# Patient Record
Sex: FEMALE | Race: WHITE | Employment: OTHER | URBAN - METROPOLITAN AREA
[De-identification: names, ages, dates, MRNs, and addresses within clinical notes are randomized per-mention and may not be internally consistent; named-entity substitution may affect disease eponyms.]

---

## 2017-01-13 ENCOUNTER — ANTI-COAG VISIT (OUTPATIENT)
Dept: PHARMACY | Facility: CLINIC | Age: 77
End: 2017-01-13

## 2017-01-13 DIAGNOSIS — I48.91 ATRIAL FIBRILLATION WITH RVR (HCC): ICD-10-CM

## 2017-01-13 LAB — INR BLD: 2.6

## 2017-01-16 ENCOUNTER — TELEPHONE (OUTPATIENT)
Dept: ORTHOPEDIC SURGERY | Age: 77
End: 2017-01-16

## 2017-01-17 ENCOUNTER — OFFICE VISIT (OUTPATIENT)
Dept: INTERNAL MEDICINE | Age: 77
End: 2017-01-17

## 2017-01-17 ENCOUNTER — OFFICE VISIT (OUTPATIENT)
Dept: CARDIOLOGY CLINIC | Age: 77
End: 2017-01-17

## 2017-01-17 VITALS
WEIGHT: 272 LBS | HEIGHT: 67 IN | BODY MASS INDEX: 42.69 KG/M2 | DIASTOLIC BLOOD PRESSURE: 80 MMHG | HEART RATE: 78 BPM | RESPIRATION RATE: 18 BRPM | TEMPERATURE: 98.4 F | SYSTOLIC BLOOD PRESSURE: 124 MMHG

## 2017-01-17 VITALS — DIASTOLIC BLOOD PRESSURE: 78 MMHG | HEART RATE: 80 BPM | SYSTOLIC BLOOD PRESSURE: 118 MMHG

## 2017-01-17 DIAGNOSIS — E79.0 HYPERURICEMIA: ICD-10-CM

## 2017-01-17 DIAGNOSIS — M17.11 PRIMARY OSTEOARTHRITIS OF RIGHT KNEE: ICD-10-CM

## 2017-01-17 DIAGNOSIS — Z86.59 HISTORY OF DEPRESSION: ICD-10-CM

## 2017-01-17 DIAGNOSIS — M1A.9XX0 CHRONIC GOUT WITHOUT TOPHUS, UNSPECIFIED CAUSE, UNSPECIFIED SITE: ICD-10-CM

## 2017-01-17 DIAGNOSIS — R92.8 ABNORMAL MAMMOGRAM: ICD-10-CM

## 2017-01-17 DIAGNOSIS — R06.09 DOE (DYSPNEA ON EXERTION): ICD-10-CM

## 2017-01-17 DIAGNOSIS — I48.91 ATRIAL FIBRILLATION WITH RVR (HCC): Primary | ICD-10-CM

## 2017-01-17 DIAGNOSIS — E79.0 HYPERURICEMIA: Primary | ICD-10-CM

## 2017-01-17 DIAGNOSIS — I10 ESSENTIAL HYPERTENSION: ICD-10-CM

## 2017-01-17 DIAGNOSIS — N18.2 CHRONIC KIDNEY DISEASE (CKD), STAGE II (MILD): ICD-10-CM

## 2017-01-17 DIAGNOSIS — I48.91 ATRIAL FIBRILLATION WITH RVR (HCC): ICD-10-CM

## 2017-01-17 DIAGNOSIS — Z01.818 PREOP EXAM FOR INTERNAL MEDICINE: Primary | ICD-10-CM

## 2017-01-17 DIAGNOSIS — E78.00 PURE HYPERCHOLESTEROLEMIA: ICD-10-CM

## 2017-01-17 LAB
A/G RATIO: 1.5 (ref 1.1–2.2)
ALBUMIN SERPL-MCNC: 4.1 G/DL (ref 3.4–5)
ALP BLD-CCNC: 71 U/L (ref 40–129)
ALT SERPL-CCNC: 12 U/L (ref 10–40)
ANION GAP SERPL CALCULATED.3IONS-SCNC: 16 MMOL/L (ref 3–16)
APTT: 43 SEC (ref 25.2–36.4)
AST SERPL-CCNC: 16 U/L (ref 15–37)
BASOPHILS ABSOLUTE: 0 K/UL (ref 0–0.2)
BASOPHILS RELATIVE PERCENT: 0.5 %
BILIRUB SERPL-MCNC: 0.4 MG/DL (ref 0–1)
BUN BLDV-MCNC: 27 MG/DL (ref 7–20)
CALCIUM SERPL-MCNC: 9.1 MG/DL (ref 8.3–10.6)
CHLORIDE BLD-SCNC: 103 MMOL/L (ref 99–110)
CO2: 23 MMOL/L (ref 21–32)
CREAT SERPL-MCNC: 1.6 MG/DL (ref 0.6–1.2)
EOSINOPHILS ABSOLUTE: 0.1 K/UL (ref 0–0.6)
EOSINOPHILS RELATIVE PERCENT: 1.8 %
GFR AFRICAN AMERICAN: 38
GFR NON-AFRICAN AMERICAN: 31
GLOBULIN: 2.8 G/DL
GLUCOSE BLD-MCNC: 98 MG/DL (ref 70–99)
HCT VFR BLD CALC: 43.1 % (ref 36–48)
HEMOGLOBIN: 13.8 G/DL (ref 12–16)
INR BLD: 2.45 (ref 0.85–1.16)
LYMPHOCYTES ABSOLUTE: 2.2 K/UL (ref 1–5.1)
LYMPHOCYTES RELATIVE PERCENT: 27.3 %
MCH RBC QN AUTO: 29.6 PG (ref 26–34)
MCHC RBC AUTO-ENTMCNC: 32.1 G/DL (ref 31–36)
MCV RBC AUTO: 92 FL (ref 80–100)
MONOCYTES ABSOLUTE: 0.7 K/UL (ref 0–1.3)
MONOCYTES RELATIVE PERCENT: 8.8 %
NEUTROPHILS ABSOLUTE: 4.9 K/UL (ref 1.7–7.7)
NEUTROPHILS RELATIVE PERCENT: 61.6 %
PDW BLD-RTO: 18 % (ref 12.4–15.4)
PLATELET # BLD: 250 K/UL (ref 135–450)
PMV BLD AUTO: 8.6 FL (ref 5–10.5)
POTASSIUM SERPL-SCNC: 4.9 MMOL/L (ref 3.5–5.1)
PROTHROMBIN TIME: 28.5 SEC (ref 9.8–13)
RBC # BLD: 4.68 M/UL (ref 4–5.2)
SODIUM BLD-SCNC: 142 MMOL/L (ref 136–145)
TOTAL PROTEIN: 6.9 G/DL (ref 6.4–8.2)
URIC ACID, SERUM: 4 MG/DL (ref 2.6–6)
WBC # BLD: 7.9 K/UL (ref 4–11)

## 2017-01-17 PROCEDURE — 1090F PRES/ABSN URINE INCON ASSESS: CPT | Performed by: INTERNAL MEDICINE

## 2017-01-17 PROCEDURE — 1036F TOBACCO NON-USER: CPT | Performed by: INTERNAL MEDICINE

## 2017-01-17 PROCEDURE — G8428 CUR MEDS NOT DOCUMENT: HCPCS | Performed by: INTERNAL MEDICINE

## 2017-01-17 PROCEDURE — G8419 CALC BMI OUT NRM PARAM NOF/U: HCPCS | Performed by: INTERNAL MEDICINE

## 2017-01-17 PROCEDURE — 4040F PNEUMOC VAC/ADMIN/RCVD: CPT | Performed by: INTERNAL MEDICINE

## 2017-01-17 PROCEDURE — 99213 OFFICE O/P EST LOW 20 MIN: CPT | Performed by: INTERNAL MEDICINE

## 2017-01-17 PROCEDURE — G8399 PT W/DXA RESULTS DOCUMENT: HCPCS | Performed by: INTERNAL MEDICINE

## 2017-01-17 PROCEDURE — 99215 OFFICE O/P EST HI 40 MIN: CPT | Performed by: INTERNAL MEDICINE

## 2017-01-17 PROCEDURE — G8427 DOCREV CUR MEDS BY ELIG CLIN: HCPCS | Performed by: INTERNAL MEDICINE

## 2017-01-17 PROCEDURE — G8484 FLU IMMUNIZE NO ADMIN: HCPCS | Performed by: INTERNAL MEDICINE

## 2017-01-17 PROCEDURE — 1123F ACP DISCUSS/DSCN MKR DOCD: CPT | Performed by: INTERNAL MEDICINE

## 2017-01-17 ASSESSMENT — ENCOUNTER SYMPTOMS
SHORTNESS OF BREATH: 1
WHEEZING: 0
COUGH: 0
CHOKING: 0
CHEST TIGHTNESS: 0
ABDOMINAL PAIN: 0
BACK PAIN: 0
COLOR CHANGE: 0

## 2017-01-18 ENCOUNTER — ANTI-COAG VISIT (OUTPATIENT)
Dept: INTERNAL MEDICINE | Age: 77
End: 2017-01-18

## 2017-01-18 DIAGNOSIS — I48.91 ATRIAL FIBRILLATION WITH RVR (HCC): ICD-10-CM

## 2017-01-18 LAB
ESTIMATED AVERAGE GLUCOSE: 108.3 MG/DL
HBA1C MFR BLD: 5.4 %

## 2017-01-19 ENCOUNTER — TELEPHONE (OUTPATIENT)
Dept: CARDIOLOGY CLINIC | Age: 77
End: 2017-01-19

## 2017-01-19 ENCOUNTER — HOSPITAL ENCOUNTER (OUTPATIENT)
Dept: OTHER | Age: 77
Discharge: HOME | End: 2017-01-19
Attending: ORTHOPAEDIC SURGERY | Admitting: ORTHOPAEDIC SURGERY

## 2017-01-19 LAB
BACTERIA: ABNORMAL /HPF
BILIRUBIN URINE: NEGATIVE
BLOOD, URINE: ABNORMAL
CLARITY: CLEAR
COLOR: YELLOW
EPITHELIAL CELLS, UA: ABNORMAL /HPF
GLUCOSE URINE: NEGATIVE MG/DL
KETONES, URINE: NEGATIVE MG/DL
LEUKOCYTE ESTERASE, URINE: NEGATIVE
MICROSCOPIC EXAMINATION: YES
NITRITE, URINE: NEGATIVE
PH UA: 6
PROTEIN UA: NEGATIVE MG/DL
RBC UA: ABNORMAL /HPF (ref 0–2)
RENAL EPITHELIAL, UA: ABNORMAL /HPF
SPECIFIC GRAVITY UA: 1.02
URINE TYPE: ABNORMAL
UROBILINOGEN, URINE: 0.2 E.U./DL
WBC UA: ABNORMAL /HPF (ref 0–5)

## 2017-01-21 LAB — URINE CULTURE, ROUTINE: NORMAL

## 2017-01-26 PROBLEM — Z96.651 STATUS POST TOTAL RIGHT KNEE REPLACEMENT: Status: ACTIVE | Noted: 2017-01-26

## 2017-01-30 ENCOUNTER — CARE COORDINATION (OUTPATIENT)
Dept: CASE MANAGEMENT | Age: 77
End: 2017-01-30

## 2017-01-31 ENCOUNTER — ANTI-COAG VISIT (OUTPATIENT)
Dept: PHARMACY | Facility: CLINIC | Age: 77
End: 2017-01-31

## 2017-01-31 DIAGNOSIS — I48.20 CHRONIC ATRIAL FIBRILLATION (HCC): ICD-10-CM

## 2017-01-31 LAB — INR BLD: 1.7

## 2017-02-01 ENCOUNTER — ANTI-COAG VISIT (OUTPATIENT)
Dept: PHARMACY | Facility: CLINIC | Age: 77
End: 2017-02-01

## 2017-02-01 LAB — INR BLD: 1.8

## 2017-02-02 ENCOUNTER — CARE COORDINATION (OUTPATIENT)
Dept: CASE MANAGEMENT | Age: 77
End: 2017-02-02

## 2017-02-03 ENCOUNTER — ANTI-COAG VISIT (OUTPATIENT)
Dept: PHARMACY | Facility: CLINIC | Age: 77
End: 2017-02-03

## 2017-02-03 LAB — INR BLD: 1.9

## 2017-02-06 ENCOUNTER — ANTI-COAG VISIT (OUTPATIENT)
Dept: PHARMACY | Facility: CLINIC | Age: 77
End: 2017-02-06

## 2017-02-06 ENCOUNTER — CARE COORDINATION (OUTPATIENT)
Dept: CASE MANAGEMENT | Age: 77
End: 2017-02-06

## 2017-02-06 DIAGNOSIS — I48.20 CHRONIC ATRIAL FIBRILLATION (HCC): ICD-10-CM

## 2017-02-06 LAB — INR BLD: 2.1

## 2017-02-09 ENCOUNTER — CARE COORDINATION (OUTPATIENT)
Dept: CASE MANAGEMENT | Age: 77
End: 2017-02-09

## 2017-02-10 ENCOUNTER — ANTI-COAG VISIT (OUTPATIENT)
Dept: PHARMACY | Facility: CLINIC | Age: 77
End: 2017-02-10

## 2017-02-10 ENCOUNTER — OFFICE VISIT (OUTPATIENT)
Dept: ORTHOPEDIC SURGERY | Age: 77
End: 2017-02-10

## 2017-02-10 VITALS — BODY MASS INDEX: 42.7 KG/M2 | HEIGHT: 67 IN | WEIGHT: 272.05 LBS

## 2017-02-10 DIAGNOSIS — M25.561 RIGHT KNEE PAIN, UNSPECIFIED CHRONICITY: Primary | ICD-10-CM

## 2017-02-10 DIAGNOSIS — I48.20 CHRONIC ATRIAL FIBRILLATION (HCC): ICD-10-CM

## 2017-02-10 DIAGNOSIS — Z96.651 STATUS POST TOTAL RIGHT KNEE REPLACEMENT: ICD-10-CM

## 2017-02-10 LAB — INR BLD: 2.2

## 2017-02-10 PROCEDURE — 73562 X-RAY EXAM OF KNEE 3: CPT | Performed by: PHYSICIAN ASSISTANT

## 2017-02-10 PROCEDURE — 99024 POSTOP FOLLOW-UP VISIT: CPT | Performed by: PHYSICIAN ASSISTANT

## 2017-02-13 ENCOUNTER — CARE COORDINATION (OUTPATIENT)
Dept: CASE MANAGEMENT | Age: 77
End: 2017-02-13

## 2017-02-15 ENCOUNTER — HOSPITAL ENCOUNTER (OUTPATIENT)
Dept: PHYSICAL THERAPY | Age: 77
Discharge: HOME | End: 2017-02-28
Admitting: ORTHOPAEDIC SURGERY

## 2017-02-17 ENCOUNTER — ANTI-COAG VISIT (OUTPATIENT)
Dept: PHARMACY | Facility: CLINIC | Age: 77
End: 2017-02-17

## 2017-02-17 DIAGNOSIS — I48.20 CHRONIC ATRIAL FIBRILLATION (HCC): ICD-10-CM

## 2017-02-17 LAB — INR BLD: 3.2

## 2017-02-20 ENCOUNTER — HOSPITAL ENCOUNTER (OUTPATIENT)
Dept: PHYSICAL THERAPY | Age: 77
Discharge: HOME | End: 2017-02-20
Admitting: ORTHOPAEDIC SURGERY

## 2017-02-22 ENCOUNTER — CARE COORDINATION (OUTPATIENT)
Dept: CASE MANAGEMENT | Age: 77
End: 2017-02-22

## 2017-02-22 ENCOUNTER — HOSPITAL ENCOUNTER (OUTPATIENT)
Dept: PHYSICAL THERAPY | Age: 77
Discharge: HOME | End: 2017-02-22
Admitting: ORTHOPAEDIC SURGERY

## 2017-02-24 ENCOUNTER — ANTI-COAG VISIT (OUTPATIENT)
Dept: PHARMACY | Facility: CLINIC | Age: 77
End: 2017-02-24

## 2017-02-24 DIAGNOSIS — I48.20 CHRONIC ATRIAL FIBRILLATION (HCC): ICD-10-CM

## 2017-02-24 LAB — INR BLD: 2.8

## 2017-02-24 RX ORDER — ALLOPURINOL 100 MG/1
TABLET ORAL
Qty: 90 TABLET | Refills: 0 | Status: SHIPPED | OUTPATIENT
Start: 2017-02-24 | End: 2017-05-26 | Stop reason: SDUPTHER

## 2017-02-28 ENCOUNTER — CARE COORDINATION (OUTPATIENT)
Dept: CASE MANAGEMENT | Age: 77
End: 2017-02-28

## 2017-02-28 ENCOUNTER — HOSPITAL ENCOUNTER (OUTPATIENT)
Dept: PHYSICAL THERAPY | Age: 77
Discharge: HOME | End: 2017-02-28
Admitting: ORTHOPAEDIC SURGERY

## 2017-03-06 ENCOUNTER — HOSPITAL ENCOUNTER (OUTPATIENT)
Dept: PHYSICAL THERAPY | Age: 77
Discharge: HOME | End: 2017-03-06
Admitting: ORTHOPAEDIC SURGERY

## 2017-03-07 ENCOUNTER — CARE COORDINATION (OUTPATIENT)
Dept: CASE MANAGEMENT | Age: 77
End: 2017-03-07

## 2017-03-10 ENCOUNTER — OFFICE VISIT (OUTPATIENT)
Dept: ORTHOPEDIC SURGERY | Age: 77
End: 2017-03-10

## 2017-03-10 DIAGNOSIS — Z96.651 STATUS POST TOTAL RIGHT KNEE REPLACEMENT: Primary | ICD-10-CM

## 2017-03-10 PROCEDURE — 99024 POSTOP FOLLOW-UP VISIT: CPT | Performed by: ORTHOPAEDIC SURGERY

## 2017-03-13 ENCOUNTER — TELEPHONE (OUTPATIENT)
Dept: OTHER | Facility: CLINIC | Age: 77
End: 2017-03-13

## 2017-03-14 ENCOUNTER — CARE COORDINATION (OUTPATIENT)
Dept: CASE MANAGEMENT | Age: 77
End: 2017-03-14

## 2017-03-20 ENCOUNTER — CARE COORDINATION (OUTPATIENT)
Dept: CASE MANAGEMENT | Age: 77
End: 2017-03-20

## 2017-03-24 ENCOUNTER — ANTI-COAG VISIT (OUTPATIENT)
Dept: PHARMACY | Facility: CLINIC | Age: 77
End: 2017-03-24

## 2017-03-24 DIAGNOSIS — I48.20 CHRONIC ATRIAL FIBRILLATION (HCC): ICD-10-CM

## 2017-03-24 LAB — INR BLD: 2.6

## 2017-04-03 ENCOUNTER — CARE COORDINATION (OUTPATIENT)
Dept: CASE MANAGEMENT | Age: 77
End: 2017-04-03

## 2017-04-05 RX ORDER — SIMVASTATIN 10 MG
TABLET ORAL
Qty: 90 TABLET | Refills: 2 | Status: ON HOLD | OUTPATIENT
Start: 2017-04-05 | End: 2017-12-30 | Stop reason: HOSPADM

## 2017-04-05 RX ORDER — DILTIAZEM HYDROCHLORIDE 300 MG/1
CAPSULE, EXTENDED RELEASE ORAL
Qty: 90 CAPSULE | Refills: 2 | Status: ON HOLD | OUTPATIENT
Start: 2017-04-05 | End: 2017-12-30 | Stop reason: HOSPADM

## 2017-04-07 ENCOUNTER — OFFICE VISIT (OUTPATIENT)
Dept: ORTHOPEDIC SURGERY | Age: 77
End: 2017-04-07

## 2017-04-07 VITALS — WEIGHT: 272.05 LBS | BODY MASS INDEX: 42.7 KG/M2 | HEIGHT: 67 IN

## 2017-04-07 DIAGNOSIS — Z96.651 STATUS POST TOTAL RIGHT KNEE REPLACEMENT: Primary | ICD-10-CM

## 2017-04-07 PROCEDURE — 99024 POSTOP FOLLOW-UP VISIT: CPT | Performed by: ORTHOPAEDIC SURGERY

## 2017-04-07 RX ORDER — CLINDAMYCIN HYDROCHLORIDE 150 MG/1
CAPSULE ORAL
Qty: 12 CAPSULE | Refills: 1 | Status: SHIPPED | OUTPATIENT
Start: 2017-04-07 | End: 2017-04-11

## 2017-04-11 ENCOUNTER — OFFICE VISIT (OUTPATIENT)
Dept: INTERNAL MEDICINE | Age: 77
End: 2017-04-11

## 2017-04-11 VITALS
DIASTOLIC BLOOD PRESSURE: 72 MMHG | BODY MASS INDEX: 43.07 KG/M2 | WEIGHT: 268 LBS | HEIGHT: 66 IN | SYSTOLIC BLOOD PRESSURE: 124 MMHG

## 2017-04-11 DIAGNOSIS — G47.30 SLEEP APNEA, UNSPECIFIED TYPE: ICD-10-CM

## 2017-04-11 DIAGNOSIS — I48.20 CHRONIC ATRIAL FIBRILLATION (HCC): ICD-10-CM

## 2017-04-11 DIAGNOSIS — I10 ESSENTIAL HYPERTENSION: ICD-10-CM

## 2017-04-11 DIAGNOSIS — E78.00 PURE HYPERCHOLESTEROLEMIA: ICD-10-CM

## 2017-04-11 DIAGNOSIS — E79.0 HYPERURICEMIA: ICD-10-CM

## 2017-04-11 DIAGNOSIS — N18.2 CHRONIC KIDNEY DISEASE (CKD), STAGE II (MILD): ICD-10-CM

## 2017-04-11 DIAGNOSIS — E03.9 ACQUIRED HYPOTHYROIDISM: ICD-10-CM

## 2017-04-11 DIAGNOSIS — Z00.00 WELL ADULT EXAM: Primary | ICD-10-CM

## 2017-04-11 DIAGNOSIS — M17.11 PRIMARY OSTEOARTHRITIS OF RIGHT KNEE: ICD-10-CM

## 2017-04-11 PROCEDURE — G8399 PT W/DXA RESULTS DOCUMENT: HCPCS | Performed by: INTERNAL MEDICINE

## 2017-04-11 PROCEDURE — G0439 PPPS, SUBSEQ VISIT: HCPCS | Performed by: INTERNAL MEDICINE

## 2017-04-11 PROCEDURE — G8427 DOCREV CUR MEDS BY ELIG CLIN: HCPCS | Performed by: INTERNAL MEDICINE

## 2017-04-11 PROCEDURE — 4040F PNEUMOC VAC/ADMIN/RCVD: CPT | Performed by: INTERNAL MEDICINE

## 2017-04-11 PROCEDURE — 1123F ACP DISCUSS/DSCN MKR DOCD: CPT | Performed by: INTERNAL MEDICINE

## 2017-04-11 PROCEDURE — 99214 OFFICE O/P EST MOD 30 MIN: CPT | Performed by: INTERNAL MEDICINE

## 2017-04-11 PROCEDURE — G8417 CALC BMI ABV UP PARAM F/U: HCPCS | Performed by: INTERNAL MEDICINE

## 2017-04-11 PROCEDURE — 1090F PRES/ABSN URINE INCON ASSESS: CPT | Performed by: INTERNAL MEDICINE

## 2017-04-11 PROCEDURE — 1036F TOBACCO NON-USER: CPT | Performed by: INTERNAL MEDICINE

## 2017-04-11 ASSESSMENT — LIFESTYLE VARIABLES: HOW OFTEN DO YOU HAVE A DRINK CONTAINING ALCOHOL: 0

## 2017-04-11 ASSESSMENT — ANXIETY QUESTIONNAIRES: GAD7 TOTAL SCORE: 0

## 2017-04-16 PROBLEM — R92.8 ABNORMAL MAMMOGRAM: Status: RESOLVED | Noted: 2017-01-17 | Resolved: 2017-04-16

## 2017-04-16 PROBLEM — Z96.651 STATUS POST TOTAL RIGHT KNEE REPLACEMENT: Status: RESOLVED | Noted: 2017-01-26 | Resolved: 2017-04-16

## 2017-04-16 ASSESSMENT — ENCOUNTER SYMPTOMS
ABDOMINAL PAIN: 0
NAUSEA: 0
CHEST TIGHTNESS: 0
COUGH: 0
BACK PAIN: 0
COLOR CHANGE: 0
CONSTIPATION: 0
WHEEZING: 0

## 2017-04-18 ENCOUNTER — CARE COORDINATION (OUTPATIENT)
Dept: CASE MANAGEMENT | Age: 77
End: 2017-04-18

## 2017-04-21 ENCOUNTER — ANTI-COAG VISIT (OUTPATIENT)
Dept: PHARMACY | Facility: CLINIC | Age: 77
End: 2017-04-21

## 2017-04-21 LAB — INR BLD: 2.2

## 2017-04-25 RX ORDER — WARFARIN SODIUM 1 MG/1
TABLET ORAL
Qty: 90 TABLET | Refills: 0 | Status: SHIPPED | OUTPATIENT
Start: 2017-04-25 | End: 2017-07-24 | Stop reason: SDUPTHER

## 2017-05-19 ENCOUNTER — ANTI-COAG VISIT (OUTPATIENT)
Dept: PHARMACY | Facility: CLINIC | Age: 77
End: 2017-05-19

## 2017-05-19 LAB — INR BLD: 2.5

## 2017-05-25 ENCOUNTER — OFFICE VISIT (OUTPATIENT)
Dept: CARDIOLOGY CLINIC | Age: 77
End: 2017-05-25

## 2017-05-25 VITALS
BODY MASS INDEX: 43.84 KG/M2 | WEIGHT: 271.6 LBS | DIASTOLIC BLOOD PRESSURE: 80 MMHG | HEART RATE: 80 BPM | SYSTOLIC BLOOD PRESSURE: 120 MMHG

## 2017-05-25 DIAGNOSIS — I48.20 CHRONIC ATRIAL FIBRILLATION (HCC): Primary | ICD-10-CM

## 2017-05-25 PROCEDURE — G8417 CALC BMI ABV UP PARAM F/U: HCPCS | Performed by: INTERNAL MEDICINE

## 2017-05-25 PROCEDURE — 99213 OFFICE O/P EST LOW 20 MIN: CPT | Performed by: INTERNAL MEDICINE

## 2017-05-25 PROCEDURE — G8399 PT W/DXA RESULTS DOCUMENT: HCPCS | Performed by: INTERNAL MEDICINE

## 2017-05-25 PROCEDURE — 1123F ACP DISCUSS/DSCN MKR DOCD: CPT | Performed by: INTERNAL MEDICINE

## 2017-05-25 PROCEDURE — 1036F TOBACCO NON-USER: CPT | Performed by: INTERNAL MEDICINE

## 2017-05-25 PROCEDURE — G8427 DOCREV CUR MEDS BY ELIG CLIN: HCPCS | Performed by: INTERNAL MEDICINE

## 2017-05-25 PROCEDURE — 4040F PNEUMOC VAC/ADMIN/RCVD: CPT | Performed by: INTERNAL MEDICINE

## 2017-05-25 PROCEDURE — 1090F PRES/ABSN URINE INCON ASSESS: CPT | Performed by: INTERNAL MEDICINE

## 2017-05-26 RX ORDER — ALLOPURINOL 100 MG/1
TABLET ORAL
Qty: 90 TABLET | Refills: 0 | Status: SHIPPED | OUTPATIENT
Start: 2017-05-26 | End: 2017-08-30 | Stop reason: SDUPTHER

## 2017-06-07 ENCOUNTER — OFFICE VISIT (OUTPATIENT)
Dept: ORTHOPEDIC SURGERY | Age: 77
End: 2017-06-07

## 2017-06-07 VITALS — HEIGHT: 66 IN | WEIGHT: 272 LBS | BODY MASS INDEX: 43.71 KG/M2

## 2017-06-07 DIAGNOSIS — M25.511 RIGHT SHOULDER PAIN, UNSPECIFIED CHRONICITY: Primary | ICD-10-CM

## 2017-06-07 PROCEDURE — 4040F PNEUMOC VAC/ADMIN/RCVD: CPT | Performed by: ORTHOPAEDIC SURGERY

## 2017-06-07 PROCEDURE — 1090F PRES/ABSN URINE INCON ASSESS: CPT | Performed by: ORTHOPAEDIC SURGERY

## 2017-06-07 PROCEDURE — G8417 CALC BMI ABV UP PARAM F/U: HCPCS | Performed by: ORTHOPAEDIC SURGERY

## 2017-06-07 PROCEDURE — 1036F TOBACCO NON-USER: CPT | Performed by: ORTHOPAEDIC SURGERY

## 2017-06-07 PROCEDURE — G8399 PT W/DXA RESULTS DOCUMENT: HCPCS | Performed by: ORTHOPAEDIC SURGERY

## 2017-06-07 PROCEDURE — 20610 DRAIN/INJ JOINT/BURSA W/O US: CPT | Performed by: ORTHOPAEDIC SURGERY

## 2017-06-07 PROCEDURE — 73030 X-RAY EXAM OF SHOULDER: CPT | Performed by: ORTHOPAEDIC SURGERY

## 2017-06-07 PROCEDURE — 1123F ACP DISCUSS/DSCN MKR DOCD: CPT | Performed by: ORTHOPAEDIC SURGERY

## 2017-06-07 PROCEDURE — G8427 DOCREV CUR MEDS BY ELIG CLIN: HCPCS | Performed by: ORTHOPAEDIC SURGERY

## 2017-06-07 PROCEDURE — 99214 OFFICE O/P EST MOD 30 MIN: CPT | Performed by: ORTHOPAEDIC SURGERY

## 2017-06-08 ENCOUNTER — OFFICE VISIT (OUTPATIENT)
Dept: INTERNAL MEDICINE | Age: 77
End: 2017-06-08

## 2017-06-08 DIAGNOSIS — H60.321 ACUTE HEMORRHAGIC OTITIS EXTERNA OF RIGHT EAR: Primary | ICD-10-CM

## 2017-06-08 DIAGNOSIS — H61.23 BILATERAL IMPACTED CERUMEN: ICD-10-CM

## 2017-06-08 PROCEDURE — G8399 PT W/DXA RESULTS DOCUMENT: HCPCS | Performed by: NURSE PRACTITIONER

## 2017-06-08 PROCEDURE — G8417 CALC BMI ABV UP PARAM F/U: HCPCS | Performed by: NURSE PRACTITIONER

## 2017-06-08 PROCEDURE — 1036F TOBACCO NON-USER: CPT | Performed by: NURSE PRACTITIONER

## 2017-06-08 PROCEDURE — 99213 OFFICE O/P EST LOW 20 MIN: CPT | Performed by: NURSE PRACTITIONER

## 2017-06-08 PROCEDURE — 69209 REMOVE IMPACTED EAR WAX UNI: CPT | Performed by: NURSE PRACTITIONER

## 2017-06-08 PROCEDURE — 4130F TOPICAL PREP RX AOE: CPT | Performed by: NURSE PRACTITIONER

## 2017-06-08 PROCEDURE — G8427 DOCREV CUR MEDS BY ELIG CLIN: HCPCS | Performed by: NURSE PRACTITIONER

## 2017-06-08 PROCEDURE — 1090F PRES/ABSN URINE INCON ASSESS: CPT | Performed by: NURSE PRACTITIONER

## 2017-06-08 PROCEDURE — 4040F PNEUMOC VAC/ADMIN/RCVD: CPT | Performed by: NURSE PRACTITIONER

## 2017-06-08 PROCEDURE — 1123F ACP DISCUSS/DSCN MKR DOCD: CPT | Performed by: NURSE PRACTITIONER

## 2017-06-08 RX ORDER — CIPROFLOXACIN AND DEXAMETHASONE 3; 1 MG/ML; MG/ML
4 SUSPENSION/ DROPS AURICULAR (OTIC) 2 TIMES DAILY
Qty: 7.5 ML | Refills: 0 | Status: SHIPPED | OUTPATIENT
Start: 2017-06-08 | End: 2017-06-15

## 2017-06-08 ASSESSMENT — ENCOUNTER SYMPTOMS
SHORTNESS OF BREATH: 0
WHEEZING: 0
SINUS PRESSURE: 0
COUGH: 0

## 2017-06-23 ENCOUNTER — ANTI-COAG VISIT (OUTPATIENT)
Dept: PHARMACY | Facility: CLINIC | Age: 77
End: 2017-06-23

## 2017-06-23 DIAGNOSIS — I48.20 CHRONIC ATRIAL FIBRILLATION (HCC): ICD-10-CM

## 2017-06-23 LAB — INR BLD: 3.1

## 2017-07-05 ENCOUNTER — OFFICE VISIT (OUTPATIENT)
Dept: ORTHOPEDIC SURGERY | Age: 77
End: 2017-07-05

## 2017-07-05 VITALS
DIASTOLIC BLOOD PRESSURE: 78 MMHG | BODY MASS INDEX: 43.71 KG/M2 | HEART RATE: 65 BPM | WEIGHT: 272 LBS | SYSTOLIC BLOOD PRESSURE: 131 MMHG | HEIGHT: 66 IN

## 2017-07-05 DIAGNOSIS — M75.101 TEAR OF RIGHT ROTATOR CUFF, UNSPECIFIED TEAR EXTENT: Primary | ICD-10-CM

## 2017-07-05 PROCEDURE — G8417 CALC BMI ABV UP PARAM F/U: HCPCS | Performed by: ORTHOPAEDIC SURGERY

## 2017-07-05 PROCEDURE — G8399 PT W/DXA RESULTS DOCUMENT: HCPCS | Performed by: ORTHOPAEDIC SURGERY

## 2017-07-05 PROCEDURE — 1090F PRES/ABSN URINE INCON ASSESS: CPT | Performed by: ORTHOPAEDIC SURGERY

## 2017-07-05 PROCEDURE — G8427 DOCREV CUR MEDS BY ELIG CLIN: HCPCS | Performed by: ORTHOPAEDIC SURGERY

## 2017-07-05 PROCEDURE — 1036F TOBACCO NON-USER: CPT | Performed by: ORTHOPAEDIC SURGERY

## 2017-07-05 PROCEDURE — 99213 OFFICE O/P EST LOW 20 MIN: CPT | Performed by: ORTHOPAEDIC SURGERY

## 2017-07-05 PROCEDURE — 4040F PNEUMOC VAC/ADMIN/RCVD: CPT | Performed by: ORTHOPAEDIC SURGERY

## 2017-07-05 PROCEDURE — 1123F ACP DISCUSS/DSCN MKR DOCD: CPT | Performed by: ORTHOPAEDIC SURGERY

## 2017-07-06 ENCOUNTER — HOSPITAL ENCOUNTER (OUTPATIENT)
Dept: MRI IMAGING | Age: 77
Discharge: HOME | End: 2017-07-06

## 2017-07-06 DIAGNOSIS — M75.101 TEAR OF RIGHT ROTATOR CUFF, UNSPECIFIED TEAR EXTENT: ICD-10-CM

## 2017-07-21 ENCOUNTER — ANTI-COAG VISIT (OUTPATIENT)
Dept: PHARMACY | Facility: CLINIC | Age: 77
End: 2017-07-21

## 2017-07-21 DIAGNOSIS — I48.20 CHRONIC ATRIAL FIBRILLATION (HCC): ICD-10-CM

## 2017-07-21 LAB — INR BLD: 3

## 2017-07-26 ENCOUNTER — OFFICE VISIT (OUTPATIENT)
Dept: ORTHOPEDIC SURGERY | Age: 77
End: 2017-07-26

## 2017-07-26 VITALS — BODY MASS INDEX: 43.71 KG/M2 | WEIGHT: 272 LBS | HEIGHT: 66 IN

## 2017-07-26 DIAGNOSIS — M25.511 RIGHT SHOULDER PAIN, UNSPECIFIED CHRONICITY: Primary | ICD-10-CM

## 2017-07-26 PROCEDURE — G8399 PT W/DXA RESULTS DOCUMENT: HCPCS | Performed by: ORTHOPAEDIC SURGERY

## 2017-07-26 PROCEDURE — 4040F PNEUMOC VAC/ADMIN/RCVD: CPT | Performed by: ORTHOPAEDIC SURGERY

## 2017-07-26 PROCEDURE — 1036F TOBACCO NON-USER: CPT | Performed by: ORTHOPAEDIC SURGERY

## 2017-07-26 PROCEDURE — 1090F PRES/ABSN URINE INCON ASSESS: CPT | Performed by: ORTHOPAEDIC SURGERY

## 2017-07-26 PROCEDURE — 1123F ACP DISCUSS/DSCN MKR DOCD: CPT | Performed by: ORTHOPAEDIC SURGERY

## 2017-07-26 PROCEDURE — G8417 CALC BMI ABV UP PARAM F/U: HCPCS | Performed by: ORTHOPAEDIC SURGERY

## 2017-07-26 PROCEDURE — G8428 CUR MEDS NOT DOCUMENT: HCPCS | Performed by: ORTHOPAEDIC SURGERY

## 2017-07-26 PROCEDURE — 99213 OFFICE O/P EST LOW 20 MIN: CPT | Performed by: ORTHOPAEDIC SURGERY

## 2017-07-26 RX ORDER — WARFARIN SODIUM 1 MG/1
1 TABLET ORAL DAILY
Qty: 90 TABLET | Refills: 3 | Status: SHIPPED | OUTPATIENT
Start: 2017-07-26 | End: 2018-02-06

## 2017-08-18 ENCOUNTER — ANTI-COAG VISIT (OUTPATIENT)
Dept: PHARMACY | Facility: CLINIC | Age: 77
End: 2017-08-18

## 2017-08-18 DIAGNOSIS — I48.20 CHRONIC ATRIAL FIBRILLATION (HCC): ICD-10-CM

## 2017-08-18 LAB — INR BLD: 2.8

## 2017-09-15 ENCOUNTER — ANTI-COAG VISIT (OUTPATIENT)
Dept: PHARMACY | Facility: CLINIC | Age: 77
End: 2017-09-15

## 2017-09-15 DIAGNOSIS — I48.20 CHRONIC ATRIAL FIBRILLATION (HCC): ICD-10-CM

## 2017-09-15 LAB — INR BLD: 3.4

## 2017-09-26 DIAGNOSIS — Z86.59 HISTORY OF DEPRESSION: ICD-10-CM

## 2017-09-26 DIAGNOSIS — I15.9 SECONDARY HYPERTENSION: ICD-10-CM

## 2017-09-26 DIAGNOSIS — E78.5 HYPERLIPIDEMIA: ICD-10-CM

## 2017-09-26 DIAGNOSIS — E03.9 HYPOTHYROIDISM: ICD-10-CM

## 2017-09-26 DIAGNOSIS — R07.9 CHEST PAIN: ICD-10-CM

## 2017-09-26 DIAGNOSIS — Z00.00 WELL ADULT EXAM: ICD-10-CM

## 2017-09-26 DIAGNOSIS — E79.0 HYPERURICEMIA: ICD-10-CM

## 2017-09-26 DIAGNOSIS — M10.9 GOUT, UNSPECIFIED CAUSE, UNSPECIFIED CHRONICITY, UNSPECIFIED SITE: ICD-10-CM

## 2017-09-26 RX ORDER — ALLOPURINOL 300 MG/1
TABLET ORAL
Qty: 90 TABLET | Refills: 1 | Status: SHIPPED | OUTPATIENT
Start: 2017-09-26 | End: 2018-04-02 | Stop reason: SDUPTHER

## 2017-10-06 ENCOUNTER — ANTI-COAG VISIT (OUTPATIENT)
Dept: PHARMACY | Facility: CLINIC | Age: 77
End: 2017-10-06

## 2017-10-06 LAB — INR BLD: 4.2

## 2017-10-06 NOTE — PROGRESS NOTES
Ms. Zev Pineda is here for management of anticoagulation for Afib. PMH also significant for HTN, Gout, CKD II/III, Hyperlipidemia, and depression. She presents today w/out complaint. Pt verifies dosing regimen as listed above. Pt denies s/s bleeding/swelling/SOB. No BRBPR. No melena. No missed doses   No changes in RX/OTCs/Herbal medications. Reviewed dietary concerns. Pt does not drink EtOH or smoke. Unsure why INR is high, patient denies any changes. INR high two consecutive visits. Will hold 1 dose and decrease weekly dosage. INR 4.2 is above the therapeutic range of 2-3. Recommend to hold dose x 1 and then decrease warfarin regimen to 1 mg daily, except 0.5 mg on Sun/Fri. Patient has 1 mg tablets. Will continue to monitor and check INR in 2 weeks. Dosing reminder card given with phone number, appointment date and time.    Return to clinic: 10/20 @ 11:00 am    Merline Homestead, PharmD 10/6/2017 11:31 AM

## 2017-10-17 ENCOUNTER — OFFICE VISIT (OUTPATIENT)
Dept: INTERNAL MEDICINE | Age: 77
End: 2017-10-17

## 2017-10-17 VITALS
HEIGHT: 67 IN | SYSTOLIC BLOOD PRESSURE: 144 MMHG | DIASTOLIC BLOOD PRESSURE: 80 MMHG | BODY MASS INDEX: 42.85 KG/M2 | WEIGHT: 273 LBS

## 2017-10-17 DIAGNOSIS — N18.2 CHRONIC KIDNEY DISEASE (CKD), STAGE II (MILD): ICD-10-CM

## 2017-10-17 DIAGNOSIS — M25.562 CHRONIC PAIN OF BOTH KNEES: ICD-10-CM

## 2017-10-17 DIAGNOSIS — E79.0 HYPERURICEMIA: ICD-10-CM

## 2017-10-17 DIAGNOSIS — Z23 NEED FOR INFLUENZA VACCINATION: ICD-10-CM

## 2017-10-17 DIAGNOSIS — M25.561 CHRONIC PAIN OF BOTH KNEES: ICD-10-CM

## 2017-10-17 DIAGNOSIS — I10 ESSENTIAL HYPERTENSION: Primary | ICD-10-CM

## 2017-10-17 DIAGNOSIS — E78.00 PURE HYPERCHOLESTEROLEMIA: ICD-10-CM

## 2017-10-17 DIAGNOSIS — E03.9 ACQUIRED HYPOTHYROIDISM: ICD-10-CM

## 2017-10-17 DIAGNOSIS — E66.01 MORBID OBESITY WITH BMI OF 40.0-44.9, ADULT (HCC): ICD-10-CM

## 2017-10-17 DIAGNOSIS — I48.20 CHRONIC ATRIAL FIBRILLATION (HCC): ICD-10-CM

## 2017-10-17 DIAGNOSIS — M1A.9XX0 CHRONIC GOUT WITHOUT TOPHUS, UNSPECIFIED CAUSE, UNSPECIFIED SITE: ICD-10-CM

## 2017-10-17 DIAGNOSIS — G89.29 CHRONIC PAIN OF BOTH KNEES: ICD-10-CM

## 2017-10-17 PROCEDURE — G8427 DOCREV CUR MEDS BY ELIG CLIN: HCPCS | Performed by: INTERNAL MEDICINE

## 2017-10-17 PROCEDURE — 99214 OFFICE O/P EST MOD 30 MIN: CPT | Performed by: INTERNAL MEDICINE

## 2017-10-17 PROCEDURE — 1036F TOBACCO NON-USER: CPT | Performed by: INTERNAL MEDICINE

## 2017-10-17 PROCEDURE — G8417 CALC BMI ABV UP PARAM F/U: HCPCS | Performed by: INTERNAL MEDICINE

## 2017-10-17 PROCEDURE — 1123F ACP DISCUSS/DSCN MKR DOCD: CPT | Performed by: INTERNAL MEDICINE

## 2017-10-17 PROCEDURE — 90662 IIV NO PRSV INCREASED AG IM: CPT | Performed by: INTERNAL MEDICINE

## 2017-10-17 PROCEDURE — 4040F PNEUMOC VAC/ADMIN/RCVD: CPT | Performed by: INTERNAL MEDICINE

## 2017-10-17 PROCEDURE — G8484 FLU IMMUNIZE NO ADMIN: HCPCS | Performed by: INTERNAL MEDICINE

## 2017-10-17 PROCEDURE — G0008 ADMIN INFLUENZA VIRUS VAC: HCPCS | Performed by: INTERNAL MEDICINE

## 2017-10-17 PROCEDURE — 1090F PRES/ABSN URINE INCON ASSESS: CPT | Performed by: INTERNAL MEDICINE

## 2017-10-17 PROCEDURE — G8399 PT W/DXA RESULTS DOCUMENT: HCPCS | Performed by: INTERNAL MEDICINE

## 2017-10-17 ASSESSMENT — PATIENT HEALTH QUESTIONNAIRE - PHQ9
2. FEELING DOWN, DEPRESSED OR HOPELESS: 0
SUM OF ALL RESPONSES TO PHQ9 QUESTIONS 1 & 2: 0
1. LITTLE INTEREST OR PLEASURE IN DOING THINGS: 0
SUM OF ALL RESPONSES TO PHQ QUESTIONS 1-9: 0

## 2017-10-17 ASSESSMENT — ENCOUNTER SYMPTOMS
CHEST TIGHTNESS: 0
COLOR CHANGE: 0
BACK PAIN: 0
ABDOMINAL PAIN: 0
WHEEZING: 0

## 2017-10-17 NOTE — PROGRESS NOTES
Subjective:      Patient ID: Sheila Naylor is a 65 y.o. female. In for check up- knees bothering her a lot still- no progress w wt loss-no recent gouty flares- bp's have been well controlled -utd w cardiology and rate controlled       Review of Systems   Constitutional: Negative for activity change, appetite change and fatigue. HENT: Negative for congestion. Eyes: Negative for visual disturbance. Respiratory: Negative for chest tightness and wheezing. Cardiovascular: Negative for chest pain and palpitations. Gastrointestinal: Negative for abdominal pain. Musculoskeletal: Positive for arthralgias and gait problem. Negative for back pain. Skin: Negative for color change and rash. Neurological: Negative for weakness, light-headedness and headaches. Psychiatric/Behavioral: Negative for dysphoric mood and sleep disturbance. The patient is not nervous/anxious. Objective:   Physical Exam   Constitutional: She is oriented to person, place, and time. She appears well-developed and well-nourished. HENT:   Head: Normocephalic and atraumatic. Eyes: Conjunctivae and EOM are normal. Pupils are equal, round, and reactive to light. Neck: Normal range of motion. Neck supple. Cardiovascular: Normal rate, regular rhythm and normal heart sounds. No carotid bruit auscultated bilaterally   Pulmonary/Chest: Effort normal and breath sounds normal. No respiratory distress. She has no wheezes. Abdominal: She exhibits no distension. There is no tenderness. Musculoskeletal: She exhibits tenderness. sridevi knee pain   Lymphadenopathy:     She has no cervical adenopathy. Neurological: She is alert and oriented to person, place, and time. Skin: Skin is warm and dry. Psychiatric: She has a normal mood and affect.  Her behavior is normal. Thought content normal.         Assessment and Plan:      Hypothyroid  Recheck labs in jan before physical     Essential hypertension  Controlled w current regimen- continue and monitor closely      Hyperlipidemia  Dominick labs now and try to watch diet     Morbid obesity with BMI of 40.0-44.9, adult [facility])  Discussed with patient at length health risks of obesity and need for diet and exercise      Gout  No recent flares-dominick ua level    Bilateral knee pain  Cont to work on wt loss    A-fib [facility])  utd w cardiology- doing well        Encounter Diagnoses   Name Primary?     Need for influenza vaccination Yes    Acquired hypothyroidism     Pure hypercholesterolemia     Chronic kidney disease (CKD), stage II (mild)     Morbid obesity with BMI of 40.0-44.9, adult (HCC)     Essential hypertension     Chronic atrial fibrillation (HCC)     Hyperuricemia     Chronic gout without tophus, unspecified cause, unspecified site     Chronic pain of both knees        Orders Placed This Encounter   Procedures    INFLUENZA, HIGH DOSE, 65 YRS +, IM, PF, PREFILL SYR, 0.5ML (FLUZONE HD)    T4, Free     Standing Status:   Future     Standing Expiration Date:   10/17/2018    TSH without Reflex     Standing Status:   Future     Standing Expiration Date:   10/17/2018    Comprehensive Metabolic Panel     Standing Status:   Future     Standing Expiration Date:   10/17/2018    CBC Auto Differential     Standing Status:   Future     Standing Expiration Date:   10/17/2018    Hemoglobin A1C     Standing Status:   Future     Standing Expiration Date:   10/17/2018    Microalbumin / Creatinine Urine Ratio     Standing Status:   Future     Standing Expiration Date:   10/17/2018    Lipid Panel     Standing Status:   Future     Standing Expiration Date:   10/17/2018     Order Specific Question:   Is Patient Fasting?/# of Hours     Answer:   yes/10    Uric Acid     Standing Status:   Future     Standing Expiration Date:   10/17/2018

## 2017-10-20 ENCOUNTER — ANTI-COAG VISIT (OUTPATIENT)
Dept: PHARMACY | Facility: CLINIC | Age: 77
End: 2017-10-20

## 2017-10-20 DIAGNOSIS — I48.20 CHRONIC ATRIAL FIBRILLATION (HCC): ICD-10-CM

## 2017-10-20 LAB — INR BLD: 2.9

## 2017-10-20 NOTE — PROGRESS NOTES
Ms. Pablito Gavin is here for management of anticoagulation for Afib. PMH also significant for HTN, Gout, CKD II/III, Hyperlipidemia, and depression. She presents today w/out complaint. Pt verifies dosing regimen as listed above. Pt denies s/s bleeding/swelling/SOB. No BRBPR. No melena. No missed doses   No changes in RX/OTCs/Herbal medications. Reviewed dietary concerns. Pt does not drink EtOH or smoke. INR 2.9 is within the therapeutic range of 2-3. Recommend to continue dose of 1 mg daily, except 0.5 mg on Sun/Fri. Patient has 1 mg tablets. Will continue to monitor and check INR in 2 weeks. Dosing reminder card given with phone number, appointment date and time.    Return to clinic: 11/17/17 @ 11:00 am    Lena Khan PharmD 11:01 AM 10/20/17

## 2017-11-01 ENCOUNTER — HOSPITAL ENCOUNTER (OUTPATIENT)
Dept: OTHER | Age: 77
Discharge: HOME | End: 2017-11-30
Attending: INTERNAL MEDICINE | Admitting: INTERNAL MEDICINE

## 2017-11-15 ENCOUNTER — OFFICE VISIT (OUTPATIENT)
Dept: ORTHOPEDIC SURGERY | Age: 77
End: 2017-11-15

## 2017-11-15 VITALS — BODY MASS INDEX: 43.71 KG/M2 | WEIGHT: 272 LBS | HEIGHT: 66 IN

## 2017-11-15 DIAGNOSIS — M75.81 RIGHT ROTATOR CUFF TENDINITIS: Primary | ICD-10-CM

## 2017-11-15 PROCEDURE — 1123F ACP DISCUSS/DSCN MKR DOCD: CPT | Performed by: ORTHOPAEDIC SURGERY

## 2017-11-15 PROCEDURE — G8427 DOCREV CUR MEDS BY ELIG CLIN: HCPCS | Performed by: ORTHOPAEDIC SURGERY

## 2017-11-15 PROCEDURE — 20610 DRAIN/INJ JOINT/BURSA W/O US: CPT | Performed by: ORTHOPAEDIC SURGERY

## 2017-11-15 PROCEDURE — G8484 FLU IMMUNIZE NO ADMIN: HCPCS | Performed by: ORTHOPAEDIC SURGERY

## 2017-11-15 PROCEDURE — G8399 PT W/DXA RESULTS DOCUMENT: HCPCS | Performed by: ORTHOPAEDIC SURGERY

## 2017-11-15 PROCEDURE — G8417 CALC BMI ABV UP PARAM F/U: HCPCS | Performed by: ORTHOPAEDIC SURGERY

## 2017-11-15 PROCEDURE — 1036F TOBACCO NON-USER: CPT | Performed by: ORTHOPAEDIC SURGERY

## 2017-11-15 PROCEDURE — 99213 OFFICE O/P EST LOW 20 MIN: CPT | Performed by: ORTHOPAEDIC SURGERY

## 2017-11-15 PROCEDURE — 4040F PNEUMOC VAC/ADMIN/RCVD: CPT | Performed by: ORTHOPAEDIC SURGERY

## 2017-11-15 PROCEDURE — 1090F PRES/ABSN URINE INCON ASSESS: CPT | Performed by: ORTHOPAEDIC SURGERY

## 2017-11-15 NOTE — PROGRESS NOTES
Chief Complaint:  Shoulder Pain (RIGHT         PT WOULD LIKE AN INJECTION)      History of Present Illness:  Pio Tolliver is a 65 y.o. RHD female here regarding right shoulder pain. She did have an MRI of her right shoulder on 7/6/2017, which showed mild supraspinatus and subscapularis tendinosis. She's had a previous injection of the shoulder that was done 6/7/2017, which helped for approx 3months. At her last visit we showed her some home exercises to work on her strengthening however they were causing her increased pain after she stopped taking them. Today she is interested in trying another cortisone injection. She has been taking Tylenol on a p.r.n. basis which helps only minimally as well. Medical History:  Patient's medications, allergies, past medical, surgical, social and family histories were reviewed and updated as appropriate. Review of Systems:  Pertinent items are noted in HPI  Review of systems reviewed from Patient History Form dated on 6/7/17 and available in the patient's chart under the Media tab. Vital Signs: There were no vitals filed for this visit. Pain Assessment:            General Exam:   Constitutional: Patient is adequately groomed with no evidence of malnutrition  DTRs: Deep tendon reflexes are intact  Mental Status: The patient is oriented to time, place and person. The patient's mood and affect are appropriate. Vascular: Examination reveals no swelling or calf tenderness. Peripheral pulses are palpable and 2+. Neurological: The patient has good coordination. There is no weakness or sensory deficit. RightShoulder Examination:  Inspection:  No gross deformities noted. No atrophy, erythema or ecchymosis. Skin is intact. Palpation:  She is no areas of significant tenderness. She has no tenderness today and no palpable swelling. There is no effusion of her shoulder. Range of Motion:  Range of motion is slightly limited due to pain.   Forward flexion to 140, abduction to 1:30. Strength:  She has no weakness with supraspinatus testing. Normal strength. Special Tests:  No instability stable    Skin: There are no rashes, ulcerations or lesions. Additional Comments: Sensation is intact to light touch throughout the median, ulnar and radial nerve distribution. Able to wiggle fingers, give thumbs up, A-OK and cross index and middle fingers. Additional Examinations:  Left Upper Extremity: Examination of the left upper extremity does not show any tenderness, deformity or injury. Range of motion is unremarkable. There is no gross instability. There are no rashes, ulcerations or lesions. Strength and tone are normal.    Diagnostics:  No new x-rays taken today    MRI of the right shoulder from July 2017 shows mild supraspinatus and subscapularis tendinosis. Assessment: Right shoulder rotator cuff tendinitis    Impression:  Encounter Diagnosis   Name Primary?  Right rotator cuff tendinitis Yes       Office Procedures:  No orders of the defined types were placed in this encounter. Treatment Plan: We will plan on injecting the patient's right shoulder with a cortisone injection. She'll follow-up with us p.r.n. Basis. The risks and benefits of an injection were discussed with the patient. The patient had full opportunity to ask questions and all were answered. The patient then provided verbal informed consent. The skin was then prepped with betadine solution and alcohol. Under aseptic conditions, the  right shoulder, subacromial space, was injected with 2cc of 1% xylocaine, then a mixture of 3cc of 0.25% marcaine and 2cc (80 mg) of Depomedrol. There were no immediate complications following the injection. The patient was advised of the possibility of injection site reaction and instructed to apply ice to the area.     Kaylin Luu PA-C, scribing for Dr. Michelle Mayo        This dictation was performed with a verbal recognition program [facility]) and it was checked for errors. It is possible that there are still dictated errors within this office note. If so, please bring any errors to my attention for an addendum. All efforts were made to ensure that this office note is accurate.

## 2017-11-17 ENCOUNTER — ANTI-COAG VISIT (OUTPATIENT)
Dept: PHARMACY | Facility: CLINIC | Age: 77
End: 2017-11-17

## 2017-11-17 DIAGNOSIS — I48.20 CHRONIC ATRIAL FIBRILLATION (HCC): ICD-10-CM

## 2017-11-17 LAB — INR BLD: 3.4

## 2017-11-17 NOTE — PROGRESS NOTES
Ms. Alexandr Keita is here for management of anticoagulation for Afib. PMH also significant for HTN, Gout, CKD II/III, Hyperlipidemia, and depression. She presents today w/out complaint. Pt verifies dosing regimen as listed above. Pt denies s/s bleeding/swelling/SOB. No BRBPR. No melena. No missed doses   No changes in RX/OTCs/Herbal medications. Reviewed dietary concerns. Pt does not drink EtOH or smoke. INR 3.4 is above the therapeutic range of 2-3. Recommend to reduce dose to 1 mg daily, except 0.5 mg on MWF. Patient has 1 mg tablets. Will continue to monitor and check INR in 3 weeks. Dosing reminder card given with phone number, appointment date and time.    Return to clinic: 12/8/17 @ 11:00 am    Med Barry PharmD 10:57 AM 11/17/17

## 2017-11-22 RX ORDER — ALLOPURINOL 100 MG/1
TABLET ORAL
Qty: 90 TABLET | Refills: 0 | Status: SHIPPED | OUTPATIENT
Start: 2017-11-22 | End: 2018-03-09 | Stop reason: SDUPTHER

## 2017-11-30 ENCOUNTER — OFFICE VISIT (OUTPATIENT)
Dept: CARDIOLOGY CLINIC | Age: 77
End: 2017-11-30

## 2017-11-30 VITALS
BODY MASS INDEX: 44 KG/M2 | SYSTOLIC BLOOD PRESSURE: 114 MMHG | HEART RATE: 63 BPM | DIASTOLIC BLOOD PRESSURE: 70 MMHG | WEIGHT: 272.6 LBS

## 2017-11-30 DIAGNOSIS — I48.20 CHRONIC ATRIAL FIBRILLATION (HCC): Primary | ICD-10-CM

## 2017-11-30 PROCEDURE — 1036F TOBACCO NON-USER: CPT | Performed by: INTERNAL MEDICINE

## 2017-11-30 PROCEDURE — 99213 OFFICE O/P EST LOW 20 MIN: CPT | Performed by: INTERNAL MEDICINE

## 2017-11-30 PROCEDURE — G8427 DOCREV CUR MEDS BY ELIG CLIN: HCPCS | Performed by: INTERNAL MEDICINE

## 2017-11-30 PROCEDURE — 1123F ACP DISCUSS/DSCN MKR DOCD: CPT | Performed by: INTERNAL MEDICINE

## 2017-11-30 PROCEDURE — 4040F PNEUMOC VAC/ADMIN/RCVD: CPT | Performed by: INTERNAL MEDICINE

## 2017-11-30 PROCEDURE — G8399 PT W/DXA RESULTS DOCUMENT: HCPCS | Performed by: INTERNAL MEDICINE

## 2017-11-30 PROCEDURE — 1090F PRES/ABSN URINE INCON ASSESS: CPT | Performed by: INTERNAL MEDICINE

## 2017-11-30 PROCEDURE — G8484 FLU IMMUNIZE NO ADMIN: HCPCS | Performed by: INTERNAL MEDICINE

## 2017-11-30 PROCEDURE — G8417 CALC BMI ABV UP PARAM F/U: HCPCS | Performed by: INTERNAL MEDICINE

## 2017-11-30 NOTE — PROGRESS NOTES
[address]     Outpatient Follow Up Note    Subjective:   CHIEF COMPLAINT / HPI:  Follow Up secondary to hypertension, obesity and atrial fibrillation   Kilo Davidson is 65 y.o. female who presents for follow up. In general seems to be doing ok. No chest pain. Mild dyspnea at times when climbing 14 steps up to her apt, but recovers quickly. No rapid heart beats or palpitations.       Past Medical History:    Past Medical History:   Diagnosis Date    Anemia     NOS    Arthritis     knee     Atrial fibrillation (HCC)     on coumadin    Chronic kidney disease (CKD), stage II (mild)     Depression     Diverticulosis     Foot pain     Gout     Hemorrhoids     Hyperlipidemia     Hypertension     Hyperuricemia     Iron deficiency anemia 1/8/2014    Iron deficiency anemia-s/p EGD and colonoscopy 2/11/2014 Esophageal tear likely source     Menopausal syndrome     Obesity     Pelvic relaxation     PONV (postoperative nausea and vomiting)     Stress 8/2006    NL stress    Syncope     Unspecified sleep apnea 03/2002    uvulectomy -hx of    Vitamin D deficiency     20ng/ml 6/2009    Wears dentures     Wears glasses      Social History:    History   Smoking Status    Never Smoker   Smokeless Tobacco    Never Used     Current Medications:  Current Outpatient Prescriptions on File Prior to Visit   Medication Sig Dispense Refill    allopurinol (ZYLOPRIM) 100 MG tablet TAKE ONE TABLET BY MOUTH DAILY 90 tablet 0    allopurinol (ZYLOPRIM) 300 MG tablet TAKE ONE TABLET BY MOUTH DAILY 90 tablet 1    warfarin (COUMADIN) 1 MG tablet Take 1 tablet by mouth daily 90 tablet 3    metoprolol tartrate (LOPRESSOR) 25 MG tablet TAKE ONE TABLET BY MOUTH TWICE A  tablet 2    CARTIA  MG extended release capsule TAKE ONE CAPSULE BY MOUTH DAILY 90 capsule 2    simvastatin (ZOCOR) 10 MG tablet TAKE ONE TABLET BY MOUTH ONCE NIGHTLY 90 tablet 2    sertraline (ZOLOFT) 50 MG tablet TAKE ONE

## 2017-12-01 ENCOUNTER — HOSPITAL ENCOUNTER (OUTPATIENT)
Dept: OTHER | Age: 77
Discharge: HOME | End: 2017-12-31
Attending: INTERNAL MEDICINE | Admitting: INTERNAL MEDICINE

## 2017-12-08 ENCOUNTER — ANTI-COAG VISIT (OUTPATIENT)
Dept: PHARMACY | Facility: CLINIC | Age: 77
End: 2017-12-08

## 2017-12-08 ENCOUNTER — HOSPITAL ENCOUNTER (OUTPATIENT)
Dept: GENERAL RADIOLOGY | Age: 77
Discharge: HOME | End: 2017-12-08
Attending: INTERNAL MEDICINE | Admitting: INTERNAL MEDICINE

## 2017-12-08 DIAGNOSIS — I48.20 CHRONIC ATRIAL FIBRILLATION (HCC): ICD-10-CM

## 2017-12-08 DIAGNOSIS — E03.9 ACQUIRED HYPOTHYROIDISM: ICD-10-CM

## 2017-12-08 DIAGNOSIS — E66.01 MORBID OBESITY WITH BMI OF 40.0-44.9, ADULT (HCC): ICD-10-CM

## 2017-12-08 DIAGNOSIS — E78.00 PURE HYPERCHOLESTEROLEMIA: ICD-10-CM

## 2017-12-08 DIAGNOSIS — I10 ESSENTIAL HYPERTENSION: ICD-10-CM

## 2017-12-08 DIAGNOSIS — E79.0 HYPERURICEMIA: ICD-10-CM

## 2017-12-08 DIAGNOSIS — N18.2 CHRONIC KIDNEY DISEASE (CKD), STAGE II (MILD): ICD-10-CM

## 2017-12-08 LAB
A/G RATIO: 1 (ref 1.1–2.2)
ALBUMIN SERPL-MCNC: 3.9 G/DL (ref 3.4–5)
ALP BLD-CCNC: 82 U/L (ref 40–129)
ALT SERPL-CCNC: 11 U/L (ref 10–40)
ANION GAP SERPL CALCULATED.3IONS-SCNC: 12 MMOL/L (ref 3–16)
AST SERPL-CCNC: 14 U/L (ref 15–37)
BASOPHILS ABSOLUTE: 0 K/UL (ref 0–0.2)
BASOPHILS RELATIVE PERCENT: 0.4 %
BILIRUB SERPL-MCNC: 0.5 MG/DL (ref 0–1)
BUN BLDV-MCNC: 23 MG/DL (ref 7–20)
CALCIUM SERPL-MCNC: 8.9 MG/DL (ref 8.3–10.6)
CHLORIDE BLD-SCNC: 104 MMOL/L (ref 99–110)
CHOLESTEROL, TOTAL: 146 MG/DL (ref 0–199)
CO2: 26 MMOL/L (ref 21–32)
CREAT SERPL-MCNC: 1.3 MG/DL (ref 0.6–1.2)
CREATININE URINE: 114 MG/DL (ref 28–259)
EOSINOPHILS ABSOLUTE: 0.2 K/UL (ref 0–0.6)
EOSINOPHILS RELATIVE PERCENT: 2.2 %
GFR AFRICAN AMERICAN: 48
GFR NON-AFRICAN AMERICAN: 40
GLOBULIN: 3.8 G/DL
GLUCOSE BLD-MCNC: 79 MG/DL (ref 70–99)
HCT VFR BLD CALC: 39.4 % (ref 36–48)
HDLC SERPL-MCNC: 63 MG/DL (ref 40–60)
HEMOGLOBIN: 12.4 G/DL (ref 12–16)
INR BLD: 3.9
LDL CHOLESTEROL CALCULATED: 66 MG/DL
LYMPHOCYTES ABSOLUTE: 1.7 K/UL (ref 1–5.1)
LYMPHOCYTES RELATIVE PERCENT: 18 %
MCH RBC QN AUTO: 27.9 PG (ref 26–34)
MCHC RBC AUTO-ENTMCNC: 31.4 G/DL (ref 31–36)
MCV RBC AUTO: 88.9 FL (ref 80–100)
MICROALBUMIN UR-MCNC: 2.6 MG/DL
MICROALBUMIN/CREAT UR-RTO: 22.8 MG/G (ref 0–30)
MONOCYTES ABSOLUTE: 0.6 K/UL (ref 0–1.3)
MONOCYTES RELATIVE PERCENT: 6 %
NEUTROPHILS ABSOLUTE: 7 K/UL (ref 1.7–7.7)
NEUTROPHILS RELATIVE PERCENT: 73.4 %
PDW BLD-RTO: 20.9 % (ref 12.4–15.4)
PLATELET # BLD: 197 K/UL (ref 135–450)
PMV BLD AUTO: 8.8 FL (ref 5–10.5)
POTASSIUM SERPL-SCNC: 4.2 MMOL/L (ref 3.5–5.1)
RBC # BLD: 4.43 M/UL (ref 4–5.2)
SODIUM BLD-SCNC: 142 MMOL/L (ref 136–145)
T4 FREE: 1 NG/DL (ref 0.9–1.8)
TOTAL PROTEIN: 7.7 G/DL (ref 6.4–8.2)
TRIGL SERPL-MCNC: 87 MG/DL (ref 0–150)
TSH SERPL DL<=0.05 MIU/L-ACNC: 3.13 UIU/ML (ref 0.27–4.2)
URIC ACID, SERUM: 4.2 MG/DL (ref 2.6–6)
VLDLC SERPL CALC-MCNC: 17 MG/DL
WBC # BLD: 9.6 K/UL (ref 4–11)

## 2017-12-09 LAB
ESTIMATED AVERAGE GLUCOSE: 111.2 MG/DL
HBA1C MFR BLD: 5.5 %

## 2017-12-14 ENCOUNTER — ANTI-COAG VISIT (OUTPATIENT)
Dept: PHARMACY | Facility: CLINIC | Age: 77
End: 2017-12-14

## 2017-12-14 DIAGNOSIS — I48.20 CHRONIC ATRIAL FIBRILLATION (HCC): ICD-10-CM

## 2017-12-14 LAB — INR BLD: 2.8

## 2017-12-14 NOTE — PROGRESS NOTES
Ms. Wheat Dean is here for management of anticoagulation for Afib. PMH also significant for HTN, Gout, CKD II/III, Hyperlipidemia, and depression. She presents today w/out complaint. Pt verifies dosing regimen as listed above. Pt denies s/s bleeding/swelling/SOB. No BRBPR. No melena. No missed doses   No changes in RX/OTCs/Herbal medications. Reviewed dietary concerns. Pt does not drink EtOH or smoke. INR 2.8 is within the therapeutic range of 2-3. Recommend to continue the normal 1 mg daily, except 0.5 mg on MWF. Went over calendar with pt and will check in one week to get good control of what pt is taking. Patient has 1 mg tablets. Will continue to monitor and check INR in 4 weeks. Dosing reminder card given with phone number, appointment date and time.    Return to clinic: 12/15/17 @ 11:00 am

## 2017-12-26 PROBLEM — I50.32 CHRONIC DIASTOLIC HEART FAILURE (HCC): Status: ACTIVE | Noted: 2017-12-26

## 2017-12-26 PROBLEM — R09.02 HYPOXEMIA: Status: ACTIVE | Noted: 2017-12-26

## 2017-12-26 PROBLEM — R06.02 SOB (SHORTNESS OF BREATH): Status: RESOLVED | Noted: 2017-12-26 | Resolved: 2017-12-26

## 2017-12-26 PROBLEM — N18.30 STAGE 3 CHRONIC KIDNEY DISEASE (HCC): Status: ACTIVE | Noted: 2017-12-26

## 2017-12-26 PROBLEM — R06.02 SOB (SHORTNESS OF BREATH): Status: ACTIVE | Noted: 2017-12-26

## 2017-12-26 PROBLEM — J18.9 COMMUNITY ACQUIRED PNEUMONIA OF LEFT LOWER LOBE OF LUNG: Status: ACTIVE | Noted: 2017-12-26

## 2017-12-26 PROBLEM — E83.42 HYPOMAGNESEMIA: Status: ACTIVE | Noted: 2017-12-26

## 2017-12-30 PROBLEM — J10.1 INFLUENZA A: Status: ACTIVE | Noted: 2017-12-30

## 2017-12-30 PROBLEM — E83.42 HYPOMAGNESEMIA: Status: RESOLVED | Noted: 2017-12-26 | Resolved: 2017-12-30

## 2017-12-30 PROBLEM — R09.02 HYPOXEMIA: Status: RESOLVED | Noted: 2017-12-26 | Resolved: 2017-12-30

## 2017-12-31 ENCOUNTER — CARE COORDINATION (OUTPATIENT)
Dept: CASE MANAGEMENT | Age: 77
End: 2017-12-31

## 2017-12-31 NOTE — CARE COORDINATION
[facility] Transitions Initial Follow Up Call    Call within 2 business days of discharge: Yes    Patient: Subhash Ceja Patient : 1940   MRN: [id number]  Reason for Admission: There are no discharge diagnoses documented for the most recent discharge. Discharge Date: 17 RARS: [facility] Risk Score: 19.5     Spoke with: Dina 428: Taoism    Non-face-to-face services provided:  Obtained and reviewed discharge summary and/or continuity of care documents    Care Transitions 24 Hour Call    Do you have a copy of your discharge instructions?:  Yes  Do you have all of your prescriptions and are they filled?:  Yes  Have you been contacted by a [facility]?:  No  Were you discharged with any Home Care or Post Acute Services:  Yes  Do you have support at home?:  Roommate/Housemate  Are you an active caregiver in your home?:  No  Care Transitions Interventions         Follow Up : spoke briefly with patients friend and also patient. Patient was in the restroom when I first called. Patient came to phone and said she is doing much better today. Pt states she has all her new prescriptions and has no questions about any of the new medicines or the changes that were made. Pt said \" I am on target with all the medicines\". Pt had a friend visiting and did not want to stay on the phone long. Pt did say she has everything she needs and had no concerns today.    Future Appointments  Date Time Provider Juan Jose Ramirez   2018 11:00 AM [address] [facility] None       Tiffanie Oglesby RN

## 2018-01-01 ENCOUNTER — HOSPITAL ENCOUNTER (OUTPATIENT)
Dept: OTHER | Age: 78
Discharge: HOME | End: 2018-01-31
Attending: INTERNAL MEDICINE | Admitting: INTERNAL MEDICINE

## 2018-01-04 ENCOUNTER — CARE COORDINATION (OUTPATIENT)
Dept: CASE MANAGEMENT | Age: 78
End: 2018-01-04

## 2018-01-10 ENCOUNTER — OFFICE VISIT (OUTPATIENT)
Dept: INTERNAL MEDICINE | Age: 78
End: 2018-01-10

## 2018-01-10 VITALS
BODY MASS INDEX: 43 KG/M2 | SYSTOLIC BLOOD PRESSURE: 132 MMHG | DIASTOLIC BLOOD PRESSURE: 84 MMHG | HEIGHT: 67 IN | WEIGHT: 274 LBS

## 2018-01-10 DIAGNOSIS — E66.01 MORBID OBESITY WITH BMI OF 40.0-44.9, ADULT (HCC): ICD-10-CM

## 2018-01-10 DIAGNOSIS — I48.20 CHRONIC ATRIAL FIBRILLATION (HCC): ICD-10-CM

## 2018-01-10 DIAGNOSIS — I50.32 HEART FAILURE, DIASTOLIC, CHRONIC (HCC): ICD-10-CM

## 2018-01-10 DIAGNOSIS — N18.30 STAGE 3 CHRONIC KIDNEY DISEASE (HCC): ICD-10-CM

## 2018-01-10 DIAGNOSIS — I10 ESSENTIAL HYPERTENSION: ICD-10-CM

## 2018-01-10 DIAGNOSIS — J10.1 INFLUENZA A: ICD-10-CM

## 2018-01-10 PROCEDURE — 99495 TRANSJ CARE MGMT MOD F2F 14D: CPT | Performed by: INTERNAL MEDICINE

## 2018-01-10 RX ORDER — ATORVASTATIN CALCIUM 20 MG/1
20 TABLET, FILM COATED ORAL NIGHTLY
Qty: 90 TABLET | Refills: 3 | Status: SHIPPED | OUTPATIENT
Start: 2018-01-10 | End: 2019-02-03 | Stop reason: SDUPTHER

## 2018-01-10 NOTE — PROGRESS NOTES
mg by mouth daily. Vitals:    01/10/18 1511   BP: 132/84   Weight: 274 lb (124.3 kg)   Height: 5' 6.5\" (1.689 m)     Body mass index is 43.56 kg/m². Wt Readings from Last 3 Encounters:   01/10/18 274 lb (124.3 kg)   12/26/17 269 lb 10 oz (122.3 kg)   11/30/17 272 lb 9.6 oz (123.7 kg)     BP Readings from Last 3 Encounters:   01/10/18 132/84   12/30/17 128/81   11/30/17 114/70        Patient was admitted to [place] [address] from 12/26 to 12/30 for presumptive pneumonia . Inpatient course: Discharge summary reviewed- see chart. Current status: stable    Review of Systems:  A comprehensive review of systems was negative except for what was noted in the HPI.     Physical Exam:  General Appearance: alert and oriented to person, place and time, well developed and well- nourished, in no acute distress  Skin: warm and dry, no rash or erythema  Head: normocephalic and atraumatic  Eyes: pupils equal, round, and reactive to light, extraocular eye movements intact, conjunctivae normal  ENT: tympanic membrane, external ear and ear canal normal bilaterally, nose without deformity, nasal mucosa and turbinates normal without polyps  Neck: supple and non-tender without mass, no thyromegaly or thyroid nodules, no cervical lymphadenopathy  Pulmonary/Chest: clear to auscultation bilaterally- no wheezes, rales or rhonchi, normal air movement, no respiratory distress  Cardiovascular: normal rate, regular rhythm, normal S1 and S2, no murmurs, rubs, clicks, or gallops, distal pulses intact, no carotid bruits  Abdomen: soft, non-tender, non-distended, normal bowel sounds, no masses or organomegaly  Extremities: no cyanosis, clubbing or edema  Musculoskeletal: normal range of motion, no joint swelling, deformity or tenderness  Neurologic: reflexes normal and symmetric, no cranial nerve deficit, gait, coordination and speech normal    Initial post-discharge communication occurred between nurse care

## 2018-01-12 ENCOUNTER — ANTI-COAG VISIT (OUTPATIENT)
Dept: PHARMACY | Facility: CLINIC | Age: 78
End: 2018-01-12

## 2018-01-12 LAB — INR BLD: 3.2

## 2018-01-12 NOTE — PROGRESS NOTES
Ms. Sabrina Justice is here for management of anticoagulation for Afib. PMH also significant for HTN, Gout, CKD II/III, Hyperlipidemia, and depression. She presents today w/out complaint. Pt verifies dosing regimen as listed above. Pt denies s/s bleeding/swelling/SOB. No BRBPR. No melena. No missed doses   No changes in RX/OTCs/Herbal medications. Reviewed dietary concerns. Pt does not drink EtOH or smoke. Patient was hospitalized 12/26-12/30, pneumonia, finished abx course (Levaquin) 1/2. Patient started on metoprolol and atorvastatin as well. INR 3.2 is above the therapeutic range of 2-3. Recommend to continue the normal 1 mg daily, except 0.5 mg on MWF. Patient has 1 mg tablets. Will continue to monitor and check INR in 4 weeks. Dosing reminder card given with phone number, appointment date and time.    Return to clinic: 2/9 at 1100

## 2018-01-15 ENCOUNTER — CARE COORDINATION (OUTPATIENT)
Dept: CASE MANAGEMENT | Age: 78
End: 2018-01-15

## 2018-01-22 ENCOUNTER — TELEPHONE (OUTPATIENT)
Dept: INTERNAL MEDICINE | Age: 78
End: 2018-01-22

## 2018-01-22 RX ORDER — METOPROLOL SUCCINATE 25 MG/1
12.5 TABLET, EXTENDED RELEASE ORAL 2 TIMES DAILY
Qty: 90 TABLET | Refills: 3 | Status: ON HOLD | OUTPATIENT
Start: 2018-01-22 | End: 2018-02-08 | Stop reason: HOSPADM

## 2018-01-22 NOTE — TELEPHONE ENCOUNTER
Pt called needs RX for. ..metoprolol succinate (TOPROL XL) 25 MG extended release tablet -90 day supply Sent to . .. Pharm on file . .. Advised Pt to check with Pharm in 24-48 hours . ..  Pls send  Pt is requesting a call back today at [phone]

## 2018-01-31 ENCOUNTER — OFFICE VISIT (OUTPATIENT)
Dept: ORTHOPEDIC SURGERY | Age: 78
End: 2018-01-31

## 2018-01-31 VITALS — WEIGHT: 274.03 LBS | BODY MASS INDEX: 44.04 KG/M2 | HEIGHT: 66 IN

## 2018-01-31 DIAGNOSIS — Z96.651 STATUS POST TOTAL RIGHT KNEE REPLACEMENT: Primary | ICD-10-CM

## 2018-01-31 PROCEDURE — G8417 CALC BMI ABV UP PARAM F/U: HCPCS | Performed by: ORTHOPAEDIC SURGERY

## 2018-01-31 PROCEDURE — 1090F PRES/ABSN URINE INCON ASSESS: CPT | Performed by: ORTHOPAEDIC SURGERY

## 2018-01-31 PROCEDURE — 99213 OFFICE O/P EST LOW 20 MIN: CPT | Performed by: ORTHOPAEDIC SURGERY

## 2018-01-31 PROCEDURE — G8427 DOCREV CUR MEDS BY ELIG CLIN: HCPCS | Performed by: ORTHOPAEDIC SURGERY

## 2018-01-31 PROCEDURE — 1123F ACP DISCUSS/DSCN MKR DOCD: CPT | Performed by: ORTHOPAEDIC SURGERY

## 2018-01-31 PROCEDURE — G8399 PT W/DXA RESULTS DOCUMENT: HCPCS | Performed by: ORTHOPAEDIC SURGERY

## 2018-01-31 PROCEDURE — 4040F PNEUMOC VAC/ADMIN/RCVD: CPT | Performed by: ORTHOPAEDIC SURGERY

## 2018-01-31 PROCEDURE — 1036F TOBACCO NON-USER: CPT | Performed by: ORTHOPAEDIC SURGERY

## 2018-01-31 PROCEDURE — G8484 FLU IMMUNIZE NO ADMIN: HCPCS | Performed by: ORTHOPAEDIC SURGERY

## 2018-02-01 ENCOUNTER — HOSPITAL ENCOUNTER (OUTPATIENT)
Dept: OTHER | Age: 78
Discharge: HOME | End: 2018-02-28
Attending: INTERNAL MEDICINE | Admitting: INTERNAL MEDICINE

## 2018-02-05 ENCOUNTER — TELEPHONE (OUTPATIENT)
Dept: INTERNAL MEDICINE | Age: 78
End: 2018-02-05

## 2018-02-05 NOTE — TELEPHONE ENCOUNTER
Pt stated breath hard for few minutes, feels weak. Pt stated feels better, and then it repeats itself. Pt needs to be advised on what to do. Pt is aware that Dr. Sherin Philippe is not in office.   Please call

## 2018-02-09 ENCOUNTER — CARE COORDINATION (OUTPATIENT)
Dept: CASE MANAGEMENT | Age: 78
End: 2018-02-09

## 2018-02-09 ENCOUNTER — OFFICE VISIT (OUTPATIENT)
Dept: CARDIOLOGY CLINIC | Age: 78
End: 2018-02-09

## 2018-02-09 VITALS
BODY MASS INDEX: 44.1 KG/M2 | DIASTOLIC BLOOD PRESSURE: 70 MMHG | SYSTOLIC BLOOD PRESSURE: 144 MMHG | HEART RATE: 71 BPM | WEIGHT: 273.2 LBS

## 2018-02-09 DIAGNOSIS — I48.20 CHRONIC ATRIAL FIBRILLATION (HCC): Primary | ICD-10-CM

## 2018-02-09 DIAGNOSIS — E78.00 PURE HYPERCHOLESTEROLEMIA: ICD-10-CM

## 2018-02-09 DIAGNOSIS — N18.2 CHRONIC KIDNEY DISEASE (CKD), STAGE II (MILD): ICD-10-CM

## 2018-02-09 DIAGNOSIS — I48.21 PERMANENT ATRIAL FIBRILLATION (HCC): Primary | ICD-10-CM

## 2018-02-09 DIAGNOSIS — I50.32 CHRONIC DIASTOLIC HEART FAILURE (HCC): ICD-10-CM

## 2018-02-09 PROCEDURE — G8417 CALC BMI ABV UP PARAM F/U: HCPCS | Performed by: INTERNAL MEDICINE

## 2018-02-09 PROCEDURE — 4040F PNEUMOC VAC/ADMIN/RCVD: CPT | Performed by: INTERNAL MEDICINE

## 2018-02-09 PROCEDURE — 1036F TOBACCO NON-USER: CPT | Performed by: INTERNAL MEDICINE

## 2018-02-09 PROCEDURE — 1123F ACP DISCUSS/DSCN MKR DOCD: CPT | Performed by: INTERNAL MEDICINE

## 2018-02-09 PROCEDURE — 1111F DSCHRG MED/CURRENT MED MERGE: CPT | Performed by: INTERNAL MEDICINE

## 2018-02-09 PROCEDURE — G8427 DOCREV CUR MEDS BY ELIG CLIN: HCPCS | Performed by: INTERNAL MEDICINE

## 2018-02-09 PROCEDURE — G8399 PT W/DXA RESULTS DOCUMENT: HCPCS | Performed by: INTERNAL MEDICINE

## 2018-02-09 PROCEDURE — G8484 FLU IMMUNIZE NO ADMIN: HCPCS | Performed by: INTERNAL MEDICINE

## 2018-02-09 PROCEDURE — 1090F PRES/ABSN URINE INCON ASSESS: CPT | Performed by: INTERNAL MEDICINE

## 2018-02-09 PROCEDURE — 99214 OFFICE O/P EST MOD 30 MIN: CPT | Performed by: INTERNAL MEDICINE

## 2018-02-09 NOTE — PROGRESS NOTES
65 y.o.  woman with a fib. Recent admission to [facility] with af with RVR. Previously saw Dr. Peyton Lawson. Has had it since 2016. Never had a stroke. It was found on a routine physical with Dr. Miguelangel Lepe. No symptoms at all. Has had sob of ? Cause but had a cardioversion that lasted just a few days. Never had an MI. Never had a stroke. No n/v/LH/dizziness. No syncope. Does get sob frequently. Gets it with exertion. Recent hospitalization for sob. Had worsening sx of sob, and bec walking 10 feet made her feel so uncomfortable, she went to the hospital.  In ER was found to be in rapid a fib and she was admitted.     Past Medical History:   Diagnosis Date    Anemia     NOS    Arthritis     knee     Atrial fibrillation (HCC)     on coumadin    Chronic kidney disease (CKD), stage II (mild)     Depression     Diverticulosis     Foot pain     Gout     Hemorrhoids     Hyperlipidemia     Hypertension     Hyperuricemia     Iron deficiency anemia 1/8/2014    Iron deficiency anemia-s/p EGD and colonoscopy 2/11/2014 Esophageal tear likely source     Menopausal syndrome     Obesity     Pelvic relaxation     PONV (postoperative nausea and vomiting)     Stress 8/2006    NL stress    Syncope     Unspecified sleep apnea 03/2002    uvulectomy -hx of    Vitamin D deficiency     20ng/ml 6/2009    Wears dentures     Wears glasses      Past Surgical History:   Procedure Laterality Date    BLADDER SUSPENSION  1997    CHOLECYSTECTOMY  1974    COLONOSCOPY      2013    COLONOSCOPY  11/2014    10yr    EYE SURGERY      macular tear and cataract right    HYSTERECTOMY  1977    partial    KNEE ARTHROSCOPY  2005    knee surgery    KNEE ARTHROSCOPY  10/25/2011    left knee    UVULOPALATOPHARYGOPLASTY  3/2002     Social History   Substance Use Topics    Smoking status: Never Smoker    Smokeless tobacco: Never Used    Alcohol use No     Allergies   Allergen Reactions    Diclofenac Hives     Severe itching    Failed DCCV in the past. Has some sob; can't say whether it's unusual or not. 1. Chronic atrial fibrillation ([facility] [address]    2. Pure hypercholesterolemia    3. Chronic diastolic heart failure (HCC)    4. Class 3 obesity due to excess calories without serious comorbidity with body mass index (BMI) of 40.0 to 44.9 in adult ([facility] [address]    5. Chronic kidney disease (CKD), stage II (mild)      Recs:  -Offered stress testing given her MONACO with 10+ feet. Reluctant to do at this time  -Next visit will stress wt loss  -No med changes  -F/U 6 mo    She is a/c with warfarin.

## 2018-02-12 ENCOUNTER — CARE COORDINATION (OUTPATIENT)
Dept: CASE MANAGEMENT | Age: 78
End: 2018-02-12

## 2018-02-12 NOTE — CARE COORDINATION
[address] Transitions Follow Up Call    2018    Patient: Courtney Youssef  Patient : 1940   MRN: [id number]  Reason for Admission: At Fib with RVR. Discharge Date: 18 RARS: Risk Score: 20.75       Spoke with: Courtney Youssef  Pt states she feels \"fine\". Denies SOB,palpitations or weakness. Pt has had f/u appt with Dr Yang Li. Care Transitions Subsequent and Final Call    Subsequent and Final Calls  Do you have any ongoing symptoms?:  No  Have your medications changed?:  No  Do you have any questions related to your medications?:  No  Do you currently have any active services?:  No  Do you have any needs or concerns that I can assist you with?:  No  Identified Barriers:  None  Care Transitions Interventions  Other Interventions: Follow Up  Future Appointments  Date Time Provider Juan Jose Ramirez   2018 3:30 PM MD [facility]   3/9/2018 10:45 AM A ANTICOAGULATION CLINIC A ANTICOAG None   3/14/2018 11:30 AM [place] Guinevere Chard, MD [facility]   2018 1:00 PM MD [facility]   2018 1:00 PM Myah Loyd MD [address] Transition Coordinator  [phone]  [email or web address]

## 2018-02-14 ENCOUNTER — OFFICE VISIT (OUTPATIENT)
Dept: INTERNAL MEDICINE | Age: 78
End: 2018-02-14

## 2018-02-14 VITALS
HEIGHT: 66 IN | SYSTOLIC BLOOD PRESSURE: 132 MMHG | WEIGHT: 272 LBS | DIASTOLIC BLOOD PRESSURE: 82 MMHG | BODY MASS INDEX: 43.71 KG/M2

## 2018-02-14 DIAGNOSIS — N18.30 STAGE 3 CHRONIC KIDNEY DISEASE (HCC): ICD-10-CM

## 2018-02-14 DIAGNOSIS — E66.01 MORBID OBESITY WITH BMI OF 40.0-44.9, ADULT (HCC): ICD-10-CM

## 2018-02-14 DIAGNOSIS — E78.00 PURE HYPERCHOLESTEROLEMIA: ICD-10-CM

## 2018-02-14 DIAGNOSIS — E79.0 HYPERURICEMIA: ICD-10-CM

## 2018-02-14 DIAGNOSIS — Z09 HOSPITAL DISCHARGE FOLLOW-UP: Primary | ICD-10-CM

## 2018-02-14 DIAGNOSIS — I10 ESSENTIAL HYPERTENSION: ICD-10-CM

## 2018-02-14 DIAGNOSIS — I48.91 ATRIAL FIBRILLATION WITH RAPID VENTRICULAR RESPONSE (HCC): ICD-10-CM

## 2018-02-14 PROCEDURE — 1111F DSCHRG MED/CURRENT MED MERGE: CPT | Performed by: INTERNAL MEDICINE

## 2018-02-14 PROCEDURE — 99495 TRANSJ CARE MGMT MOD F2F 14D: CPT | Performed by: INTERNAL MEDICINE

## 2018-02-14 NOTE — PROGRESS NOTES
Post-Discharge Transitional Care Management Services      You Davis   YOB: 1940    Date of Visit:  2/14/2018  Patient in for post discharge visits after admission to [facility] for tachycardia due to atrial fib with rapid ventricular response. Patient states that she has been feeling very poorly with weakness and wasn't really sure what was going on but when she checked her pulse she noted it was very high and went immediately to the emergency room. When the diltiazem drip was initiated she states she immediately felt better and over the next couple of days as she was transferred to by mouth dosing she continued to improve. Since discharge she continues to feel well and has been checking her pulse and it has been well-controlled although is obviously still in atrial fib. She's had no progress with working on her diet or weight loss. She continues to have some aches and pains due to her arthritis. Otherwise she is feeling fairly well. She is up-to-date with getting her pro time checked.   Allergies   Allergen Reactions    Diclofenac Hives     Severe itching    Adhesive Tape Rash    Penicillins Nausea And Vomiting     \"years ago\"     Outpatient Prescriptions Marked as Taking for the 2/14/18 encounter (Office Visit) with Lorie Hogue MD   Medication Sig Dispense Refill    diltiazem (CARTIA XT) 300 MG extended release capsule Take 1 capsule by mouth daily 30 capsule 3    warfarin (COUMADIN) 1 MG tablet Take 0.5 mg by mouth See Admin Instructions MWF      atorvastatin (LIPITOR) 20 MG tablet Take 1 tablet by mouth nightly 90 tablet 3    albuterol sulfate HFA (PROAIR HFA) 108 (90 Base) MCG/ACT inhaler Inhale 2 puffs into the lungs every 6 hours as needed for Wheezing 1 Inhaler 1    warfarin (COUMADIN) 1 MG tablet Take 1 tablet by mouth four times a week Take on Sun, Tues, Thurs, Sat 30 tablet 3    sertraline (ZOLOFT) 50 MG tablet TAKE ONE TABLET BY MOUTH DAILY 90 tablet 3   

## 2018-02-15 ENCOUNTER — CARE COORDINATION (OUTPATIENT)
Dept: CASE MANAGEMENT | Age: 78
End: 2018-02-15

## 2018-02-15 NOTE — CARE COORDINATION
[facility] Transitions Follow Up Call    2/15/2018    Patient: Brittney Glaser  Patient : 1940   MRN: [id number]  Reason for Admission: At [facility] with RVR  Discharge Date: 18 RARS: Risk Score: 20.75       Spoke with: [address] Transitions Subsequent and Final Call    Subsequent and Final Calls  Do you have any ongoing symptoms?:  No  Have your medications changed?:  No  Do you have any questions related to your medications?:  No  Do you currently have any active services?:  No  Do you have any needs or concerns that I can assist you with?:  No  Identified Barriers: Other  Care Transitions Interventions  Other Interventions:        Care Transition f/u call to pt. She said she is doing fine. Denies any chest pain, palpitations, lightheadedness or SOB. Noted she saw PCP yesterday. No changes in meds. Care transitions continues to follow.     Follow Up  Future Appointments  Date Time Provider Juan Jose Ramirez   2018 9:45 AM Fabienne Nieto [facility]   3/9/2018 10:45 AM A ANTICOAGULATION CLINIC [facility] ANTICOAG None   2018 1:00 PM MD GARY Jiménez [address] IM [facility]   2018 1:00 PM MD Vinay Kiran [facility]       Radha Callahan, RN

## 2018-02-23 ENCOUNTER — OFFICE VISIT (OUTPATIENT)
Dept: ORTHOPEDIC SURGERY | Age: 78
End: 2018-02-23

## 2018-02-23 VITALS
BODY MASS INDEX: 44.03 KG/M2 | HEART RATE: 96 BPM | DIASTOLIC BLOOD PRESSURE: 79 MMHG | WEIGHT: 274 LBS | SYSTOLIC BLOOD PRESSURE: 124 MMHG | HEIGHT: 66 IN

## 2018-02-23 DIAGNOSIS — M75.81 RIGHT ROTATOR CUFF TENDINITIS: Primary | ICD-10-CM

## 2018-02-23 PROCEDURE — 20610 DRAIN/INJ JOINT/BURSA W/O US: CPT | Performed by: PHYSICIAN ASSISTANT

## 2018-02-27 ENCOUNTER — CARE COORDINATION (OUTPATIENT)
Dept: CASE MANAGEMENT | Age: 78
End: 2018-02-27

## 2018-03-09 ENCOUNTER — ANTI-COAG VISIT (OUTPATIENT)
Dept: PHARMACY | Facility: CLINIC | Age: 78
End: 2018-03-09

## 2018-03-09 ENCOUNTER — HOSPITAL ENCOUNTER (OUTPATIENT)
Dept: OTHER | Age: 78
Discharge: HOME | End: 2018-03-31
Attending: INTERNAL MEDICINE | Admitting: INTERNAL MEDICINE

## 2018-03-09 LAB — INR BLD: 2.2

## 2018-03-09 RX ORDER — ALLOPURINOL 100 MG/1
TABLET ORAL
Qty: 90 TABLET | Refills: 0 | Status: SHIPPED | OUTPATIENT
Start: 2018-03-09 | End: 2018-10-31 | Stop reason: RX

## 2018-03-29 ENCOUNTER — ANTI-COAG VISIT (OUTPATIENT)
Dept: PHARMACY | Facility: CLINIC | Age: 78
End: 2018-03-29

## 2018-03-29 LAB — INR BLD: 2.3

## 2018-04-01 ENCOUNTER — HOSPITAL ENCOUNTER (OUTPATIENT)
Dept: OTHER | Age: 78
Discharge: HOME | End: 2018-04-30
Attending: INTERNAL MEDICINE | Admitting: INTERNAL MEDICINE

## 2018-04-02 ENCOUNTER — TELEPHONE (OUTPATIENT)
Dept: INTERNAL MEDICINE | Age: 78
End: 2018-04-02

## 2018-04-02 DIAGNOSIS — R07.9 CHEST PAIN, UNSPECIFIED TYPE: ICD-10-CM

## 2018-04-02 DIAGNOSIS — M10.9 GOUT, UNSPECIFIED CAUSE, UNSPECIFIED CHRONICITY, UNSPECIFIED SITE: ICD-10-CM

## 2018-04-02 DIAGNOSIS — Z00.00 WELL ADULT EXAM: ICD-10-CM

## 2018-04-02 DIAGNOSIS — I15.9 SECONDARY HYPERTENSION: ICD-10-CM

## 2018-04-02 DIAGNOSIS — E03.9 HYPOTHYROIDISM, UNSPECIFIED TYPE: ICD-10-CM

## 2018-04-02 DIAGNOSIS — E78.5 HYPERLIPIDEMIA, UNSPECIFIED HYPERLIPIDEMIA TYPE: ICD-10-CM

## 2018-04-02 DIAGNOSIS — E79.0 HYPERURICEMIA: ICD-10-CM

## 2018-04-02 DIAGNOSIS — Z86.59 HISTORY OF DEPRESSION: ICD-10-CM

## 2018-04-02 RX ORDER — ALLOPURINOL 300 MG/1
TABLET ORAL
Qty: 90 TABLET | Refills: 3 | Status: SHIPPED | OUTPATIENT
Start: 2018-04-02 | End: 2019-03-20 | Stop reason: SDUPTHER

## 2018-04-11 PROBLEM — J10.1 INFLUENZA A: Status: RESOLVED | Noted: 2017-12-30 | Resolved: 2018-04-11

## 2018-04-11 RX ORDER — OMEPRAZOLE 20 MG/1
20 CAPSULE, DELAYED RELEASE ORAL DAILY
Qty: 90 CAPSULE | Refills: 3 | Status: SHIPPED | OUTPATIENT
Start: 2018-04-11 | End: 2019-02-13 | Stop reason: SDUPTHER

## 2018-04-18 ENCOUNTER — OFFICE VISIT (OUTPATIENT)
Dept: INTERNAL MEDICINE | Age: 78
End: 2018-04-18

## 2018-04-18 VITALS
DIASTOLIC BLOOD PRESSURE: 74 MMHG | BODY MASS INDEX: 43.87 KG/M2 | HEIGHT: 66 IN | WEIGHT: 273 LBS | SYSTOLIC BLOOD PRESSURE: 132 MMHG

## 2018-04-18 DIAGNOSIS — M25.561 CHRONIC PAIN OF BOTH KNEES: ICD-10-CM

## 2018-04-18 DIAGNOSIS — R73.9 HYPERGLYCEMIA: ICD-10-CM

## 2018-04-18 DIAGNOSIS — N18.30 STAGE 3 CHRONIC KIDNEY DISEASE (HCC): ICD-10-CM

## 2018-04-18 DIAGNOSIS — I48.91 ATRIAL FIBRILLATION WITH RAPID VENTRICULAR RESPONSE (HCC): ICD-10-CM

## 2018-04-18 DIAGNOSIS — E66.01 MORBID OBESITY WITH BMI OF 40.0-44.9, ADULT (HCC): ICD-10-CM

## 2018-04-18 DIAGNOSIS — J18.9 COMMUNITY ACQUIRED PNEUMONIA OF LEFT LOWER LOBE OF LUNG: ICD-10-CM

## 2018-04-18 DIAGNOSIS — K29.71 GASTROINTESTINAL HEMORRHAGE ASSOCIATED WITH GASTRITIS, UNSPECIFIED GASTRITIS TYPE: ICD-10-CM

## 2018-04-18 DIAGNOSIS — K21.9 GASTROESOPHAGEAL REFLUX DISEASE WITHOUT ESOPHAGITIS: ICD-10-CM

## 2018-04-18 DIAGNOSIS — M1A.9XX0 CHRONIC GOUT WITHOUT TOPHUS, UNSPECIFIED CAUSE, UNSPECIFIED SITE: ICD-10-CM

## 2018-04-18 DIAGNOSIS — I10 ESSENTIAL HYPERTENSION: ICD-10-CM

## 2018-04-18 DIAGNOSIS — Z00.00 ROUTINE GENERAL MEDICAL EXAMINATION AT A HEALTH CARE FACILITY: ICD-10-CM

## 2018-04-18 DIAGNOSIS — I50.32 CHRONIC DIASTOLIC HEART FAILURE (HCC): ICD-10-CM

## 2018-04-18 DIAGNOSIS — M25.562 CHRONIC PAIN OF BOTH KNEES: ICD-10-CM

## 2018-04-18 DIAGNOSIS — Z00.00 WELL ADULT EXAM: Primary | ICD-10-CM

## 2018-04-18 DIAGNOSIS — G89.29 CHRONIC PAIN OF BOTH KNEES: ICD-10-CM

## 2018-04-18 PROBLEM — K92.2 GI BLEED: Status: ACTIVE | Noted: 2018-04-18

## 2018-04-18 PROCEDURE — 1123F ACP DISCUSS/DSCN MKR DOCD: CPT | Performed by: INTERNAL MEDICINE

## 2018-04-18 PROCEDURE — G8427 DOCREV CUR MEDS BY ELIG CLIN: HCPCS | Performed by: INTERNAL MEDICINE

## 2018-04-18 PROCEDURE — 1090F PRES/ABSN URINE INCON ASSESS: CPT | Performed by: INTERNAL MEDICINE

## 2018-04-18 PROCEDURE — G0439 PPPS, SUBSEQ VISIT: HCPCS | Performed by: INTERNAL MEDICINE

## 2018-04-18 PROCEDURE — 1036F TOBACCO NON-USER: CPT | Performed by: INTERNAL MEDICINE

## 2018-04-18 PROCEDURE — 99214 OFFICE O/P EST MOD 30 MIN: CPT | Performed by: INTERNAL MEDICINE

## 2018-04-18 PROCEDURE — G8399 PT W/DXA RESULTS DOCUMENT: HCPCS | Performed by: INTERNAL MEDICINE

## 2018-04-18 PROCEDURE — G8417 CALC BMI ABV UP PARAM F/U: HCPCS | Performed by: INTERNAL MEDICINE

## 2018-04-18 PROCEDURE — 4040F PNEUMOC VAC/ADMIN/RCVD: CPT | Performed by: INTERNAL MEDICINE

## 2018-04-27 ENCOUNTER — ANTI-COAG VISIT (OUTPATIENT)
Dept: PHARMACY | Facility: CLINIC | Age: 78
End: 2018-04-27

## 2018-04-27 ENCOUNTER — HOSPITAL ENCOUNTER (OUTPATIENT)
Dept: GENERAL RADIOLOGY | Age: 78
Discharge: HOME | End: 2018-04-27
Attending: INTERNAL MEDICINE | Admitting: INTERNAL MEDICINE

## 2018-04-27 DIAGNOSIS — G89.29 CHRONIC PAIN OF BOTH KNEES: ICD-10-CM

## 2018-04-27 DIAGNOSIS — R73.9 HYPERGLYCEMIA: ICD-10-CM

## 2018-04-27 DIAGNOSIS — M1A.9XX0 CHRONIC GOUT WITHOUT TOPHUS, UNSPECIFIED CAUSE, UNSPECIFIED SITE: ICD-10-CM

## 2018-04-27 DIAGNOSIS — M25.561 CHRONIC PAIN OF BOTH KNEES: ICD-10-CM

## 2018-04-27 DIAGNOSIS — K29.71 GASTROINTESTINAL HEMORRHAGE ASSOCIATED WITH GASTRITIS, UNSPECIFIED GASTRITIS TYPE: ICD-10-CM

## 2018-04-27 DIAGNOSIS — I50.32 CHRONIC DIASTOLIC HEART FAILURE (HCC): ICD-10-CM

## 2018-04-27 DIAGNOSIS — N18.30 STAGE 3 CHRONIC KIDNEY DISEASE (HCC): ICD-10-CM

## 2018-04-27 DIAGNOSIS — Z00.00 WELL ADULT EXAM: ICD-10-CM

## 2018-04-27 DIAGNOSIS — M25.562 CHRONIC PAIN OF BOTH KNEES: ICD-10-CM

## 2018-04-27 DIAGNOSIS — I48.91 ATRIAL FIBRILLATION WITH RAPID VENTRICULAR RESPONSE (HCC): ICD-10-CM

## 2018-04-27 LAB
BASOPHILS ABSOLUTE: 0.1 K/UL (ref 0–0.2)
BASOPHILS RELATIVE PERCENT: 1 %
EOSINOPHILS ABSOLUTE: 0.1 K/UL (ref 0–0.6)
EOSINOPHILS RELATIVE PERCENT: 1.2 %
FOLATE: 13.26 NG/ML (ref 4.78–24.2)
HCT VFR BLD CALC: 37.4 % (ref 36–48)
HEMOGLOBIN: 12 G/DL (ref 12–16)
INR BLD: 2
IRON SATURATION: 7 % (ref 15–50)
IRON: 30 UG/DL (ref 37–145)
LYMPHOCYTES ABSOLUTE: 1.8 K/UL (ref 1–5.1)
LYMPHOCYTES RELATIVE PERCENT: 17.9 %
MCH RBC QN AUTO: 26.4 PG (ref 26–34)
MCHC RBC AUTO-ENTMCNC: 32.1 G/DL (ref 31–36)
MCV RBC AUTO: 82.2 FL (ref 80–100)
MONOCYTES ABSOLUTE: 0.7 K/UL (ref 0–1.3)
MONOCYTES RELATIVE PERCENT: 7.5 %
NEUTROPHILS ABSOLUTE: 7.1 K/UL (ref 1.7–7.7)
NEUTROPHILS RELATIVE PERCENT: 72.4 %
PDW BLD-RTO: 20.6 % (ref 12.4–15.4)
PLATELET # BLD: 244 K/UL (ref 135–450)
PMV BLD AUTO: 8.6 FL (ref 5–10.5)
RBC # BLD: 4.55 M/UL (ref 4–5.2)
TOTAL IRON BINDING CAPACITY: 406 UG/DL (ref 260–445)
URIC ACID, SERUM: 4 MG/DL (ref 2.6–6)
VITAMIN B-12: 387 PG/ML (ref 211–911)
WBC # BLD: 9.8 K/UL (ref 4–11)

## 2018-04-28 LAB
ESTIMATED AVERAGE GLUCOSE: 111.2 MG/DL
HBA1C MFR BLD: 5.5 %

## 2018-04-28 ASSESSMENT — ENCOUNTER SYMPTOMS
BACK PAIN: 0
ABDOMINAL PAIN: 0
CHEST TIGHTNESS: 0
COLOR CHANGE: 0
SHORTNESS OF BREATH: 1
WHEEZING: 0

## 2018-05-01 ENCOUNTER — HOSPITAL ENCOUNTER (OUTPATIENT)
Dept: OTHER | Age: 78
Discharge: HOME | End: 2018-05-31
Attending: INTERNAL MEDICINE | Admitting: INTERNAL MEDICINE

## 2018-05-25 ENCOUNTER — ANTI-COAG VISIT (OUTPATIENT)
Dept: PHARMACY | Facility: CLINIC | Age: 78
End: 2018-05-25

## 2018-05-25 LAB — INR BLD: 2.2

## 2018-06-01 ENCOUNTER — HOSPITAL ENCOUNTER (OUTPATIENT)
Dept: OTHER | Age: 78
Discharge: HOME | End: 2018-06-30
Attending: INTERNAL MEDICINE | Admitting: INTERNAL MEDICINE

## 2018-06-06 RX ORDER — DILTIAZEM HYDROCHLORIDE 300 MG/1
300 CAPSULE, COATED, EXTENDED RELEASE ORAL DAILY
Qty: 90 CAPSULE | Refills: 3 | Status: SHIPPED | OUTPATIENT
Start: 2018-06-06 | End: 2019-02-21 | Stop reason: SDUPTHER

## 2018-06-12 NOTE — PROGRESS NOTES
Ms. Chey Chakraborty is here for management of anticoagulation for Afib. PMH also significant for HTN, Gout, CKD II/III, Hyperlipidemia, and depression. She presents today w/out complaint. Pt verifies dosing regimen as listed above. Pt denies s/s bleeding/swelling/SOB. No BRBPR. No melena. No missed doses   No changes in RX/OTCs/Herbal medications. Reviewed dietary concerns. Pt does not drink EtOH or smoke. Pt stated at first that she skipped M/W/F cause she read the card wrong. She then said no that maybe she did follow the card correctly? INR 3.9 is above the therapeutic range of 2-3. Recommend to [facility] then take the normal 1 mg daily, except 0.5 mg on MWF. Went over calendar with pt and will check in one week to get good control of what pt is taking. Patient has 1 mg tablets. Will continue to monitor and check INR in 1 week. Dosing reminder card given with phone number, appointment date and time.    Return to clinic: 12/15/17 @ 11:00 am
no radiation

## 2018-06-22 ENCOUNTER — ANTI-COAG VISIT (OUTPATIENT)
Dept: PHARMACY | Facility: CLINIC | Age: 78
End: 2018-06-22

## 2018-06-22 LAB — INR BLD: 1.9

## 2018-07-01 ENCOUNTER — HOSPITAL ENCOUNTER (OUTPATIENT)
Dept: OTHER | Age: 78
Discharge: HOME | End: 2018-07-01
Attending: INTERNAL MEDICINE | Admitting: INTERNAL MEDICINE

## 2018-07-20 ENCOUNTER — ANTI-COAG VISIT (OUTPATIENT)
Dept: PHARMACY | Age: 78
End: 2018-07-20
Payer: MEDICARE

## 2018-07-20 LAB — INR BLD: 3.6

## 2018-07-20 PROCEDURE — 99211 OFF/OP EST MAY X REQ PHY/QHP: CPT

## 2018-07-20 PROCEDURE — 85610 PROTHROMBIN TIME: CPT

## 2018-07-20 NOTE — PROGRESS NOTES
Ms. Clarisse Castro is here for management of anticoagulation for Afib. PMH also significant for HTN, Gout, CKD II/III, Hyperlipidemia, and depression. She presents today w/out complaint. Pt verifies dosing regimen as listed above. Pt denies s/s bleeding/swelling/SOB. No BRBPR. No melena. No missed doses   No changes in RX/OTCs/Herbal medications. Reviewed dietary concerns. Pt does not drink EtOH or smoke. INR 3.6 is above the acceptable therapeutic range of 2-3. Recommend to hold today then continue the normal 1 mg daily, except 0.5 mg on MWF. Pt has been stable on this dose for a while. Patient has 1 mg tablets. Will continue to monitor and check INR in about 2 weeks. Dosing reminder card given with phone number, appointment date and time.    Return to clinic: 8/3 @ 11:15 am

## 2018-08-03 ENCOUNTER — ANTI-COAG VISIT (OUTPATIENT)
Dept: PHARMACY | Age: 78
End: 2018-08-03
Payer: MEDICARE

## 2018-08-03 LAB — INR BLD: 3.4

## 2018-08-03 PROCEDURE — 85610 PROTHROMBIN TIME: CPT | Performed by: PHARMACIST

## 2018-08-03 PROCEDURE — 99211 OFF/OP EST MAY X REQ PHY/QHP: CPT | Performed by: PHARMACIST

## 2018-08-13 ENCOUNTER — TELEPHONE (OUTPATIENT)
Dept: INTERNAL MEDICINE | Age: 78
End: 2018-08-13

## 2018-08-13 NOTE — TELEPHONE ENCOUNTER
So as long as no PNC allergy amoxicillin 500 mg 4 tabs 1/2 hr before procedure- get her #8 w 3 refills

## 2018-08-13 NOTE — TELEPHONE ENCOUNTER
Patient having dental work done 8/22 and requesting antibiotics be called into [address], [address] [place] [address] [phone] - F [phone] [[id number] Preferred]   [phone]. Patient knows Dr. Katie Cheung out of the office 8/13.

## 2018-08-20 RX ORDER — CLINDAMYCIN HYDROCHLORIDE 300 MG/1
CAPSULE ORAL
Qty: 2 CAPSULE | Refills: 3 | Status: SHIPPED | OUTPATIENT
Start: 2018-08-20 | End: 2019-11-13 | Stop reason: SDUPTHER

## 2018-08-20 NOTE — TELEPHONE ENCOUNTER
Pt calling again and would like an antibiotic sent to pharmacy before her dental appt on 8.22.18.     Pharmacy verified

## 2018-08-24 ENCOUNTER — ANTI-COAG VISIT (OUTPATIENT)
Dept: PHARMACY | Age: 78
End: 2018-08-24
Payer: MEDICARE

## 2018-08-24 LAB — INR BLD: 2.9

## 2018-08-24 PROCEDURE — 99211 OFF/OP EST MAY X REQ PHY/QHP: CPT | Performed by: PHARMACIST

## 2018-08-24 PROCEDURE — 85610 PROTHROMBIN TIME: CPT | Performed by: PHARMACIST

## 2018-09-21 ENCOUNTER — ANTI-COAG VISIT (OUTPATIENT)
Dept: PHARMACY | Age: 78
End: 2018-09-21
Payer: MEDICARE

## 2018-09-21 LAB — INR BLD: 2.4

## 2018-09-21 PROCEDURE — 99211 OFF/OP EST MAY X REQ PHY/QHP: CPT | Performed by: INTERNAL MEDICINE

## 2018-09-21 PROCEDURE — 85610 PROTHROMBIN TIME: CPT | Performed by: INTERNAL MEDICINE

## 2018-09-21 NOTE — PROGRESS NOTES
Ms. Brien Trivedi is here for management of anticoagulation for Afib. PMH also significant for HTN, Gout, CKD II/III, Hyperlipidemia, and depression. She presents today w/out complaint. Pt verifies dosing regimen as listed above. Pt denies s/s bleeding/swelling/SOB. No BRBPR. No melena. No missed doses   No changes in RX/OTCs/Herbal medications. No diet changes. Pt does not drink EtOH or smoke. INR 2.4 is within the acceptable therapeutic range of 2-3. Recommend to continue 1 mg Sunday/Tuesday/Thursday and  0.5 mg on all other days. Patient has 1 mg tablets. Will continue to monitor and check INR in about 4 weeks. Dosing reminder card given with phone number, appointment date and time. Return to clinic: 9/21 @ 11:15 am  Referring Provider- Dr. Elliott Fraser, PharmD Candidate 2019    I have seen the patient and reviewed the progress note written by the PharmD Candidate. I agree with this assessment and plan.    Kelsea Vann, Ryan 9/21/2018 1:26 PM

## 2018-09-27 ENCOUNTER — OFFICE VISIT (OUTPATIENT)
Dept: ORTHOPEDIC SURGERY | Age: 78
End: 2018-09-27
Payer: MEDICARE

## 2018-09-27 VITALS — WEIGHT: 260 LBS | HEIGHT: 67 IN | BODY MASS INDEX: 40.81 KG/M2

## 2018-09-27 DIAGNOSIS — M25.511 RIGHT SHOULDER PAIN, UNSPECIFIED CHRONICITY: Primary | ICD-10-CM

## 2018-09-27 DIAGNOSIS — M75.81 RIGHT ROTATOR CUFF TENDINITIS: ICD-10-CM

## 2018-09-27 PROCEDURE — 1101F PT FALLS ASSESS-DOCD LE1/YR: CPT | Performed by: PHYSICIAN ASSISTANT

## 2018-09-27 PROCEDURE — 20610 DRAIN/INJ JOINT/BURSA W/O US: CPT | Performed by: PHYSICIAN ASSISTANT

## 2018-09-27 PROCEDURE — 1036F TOBACCO NON-USER: CPT | Performed by: PHYSICIAN ASSISTANT

## 2018-09-27 PROCEDURE — G8399 PT W/DXA RESULTS DOCUMENT: HCPCS | Performed by: PHYSICIAN ASSISTANT

## 2018-09-27 PROCEDURE — 1090F PRES/ABSN URINE INCON ASSESS: CPT | Performed by: PHYSICIAN ASSISTANT

## 2018-09-27 PROCEDURE — 4040F PNEUMOC VAC/ADMIN/RCVD: CPT | Performed by: PHYSICIAN ASSISTANT

## 2018-09-27 PROCEDURE — 1123F ACP DISCUSS/DSCN MKR DOCD: CPT | Performed by: PHYSICIAN ASSISTANT

## 2018-09-27 PROCEDURE — 99213 OFFICE O/P EST LOW 20 MIN: CPT | Performed by: PHYSICIAN ASSISTANT

## 2018-09-27 PROCEDURE — G8417 CALC BMI ABV UP PARAM F/U: HCPCS | Performed by: PHYSICIAN ASSISTANT

## 2018-09-27 PROCEDURE — G8427 DOCREV CUR MEDS BY ELIG CLIN: HCPCS | Performed by: PHYSICIAN ASSISTANT

## 2018-09-27 NOTE — PROGRESS NOTES
SITE: RIGHT SHOULDER    MARCAINE  NDC# [id number]  LOT NUMBER#  [id number]    DEPO 40MG  NDC# 1060-7053-93  LOT NUMBER#  [id number]    LIDOCAINE    NDC# 7502-9931-95  LOT NUMBER#  [id number]

## 2018-09-27 NOTE — PROGRESS NOTES
Chief Complaint:  Shoulder Pain (RIGHT SHOULDER)      History of Present Illness:  Yusuf Maldonado is a 77 y.o. RHD female here regarding right shoulder pain. She did have an MRI of her right shoulder on 7/6/2017, which showed mild supraspinatus and subscapularis tendinosis. She's had a few previous injections of the shoulder, with the most recent being done in February 2018. This injection just started wearing off approximately 3 weeks ago. She is interested in trying another cortisone injection today. She takes Tylenol arthritis for all of her joints. Medical History:  Patient's medications, allergies, past medical, surgical, social and family histories were reviewed and updated as appropriate. Review of Systems:  Pertinent items are noted in HPI  Review of systems reviewed from Patient History Form dated on 06/07/2017 and available in the patient's chart under the Media tab. Vital Signs: There were no vitals filed for this visit. Pain Assessment:            General Exam:   Constitutional: Patient is adequately groomed with no evidence of malnutrition  DTRs: Deep tendon reflexes are intact  Mental Status: The patient is oriented to time, place and person. The patient's mood and affect are appropriate. Vascular: Examination reveals no swelling or calf tenderness. Peripheral pulses are palpable and 2+. Neurological: The patient has good coordination. There is no weakness or sensory deficit. Body mass index is 40.72 kg/m². Right Shoulder Examination:  Inspection:  No gross deformities noted. No atrophy, erythema or ecchymosis. Skin is intact. Palpation:  She is no areas of significant tenderness. She has no tenderness today and no palpable swelling. There is no effusion of her shoulder. Range of Motion:  Range of motion is slightly limited due to pain. Forward flexion to 140, abduction to 130. Strength:  She has no weakness with supraspinatus testing.  Normal

## 2018-10-19 ENCOUNTER — ANTI-COAG VISIT (OUTPATIENT)
Dept: PHARMACY | Age: 78
End: 2018-10-19
Payer: MEDICARE

## 2018-10-19 DIAGNOSIS — I48.21 PERMANENT ATRIAL FIBRILLATION (HCC): ICD-10-CM

## 2018-10-19 LAB — INR BLD: 3.1

## 2018-10-19 PROCEDURE — 85610 PROTHROMBIN TIME: CPT | Performed by: PHARMACIST

## 2018-10-19 PROCEDURE — 99211 OFF/OP EST MAY X REQ PHY/QHP: CPT | Performed by: PHARMACIST

## 2018-10-19 RX ORDER — WARFARIN SODIUM 1 MG/1
0.5 TABLET ORAL SEE ADMIN INSTRUCTIONS
Qty: 90 TABLET | Refills: 3 | Status: SHIPPED | OUTPATIENT
Start: 2018-10-19 | End: 2019-02-21 | Stop reason: SDUPTHER

## 2018-10-31 ENCOUNTER — OFFICE VISIT (OUTPATIENT)
Dept: INTERNAL MEDICINE CLINIC | Age: 78
End: 2018-10-31
Payer: MEDICARE

## 2018-10-31 VITALS
HEART RATE: 85 BPM | BODY MASS INDEX: 43.87 KG/M2 | HEIGHT: 66 IN | SYSTOLIC BLOOD PRESSURE: 128 MMHG | OXYGEN SATURATION: 97 % | WEIGHT: 273 LBS | DIASTOLIC BLOOD PRESSURE: 82 MMHG

## 2018-10-31 DIAGNOSIS — R73.9 HYPERGLYCEMIA: ICD-10-CM

## 2018-10-31 DIAGNOSIS — E78.00 PURE HYPERCHOLESTEROLEMIA: ICD-10-CM

## 2018-10-31 DIAGNOSIS — Z23 NEED FOR INFLUENZA VACCINATION: ICD-10-CM

## 2018-10-31 DIAGNOSIS — I48.21 PERMANENT ATRIAL FIBRILLATION (HCC): ICD-10-CM

## 2018-10-31 DIAGNOSIS — Z00.00 WELL ADULT EXAM: Primary | ICD-10-CM

## 2018-10-31 DIAGNOSIS — I50.32 CHRONIC DIASTOLIC HEART FAILURE (HCC): ICD-10-CM

## 2018-10-31 DIAGNOSIS — N18.30 STAGE 3 CHRONIC KIDNEY DISEASE (HCC): ICD-10-CM

## 2018-10-31 DIAGNOSIS — I10 ESSENTIAL HYPERTENSION: ICD-10-CM

## 2018-10-31 DIAGNOSIS — I48.91 ATRIAL FIBRILLATION WITH RAPID VENTRICULAR RESPONSE (HCC): ICD-10-CM

## 2018-10-31 DIAGNOSIS — E66.01 MORBID OBESITY WITH BMI OF 40.0-44.9, ADULT (HCC): ICD-10-CM

## 2018-10-31 DIAGNOSIS — Z00.00 ROUTINE GENERAL MEDICAL EXAMINATION AT A HEALTH CARE FACILITY: ICD-10-CM

## 2018-10-31 PROBLEM — J18.9 COMMUNITY ACQUIRED PNEUMONIA OF LEFT LOWER LOBE OF LUNG: Status: RESOLVED | Noted: 2017-12-26 | Resolved: 2018-10-31

## 2018-10-31 PROCEDURE — 4040F PNEUMOC VAC/ADMIN/RCVD: CPT | Performed by: INTERNAL MEDICINE

## 2018-10-31 PROCEDURE — G8399 PT W/DXA RESULTS DOCUMENT: HCPCS | Performed by: INTERNAL MEDICINE

## 2018-10-31 PROCEDURE — 1101F PT FALLS ASSESS-DOCD LE1/YR: CPT | Performed by: INTERNAL MEDICINE

## 2018-10-31 PROCEDURE — G8482 FLU IMMUNIZE ORDER/ADMIN: HCPCS | Performed by: INTERNAL MEDICINE

## 2018-10-31 PROCEDURE — 1090F PRES/ABSN URINE INCON ASSESS: CPT | Performed by: INTERNAL MEDICINE

## 2018-10-31 PROCEDURE — G8417 CALC BMI ABV UP PARAM F/U: HCPCS | Performed by: INTERNAL MEDICINE

## 2018-10-31 PROCEDURE — 90662 IIV NO PRSV INCREASED AG IM: CPT | Performed by: INTERNAL MEDICINE

## 2018-10-31 PROCEDURE — 1123F ACP DISCUSS/DSCN MKR DOCD: CPT | Performed by: INTERNAL MEDICINE

## 2018-10-31 PROCEDURE — G0439 PPPS, SUBSEQ VISIT: HCPCS | Performed by: INTERNAL MEDICINE

## 2018-10-31 PROCEDURE — G8427 DOCREV CUR MEDS BY ELIG CLIN: HCPCS | Performed by: INTERNAL MEDICINE

## 2018-10-31 PROCEDURE — G0008 ADMIN INFLUENZA VIRUS VAC: HCPCS | Performed by: INTERNAL MEDICINE

## 2018-10-31 PROCEDURE — 1036F TOBACCO NON-USER: CPT | Performed by: INTERNAL MEDICINE

## 2018-10-31 ASSESSMENT — ENCOUNTER SYMPTOMS
SHORTNESS OF BREATH: 1
COUGH: 0
ABDOMINAL PAIN: 0
CHEST TIGHTNESS: 0
COLOR CHANGE: 0
BACK PAIN: 0
WHEEZING: 0

## 2018-10-31 ASSESSMENT — PATIENT HEALTH QUESTIONNAIRE - PHQ9
SUM OF ALL RESPONSES TO PHQ QUESTIONS 1-9: 0
SUM OF ALL RESPONSES TO PHQ QUESTIONS 1-9: 0

## 2018-10-31 ASSESSMENT — LIFESTYLE VARIABLES: HOW OFTEN DO YOU HAVE A DRINK CONTAINING ALCOHOL: 0

## 2018-10-31 ASSESSMENT — ANXIETY QUESTIONNAIRES: GAD7 TOTAL SCORE: 0

## 2018-10-31 NOTE — PROGRESS NOTES
Medicare Annual Wellness Visit  Name: Chris Marsh Date: 10/31/2018   MRN: [id number] Sex: Female   Age: 77 y.o. Ethnicity: Non-/Non    : 1940 Race: Kaylan Crawford is here for Medicare AWV (no complaints )    Screenings for behavioral, psychosocial and functional/safety risks, and cognitive dysfunction are all negative except as indicated below. These results, as well as other patient data from the [address] form, are documented in Flowsheets linked to this Encounter. Allergies   Allergen Reactions    Diclofenac Hives     Severe itching    Adhesive Tape Rash    Penicillins Nausea And Vomiting     \"years ago\"     Prior to Visit Medications    Medication Sig Taking?  Authorizing Provider   warfarin (COUMADIN) 1 MG tablet Take 0.5 tablets by mouth See Admin Instructions Take 1/2 or 1 tablets by mouth daily as directed by coumadin clinic Yes Marya Pizarro MD   diltiazem (CARTIA XT) 300 MG extended release capsule Take 1 capsule by mouth daily Yes RADHA Galeana - CNP   omeprazole (PRILOSEC) 20 MG delayed release capsule Take 1 capsule by mouth daily Yes Marya Pizarro MD   allopurinol (ZYLOPRIM) 300 MG tablet TAKE ONE TABLET BY MOUTH DAILY Yes Marya Pizarro MD   atorvastatin (LIPITOR) 20 MG tablet Take 1 tablet by mouth nightly Yes Marya Pizarro MD   albuterol sulfate HFA (PROAIR HFA) 108 (90 Base) MCG/ACT inhaler Inhale 2 puffs into the lungs every 6 hours as needed for Wheezing Yes Estrella Guzman MD   warfarin (COUMADIN) 1 MG tablet Take 1 tablet by mouth four times a week Take on Sun, Tues, Thurs, Sat Yes Estrella Guzman MD   sertraline (ZOLOFT) 50 MG tablet TAKE ONE TABLET BY MOUTH DAILY Yes Marya Pizarro MD   Cholecalciferol (VITAMIN D) 2000 UNITS CAPS capsule Take 2,000 Units by mouth daily Yes Historical Provider, MD   docusate sodium (COLACE) 100 MG capsule Take 100 mg by mouth daily Yes Historical Provider, MD   aspirin 81 MG EC

## 2018-10-31 NOTE — ASSESSMENT & PLAN NOTE
Discussed with patient at length health risks of obesity and need for diet and exercise  Not really interested in loosing weight

## 2018-11-16 ENCOUNTER — ANTI-COAG VISIT (OUTPATIENT)
Dept: PHARMACY | Age: 78
End: 2018-11-16
Payer: MEDICARE

## 2018-11-16 ENCOUNTER — HOSPITAL ENCOUNTER (OUTPATIENT)
Age: 78
Discharge: HOME | End: 2018-11-16
Payer: MEDICARE

## 2018-11-16 DIAGNOSIS — E78.00 PURE HYPERCHOLESTEROLEMIA: ICD-10-CM

## 2018-11-16 DIAGNOSIS — E66.01 MORBID OBESITY WITH BMI OF 40.0-44.9, ADULT (HCC): ICD-10-CM

## 2018-11-16 DIAGNOSIS — I50.32 CHRONIC DIASTOLIC HEART FAILURE (HCC): ICD-10-CM

## 2018-11-16 DIAGNOSIS — I48.21 PERMANENT ATRIAL FIBRILLATION (HCC): ICD-10-CM

## 2018-11-16 DIAGNOSIS — R73.9 HYPERGLYCEMIA: ICD-10-CM

## 2018-11-16 DIAGNOSIS — I10 ESSENTIAL HYPERTENSION: ICD-10-CM

## 2018-11-16 DIAGNOSIS — N18.30 STAGE 3 CHRONIC KIDNEY DISEASE (HCC): ICD-10-CM

## 2018-11-16 DIAGNOSIS — I48.91 ATRIAL FIBRILLATION WITH RAPID VENTRICULAR RESPONSE (HCC): ICD-10-CM

## 2018-11-16 LAB
A/G RATIO: 1.3 (ref 1.1–2.2)
ALBUMIN SERPL-MCNC: 4.2 G/DL (ref 3.4–5)
ALP BLD-CCNC: 88 U/L (ref 40–129)
ALT SERPL-CCNC: 11 U/L (ref 10–40)
ANION GAP SERPL CALCULATED.3IONS-SCNC: 15 MMOL/L (ref 3–16)
AST SERPL-CCNC: 16 U/L (ref 15–37)
BASOPHILS ABSOLUTE: 0.1 K/UL (ref 0–0.2)
BASOPHILS RELATIVE PERCENT: 0.7 %
BILIRUB SERPL-MCNC: 0.7 MG/DL (ref 0–1)
BUN BLDV-MCNC: 22 MG/DL (ref 7–20)
CALCIUM SERPL-MCNC: 9.4 MG/DL (ref 8.3–10.6)
CHLORIDE BLD-SCNC: 106 MMOL/L (ref 99–110)
CHOLESTEROL, TOTAL: 119 MG/DL (ref 0–199)
CO2: 23 MMOL/L (ref 21–32)
CREAT SERPL-MCNC: 1.3 MG/DL (ref 0.6–1.2)
CREATININE URINE: 202.4 MG/DL (ref 28–259)
EOSINOPHILS ABSOLUTE: 0.2 K/UL (ref 0–0.6)
EOSINOPHILS RELATIVE PERCENT: 2.3 %
GFR AFRICAN AMERICAN: 48
GFR NON-AFRICAN AMERICAN: 39
GLOBULIN: 3.3 G/DL
GLUCOSE BLD-MCNC: 87 MG/DL (ref 70–99)
HCT VFR BLD CALC: 43.7 % (ref 36–48)
HDLC SERPL-MCNC: 54 MG/DL (ref 40–60)
HEMOGLOBIN: 14.5 G/DL (ref 12–16)
INR BLD: 2.5
LDL CHOLESTEROL CALCULATED: 49 MG/DL
LYMPHOCYTES ABSOLUTE: 1.8 K/UL (ref 1–5.1)
LYMPHOCYTES RELATIVE PERCENT: 23 %
MCH RBC QN AUTO: 32 PG (ref 26–34)
MCHC RBC AUTO-ENTMCNC: 33.1 G/DL (ref 31–36)
MCV RBC AUTO: 96.8 FL (ref 80–100)
MICROALBUMIN UR-MCNC: 1.4 MG/DL
MICROALBUMIN/CREAT UR-RTO: 6.9 MG/G (ref 0–30)
MONOCYTES ABSOLUTE: 0.6 K/UL (ref 0–1.3)
MONOCYTES RELATIVE PERCENT: 7.7 %
NEUTROPHILS ABSOLUTE: 5.2 K/UL (ref 1.7–7.7)
NEUTROPHILS RELATIVE PERCENT: 66.3 %
PDW BLD-RTO: 16.5 % (ref 12.4–15.4)
PLATELET # BLD: 206 K/UL (ref 135–450)
PMV BLD AUTO: 8.8 FL (ref 5–10.5)
POTASSIUM SERPL-SCNC: 4.3 MMOL/L (ref 3.5–5.1)
RBC # BLD: 4.52 M/UL (ref 4–5.2)
SODIUM BLD-SCNC: 144 MMOL/L (ref 136–145)
TOTAL PROTEIN: 7.5 G/DL (ref 6.4–8.2)
TRIGL SERPL-MCNC: 82 MG/DL (ref 0–150)
TSH SERPL DL<=0.05 MIU/L-ACNC: 3.67 UIU/ML (ref 0.27–4.2)
VLDLC SERPL CALC-MCNC: 16 MG/DL
WBC # BLD: 7.8 K/UL (ref 4–11)

## 2018-11-16 PROCEDURE — 85025 COMPLETE CBC W/AUTO DIFF WBC: CPT

## 2018-11-16 PROCEDURE — 82570 ASSAY OF URINE CREATININE: CPT

## 2018-11-16 PROCEDURE — 80061 LIPID PANEL: CPT

## 2018-11-16 PROCEDURE — 36415 COLL VENOUS BLD VENIPUNCTURE: CPT

## 2018-11-16 PROCEDURE — 82043 UR ALBUMIN QUANTITATIVE: CPT

## 2018-11-16 PROCEDURE — 99211 OFF/OP EST MAY X REQ PHY/QHP: CPT

## 2018-11-16 PROCEDURE — 80053 COMPREHEN METABOLIC PANEL: CPT

## 2018-11-16 PROCEDURE — 84443 ASSAY THYROID STIM HORMONE: CPT

## 2018-11-16 PROCEDURE — 83036 HEMOGLOBIN GLYCOSYLATED A1C: CPT

## 2018-11-16 PROCEDURE — 85610 PROTHROMBIN TIME: CPT

## 2018-11-16 NOTE — PROGRESS NOTES
Ms. Mauricio Oliva is here for management of anticoagulation for Afib. PMH also significant for HTN, Gout, CKD II/III, Hyperlipidemia, and depression. She presents today w/out complaint. Pt verifies dosing regimen as listed above. Pt denies s/s bleeding/swelling/SOB. No BRBPR. No melena. No missed doses. No changes in Rx/OTC/herbal medications. No major changes in diet. Pt does not drink EtOH or smoke. INR 2.5 is within the acceptable therapeutic range of 2 - 3. Recommend to continue 1 mg Sunday/Tuesday/Thursday and 0.5 mg on all other days. Patient has 1 mg tablets. Will continue to monitor and check INR in about 4 weeks. Dosing reminder card given with phone number, appointment date and time.   Return to clinic: 12/13 @ [phone]  Referring Provider: Dr. Joshua Saldana, PharmD Candidate 2019  [facility] Intern  11/16/2018 11:03 AM

## 2018-11-17 LAB
ESTIMATED AVERAGE GLUCOSE: 114 MG/DL
HBA1C MFR BLD: 5.6 %

## 2018-12-13 ENCOUNTER — ANTI-COAG VISIT (OUTPATIENT)
Dept: PHARMACY | Age: 78
End: 2018-12-13
Payer: MEDICARE

## 2018-12-13 DIAGNOSIS — I48.21 PERMANENT ATRIAL FIBRILLATION (HCC): ICD-10-CM

## 2018-12-13 LAB — INR BLD: 2.4

## 2018-12-13 PROCEDURE — 85610 PROTHROMBIN TIME: CPT | Performed by: PHARMACIST

## 2018-12-13 PROCEDURE — 99211 OFF/OP EST MAY X REQ PHY/QHP: CPT | Performed by: PHARMACIST

## 2019-01-03 ENCOUNTER — TELEPHONE (OUTPATIENT)
Dept: CARDIOLOGY CLINIC | Age: 79
End: 2019-01-03

## 2019-01-11 ENCOUNTER — ANTI-COAG VISIT (OUTPATIENT)
Dept: PHARMACY | Age: 79
End: 2019-01-11
Payer: MEDICARE

## 2019-01-11 DIAGNOSIS — I48.21 PERMANENT ATRIAL FIBRILLATION (HCC): ICD-10-CM

## 2019-01-11 LAB — INR BLD: 2.5

## 2019-01-11 PROCEDURE — 99211 OFF/OP EST MAY X REQ PHY/QHP: CPT

## 2019-01-11 PROCEDURE — 85610 PROTHROMBIN TIME: CPT

## 2019-02-04 RX ORDER — ATORVASTATIN CALCIUM 20 MG/1
TABLET, FILM COATED ORAL
Qty: 90 TABLET | Refills: 2 | Status: SHIPPED | OUTPATIENT
Start: 2019-02-04 | End: 2019-11-05 | Stop reason: SDUPTHER

## 2019-02-08 ENCOUNTER — ANTI-COAG VISIT (OUTPATIENT)
Dept: PHARMACY | Age: 79
End: 2019-02-08
Payer: MEDICARE

## 2019-02-08 DIAGNOSIS — I48.21 PERMANENT ATRIAL FIBRILLATION (HCC): ICD-10-CM

## 2019-02-08 LAB — INR BLD: 2.8

## 2019-02-08 PROCEDURE — 99211 OFF/OP EST MAY X REQ PHY/QHP: CPT | Performed by: INTERNAL MEDICINE

## 2019-02-08 PROCEDURE — 85610 PROTHROMBIN TIME: CPT | Performed by: INTERNAL MEDICINE

## 2019-02-13 RX ORDER — OMEPRAZOLE 20 MG/1
CAPSULE, DELAYED RELEASE ORAL
Qty: 90 CAPSULE | Refills: 2 | Status: SHIPPED | OUTPATIENT
Start: 2019-02-13 | End: 2019-11-13 | Stop reason: SDUPTHER

## 2019-02-21 ENCOUNTER — OFFICE VISIT (OUTPATIENT)
Dept: CARDIOLOGY CLINIC | Age: 79
End: 2019-02-21
Payer: MEDICARE

## 2019-02-21 VITALS
WEIGHT: 273 LBS | SYSTOLIC BLOOD PRESSURE: 142 MMHG | HEIGHT: 67 IN | HEART RATE: 76 BPM | BODY MASS INDEX: 42.85 KG/M2 | DIASTOLIC BLOOD PRESSURE: 80 MMHG

## 2019-02-21 DIAGNOSIS — I48.20 CHRONIC ATRIAL FIBRILLATION (HCC): Primary | ICD-10-CM

## 2019-02-21 DIAGNOSIS — I50.32 CHRONIC DIASTOLIC HEART FAILURE (HCC): ICD-10-CM

## 2019-02-21 DIAGNOSIS — E78.00 PURE HYPERCHOLESTEROLEMIA: ICD-10-CM

## 2019-02-21 PROCEDURE — 1123F ACP DISCUSS/DSCN MKR DOCD: CPT | Performed by: INTERNAL MEDICINE

## 2019-02-21 PROCEDURE — G8427 DOCREV CUR MEDS BY ELIG CLIN: HCPCS | Performed by: INTERNAL MEDICINE

## 2019-02-21 PROCEDURE — 99204 OFFICE O/P NEW MOD 45 MIN: CPT | Performed by: INTERNAL MEDICINE

## 2019-02-21 PROCEDURE — 1036F TOBACCO NON-USER: CPT | Performed by: INTERNAL MEDICINE

## 2019-02-21 PROCEDURE — 1101F PT FALLS ASSESS-DOCD LE1/YR: CPT | Performed by: INTERNAL MEDICINE

## 2019-02-21 PROCEDURE — G8399 PT W/DXA RESULTS DOCUMENT: HCPCS | Performed by: INTERNAL MEDICINE

## 2019-02-21 PROCEDURE — G8482 FLU IMMUNIZE ORDER/ADMIN: HCPCS | Performed by: INTERNAL MEDICINE

## 2019-02-21 PROCEDURE — 4040F PNEUMOC VAC/ADMIN/RCVD: CPT | Performed by: INTERNAL MEDICINE

## 2019-02-21 PROCEDURE — G8417 CALC BMI ABV UP PARAM F/U: HCPCS | Performed by: INTERNAL MEDICINE

## 2019-02-21 PROCEDURE — 93000 ELECTROCARDIOGRAM COMPLETE: CPT | Performed by: INTERNAL MEDICINE

## 2019-02-21 PROCEDURE — 1090F PRES/ABSN URINE INCON ASSESS: CPT | Performed by: INTERNAL MEDICINE

## 2019-02-21 RX ORDER — DILTIAZEM HYDROCHLORIDE 300 MG/1
300 CAPSULE, COATED, EXTENDED RELEASE ORAL DAILY
Qty: 90 CAPSULE | Refills: 3 | Status: SHIPPED | OUTPATIENT
Start: 2019-02-21 | End: 2020-02-20 | Stop reason: SDUPTHER

## 2019-02-28 DIAGNOSIS — I48.91 ATRIAL FIBRILLATION WITH RAPID VENTRICULAR RESPONSE (HCC): Primary | ICD-10-CM

## 2019-02-28 PROCEDURE — 93227 XTRNL ECG REC<48 HR R&I: CPT | Performed by: INTERNAL MEDICINE

## 2019-03-08 ENCOUNTER — ANTI-COAG VISIT (OUTPATIENT)
Dept: PHARMACY | Age: 79
End: 2019-03-08
Payer: MEDICARE

## 2019-03-08 DIAGNOSIS — I48.21 PERMANENT ATRIAL FIBRILLATION (HCC): ICD-10-CM

## 2019-03-08 LAB — INR BLD: 2.4

## 2019-03-08 PROCEDURE — 99211 OFF/OP EST MAY X REQ PHY/QHP: CPT

## 2019-03-08 PROCEDURE — 85610 PROTHROMBIN TIME: CPT

## 2019-03-20 ENCOUNTER — TELEPHONE (OUTPATIENT)
Dept: INTERNAL MEDICINE CLINIC | Age: 79
End: 2019-03-20

## 2019-03-20 DIAGNOSIS — Z86.59 HISTORY OF DEPRESSION: ICD-10-CM

## 2019-03-20 DIAGNOSIS — I15.9 SECONDARY HYPERTENSION: ICD-10-CM

## 2019-03-20 DIAGNOSIS — R07.9 CHEST PAIN, UNSPECIFIED TYPE: ICD-10-CM

## 2019-03-20 DIAGNOSIS — Z00.00 WELL ADULT EXAM: ICD-10-CM

## 2019-03-20 DIAGNOSIS — E78.5 HYPERLIPIDEMIA, UNSPECIFIED HYPERLIPIDEMIA TYPE: ICD-10-CM

## 2019-03-20 DIAGNOSIS — E03.9 HYPOTHYROIDISM, UNSPECIFIED TYPE: ICD-10-CM

## 2019-03-20 DIAGNOSIS — M10.9 GOUT, UNSPECIFIED CAUSE, UNSPECIFIED CHRONICITY, UNSPECIFIED SITE: ICD-10-CM

## 2019-03-20 DIAGNOSIS — E79.0 HYPERURICEMIA: ICD-10-CM

## 2019-03-20 RX ORDER — ALLOPURINOL 300 MG/1
TABLET ORAL
Qty: 90 TABLET | Refills: 3 | Status: SHIPPED | OUTPATIENT
Start: 2019-03-20 | End: 2020-03-16

## 2019-04-05 ENCOUNTER — ANTI-COAG VISIT (OUTPATIENT)
Dept: PHARMACY | Age: 79
End: 2019-04-05
Payer: MEDICARE

## 2019-04-05 DIAGNOSIS — I48.21 PERMANENT ATRIAL FIBRILLATION (HCC): ICD-10-CM

## 2019-04-05 LAB — INR BLD: 2.4

## 2019-04-05 PROCEDURE — 85610 PROTHROMBIN TIME: CPT

## 2019-04-05 PROCEDURE — 99211 OFF/OP EST MAY X REQ PHY/QHP: CPT

## 2019-04-05 NOTE — PROGRESS NOTES
Ms. Arron Andino is here for management of anticoagulation for Afib. PMH also significant for HTN, Gout, CKD II/III, Hyperlipidemia, and depression. She presents today w/out complaint. Pt verifies dosing regimen as listed above. Pt denies s/s bleeding/swelling/SOB. No BRBPR. No melena. No missed doses. No changes in Rx/OTC/herbal medications. No major changes in diet. Pt does not drink EtOH or smoke. No longer taking ASA 81mg per cardiologist recommendation. INR 2.4 is within the acceptable therapeutic range of 2-3. Recommend to continue 1 mg Sunday/Tuesday/Thursday and 0.5 mg on all other days. Patient has 1 mg tablets. Will continue to monitor and check INR in 4 weeks. Dosing reminder card given with phone number, appointment date and time.   Return to clinic: 5/3 at 11:15am  Referring Provider: Dr. Blaise Covington

## 2019-05-03 ENCOUNTER — ANTI-COAG VISIT (OUTPATIENT)
Dept: PHARMACY | Age: 79
End: 2019-05-03
Payer: MEDICARE

## 2019-05-03 DIAGNOSIS — I48.21 PERMANENT ATRIAL FIBRILLATION (HCC): ICD-10-CM

## 2019-05-03 LAB — INR BLD: 2.3

## 2019-05-03 PROCEDURE — 85610 PROTHROMBIN TIME: CPT

## 2019-05-03 PROCEDURE — 99211 OFF/OP EST MAY X REQ PHY/QHP: CPT

## 2019-05-03 NOTE — PROGRESS NOTES
Ms. Lennie Zacarias is here for management of anticoagulation for Afib. PMH also significant for HTN, Gout, CKD II/III, Hyperlipidemia, and depression. She presents today w/out complaint. Pt verifies dosing regimen as listed above. Pt denies s/s bleeding/swelling/SOB. No BRBPR. No melena. No missed doses. No changes in Rx/OTC/herbal medications. No major changes in diet. Pt does not drink EtOH or smoke. INR 2.3 is within the acceptable therapeutic range of 2-3. Recommend to continue 1 mg Sunday/Tuesday/Thursday and 0.5 mg on all other days. Patient has 1 mg tablets. Will continue to monitor and check INR in 4 weeks. Dosing reminder card given with phone number, appointment date and time.   Return to clinic: 6/7 at 11:15am  Referring Provider: Dr. Marce Henry

## 2019-05-23 ENCOUNTER — OFFICE VISIT (OUTPATIENT)
Dept: ORTHOPEDIC SURGERY | Age: 79
End: 2019-05-23
Payer: MEDICARE

## 2019-05-23 VITALS — WEIGHT: 260 LBS | HEIGHT: 66 IN | BODY MASS INDEX: 41.78 KG/M2

## 2019-05-23 DIAGNOSIS — M25.532 WRIST PAIN, LEFT: Primary | ICD-10-CM

## 2019-05-23 DIAGNOSIS — S63.592A: ICD-10-CM

## 2019-05-23 PROCEDURE — 1036F TOBACCO NON-USER: CPT | Performed by: PHYSICIAN ASSISTANT

## 2019-05-23 PROCEDURE — 4040F PNEUMOC VAC/ADMIN/RCVD: CPT | Performed by: PHYSICIAN ASSISTANT

## 2019-05-23 PROCEDURE — 99213 OFFICE O/P EST LOW 20 MIN: CPT | Performed by: PHYSICIAN ASSISTANT

## 2019-05-23 PROCEDURE — 1090F PRES/ABSN URINE INCON ASSESS: CPT | Performed by: PHYSICIAN ASSISTANT

## 2019-05-23 PROCEDURE — G8427 DOCREV CUR MEDS BY ELIG CLIN: HCPCS | Performed by: PHYSICIAN ASSISTANT

## 2019-05-23 PROCEDURE — G8399 PT W/DXA RESULTS DOCUMENT: HCPCS | Performed by: PHYSICIAN ASSISTANT

## 2019-05-23 PROCEDURE — L3908 WHO COCK-UP NONMOLDE PRE OTS: HCPCS | Performed by: PHYSICIAN ASSISTANT

## 2019-05-23 PROCEDURE — G8417 CALC BMI ABV UP PARAM F/U: HCPCS | Performed by: PHYSICIAN ASSISTANT

## 2019-05-23 PROCEDURE — 1123F ACP DISCUSS/DSCN MKR DOCD: CPT | Performed by: PHYSICIAN ASSISTANT

## 2019-05-23 NOTE — PROGRESS NOTES
CHIEF COMPLAINT:    Chief Complaint   Patient presents with    Wrist Pain     fell yesterday       HISTORY OF PRESENT ILLNESS:                The patient is a 77 y.o. female   Past Medical History:   Diagnosis Date    Anemia     NOS    Arthritis     knee     Atrial fibrillation (HCC)     on coumadin    Chronic kidney disease (CKD), stage II (mild)     Community acquired pneumonia of left lower lobe of lung ([facility] [address] 12/26/2017    Depression     Diverticulosis     Foot pain     Gout     Hemorrhoids     Hyperlipidemia     Hypertension     Hyperuricemia     Iron deficiency anemia 1/8/2014    Iron deficiency anemia-s/p EGD and colonoscopy 2/11/2014 Esophageal tear likely source     Menopausal syndrome     Obesity     Pelvic relaxation     PONV (postoperative nausea and vomiting)     Stress 8/2006    NL stress    Syncope     Unspecified sleep apnea 03/2002    uvulectomy -hx of    Vitamin D deficiency     20ng/ml 6/2009    Wears dentures     Wears glasses         The patient presents today for left wrist evaluation. The patient is right-hand dominant. She had an injury yesterday when she fell backwards with her right hand behind her head. She is unsure where her left hand was but following the fall she had immediate left wrist discomfort. She has numbness and tingling into the fifth digit of her left hand that was present prior to her fall. She denies any new numbness or tingling into her left hand. She denies any previous problems with her left wrist.  She has treated her left wrist symptoms with ice, Tylenol arthritis, and activity modification.     The pain assessment was noted & is as follows:  Pain Assessment  Location of Pain: Wrist  Location Modifiers: Left  Severity of Pain: 7  Quality of Pain: Dull  Duration of Pain: A few minutes  Frequency of Pain: Intermittent  Date Pain First Started: 05/22/19  Aggravating Factors: Bending, Straightening, Other (Comment)(gripping/lifting)  Limiting Behavior: Yes  Relieving Factors: Rest, Ice  Result of Injury: Yes  Work-Related Injury: No  Are there other pain locations you wish to document?: No]      Past Medical History: Medical history form was reviewed today & can be found in the media tab  Past Medical History:   Diagnosis Date    Anemia     NOS    Arthritis     knee     Atrial fibrillation (HCC)     on coumadin    Chronic kidney disease (CKD), stage II (mild)     Community acquired pneumonia of left lower lobe of lung ([facility] [address] 12/26/2017    Depression     Diverticulosis     Foot pain     Gout     Hemorrhoids     Hyperlipidemia     Hypertension     Hyperuricemia     Iron deficiency anemia 1/8/2014    Iron deficiency anemia-s/p EGD and colonoscopy 2/11/2014 Esophageal tear likely source     Menopausal syndrome     Obesity     Pelvic relaxation     PONV (postoperative nausea and vomiting)     Stress 8/2006    NL stress    Syncope     Unspecified sleep apnea 03/2002    uvulectomy -hx of    Vitamin D deficiency     20ng/ml 6/2009    Wears dentures     Wears glasses       Past Surgical History:     Past Surgical History:   Procedure Laterality Date    BLADDER SUSPENSION  1997    CHOLECYSTECTOMY  1974    COLONOSCOPY      2013    COLONOSCOPY  11/2014    10yr    EYE SURGERY      macular tear and cataract right    HYSTERECTOMY  1977    partial    KNEE ARTHROSCOPY  2005    knee surgery    KNEE ARTHROSCOPY  10/25/2011    left knee    UVULOPALATOPHARYGOPLASTY  3/2002     Current Medications:     Current Outpatient Medications:     allopurinol (ZYLOPRIM) 300 MG tablet, TAKE ONE TABLET BY MOUTH DAILY, Disp: 90 tablet, Rfl: 3    diltiazem (CARTIA XT) 300 MG extended release capsule, Take 1 capsule by mouth daily, Disp: 90 capsule, Rfl: 3    omeprazole (PRILOSEC) 20 MG delayed release capsule, TAKE ONE CAPSULE BY MOUTH DAILY, Disp: 90 capsule, Rfl: 2    atorvastatin (LIPITOR) 20 MG tablet, TAKE ONE TABLET BY MOUTH ONCE NIGHTLY, Disp: 90 tablet, Rfl: 2    sertraline (ZOLOFT) 50 MG tablet, TAKE ONE TABLET BY MOUTH DAILY, Disp: 90 tablet, Rfl: 3    clindamycin (CLEOCIN) 300 MG capsule, Take 2 tabs 1 hr before procedure (Patient taking differently: 300 mg Take 2 tabs 1 hr before procedure), Disp: 2 capsule, Rfl: 3    albuterol sulfate HFA (PROAIR HFA) 108 (90 Base) MCG/ACT inhaler, Inhale 2 puffs into the lungs every 6 hours as needed for Wheezing, Disp: 1 Inhaler, Rfl: 1    warfarin (COUMADIN) 1 MG tablet, Take 1 tablet by mouth four times a week Take on Sun, Tues, Thurs, Sat, Disp: 30 tablet, Rfl: 3    Cholecalciferol (VITAMIN D) 2000 UNITS CAPS capsule, Take 2,000 Units by mouth daily, Disp: , Rfl:     docusate sodium (COLACE) 100 MG capsule, Take 100 mg by mouth daily, Disp: , Rfl:   Allergies:  Diclofenac; Adhesive tape; and Penicillins  Social History:    reports that she has never smoked. She has never used smokeless tobacco. She reports that she does not drink alcohol or use drugs. Family History:   Family History   Problem Relation Age of Onset    Heart Attack Father     Heart Disease Father     Stroke Brother        REVIEW OF SYSTEMS:   Pertinent items are noted in HPI  Review of systems reviewed from Patient History Form dated on May 23, 2019 and available in the patient's chart under the Media tab. CONSTITUTIONAL: Denies unexplained weight loss, fevers, chills or fatigue  NEUROLOGICAL: Denies unsteady gait or progressive weakness  SKIN: Denies skin changes, delayed healing, rash, itching     PHYSICAL EXAMINATION:   Vitals: Height 5' 6\" (1.676 m), weight 260 lb (117.9 kg), not currently breastfeeding. GENERAL EXAM:  ? General Apparence: Patient is adequately groomed with no evidence of malnutrition. ? Orientation: The patient is oriented to time, place and person. ?  Mood & Affect:The patient's mood and affect are appropriate     PHYSICAL EXAMINATION:  Inspection reveals wrist will require stabilization / immobilization from this semi-rigid / rigid orthosis to improve their function. The orthosis will assist in protecting the affected area, provide functional support and facilitate healing. The patient was educated and fit by a healthcare professional with expert knowledge and specialization in brace application while under the direct supervision of the treating physician. Verbal and written instructions for the use of and application of this item were provided. They were instructed to contact the office immediately should the brace result in increased pain, decreased sensation, increased swelling or worsening of the condition.          Ramona Masters ATC, PAAspenC

## 2019-06-07 ENCOUNTER — ANTI-COAG VISIT (OUTPATIENT)
Dept: PHARMACY | Age: 79
End: 2019-06-07
Payer: MEDICARE

## 2019-06-07 DIAGNOSIS — I48.21 PERMANENT ATRIAL FIBRILLATION (HCC): ICD-10-CM

## 2019-06-07 LAB — INR BLD: 2.4

## 2019-06-07 PROCEDURE — 99211 OFF/OP EST MAY X REQ PHY/QHP: CPT

## 2019-06-07 PROCEDURE — 85610 PROTHROMBIN TIME: CPT

## 2019-07-05 ENCOUNTER — ANTI-COAG VISIT (OUTPATIENT)
Dept: PHARMACY | Age: 79
End: 2019-07-05
Payer: MEDICARE

## 2019-07-05 DIAGNOSIS — I48.21 PERMANENT ATRIAL FIBRILLATION (HCC): ICD-10-CM

## 2019-07-05 LAB — INR BLD: 2.4

## 2019-07-05 PROCEDURE — 99211 OFF/OP EST MAY X REQ PHY/QHP: CPT

## 2019-07-05 PROCEDURE — 85610 PROTHROMBIN TIME: CPT

## 2019-07-05 NOTE — PROGRESS NOTES
Ms. Valentina Bello is here for management of anticoagulation for Afib. PMH also significant for HTN, Gout, CKD II/III, Hyperlipidemia, and depression. She presents today w/out complaint. Pt verifies dosing regimen as listed above. Pt denies s/s bleeding/swelling/SOB. No BRBPR. No melena. No missed doses. No changes in Rx/OTC/herbal medications. No major changes in diet. Pt does not drink EtOH or smoke. INR 2.4 is within the acceptable therapeutic range of 2-3. Recommend to continue 1 mg Sunday/Tuesday/Thursday and 0.5 mg on all other days. Patient has 1 mg tablets. Will continue to monitor and check INR in 4 weeks. Dosing reminder card given with phone number, appointment date and time.   Return to clinic: 8/2 at 11:15 am  Referring Provider: Dr. Génesis Paulino

## 2019-07-24 DIAGNOSIS — I48.91 ATRIAL FIBRILLATION WITH RAPID VENTRICULAR RESPONSE (HCC): ICD-10-CM

## 2019-08-02 ENCOUNTER — ANTI-COAG VISIT (OUTPATIENT)
Dept: PHARMACY | Age: 79
End: 2019-08-02
Payer: MEDICARE

## 2019-08-02 DIAGNOSIS — I48.21 PERMANENT ATRIAL FIBRILLATION (HCC): ICD-10-CM

## 2019-08-02 LAB — INR BLD: 2.2

## 2019-08-02 PROCEDURE — 85610 PROTHROMBIN TIME: CPT | Performed by: FAMILY MEDICINE

## 2019-08-02 PROCEDURE — 99211 OFF/OP EST MAY X REQ PHY/QHP: CPT | Performed by: FAMILY MEDICINE

## 2019-08-26 NOTE — PROGRESS NOTES
[facility]   Cardiac Consultation    Referring Provider:  Chelsey Landers MD     CC:  AF  HPI:  Paola Mirza is a 77 y.o. female with PMH of CKD, HLD, HTN, and AF. She was first diagnosed with AF during a routine physical with PCP in 1/2016. She was asymptomatic at that time. ECG from 2013 showed SR. S/p DCCV (2/2016, Dr. Mendoza Neves) with recurrence of AF within a week (?). She denies feeling any different during the brief time that she was in NSR following the DCCV. Review of all ECG's since DCCV have shown persistent AF. She was admitted for AF RVR in 12/2017, presenting with SOB. Treatment has been focused on rate control and anticoagulation. Currently on diltiazem 300 mg daily and warfarin, managed by the anticoagulation clinic. INR's have been consistently stable at > 2.0. She had a normal Myoview (1/2016) and a normal echo (2/6/18) with EF of 55%. Today she presents for follow up for AF. 24 hour monitor worn 2/2019 demonstrated Afib, 46 pauses > 2.0 seconds, longest lasting 2.7 seconds, no symptoms. EKG today demonstrates Afib, HR 87. She is taking her medications as prescribed. She states she takes her medications between 10 am and 12 pm, depending upon when she gets up. She denies feeling any palpitations. She states she is active on her computer, otherwise she cleans and cooks. She states she can walk flat surfaces and can walk through the grocery store without having to stop and rest. States going up 1 flight of stairs does not make her short of breath, but 2 flights would. Her coumadin/INR is monitored by [facility] coumadin clinic.     Past Medical History:   has a past medical history of Anemia, Arthritis, Atrial fibrillation ([address] Chronic kidney disease (CKD), stage II (mild), Community acquired pneumonia of left lower lobe of lung ([address] Depression, Diverticulosis, Foot pain, Gout, Hemorrhoids, Hyperlipidemia, Hypertension, Hyperuricemia, Iron deficiency anemia, Inhaler 1     Facility-Administered Encounter Medications as of 8/28/2019   Medication Dose Route Frequency Provider Last Rate Last Dose    hyaluronate (HYLAN) injection 16 mg  16 mg Intra-articular Once KAREN Rojo           Allergies:  Diclofenac; Adhesive tape; and Penicillins     Review of Systems   Constitutional: Negative. HENT: Negative. Eyes: Negative. Respiratory: Negative. Cardiovascular: Negative. Gastrointestinal: Negative. Genitourinary: Negative. Musculoskeletal: Negative. Skin: Negative. Neurological: Negative. Hematological: Negative. Psychiatric/Behavioral: Negative. /60   Pulse 82   Ht 5' 6\" (1.676 m)   Wt 274 lb 3.2 oz (124.4 kg)   SpO2 95%   BMI 44.26 kg/m²     ECG 8/28/19, personally reviewed. AF, V rate 82    Echo 2/6/18  Summary   Left ventricle size is normal with normal left ventricular wall thickness.   Left ventricular function is borderline with ejection fraction estimated at   50 %.   No regional wall motion abnormalities   Diastolic dysfunction is indeterminate.   Trivial mitral regurgitation .   Moderate tricuspid regurgitation with RVSP estimated at 52 mmHg.   Biatrial enlargement. Myoview 1/15/16  IMPRESSION :       Normal myocardial perfusion scan       Ejection fraction 59 %          Objective:  Physical Exam   Constitutional: She is oriented to person, place, and time. She appears well-developed and well-nourished. HENT:   Head: Normocephalic and atraumatic. Eyes: Pupils are equal, round, and reactive to light. Neck: Normal range of motion. Cardiovascular:  Irregular S1, S1,  normal heart sounds. Pulmonary/Chest: Effort normal and breath sounds normal.   Abdominal: Soft. No tenderness. Musculoskeletal: Normal range of motion. She exhibits no edema. Neurological: She is alert and oriented to person, place, and time. Skin: Skin is warm and dry. Psychiatric: She has a normal mood and affect.

## 2019-08-28 ENCOUNTER — OFFICE VISIT (OUTPATIENT)
Dept: CARDIOLOGY CLINIC | Age: 79
End: 2019-08-28
Payer: MEDICARE

## 2019-08-28 VITALS
HEIGHT: 66 IN | WEIGHT: 274.2 LBS | BODY MASS INDEX: 44.07 KG/M2 | OXYGEN SATURATION: 95 % | DIASTOLIC BLOOD PRESSURE: 60 MMHG | SYSTOLIC BLOOD PRESSURE: 118 MMHG | HEART RATE: 82 BPM

## 2019-08-28 DIAGNOSIS — I48.20 CHRONIC ATRIAL FIBRILLATION (HCC): Primary | ICD-10-CM

## 2019-08-28 PROCEDURE — 1090F PRES/ABSN URINE INCON ASSESS: CPT | Performed by: INTERNAL MEDICINE

## 2019-08-28 PROCEDURE — 93000 ELECTROCARDIOGRAM COMPLETE: CPT | Performed by: INTERNAL MEDICINE

## 2019-08-28 PROCEDURE — 1123F ACP DISCUSS/DSCN MKR DOCD: CPT | Performed by: INTERNAL MEDICINE

## 2019-08-28 PROCEDURE — 99214 OFFICE O/P EST MOD 30 MIN: CPT | Performed by: INTERNAL MEDICINE

## 2019-08-28 PROCEDURE — G8427 DOCREV CUR MEDS BY ELIG CLIN: HCPCS | Performed by: INTERNAL MEDICINE

## 2019-08-28 PROCEDURE — G8417 CALC BMI ABV UP PARAM F/U: HCPCS | Performed by: INTERNAL MEDICINE

## 2019-08-28 PROCEDURE — 1036F TOBACCO NON-USER: CPT | Performed by: INTERNAL MEDICINE

## 2019-08-28 PROCEDURE — 4040F PNEUMOC VAC/ADMIN/RCVD: CPT | Performed by: INTERNAL MEDICINE

## 2019-08-28 PROCEDURE — G8399 PT W/DXA RESULTS DOCUMENT: HCPCS | Performed by: INTERNAL MEDICINE

## 2019-08-28 PROCEDURE — 93227 XTRNL ECG REC<48 HR R&I: CPT | Performed by: INTERNAL MEDICINE

## 2019-09-06 ENCOUNTER — ANTI-COAG VISIT (OUTPATIENT)
Dept: PHARMACY | Age: 79
End: 2019-09-06
Payer: MEDICARE

## 2019-09-06 DIAGNOSIS — I48.21 PERMANENT ATRIAL FIBRILLATION (HCC): ICD-10-CM

## 2019-09-06 DIAGNOSIS — I48.19 PERSISTENT ATRIAL FIBRILLATION (HCC): ICD-10-CM

## 2019-09-06 LAB — INR BLD: 2.7

## 2019-09-06 PROCEDURE — 99211 OFF/OP EST MAY X REQ PHY/QHP: CPT

## 2019-09-06 PROCEDURE — 85610 PROTHROMBIN TIME: CPT

## 2019-09-20 ENCOUNTER — TELEPHONE (OUTPATIENT)
Dept: CARDIOLOGY CLINIC | Age: 79
End: 2019-09-20

## 2019-10-04 ENCOUNTER — ANTI-COAG VISIT (OUTPATIENT)
Dept: PHARMACY | Age: 79
End: 2019-10-04
Payer: MEDICARE

## 2019-10-04 DIAGNOSIS — I48.21 PERMANENT ATRIAL FIBRILLATION (HCC): ICD-10-CM

## 2019-10-04 DIAGNOSIS — I48.19 PERSISTENT ATRIAL FIBRILLATION (HCC): ICD-10-CM

## 2019-10-04 LAB — INTERNATIONAL NORMALIZATION RATIO, POC: 2.3

## 2019-10-04 PROCEDURE — 99211 OFF/OP EST MAY X REQ PHY/QHP: CPT

## 2019-10-04 PROCEDURE — 85610 PROTHROMBIN TIME: CPT

## 2019-11-01 ENCOUNTER — ANTI-COAG VISIT (OUTPATIENT)
Dept: PHARMACY | Age: 79
End: 2019-11-01
Payer: MEDICARE

## 2019-11-01 DIAGNOSIS — I48.19 PERSISTENT ATRIAL FIBRILLATION (HCC): ICD-10-CM

## 2019-11-01 DIAGNOSIS — I48.21 PERMANENT ATRIAL FIBRILLATION (HCC): ICD-10-CM

## 2019-11-01 LAB — INTERNATIONAL NORMALIZATION RATIO, POC: 2.4

## 2019-11-01 PROCEDURE — 85610 PROTHROMBIN TIME: CPT

## 2019-11-01 PROCEDURE — 99211 OFF/OP EST MAY X REQ PHY/QHP: CPT

## 2019-11-13 ENCOUNTER — OFFICE VISIT (OUTPATIENT)
Dept: INTERNAL MEDICINE CLINIC | Age: 79
End: 2019-11-13
Payer: MEDICARE

## 2019-11-13 VITALS — HEIGHT: 68 IN | BODY MASS INDEX: 41.52 KG/M2 | WEIGHT: 274 LBS

## 2019-11-13 DIAGNOSIS — M25.562 CHRONIC PAIN OF BOTH KNEES: ICD-10-CM

## 2019-11-13 DIAGNOSIS — I48.19 PERSISTENT ATRIAL FIBRILLATION (HCC): ICD-10-CM

## 2019-11-13 DIAGNOSIS — N18.30 STAGE 3 CHRONIC KIDNEY DISEASE (HCC): ICD-10-CM

## 2019-11-13 DIAGNOSIS — I10 ESSENTIAL HYPERTENSION: ICD-10-CM

## 2019-11-13 DIAGNOSIS — R73.9 HYPERGLYCEMIA: ICD-10-CM

## 2019-11-13 DIAGNOSIS — E78.00 PURE HYPERCHOLESTEROLEMIA: ICD-10-CM

## 2019-11-13 DIAGNOSIS — K21.9 GASTROESOPHAGEAL REFLUX DISEASE WITHOUT ESOPHAGITIS: ICD-10-CM

## 2019-11-13 DIAGNOSIS — E66.01 MORBID OBESITY WITH BMI OF 40.0-44.9, ADULT (HCC): ICD-10-CM

## 2019-11-13 DIAGNOSIS — M25.561 CHRONIC PAIN OF BOTH KNEES: ICD-10-CM

## 2019-11-13 DIAGNOSIS — N18.30 CHRONIC KIDNEY DISEASE, STAGE 3 (MODERATE): ICD-10-CM

## 2019-11-13 DIAGNOSIS — E79.0 HYPERURICEMIA: ICD-10-CM

## 2019-11-13 DIAGNOSIS — K57.90 DIVERTICULOSIS: ICD-10-CM

## 2019-11-13 DIAGNOSIS — M1A.9XX0 CHRONIC GOUT WITHOUT TOPHUS, UNSPECIFIED CAUSE, UNSPECIFIED SITE: ICD-10-CM

## 2019-11-13 DIAGNOSIS — Z23 NEED FOR INFLUENZA VACCINATION: Primary | ICD-10-CM

## 2019-11-13 DIAGNOSIS — R26.9 GAIT DISTURBANCE: ICD-10-CM

## 2019-11-13 DIAGNOSIS — G89.29 CHRONIC PAIN OF BOTH KNEES: ICD-10-CM

## 2019-11-13 DIAGNOSIS — I50.32 CHRONIC DIASTOLIC HEART FAILURE (HCC): ICD-10-CM

## 2019-11-13 DIAGNOSIS — Z00.00 WELL ADULT EXAM: ICD-10-CM

## 2019-11-13 PROBLEM — K92.2 GI BLEED: Status: RESOLVED | Noted: 2018-04-18 | Resolved: 2019-11-13

## 2019-11-13 PROCEDURE — 99214 OFFICE O/P EST MOD 30 MIN: CPT | Performed by: INTERNAL MEDICINE

## 2019-11-13 PROCEDURE — G8482 FLU IMMUNIZE ORDER/ADMIN: HCPCS | Performed by: INTERNAL MEDICINE

## 2019-11-13 PROCEDURE — 1090F PRES/ABSN URINE INCON ASSESS: CPT | Performed by: INTERNAL MEDICINE

## 2019-11-13 PROCEDURE — 90653 IIV ADJUVANT VACCINE IM: CPT | Performed by: INTERNAL MEDICINE

## 2019-11-13 PROCEDURE — 1123F ACP DISCUSS/DSCN MKR DOCD: CPT | Performed by: INTERNAL MEDICINE

## 2019-11-13 PROCEDURE — G8399 PT W/DXA RESULTS DOCUMENT: HCPCS | Performed by: INTERNAL MEDICINE

## 2019-11-13 PROCEDURE — 4040F PNEUMOC VAC/ADMIN/RCVD: CPT | Performed by: INTERNAL MEDICINE

## 2019-11-13 PROCEDURE — G8417 CALC BMI ABV UP PARAM F/U: HCPCS | Performed by: INTERNAL MEDICINE

## 2019-11-13 PROCEDURE — 1036F TOBACCO NON-USER: CPT | Performed by: INTERNAL MEDICINE

## 2019-11-13 PROCEDURE — G0008 ADMIN INFLUENZA VIRUS VAC: HCPCS | Performed by: INTERNAL MEDICINE

## 2019-11-13 PROCEDURE — G0439 PPPS, SUBSEQ VISIT: HCPCS | Performed by: INTERNAL MEDICINE

## 2019-11-13 PROCEDURE — G8427 DOCREV CUR MEDS BY ELIG CLIN: HCPCS | Performed by: INTERNAL MEDICINE

## 2019-11-13 RX ORDER — CLINDAMYCIN HYDROCHLORIDE 300 MG/1
CAPSULE ORAL
Qty: 2 CAPSULE | Refills: 5 | Status: SHIPPED | OUTPATIENT
Start: 2019-11-13 | End: 2020-02-19

## 2019-11-13 RX ORDER — OMEPRAZOLE 20 MG/1
CAPSULE, DELAYED RELEASE ORAL
Qty: 90 CAPSULE | Refills: 3 | Status: SHIPPED | OUTPATIENT
Start: 2019-11-13 | End: 2020-11-16 | Stop reason: SDUPTHER

## 2019-11-13 ASSESSMENT — PATIENT HEALTH QUESTIONNAIRE - PHQ9
SUM OF ALL RESPONSES TO PHQ QUESTIONS 1-9: 0
SUM OF ALL RESPONSES TO PHQ QUESTIONS 1-9: 0

## 2019-11-13 ASSESSMENT — ENCOUNTER SYMPTOMS
CHEST TIGHTNESS: 0
ABDOMINAL PAIN: 0
BACK PAIN: 0
SHORTNESS OF BREATH: 0
COLOR CHANGE: 0
WHEEZING: 0

## 2019-11-20 ENCOUNTER — OFFICE VISIT (OUTPATIENT)
Dept: DERMATOLOGY | Age: 79
End: 2019-11-20
Payer: MEDICARE

## 2019-11-20 DIAGNOSIS — D48.5 NEOPLASM OF UNCERTAIN BEHAVIOR OF SKIN: Primary | ICD-10-CM

## 2019-11-20 PROCEDURE — 11102 TANGNTL BX SKIN SINGLE LES: CPT | Performed by: DERMATOLOGY

## 2019-11-26 ENCOUNTER — TELEPHONE (OUTPATIENT)
Dept: CARDIOLOGY CLINIC | Age: 79
End: 2019-11-26

## 2019-11-26 ENCOUNTER — TELEPHONE (OUTPATIENT)
Dept: INTERNAL MEDICINE CLINIC | Age: 79
End: 2019-11-26

## 2019-11-26 LAB — DERMATOLOGY PATHOLOGY REPORT: NORMAL

## 2019-12-06 ENCOUNTER — HOSPITAL ENCOUNTER (OUTPATIENT)
Age: 79
Discharge: HOME | End: 2019-12-06
Payer: MEDICARE

## 2019-12-06 ENCOUNTER — ANTI-COAG VISIT (OUTPATIENT)
Dept: PHARMACY | Age: 79
End: 2019-12-06
Payer: MEDICARE

## 2019-12-06 DIAGNOSIS — K57.90 DIVERTICULOSIS: ICD-10-CM

## 2019-12-06 DIAGNOSIS — K21.9 GASTROESOPHAGEAL REFLUX DISEASE WITHOUT ESOPHAGITIS: ICD-10-CM

## 2019-12-06 DIAGNOSIS — Z23 NEED FOR INFLUENZA VACCINATION: ICD-10-CM

## 2019-12-06 DIAGNOSIS — I10 ESSENTIAL HYPERTENSION: ICD-10-CM

## 2019-12-06 DIAGNOSIS — M1A.9XX0 CHRONIC GOUT WITHOUT TOPHUS, UNSPECIFIED CAUSE, UNSPECIFIED SITE: ICD-10-CM

## 2019-12-06 DIAGNOSIS — I48.19 PERSISTENT ATRIAL FIBRILLATION (HCC): ICD-10-CM

## 2019-12-06 DIAGNOSIS — I50.32 CHRONIC DIASTOLIC HEART FAILURE (HCC): ICD-10-CM

## 2019-12-06 DIAGNOSIS — E79.0 HYPERURICEMIA: ICD-10-CM

## 2019-12-06 DIAGNOSIS — R73.9 HYPERGLYCEMIA: ICD-10-CM

## 2019-12-06 DIAGNOSIS — N18.30 STAGE 3 CHRONIC KIDNEY DISEASE (HCC): ICD-10-CM

## 2019-12-06 DIAGNOSIS — E66.01 MORBID OBESITY WITH BMI OF 40.0-44.9, ADULT (HCC): ICD-10-CM

## 2019-12-06 LAB
A/G RATIO: 1.4 (ref 1.1–2.2)
ALBUMIN SERPL-MCNC: 4.3 G/DL (ref 3.4–5)
ALP BLD-CCNC: 88 U/L (ref 40–129)
ALT SERPL-CCNC: 9 U/L (ref 10–40)
ANION GAP SERPL CALCULATED.3IONS-SCNC: 14 MMOL/L (ref 3–16)
AST SERPL-CCNC: 14 U/L (ref 15–37)
BASOPHILS ABSOLUTE: 0.1 K/UL (ref 0–0.2)
BASOPHILS RELATIVE PERCENT: 0.9 %
BILIRUB SERPL-MCNC: 0.7 MG/DL (ref 0–1)
BUN BLDV-MCNC: 25 MG/DL (ref 7–20)
CALCIUM SERPL-MCNC: 9.3 MG/DL (ref 8.3–10.6)
CHLORIDE BLD-SCNC: 103 MMOL/L (ref 99–110)
CHOLESTEROL, TOTAL: 103 MG/DL (ref 0–199)
CO2: 23 MMOL/L (ref 21–32)
CREAT SERPL-MCNC: 1.2 MG/DL (ref 0.6–1.2)
CREATININE URINE: 174.4 MG/DL (ref 28–259)
EOSINOPHILS ABSOLUTE: 0.2 K/UL (ref 0–0.6)
EOSINOPHILS RELATIVE PERCENT: 3.1 %
GFR AFRICAN AMERICAN: 52
GFR NON-AFRICAN AMERICAN: 43
GLOBULIN: 3.1 G/DL
GLUCOSE BLD-MCNC: 93 MG/DL (ref 70–99)
HCT VFR BLD CALC: 42.2 % (ref 36–48)
HDLC SERPL-MCNC: 52 MG/DL (ref 40–60)
HEMOGLOBIN: 13.9 G/DL (ref 12–16)
INTERNATIONAL NORMALIZATION RATIO, POC: 2.4
LDL CHOLESTEROL CALCULATED: 37 MG/DL
LYMPHOCYTES ABSOLUTE: 1.5 K/UL (ref 1–5.1)
LYMPHOCYTES RELATIVE PERCENT: 18.7 %
MCH RBC QN AUTO: 31.8 PG (ref 26–34)
MCHC RBC AUTO-ENTMCNC: 32.9 G/DL (ref 31–36)
MCV RBC AUTO: 96.8 FL (ref 80–100)
MICROALBUMIN UR-MCNC: 4 MG/DL
MICROALBUMIN/CREAT UR-RTO: 22.9 MG/G (ref 0–30)
MONOCYTES ABSOLUTE: 0.6 K/UL (ref 0–1.3)
MONOCYTES RELATIVE PERCENT: 7.9 %
NEUTROPHILS ABSOLUTE: 5.5 K/UL (ref 1.7–7.7)
NEUTROPHILS RELATIVE PERCENT: 69.4 %
PDW BLD-RTO: 15.8 % (ref 12.4–15.4)
PLATELET # BLD: 209 K/UL (ref 135–450)
PMV BLD AUTO: 9 FL (ref 5–10.5)
POTASSIUM SERPL-SCNC: 4.5 MMOL/L (ref 3.5–5.1)
RBC # BLD: 4.36 M/UL (ref 4–5.2)
SODIUM BLD-SCNC: 140 MMOL/L (ref 136–145)
TOTAL PROTEIN: 7.4 G/DL (ref 6.4–8.2)
TRIGL SERPL-MCNC: 69 MG/DL (ref 0–150)
TSH SERPL DL<=0.05 MIU/L-ACNC: 3.78 UIU/ML (ref 0.27–4.2)
URIC ACID, SERUM: 4.9 MG/DL (ref 2.6–6)
VLDLC SERPL CALC-MCNC: 14 MG/DL
WBC # BLD: 7.9 K/UL (ref 4–11)

## 2019-12-06 PROCEDURE — 85610 PROTHROMBIN TIME: CPT | Performed by: PHARMACIST

## 2019-12-06 PROCEDURE — 83036 HEMOGLOBIN GLYCOSYLATED A1C: CPT

## 2019-12-06 PROCEDURE — 80053 COMPREHEN METABOLIC PANEL: CPT

## 2019-12-06 PROCEDURE — 82043 UR ALBUMIN QUANTITATIVE: CPT

## 2019-12-06 PROCEDURE — 99211 OFF/OP EST MAY X REQ PHY/QHP: CPT | Performed by: PHARMACIST

## 2019-12-06 PROCEDURE — 84550 ASSAY OF BLOOD/URIC ACID: CPT

## 2019-12-06 PROCEDURE — 80061 LIPID PANEL: CPT

## 2019-12-06 PROCEDURE — 82570 ASSAY OF URINE CREATININE: CPT

## 2019-12-06 PROCEDURE — 84443 ASSAY THYROID STIM HORMONE: CPT

## 2019-12-06 PROCEDURE — 36415 COLL VENOUS BLD VENIPUNCTURE: CPT

## 2019-12-06 PROCEDURE — 85025 COMPLETE CBC W/AUTO DIFF WBC: CPT

## 2019-12-07 LAB
ESTIMATED AVERAGE GLUCOSE: 102.5 MG/DL
HBA1C MFR BLD: 5.2 %

## 2020-01-10 ENCOUNTER — ANTI-COAG VISIT (OUTPATIENT)
Dept: PHARMACY | Age: 80
End: 2020-01-10
Payer: MEDICARE

## 2020-01-10 LAB — INR BLD: 2

## 2020-01-10 PROCEDURE — 85610 PROTHROMBIN TIME: CPT

## 2020-01-10 PROCEDURE — 99211 OFF/OP EST MAY X REQ PHY/QHP: CPT

## 2020-01-13 ENCOUNTER — OFFICE VISIT (OUTPATIENT)
Dept: ORTHOPEDIC SURGERY | Age: 80
End: 2020-01-13
Payer: MEDICARE

## 2020-01-13 VITALS — WEIGHT: 274.03 LBS | BODY MASS INDEX: 41.53 KG/M2 | HEIGHT: 68 IN

## 2020-01-13 PROCEDURE — G8399 PT W/DXA RESULTS DOCUMENT: HCPCS | Performed by: PHYSICIAN ASSISTANT

## 2020-01-13 PROCEDURE — 1036F TOBACCO NON-USER: CPT | Performed by: PHYSICIAN ASSISTANT

## 2020-01-13 PROCEDURE — 99212 OFFICE O/P EST SF 10 MIN: CPT | Performed by: PHYSICIAN ASSISTANT

## 2020-01-13 PROCEDURE — G8417 CALC BMI ABV UP PARAM F/U: HCPCS | Performed by: PHYSICIAN ASSISTANT

## 2020-01-13 PROCEDURE — 1090F PRES/ABSN URINE INCON ASSESS: CPT | Performed by: PHYSICIAN ASSISTANT

## 2020-01-13 PROCEDURE — G8427 DOCREV CUR MEDS BY ELIG CLIN: HCPCS | Performed by: PHYSICIAN ASSISTANT

## 2020-01-13 PROCEDURE — G8482 FLU IMMUNIZE ORDER/ADMIN: HCPCS | Performed by: PHYSICIAN ASSISTANT

## 2020-01-13 PROCEDURE — 4040F PNEUMOC VAC/ADMIN/RCVD: CPT | Performed by: PHYSICIAN ASSISTANT

## 2020-01-13 PROCEDURE — 1123F ACP DISCUSS/DSCN MKR DOCD: CPT | Performed by: PHYSICIAN ASSISTANT

## 2020-01-13 NOTE — PROGRESS NOTES
Chief Complaint   Patient presents with    Knee Pain     ANNUAL CK RIGHT TKR SX:1/26/2017       Subjective:    She returns to clinic today following up for her 3-year visit status post right total knee replacement. Date of surgery was 1/26/2017. She has done very well postoperatively. She states that occasionally, she will get a minimal amount of pain along the anterior aspect of the knee. Objective:    Well-healed surgical incision. Range of motion is normal.  No strength deficits are noted. Calf is soft and nontender. Neurovascular exam is intact distally. X-rays:    3 x-rays of the right knee were taken today and reveal a posterior stabilized total knee replacement in excellent alignment with no signs of loosening. Assessment/plan:    She is doing very well postoperatively. She was provided with some VMO strengthening exercises today that she is going to begin performing at home. Overall, she is very happy with her knee replacement and will return to our clinic on an as-needed basis. Jefferson Cantu, [facility]    This dictation was performed with a verbal recognition program [facility]) and it was checked for errors. It is possible that there are still dictated errors within this office note. If so, please bring any errors to my attention for an addendum. All efforts were made to ensure that this office note is accurate.

## 2020-02-07 ENCOUNTER — ANTI-COAG VISIT (OUTPATIENT)
Dept: PHARMACY | Age: 80
End: 2020-02-07
Payer: MEDICARE

## 2020-02-07 LAB — INR BLD: 2.4

## 2020-02-07 PROCEDURE — 99211 OFF/OP EST MAY X REQ PHY/QHP: CPT

## 2020-02-07 PROCEDURE — 85610 PROTHROMBIN TIME: CPT

## 2020-02-07 NOTE — PROGRESS NOTES
Ms. Jesse Hunter is here for management of anticoagulation for Afib. PMH also significant for HTN, Gout, CKD II/III, Hyperlipidemia, and depression. She presents today w/out complaint. Pt verifies dosing regimen as listed above. Pt denies s/s bleeding/swelling/SOB. No BRBPR. No melena. No missed doses. No changes in Rx/OTC/herbal medications. No major changes in diet. Pt does not drink EtOH or smoke. Pt reports no changes. She fell last week and has a large bruise on her knee. INR 2.4 is within the acceptable therapeutic range of 2-3. Recommend to continue 1 mg Sunday/Tuesday/Thursday and 0.5 mg on all other days. Patient has 1 mg tablets. Will continue to monitor and check INR in 4 weeks. Dosing reminder card given with phone number, appointment date and time.   Return to clinic: 3/6 @ [phone]  Referring Provider: Dr. Layne You, PharmD 2/7/20 11:23 AM

## 2020-02-18 NOTE — PROGRESS NOTES
[address]   Cardiac Follow Up    Primary Provider:  Noemi Pryor MD     CC:  AF  HPI:  Rachel Khalil is a [de-identified] y.o. female with PMH of CKD, HLD, HTN, and AF. She was first diagnosed with AF during a routine physical with PCP in 1/2016. She was asymptomatic at that time. ECG from 2013 showed SR. S/p DCCV (2/2016, Dr. Ananda Davila) with recurrence of AF within a week (?). She denied feeling any different during the brief time that she was in NSR following the DCCV. Review of all ECG's since DCCV have shown persistent AF. She was admitted for AF RVR in 12/2017, presenting with SOB. Treatment has been focused on rate control and anticoagulation. She had a normal Myoview (1/2016) and a normal echo (2/6/18) with EF of 55%. On 8/28/19 she presented follow up for AF. 24 hour monitor worn 2/2019 demonstrated Afib, 46 pauses > 2.0 seconds, longest lasting 2.7 seconds, no symptoms. EKG demonstrated Afib, HR 87. Her coumadin/INR is monitored by [facility]. Today she states she is doing good. Her heart rates have been good as she monitors it at home. She did get an injection in her shoulder today. She recently bought a new pair of shoes and she fell wearing them while holding groceries. They softened fall. She fell again on stairs at [place]. She denies hitting her head on both episodes. Her EKG today shows AFib 53 bpm.  She is taking her medications as prescribed. She goes to coumadin clinic for her warfarin.       Past Medical History:   has a past medical history of Anemia, Arthritis, Atrial fibrillation ([address] Chronic kidney disease (CKD), stage II (mild), Community acquired pneumonia of left lower lobe of lung ([address] Depression, Diverticulosis, Foot pain, GI bleed, Gout, Hemorrhoids, Hyperlipidemia, Hypertension, Hyperuricemia, Iron deficiency anemia, Menopausal syndrome, Obesity, Pelvic relaxation, PONV (postoperative nausea and vomiting), Stress, Syncope, Unspecified Intra-articular Once [place], [id number]           Allergies:  Diclofenac; Adhesive tape; and Penicillins     Review of Systems   Constitutional: Negative. HENT: Negative. Eyes: Negative. Respiratory: Negative. Cardiovascular: Negative. Gastrointestinal: Negative. Genitourinary: Negative. Musculoskeletal: Negative. Skin: Negative. Neurological: Negative. Hematological: Negative. Psychiatric/Behavioral: Negative. /82   Pulse (!) 46   Ht 5' 6.5\" (1.689 m)   Wt 278 lb (126.1 kg)   SpO2 91%   BMI 44.20 kg/m²     ECG 2/19/20,    Echo 2/6/18  Summary   Left ventricle size is normal with normal left ventricular wall thickness.   Left ventricular function is borderline with ejection fraction estimated at   50 %.   No regional wall motion abnormalities   Diastolic dysfunction is indeterminate.   Trivial mitral regurgitation .   Moderate tricuspid regurgitation with RVSP estimated at 52 mmHg.   Biatrial enlargement. Myoview 1/15/16  IMPRESSION :       Normal myocardial perfusion scan       Ejection fraction 59 %          Objective:  Physical Exam   Constitutional: She is oriented to person, place, and time. She appears well-developed and well-nourished. HENT:   Head: Normocephalic and atraumatic. Eyes: Pupils are equal, round, and reactive to light. Neck: Normal range of motion. Cardiovascular:  Irregular S1, S1,  normal heart sounds. Pulmonary/Chest: Effort normal and breath sounds normal.   Abdominal: Soft. No tenderness. Musculoskeletal: Normal range of motion. She exhibits no edema. Neurological: She is alert and oriented to person, place, and time. Skin: Skin is warm and dry. Psychiatric: She has a normal mood and affect. Assessment:  1. AF- persistent  - first seen in 1/2016. S/p DCCV in 2/2016 with recurrence within a month. ECG's since have shown persistent AF. 24 hour monitor worn 2/2019 demonstrated AF with adequate HR control. Coumadin/INR monitored by [facility]. Plan:  1. Recommend purchasing a pulse oximeter to monitor heart rate   2. Continue all other medications  3. Continue to monitor your coumadin/INR at the coumadin clinic  4. Follow up in 6 months       This note was scribed in the presence of Emaline Angelucci MD by Kassandra Mcconnell RN.            Emaline Angelucci, M.D.

## 2020-02-19 ENCOUNTER — OFFICE VISIT (OUTPATIENT)
Dept: CARDIOLOGY CLINIC | Age: 80
End: 2020-02-19
Payer: MEDICARE

## 2020-02-19 ENCOUNTER — OFFICE VISIT (OUTPATIENT)
Dept: ORTHOPEDIC SURGERY | Age: 80
End: 2020-02-19
Payer: MEDICARE

## 2020-02-19 VITALS
BODY MASS INDEX: 43.63 KG/M2 | HEIGHT: 67 IN | HEART RATE: 46 BPM | OXYGEN SATURATION: 91 % | DIASTOLIC BLOOD PRESSURE: 82 MMHG | WEIGHT: 278 LBS | SYSTOLIC BLOOD PRESSURE: 126 MMHG

## 2020-02-19 VITALS — WEIGHT: 274.03 LBS | HEIGHT: 68 IN | BODY MASS INDEX: 41.53 KG/M2

## 2020-02-19 PROCEDURE — 1090F PRES/ABSN URINE INCON ASSESS: CPT | Performed by: ORTHOPAEDIC SURGERY

## 2020-02-19 PROCEDURE — 93000 ELECTROCARDIOGRAM COMPLETE: CPT | Performed by: INTERNAL MEDICINE

## 2020-02-19 PROCEDURE — 1123F ACP DISCUSS/DSCN MKR DOCD: CPT | Performed by: INTERNAL MEDICINE

## 2020-02-19 PROCEDURE — G8399 PT W/DXA RESULTS DOCUMENT: HCPCS | Performed by: INTERNAL MEDICINE

## 2020-02-19 PROCEDURE — G8417 CALC BMI ABV UP PARAM F/U: HCPCS | Performed by: ORTHOPAEDIC SURGERY

## 2020-02-19 PROCEDURE — G8482 FLU IMMUNIZE ORDER/ADMIN: HCPCS | Performed by: ORTHOPAEDIC SURGERY

## 2020-02-19 PROCEDURE — G8427 DOCREV CUR MEDS BY ELIG CLIN: HCPCS | Performed by: ORTHOPAEDIC SURGERY

## 2020-02-19 PROCEDURE — 4040F PNEUMOC VAC/ADMIN/RCVD: CPT | Performed by: ORTHOPAEDIC SURGERY

## 2020-02-19 PROCEDURE — 1090F PRES/ABSN URINE INCON ASSESS: CPT | Performed by: INTERNAL MEDICINE

## 2020-02-19 PROCEDURE — 99214 OFFICE O/P EST MOD 30 MIN: CPT | Performed by: INTERNAL MEDICINE

## 2020-02-19 PROCEDURE — 4040F PNEUMOC VAC/ADMIN/RCVD: CPT | Performed by: INTERNAL MEDICINE

## 2020-02-19 PROCEDURE — 1036F TOBACCO NON-USER: CPT | Performed by: ORTHOPAEDIC SURGERY

## 2020-02-19 PROCEDURE — G8417 CALC BMI ABV UP PARAM F/U: HCPCS | Performed by: INTERNAL MEDICINE

## 2020-02-19 PROCEDURE — 1036F TOBACCO NON-USER: CPT | Performed by: INTERNAL MEDICINE

## 2020-02-19 PROCEDURE — 99213 OFFICE O/P EST LOW 20 MIN: CPT | Performed by: ORTHOPAEDIC SURGERY

## 2020-02-19 PROCEDURE — G8427 DOCREV CUR MEDS BY ELIG CLIN: HCPCS | Performed by: INTERNAL MEDICINE

## 2020-02-19 PROCEDURE — G8482 FLU IMMUNIZE ORDER/ADMIN: HCPCS | Performed by: INTERNAL MEDICINE

## 2020-02-19 PROCEDURE — G8399 PT W/DXA RESULTS DOCUMENT: HCPCS | Performed by: ORTHOPAEDIC SURGERY

## 2020-02-19 PROCEDURE — 1123F ACP DISCUSS/DSCN MKR DOCD: CPT | Performed by: ORTHOPAEDIC SURGERY

## 2020-02-19 RX ORDER — METHYLPREDNISOLONE ACETATE 40 MG/ML
80 INJECTION, SUSPENSION INTRA-ARTICULAR; INTRALESIONAL; INTRAMUSCULAR; SOFT TISSUE ONCE
Status: COMPLETED | OUTPATIENT
Start: 2020-02-19 | End: 2020-02-19

## 2020-02-19 RX ADMIN — METHYLPREDNISOLONE ACETATE 80 MG: 40 INJECTION, SUSPENSION INTRA-ARTICULAR; INTRALESIONAL; INTRAMUSCULAR; SOFT TISSUE at 08:27

## 2020-02-19 NOTE — PROGRESS NOTES
MD [facility], [place]         Orthopaedic Surgery and Sports Medicine      Patient Name: Ann Llanes  YOB: 1940  Patient's PCP is Marbin Rosario MD    SUBJECTIVE  Chief Complaint:  Shoulder Pain (right )      History of Present Illness:  Ann Llanes is a right handed [de-identified] y.o. female here regarding right shoulder pain. The pain began years ago. She had an injection in this right shoulder in 2017 which was somewhat beneficial.  She had done fine but earlier this month fell forward and landed on her outstretched arms but aggravated her shoulder. Has improved somewhat but still more painful than it was previously. She had difficulty moving it initially but now has better range of motion. But still has difficulty sleeping at night. There was a history of injury. The pain has a diffuse location around the shoulder and is not well localized. She describes the symptoms as aching and sharp. She rates pain at 6/10. Symptoms improve with rest.   The symptoms are worse with elevation, activity, lifting, work and repetitive activity. The shoulder has not dislocated/subluxed and/or felt unstable. There is no numbness or tingling in hand/fingers. Sleeping habits related to chief complaint: fair      The patient has not had PT. The patient has not had an injection. The patient has not taken NSAIDs. The patient is working.       Pain Assessment:  Pain Assessment  Location of Pain: Shoulder  Location Modifiers: Right  Severity of Pain: 9    Past Medical History:  Past Medical History:   Diagnosis Date    Anemia     NOS    Arthritis     knee     Atrial fibrillation (HCC)     on coumadin    Chronic kidney disease (CKD), stage II (mild)     Community acquired pneumonia of left lower lobe of lung ([facility] [address] 12/26/2017    Depression     Diverticulosis  Foot pain     GI bleed 4/18/2018    Due to esophageal tear     Gout     Hemorrhoids     Hyperlipidemia     Hypertension     Hyperuricemia     Iron deficiency anemia 1/8/2014    Iron deficiency anemia-s/p EGD and colonoscopy 2/11/2014 Esophageal tear likely source     Menopausal syndrome     Obesity     Pelvic relaxation     PONV (postoperative nausea and vomiting)     Stress 8/2006    NL stress    Syncope     Unspecified sleep apnea 03/2002    uvulectomy -hx of    Vitamin D deficiency     20ng/ml 6/2009    Wears dentures     Wears glasses        Past Surgical History:  Past Surgical History:   Procedure Laterality Date    BLADDER SUSPENSION  1997    CHOLECYSTECTOMY  1974    COLONOSCOPY      2013    COLONOSCOPY  11/2014    10yr    EYE SURGERY      macular tear and cataract right    HYSTERECTOMY  1977    partial    JOINT REPLACEMENT Right 2017    KNEE ARTHROSCOPY  2005    knee surgery    UVULOPALATOPHARYGOPLASTY  3/2002       Allergies: Allergies   Allergen Reactions    Diclofenac Hives     Severe itching    Adhesive Tape Rash    Penicillins Nausea And Vomiting     \"years ago\"       Medications:  Current Outpatient Medications   Medication Sig Dispense Refill    warfarin (COUMADIN) 1 MG tablet TAKE ONE TABLET BY MOUTH ON SUNDAY, TUESDAY AND THURSDAY.   TAKE ONE-HALF TABLET BY MOUTH ON ALL OTHER DAYS OR AS DIRECTED BY CLINIC 65 tablet 5    omeprazole (PRILOSEC) 20 MG delayed release capsule TAKE ONE CAPSULE BY MOUTH DAILY 90 capsule 3    sertraline (ZOLOFT) 50 MG tablet TAKE ONE TABLET BY MOUTH DAILY 90 tablet 3    clindamycin (CLEOCIN) 300 MG capsule Take 2 tabs 1 hr before procedure (Patient not taking: Reported on 11/20/2019) 2 capsule 5    atorvastatin (LIPITOR) 20 MG tablet TAKE ONE TABLET BY MOUTH NIGHTLY 90 tablet 1    metoprolol tartrate (LOPRESSOR) 25 MG tablet Take 1 tablet by mouth daily 90 tablet 3    allopurinol (ZYLOPRIM) 300 MG tablet TAKE ONE TABLET BY MOUTH DAILY discontinue the medication is there is any possible complication. Ann Llanes was instructed to call the office if her symptoms worsen or if new symptoms appear prior to the next scheduled visit. She is specifically instructed to contact the office between now and schedule appointment if she has concerns related to her condition or if she needs assistance in scheduling any above tests. She is welcome to call for an appointment sooner if she has any additional concerns or questions. This dictation was performed with a verbal recognition program ([employer]) and it was checked for errors. It is possible that there are still dictated errors within this office note. If so, please bring any errors to my attention for an addendum. All efforts were made to ensure that this office note is accurate.

## 2020-02-20 RX ORDER — DILTIAZEM HYDROCHLORIDE 300 MG/1
300 CAPSULE, COATED, EXTENDED RELEASE ORAL DAILY
Qty: 90 CAPSULE | Refills: 3 | Status: SHIPPED | OUTPATIENT
Start: 2020-02-20 | End: 2021-01-01

## 2020-02-20 RX ORDER — DILTIAZEM HYDROCHLORIDE 300 MG/1
CAPSULE, EXTENDED RELEASE ORAL
Qty: 90 CAPSULE | Refills: 3 | Status: ON HOLD | OUTPATIENT
Start: 2020-02-20 | End: 2020-10-15 | Stop reason: HOSPADM

## 2020-03-06 ENCOUNTER — ANTI-COAG VISIT (OUTPATIENT)
Dept: PHARMACY | Age: 80
End: 2020-03-06
Payer: MEDICARE

## 2020-03-06 LAB — INR BLD: 2.4

## 2020-03-06 PROCEDURE — 99211 OFF/OP EST MAY X REQ PHY/QHP: CPT

## 2020-03-06 PROCEDURE — 85610 PROTHROMBIN TIME: CPT

## 2020-03-16 RX ORDER — ALLOPURINOL 300 MG/1
TABLET ORAL
Qty: 90 TABLET | Refills: 2 | Status: SHIPPED | OUTPATIENT
Start: 2020-03-16 | End: 2020-12-09

## 2020-05-07 RX ORDER — ATORVASTATIN CALCIUM 20 MG/1
TABLET, FILM COATED ORAL
Qty: 90 TABLET | Refills: 0 | Status: SHIPPED | OUTPATIENT
Start: 2020-05-07 | End: 2020-08-07

## 2020-06-05 ENCOUNTER — ANTI-COAG VISIT (OUTPATIENT)
Dept: PHARMACY | Age: 80
End: 2020-06-05
Payer: MEDICARE

## 2020-06-05 LAB — INR BLD: 2.9

## 2020-06-05 PROCEDURE — 85610 PROTHROMBIN TIME: CPT

## 2020-06-05 PROCEDURE — 99211 OFF/OP EST MAY X REQ PHY/QHP: CPT

## 2020-06-10 ENCOUNTER — OFFICE VISIT (OUTPATIENT)
Dept: ORTHOPEDIC SURGERY | Age: 80
End: 2020-06-10
Payer: MEDICARE

## 2020-06-10 VITALS — BODY MASS INDEX: 43 KG/M2 | HEIGHT: 67 IN | WEIGHT: 274 LBS

## 2020-06-10 PROCEDURE — 4040F PNEUMOC VAC/ADMIN/RCVD: CPT | Performed by: ORTHOPAEDIC SURGERY

## 2020-06-10 PROCEDURE — G8417 CALC BMI ABV UP PARAM F/U: HCPCS | Performed by: ORTHOPAEDIC SURGERY

## 2020-06-10 PROCEDURE — 1036F TOBACCO NON-USER: CPT | Performed by: ORTHOPAEDIC SURGERY

## 2020-06-10 PROCEDURE — 1123F ACP DISCUSS/DSCN MKR DOCD: CPT | Performed by: ORTHOPAEDIC SURGERY

## 2020-06-10 PROCEDURE — 1090F PRES/ABSN URINE INCON ASSESS: CPT | Performed by: ORTHOPAEDIC SURGERY

## 2020-06-10 PROCEDURE — G8399 PT W/DXA RESULTS DOCUMENT: HCPCS | Performed by: ORTHOPAEDIC SURGERY

## 2020-06-10 PROCEDURE — 99213 OFFICE O/P EST LOW 20 MIN: CPT | Performed by: ORTHOPAEDIC SURGERY

## 2020-06-10 PROCEDURE — G8428 CUR MEDS NOT DOCUMENT: HCPCS | Performed by: ORTHOPAEDIC SURGERY

## 2020-06-10 RX ORDER — LIDOCAINE HYDROCHLORIDE 10 MG/ML
20 INJECTION, SOLUTION INFILTRATION; PERINEURAL ONCE
Status: COMPLETED | OUTPATIENT
Start: 2020-06-10 | End: 2020-06-10

## 2020-06-10 RX ORDER — BUPIVACAINE HYDROCHLORIDE 2.5 MG/ML
60 INJECTION, SOLUTION INFILTRATION; PERINEURAL ONCE
Status: COMPLETED | OUTPATIENT
Start: 2020-06-10 | End: 2020-06-10

## 2020-06-10 RX ORDER — METHYLPREDNISOLONE ACETATE 40 MG/ML
80 INJECTION, SUSPENSION INTRA-ARTICULAR; INTRALESIONAL; INTRAMUSCULAR; SOFT TISSUE ONCE
Status: COMPLETED | OUTPATIENT
Start: 2020-06-10 | End: 2020-06-10

## 2020-06-10 RX ADMIN — METHYLPREDNISOLONE ACETATE 80 MG: 40 INJECTION, SUSPENSION INTRA-ARTICULAR; INTRALESIONAL; INTRAMUSCULAR; SOFT TISSUE at 11:27

## 2020-06-10 RX ADMIN — LIDOCAINE HYDROCHLORIDE 20 ML: 10 INJECTION, SOLUTION INFILTRATION; PERINEURAL at 11:27

## 2020-06-10 RX ADMIN — BUPIVACAINE HYDROCHLORIDE 150 MG: 2.5 INJECTION, SOLUTION INFILTRATION; PERINEURAL at 11:26

## 2020-06-10 NOTE — PROGRESS NOTES
Hyperuricemia     Iron deficiency anemia 1/8/2014    Iron deficiency anemia-s/p EGD and colonoscopy 2/11/2014 Esophageal tear likely source     Menopausal syndrome     Obesity     Pelvic relaxation     PONV (postoperative nausea and vomiting)     Stress 8/2006    NL stress    Syncope     Unspecified sleep apnea 03/2002    uvulectomy -hx of    Vitamin D deficiency     20ng/ml 6/2009    Wears dentures     Wears glasses        Past Surgical History:  Past Surgical History:   Procedure Laterality Date    BLADDER SUSPENSION  1997    CHOLECYSTECTOMY  1974    COLONOSCOPY      2013    COLONOSCOPY  11/2014    10yr    EYE SURGERY      macular tear and cataract right    HYSTERECTOMY  1977    partial    JOINT REPLACEMENT Right 2017    KNEE ARTHROSCOPY  2005    knee surgery    UVULOPALATOPHARYGOPLASTY  3/2002       Allergies: Allergies   Allergen Reactions    Diclofenac Hives     Severe itching    Adhesive Tape Rash    Penicillins Nausea And Vomiting     \"years ago\"       Medications:  Current Outpatient Medications   Medication Sig Dispense Refill    atorvastatin (LIPITOR) 20 MG tablet TAKE ONE TABLET BY MOUTH ONCE NIGHTLY 90 tablet 0    allopurinol (ZYLOPRIM) 300 MG tablet TAKE ONE TABLET BY MOUTH DAILY 90 tablet 2    dilTIAZem (CARTIA XT) 300 MG extended release capsule Take 1 capsule by mouth daily 90 capsule 3    dilTIAZem (TIAZAC) 300 MG extended release capsule TAKE ONE CAPSULE BY MOUTH DAILY 90 capsule 3    warfarin (COUMADIN) 1 MG tablet TAKE ONE TABLET BY MOUTH ON SUNDAY, TUESDAY AND THURSDAY.   TAKE ONE-HALF TABLET BY MOUTH ON ALL OTHER DAYS OR AS DIRECTED BY CLINIC 65 tablet 5    omeprazole (PRILOSEC) 20 MG delayed release capsule TAKE ONE CAPSULE BY MOUTH DAILY 90 capsule 3    sertraline (ZOLOFT) 50 MG tablet TAKE ONE TABLET BY MOUTH DAILY 90 tablet 3    metoprolol tartrate (LOPRESSOR) 25 MG tablet Take 1 tablet by mouth daily 90 tablet 3    Cholecalciferol (VITAMIN D) 2000 UNITS CAPS JNT/BURSA RIGHT     Standing Status:   Future     Number of Occurrences:   1     Standing Expiration Date:   6/10/2021     Order Specific Question:   Reason for exam:     Answer:   OA SHOULDER     The patient was given the option of performing today's injection using ultrasound guidance. We discussed the pros and cons of using the ultrasound for guidance. The patient chose to proceed, and today's injection was performed under sterile conditions using and [employer] ultrasound unit with a variable frequency (6.0-15.0  Mhz) linear transducer for localization and needle placement. The image was saved for the medical record. The risks and benefits of an injection were discussed with the patient. The patient had full opportunity to ask questions and all were answered. The patient then provided verbal informed consent. The skin was then prepped with betadine solution and alcohol. Under aseptic conditions, the right shoulder, subacromial space, was injected with 2cc of 1% xylocaine, then a mixture of 3cc of 0.25% marcaine and 2cc (80 mg) of Depomedrol. There were no immediate complications following the injection. The patient was advised of the possibility of injection site reaction and instructed to apply ice to the area. Treatment Plan:    I discussed the diagnosis and treatment options with Damaris Freitas today. At this point, we will plan on injecting the patient's right shoulder with a cortisone injection. Follow-up with us in approximately 3 months and we can reevaluate her again at that time    Non-steroidal anti-inflammatories medications (NSAIDs) can be used to assist with pain control and to reduce inflammatory changes. These medications may be over-the-counter or prescribed. We discussed taking the NSAID properly and the precautions. The patient understands that this medication may potentially interfere with other medications.   Patient was also instructed to immediately discontinue the

## 2020-06-24 ENCOUNTER — OFFICE VISIT (OUTPATIENT)
Dept: ORTHOPEDIC SURGERY | Age: 80
End: 2020-06-24
Payer: MEDICARE

## 2020-06-24 VITALS — WEIGHT: 274.03 LBS | HEIGHT: 67 IN | RESPIRATION RATE: 16 BRPM | BODY MASS INDEX: 43.01 KG/M2

## 2020-06-24 PROCEDURE — 1123F ACP DISCUSS/DSCN MKR DOCD: CPT | Performed by: PHYSICIAN ASSISTANT

## 2020-06-24 PROCEDURE — 1090F PRES/ABSN URINE INCON ASSESS: CPT | Performed by: PHYSICIAN ASSISTANT

## 2020-06-24 PROCEDURE — G8399 PT W/DXA RESULTS DOCUMENT: HCPCS | Performed by: PHYSICIAN ASSISTANT

## 2020-06-24 PROCEDURE — G8427 DOCREV CUR MEDS BY ELIG CLIN: HCPCS | Performed by: PHYSICIAN ASSISTANT

## 2020-06-24 PROCEDURE — G8417 CALC BMI ABV UP PARAM F/U: HCPCS | Performed by: PHYSICIAN ASSISTANT

## 2020-06-24 PROCEDURE — 1036F TOBACCO NON-USER: CPT | Performed by: PHYSICIAN ASSISTANT

## 2020-06-24 PROCEDURE — 4040F PNEUMOC VAC/ADMIN/RCVD: CPT | Performed by: PHYSICIAN ASSISTANT

## 2020-06-24 PROCEDURE — 99213 OFFICE O/P EST LOW 20 MIN: CPT | Performed by: PHYSICIAN ASSISTANT

## 2020-06-24 RX ORDER — PREDNISONE 10 MG/1
TABLET ORAL
Qty: 35 TABLET | Refills: 1 | Status: SHIPPED | OUTPATIENT
Start: 2020-06-24 | End: 2020-07-21 | Stop reason: ALTCHOICE

## 2020-06-24 RX ORDER — METHYLPREDNISOLONE 4 MG/1
TABLET ORAL
Qty: 1 KIT | Refills: 0 | Status: SHIPPED | OUTPATIENT
Start: 2020-06-24 | End: 2020-07-21 | Stop reason: ALTCHOICE

## 2020-06-24 NOTE — PROGRESS NOTES
weakness  MUSCULOSKELETAL: Denies joint swelling or redness  PSYCHOLOGICAL: Denies anxiety, depression   SKIN: Denies skin changes, delayed healing, rash, itching   HEMATOLOGIC: Denies easy bleeding or bruising  ENDOCRINE: Denies excessive thirst, urination, heat/cold  RESPIRATORY: Denies current dyspnea, cough  GI: Denies nausea, vomiting, diarrhea   : Denies bowel or bladder issues       PHYSICAL EXAM:    Vitals: Resp. rate 16, height 5' 7.01\" (1.702 m), weight 274 lb 0.5 oz (124.3 kg), not currently breastfeeding. GENERAL EXAM:  · General Apparence: Patient is adequately groomed with no evidence of malnutrition. · Orientation: The patient is oriented to time, place and person. · Mood & Affect:The patient's mood and affect are appropriate   · Vascular: Examination reveals no swelling tenderness in upper or lower extremities. Good capillary refill  · Lymphatic: The lymphatic examination bilaterally reveals all areas to be without enlargement or induration  · Sensation: Sensation is intact without deficit  · Coordination/Balance: Good coordination   ·     LUMBAR/SACRAL EXAMINATION:  · Inspection: Local inspection shows no step-off or bruising. Lumbar alignment is normal.  Sagittal and Coronal balance is neutral.      · Palpation:   No evidence of tenderness at the midline. No tenderness bilaterally at the paraspinal or trochanters. There is no step-off or paraspinal spasm. · Range of Motion: There is some good range of motion in flexion and extension but some reproducible symptoms with radiculopathy with lumbar extension  · Strength:   Strength testing is 5/5 in all muscle groups tested. · Special Tests:   Straight leg raise and crossed SLR positive. Leg length and pelvis level.  0 out of 5 Edmundo's signs. · Skin: There are no rashes, ulcerations or lesions. · Reflexes: Reflexes are symmetrically 2+ at the patellar and ankle tendons. Clonus absent bilaterally at the feet.   · Gait & station: Normal gait  · Additional Examinations:   · RIGHT LOWER EXTREMITY: Inspection/examination of the right lower extremity does not show any tenderness, deformity or injury. Range of motion is full. There is no gross instability. There are no rashes, ulcerations or lesions. Strength and tone are normal.  ·   · LEFT LOWER EXTREMITY:  Inspection/examination of the left lower extremity does not show any tenderness, deformity or injury. Range of motion is full. There is no gross instability. There are no rashes, ulcerations or lesions. Strength and tone are normal.    Diagnostic Testin views of the lumbar spine including AP lateral views do reveal marked degenerative changes and degenerative disc disease particularly at the L2-L3 and L3-L4 levels with additional endplate changes and facet joint arthropathy throughout the lumbar spine. Impression:      Sciatica with radiculopathy      Plan:   Start the patient on a prednisone taper this evening.   I will have her follow-up with 1 of our spine specialist in approximately 2 weeks for repeat clinical exam and reevaluation                                                                                                                                                             Dorothy Quezada

## 2020-07-06 ENCOUNTER — HOSPITAL ENCOUNTER (EMERGENCY)
Age: 80
Discharge: HOME | End: 2020-07-06
Payer: MEDICARE

## 2020-07-06 ENCOUNTER — APPOINTMENT (OUTPATIENT)
Dept: GENERAL RADIOLOGY | Age: 80
End: 2020-07-06
Payer: MEDICARE

## 2020-07-06 VITALS
OXYGEN SATURATION: 95 % | HEART RATE: 81 BPM | SYSTOLIC BLOOD PRESSURE: 143 MMHG | WEIGHT: 280 LBS | TEMPERATURE: 98.7 F | RESPIRATION RATE: 16 BRPM | BODY MASS INDEX: 43.95 KG/M2 | HEIGHT: 67 IN | DIASTOLIC BLOOD PRESSURE: 88 MMHG

## 2020-07-06 PROCEDURE — 73630 X-RAY EXAM OF FOOT: CPT

## 2020-07-06 PROCEDURE — 99284 EMERGENCY DEPT VISIT MOD MDM: CPT

## 2020-07-06 ASSESSMENT — ENCOUNTER SYMPTOMS
COLOR CHANGE: 0
WHEEZING: 0
BACK PAIN: 0
ABDOMINAL PAIN: 0
COUGH: 0
NAUSEA: 0
DIARRHEA: 0
VOMITING: 0
SHORTNESS OF BREATH: 0

## 2020-07-06 ASSESSMENT — PAIN SCALES - GENERAL
PAINLEVEL_OUTOF10: 6
PAINLEVEL_OUTOF10: 8

## 2020-07-06 NOTE — ED NOTES
Applied a medium walking boot to pt's right foot. Pt instructed on care and tolerated well. CMS intact before and after application.       Juan M Ordoñez  07/06/20 1136

## 2020-07-06 NOTE — ED NOTES

## 2020-07-06 NOTE — ED PROVIDER NOTES
**[facility]  [facility]  EMERGENCY DEPARTMENT ENCOUNTER      Pt Name: Refjeovanny Rater  FNQ:[id number]  [place] 1940  Date of evaluation: 7/6/2020  Provider: RADHA Newell CNP      Chief Complaint:    Chief Complaint   Patient presents with    Foot Pain     35 yesterday was getting up off waterbed and hit foot on something pain to the outer side of her right foot       Nursing Notes, Past Medical Hx, Past Surgical Hx, Social Hx, Allergies, and Family Hx were all reviewed and agreed with or any disagreements were addressed in the HPI.    HPI:  (Location, Duration, Timing, Severity, Quality, Assoc Sx, Context, Modifying factors)  This is a  [de-identified] y.o. female who presents emergency department right foot pain and swelling, patient states yesterday morning around 8 AM, she went to get up out of the water bed when she hit the right side of her foot on the bed because her foot was asleep causing her to then fall and twist her right foot. She is complaining of pain to the lateral aspect of her right foot, states the pain is worse with walking, she rates the pain 8 out of 10, has pain medicine she takes at home chronically. She denies any additional injuries. She denies any lightheadedness or dizziness with the fall, denies hitting her head or loss of conscious. She denies any recent cough, congestion, fever or chills. No chest pain put of chest pain or shortness of breath. She has not taken any additional medicine besides her home prescription, states that she has pain medicine she can take at home. Therefore, no distal complaints, no additional aggravating relieving factors. Patient presents awake, alert and in no acute respiratory distress or toxic appearance.     PastMedical/Surgical History:      Diagnosis Date    Anemia     NOS    Arthritis     knee     Atrial fibrillation (HCC)     on coumadin    Chronic kidney disease (CKD), stage II (mild)     Community acquired pneumonia of left lower lobe of lung ([address] 12/26/2017    Depression     Diverticulosis     Foot pain     GI bleed 4/18/2018    Due to esophageal tear     Gout     Hemorrhoids     Hyperlipidemia     Hypertension     Hyperuricemia     Iron deficiency anemia 1/8/2014    Iron deficiency anemia-s/p EGD and colonoscopy 2/11/2014 Esophageal tear likely source     Menopausal syndrome     Obesity     Pelvic relaxation     PONV (postoperative nausea and vomiting)     Stress 8/2006    NL stress    Syncope     Unspecified sleep apnea 03/2002    uvulectomy -hx of    Vitamin D deficiency     20ng/ml 6/2009    Wears dentures     Wears glasses          Procedure Laterality Date    BLADDER SUSPENSION  1997    CHOLECYSTECTOMY  1974    COLONOSCOPY      2013    COLONOSCOPY  11/2014    10yr    EYE SURGERY      macular tear and cataract right    HYSTERECTOMY  1977    partial    JOINT REPLACEMENT Right 2017    KNEE ARTHROSCOPY  2005    knee surgery    UVULOPALATOPHARYGOPLASTY  3/2002       Medications:  Previous Medications    ALLOPURINOL (ZYLOPRIM) 300 MG TABLET    TAKE ONE TABLET BY MOUTH DAILY    ATORVASTATIN (LIPITOR) 20 MG TABLET    TAKE ONE TABLET BY MOUTH ONCE NIGHTLY    CHOLECALCIFEROL (VITAMIN D) 2000 UNITS CAPS CAPSULE    Take 2,000 Units by mouth daily    DILTIAZEM (CARTIA XT) 300 MG EXTENDED RELEASE CAPSULE    Take 1 capsule by mouth daily    DILTIAZEM (TIAZAC) 300 MG EXTENDED RELEASE CAPSULE    TAKE ONE CAPSULE BY MOUTH DAILY    DOCUSATE SODIUM (COLACE) 100 MG CAPSULE    Take 100 mg by mouth daily    METHYLPREDNISOLONE (MEDROL, SUNNI,) 4 MG TABLET    Take by mouth. METOPROLOL TARTRATE (LOPRESSOR) 25 MG TABLET    Take 1 tablet by mouth daily    OMEPRAZOLE (PRILOSEC) 20 MG DELAYED RELEASE CAPSULE    TAKE ONE CAPSULE BY MOUTH DAILY    PREDNISONE (DELTASONE) 10 MG TABLET    30 mg a day for 1 week followed by 20 mg a day for 1 week followed by a Medrol Dosepak. PREDNISONE (DELTASONE) 10 MG TABLET    Take 30 mg/day for 1 week, then 20 mg/day for 1 week, followed by a Medrol Dosepak    SERTRALINE (ZOLOFT) 50 MG TABLET    TAKE ONE TABLET BY MOUTH DAILY    WARFARIN (COUMADIN) 1 MG TABLET    TAKE ONE TABLET BY MOUTH ON SUNDAY, TUESDAY AND THURSDAY. TAKE ONE-HALF TABLET BY MOUTH ON ALL OTHER DAYS OR AS DIRECTED BY CLINIC         Review of Systems:  Review of Systems   Constitutional: Negative for chills and fever. HENT: Negative for congestion. Respiratory: Negative for cough, shortness of breath and wheezing. Cardiovascular: Negative for chest pain. Gastrointestinal: Negative for abdominal pain, diarrhea, nausea and vomiting. Musculoskeletal: Positive for arthralgias and joint swelling. Negative for back pain. Patient complains of right foot pain status post injury, see HPI. States the pain is worse with walking. Skin: Negative for color change. Neurological: Negative for weakness, numbness and headaches. Positives and Pertinent negatives as per HPI. Except as noted above in the ROS, problem specific ROS was completed and is negative. Physical Exam:  Physical Exam  Vitals signs and nursing note reviewed. Constitutional:       Appearance: She is well-developed. She is not diaphoretic. HENT:      Head: Normocephalic. Right Ear: External ear normal.      Left Ear: External ear normal.   Eyes:      General:         Right eye: No discharge. Left eye: No discharge. Neck:      Musculoskeletal: Normal range of motion and neck supple. Cardiovascular:      Rate and Rhythm: Normal rate. Pulmonary:      Effort: Pulmonary effort is normal. No respiratory distress. Musculoskeletal:         General: Swelling, tenderness and signs of injury present.       Comments: Patient has reproducible tenderness to the lateral aspect of her right foot, there is obvious erythema and bruising across the fourth and fifth metatarsal, but there is no significant deformity, break in skin integrity. No ankle involvement. She has flexion-extension of the ankle without difficulty. Compartments in the right leg are soft. Pedal pulses are 2+. Capillary refill less than 3 seconds. There is no calf tenderness, negative Homans signs. Skin:     General: Skin is warm. Capillary Refill: Capillary refill takes less than 2 seconds. Coloration: Skin is not pale. Neurological:      Mental Status: She is alert and oriented to person, place, and time. Psychiatric:         Behavior: Behavior normal.         MEDICAL DECISION MAKING    Vitals:    Vitals:    07/06/20 1019   BP: (!) 143/88   Pulse: 81   Resp: 16   Temp: 98.7 °F (37.1 °C)   SpO2: 95%   Weight: 280 lb (127 kg)   Height: 5' 6.5\" (1.689 m)       LABS:Labs Reviewed - No data to display     Remainder of labs reviewed and werenegative at this time or not returned at the time of this note. RADIOLOGY:   Non-plain film images such as CT, Ultrasound and MRI are read by the radiologist. Uzma QUIGLEY, RADHA - CNP have directly visualized the radiologic plain film image(s) with the below findings:        Interpretation per the Radiologist below, if available at the time of this note:    XR FOOT RIGHT (MIN 3 VIEWS)   Final Result   Fracture of the 5th metatarsal base. Xr Lumbar Spine (2-3 Views)    Result Date: 6/24/2020  Radiology exam is complete. No Radiologist dictation. Please follow up with ordering provider. Us Arthr/asp/inj Major Jnt/bursa Right    Result Date: 6/10/2020  Radiology result is complete; follow up with provider / physician office for radiology results          [facility] / ED COURSE:      PROCEDURES:   Procedures    None    Patient was given:  Medications - No data to display    Patient complains of right foot pain status post injury, see HPI. States the pain is worse with walking. After evaluation and examination patient x-ray was obtained. X-ray shows fifth metatarsal fracture at the base. No obvious displacement. I did educate her about Ace wrap, rice and placing her in an Ortho boot. However, I estimate there is LOW risk for COMPARTMENT SYNDROME, DEEP VENOUS THROMBOSIS, SEPTIC ARTHRITIS, TENDON OR NEUROVASCULAR INJURY, thus I consider the discharge disposition reasonable. Therefore, shared medical decision was made between the patient myself only agreed the patient could be discharged home with outpatient follow-up. She Vishnu has pain medicine at home she can take, educated her to take that as prescribed. Educated her about rice. Faiza Mares sees an orthopedic specialist at [facility], educated her to call today make an appointment to be seen later this week. I educated her to return to the ER for any worsening symptoms. The patient tolerated their visit well. I evaluated the patient. The physician was available for consultation as needed. The patient and / or the family were informed of the results of any tests, a time was given to answer questions, a plan was proposed and they agreed with plan. CLINICAL IMPRESSION:  1. Nondisplaced fracture of fifth metatarsal bone, right foot, initial encounter for closed fracture    2.  Acute foot pain, right        DISPOSITION Decision To Discharge 07/06/2020 10:53:16 AM      PATIENT REFERRED TO:  Barry Hickey MD  [id number] [place] [address]  [phone]    Schedule an appointment as soon as possible for a visit   As needed    [address]  [phone]  Schedule an appointment as soon as possible for a visit in 1 day  Call orthopedic surgery today make an appointment to be seen in the next 2 days      DISCHARGE MEDICATIONS:  New Prescriptions    No medications on file       DISCONTINUED MEDICATIONS:  Discontinued Medications    No medications on file              (Please note the MDM and HPI sections of this note were completed with a voice recognition program.  Efforts were made to edit the dictations but occasionally words are mis-transcribed.)    Electronically signed, RADHA Chapa CNP,           RADHA Chapa CNP  07/06/20 1100

## 2020-07-08 ENCOUNTER — OFFICE VISIT (OUTPATIENT)
Dept: ORTHOPEDIC SURGERY | Age: 80
End: 2020-07-08
Payer: MEDICARE

## 2020-07-08 VITALS — HEIGHT: 66 IN | WEIGHT: 279.98 LBS | BODY MASS INDEX: 45 KG/M2

## 2020-07-08 PROCEDURE — 4040F PNEUMOC VAC/ADMIN/RCVD: CPT | Performed by: ORTHOPAEDIC SURGERY

## 2020-07-08 PROCEDURE — 99213 OFFICE O/P EST LOW 20 MIN: CPT | Performed by: ORTHOPAEDIC SURGERY

## 2020-07-08 PROCEDURE — 1123F ACP DISCUSS/DSCN MKR DOCD: CPT | Performed by: ORTHOPAEDIC SURGERY

## 2020-07-08 PROCEDURE — 1036F TOBACCO NON-USER: CPT | Performed by: ORTHOPAEDIC SURGERY

## 2020-07-08 PROCEDURE — 1090F PRES/ABSN URINE INCON ASSESS: CPT | Performed by: ORTHOPAEDIC SURGERY

## 2020-07-08 PROCEDURE — G8417 CALC BMI ABV UP PARAM F/U: HCPCS | Performed by: ORTHOPAEDIC SURGERY

## 2020-07-08 PROCEDURE — G8399 PT W/DXA RESULTS DOCUMENT: HCPCS | Performed by: ORTHOPAEDIC SURGERY

## 2020-07-08 PROCEDURE — G8427 DOCREV CUR MEDS BY ELIG CLIN: HCPCS | Performed by: ORTHOPAEDIC SURGERY

## 2020-07-08 NOTE — PROGRESS NOTES
CHIEF COMPLAINT:    Chief Complaint   Patient presents with    Foot Pain     RIGHT FOOT 5TH METATARSAL FX FROM FALL       HISTORY OF PRESENT ILLNESS:    This is a pleasant lady who was getting up from a water bed. She was having the edge press on the back of her thighs and that was her foot was numb. She Took a step and sustained a forced inversion type injury. She was seen in the emergency department with a fracture. She was placed in an Ace wrap and tall walker boot. She is been utilizing a walker.             The patient is a [de-identified] y.o. female   Past Medical History:   Diagnosis Date    Anemia     NOS    Arthritis     knee     Atrial fibrillation (HCC)     on coumadin    Chronic kidney disease (CKD), stage II (mild)     Community acquired pneumonia of left lower lobe of lung [address] [address] 12/26/2017    Depression     Diverticulosis     Foot pain     GI bleed 4/18/2018    Due to esophageal tear     Gout     Hemorrhoids     Hyperlipidemia     Hypertension     Hyperuricemia     Iron deficiency anemia 1/8/2014    Iron deficiency anemia-s/p EGD and colonoscopy 2/11/2014 Esophageal tear likely source     Menopausal syndrome     Obesity     Pelvic relaxation     PONV (postoperative nausea and vomiting)     Stress 8/2006    NL stress    Syncope     Unspecified sleep apnea 03/2002    uvulectomy -hx of    Vitamin D deficiency     20ng/ml 6/2009    Wears dentures     Wears glasses         Work Status: Retired    The pain assessment was noted & is as follows:  Pain Assessment  Location of Pain: Foot  Location Modifiers: Right  Severity of Pain: 8  Quality of Pain: Aching, Dull, Sharp, Throbbing  Duration of Pain: Persistent  Frequency of Pain: Constant]      Work Status/Functionality:     Past Medical History: Medical history form was reviewed today & can be found in the media tab  Past Medical History:   Diagnosis Date    Anemia     NOS    Arthritis     knee     Atrial fibrillation ([facility]     on coumadin    Chronic kidney disease (CKD), stage II (mild)     Community acquired pneumonia of left lower lobe of lung ([facility] 12/26/2017    Depression     Diverticulosis     Foot pain     GI bleed 4/18/2018    Due to esophageal tear     Gout     Hemorrhoids     Hyperlipidemia     Hypertension     Hyperuricemia     Iron deficiency anemia 1/8/2014    Iron deficiency anemia-s/p EGD and colonoscopy 2/11/2014 Esophageal tear likely source     Menopausal syndrome     Obesity     Pelvic relaxation     PONV (postoperative nausea and vomiting)     Stress 8/2006    NL stress    Syncope     Unspecified sleep apnea 03/2002    uvulectomy -hx of    Vitamin D deficiency     20ng/ml 6/2009    Wears dentures     Wears glasses       Past Surgical History:     Past Surgical History:   Procedure Laterality Date    BLADDER SUSPENSION  1997    CHOLECYSTECTOMY  1974    COLONOSCOPY      2013    COLONOSCOPY  11/2014    10yr    EYE SURGERY      macular tear and cataract right    HYSTERECTOMY  1977    partial    JOINT REPLACEMENT Right 2017    KNEE ARTHROSCOPY  2005    knee surgery    UVULOPALATOPHARYGOPLASTY  3/2002     Current Medications:     Current Outpatient Medications:     methylPREDNISolone (MEDROL, SUNNI,) 4 MG tablet, Take by mouth., Disp: 1 kit, Rfl: 0    predniSONE (DELTASONE) 10 MG tablet, 30 mg a day for 1 week followed by 20 mg a day for 1 week followed by a Medrol Dosepak., Disp: 35 tablet, Rfl: 1    predniSONE (DELTASONE) 10 MG tablet, Take 30 mg/day for 1 week, then 20 mg/day for 1 week, followed by a Medrol Dosepak, Disp: 35 tablet, Rfl: 1    atorvastatin (LIPITOR) 20 MG tablet, TAKE ONE TABLET BY MOUTH ONCE NIGHTLY, Disp: 90 tablet, Rfl: 0    allopurinol (ZYLOPRIM) 300 MG tablet, TAKE ONE TABLET BY MOUTH DAILY, Disp: 90 tablet, Rfl: 2    dilTIAZem (CARTIA XT) 300 MG extended release capsule, Take 1 capsule by mouth daily, Disp: 90 capsule, Rfl: 3    dilTIAZem (TIAZAC) 300 MG extended release capsule, TAKE ONE CAPSULE BY MOUTH DAILY, Disp: 90 capsule, Rfl: 3    warfarin (COUMADIN) 1 MG tablet, TAKE ONE TABLET BY MOUTH ON SUNDAY, TUESDAY AND THURSDAY. TAKE ONE-HALF TABLET BY MOUTH ON ALL OTHER DAYS OR AS DIRECTED BY CLINIC, Disp: 65 tablet, Rfl: 5    omeprazole (PRILOSEC) 20 MG delayed release capsule, TAKE ONE CAPSULE BY MOUTH DAILY, Disp: 90 capsule, Rfl: 3    sertraline (ZOLOFT) 50 MG tablet, TAKE ONE TABLET BY MOUTH DAILY, Disp: 90 tablet, Rfl: 3    metoprolol tartrate (LOPRESSOR) 25 MG tablet, Take 1 tablet by mouth daily, Disp: 90 tablet, Rfl: 3    Cholecalciferol (VITAMIN D) 2000 UNITS CAPS capsule, Take 2,000 Units by mouth daily, Disp: , Rfl:     docusate sodium (COLACE) 100 MG capsule, Take 100 mg by mouth daily, Disp: , Rfl:   Allergies:  Diclofenac; Adhesive tape; and Penicillins  Social History:    reports that she has never smoked. She has never used smokeless tobacco. She reports that she does not drink alcohol or use drugs. Family History:   Family History   Problem Relation Age of Onset    Heart Attack Father     Heart Disease Father     Stroke Brother        REVIEW OF SYSTEMS:   For new problems, a full review of systems will be found scanned in the patient's chart. CONSTITUTIONAL: Denies unexplained weight loss, fevers, chills   NEUROLOGICAL: Denies unsteady gait or progressive weakness  SKIN: Denies skin changes, delayed healing, rash, itching       PHYSICAL EXAM:    Vitals: Height 5' 6.5\" (1.689 m), weight 279 lb 15.8 oz (127 kg), not currently breastfeeding. GENERAL EXAM:  · General Apparence: Patient is adequately groomed with no evidence of malnutrition. · Orientation: The patient is oriented to time, place and person.    · Mood & Affect:The patient's mood and affect are appropriate       Right foot PHYSICAL EXAMINATION:  · Inspection: Mild ecchymosis and mild swelling over the base of the fifth metatarsal.  No area of swelling or tenderness made to ensure that this office note is accurate.           Brandt Dowling MD

## 2020-07-17 ENCOUNTER — ANTI-COAG VISIT (OUTPATIENT)
Dept: PHARMACY | Age: 80
End: 2020-07-17
Payer: MEDICARE

## 2020-07-17 LAB — INR BLD: 4

## 2020-07-17 PROCEDURE — 99211 OFF/OP EST MAY X REQ PHY/QHP: CPT

## 2020-07-17 PROCEDURE — 85610 PROTHROMBIN TIME: CPT

## 2020-07-21 ENCOUNTER — OFFICE VISIT (OUTPATIENT)
Dept: ORTHOPEDIC SURGERY | Age: 80
End: 2020-07-21
Payer: MEDICARE

## 2020-07-21 VITALS — TEMPERATURE: 96.4 F | WEIGHT: 279.98 LBS | RESPIRATION RATE: 16 BRPM | BODY MASS INDEX: 45 KG/M2 | HEIGHT: 66 IN

## 2020-07-21 PROCEDURE — 99204 OFFICE O/P NEW MOD 45 MIN: CPT | Performed by: PHYSICAL MEDICINE & REHABILITATION

## 2020-07-21 PROCEDURE — 1123F ACP DISCUSS/DSCN MKR DOCD: CPT | Performed by: PHYSICAL MEDICINE & REHABILITATION

## 2020-07-21 PROCEDURE — 1090F PRES/ABSN URINE INCON ASSESS: CPT | Performed by: PHYSICAL MEDICINE & REHABILITATION

## 2020-07-21 PROCEDURE — G8417 CALC BMI ABV UP PARAM F/U: HCPCS | Performed by: PHYSICAL MEDICINE & REHABILITATION

## 2020-07-21 PROCEDURE — G8427 DOCREV CUR MEDS BY ELIG CLIN: HCPCS | Performed by: PHYSICAL MEDICINE & REHABILITATION

## 2020-07-21 PROCEDURE — 1036F TOBACCO NON-USER: CPT | Performed by: PHYSICAL MEDICINE & REHABILITATION

## 2020-07-21 PROCEDURE — 4040F PNEUMOC VAC/ADMIN/RCVD: CPT | Performed by: PHYSICAL MEDICINE & REHABILITATION

## 2020-07-21 PROCEDURE — G8399 PT W/DXA RESULTS DOCUMENT: HCPCS | Performed by: PHYSICAL MEDICINE & REHABILITATION

## 2020-07-21 RX ORDER — TRAMADOL HYDROCHLORIDE 50 MG/1
50 TABLET ORAL 3 TIMES DAILY
Qty: 21 TABLET | Refills: 0 | Status: SHIPPED | OUTPATIENT
Start: 2020-07-21 | End: 2020-07-28

## 2020-07-21 NOTE — PROGRESS NOTES
of Pain: Throbbing, Sharp, Dull, Aching  Duration of Pain: Persistent  Frequency of Pain: Constant  Date Pain First Started: (5/2020)  Aggravating Factors: (TWISTING, TURNING)  Limiting Behavior: Yes  Result of Injury: No  Work-Related Injury: No  Are there other pain locations you wish to document?: No      Associated signs and symptoms:   Neurogenic bowel or bladder symptoms:  no   Perceived weakness:  no   Difficulty walking:  yes    Recent Imaging (within past one year)   Xrays: yes   MRI or CT of spine: no    Current/Past Treatment:   · Physical Therapy:  HEP x8 weeks  · Chiropractic:  none  · Injection:  none  · Medications:   NSAIDS:  none   Muscle relaxer:  none   Steriods:  yes   Neuropathic medications:  none   Opioids:  none  · Previous surgery:  no  · Previous surgical consult:  no  · Other:  · Infection control  · Tested positive for MRSA in past 12 months:  no  · Tested positive for MSSA \"staph infection\" in past 12 months: no  · Tested positive for VRE (Vancomycin Resistant Enterococci) in past 12 months:   no  · Currently on any antibiotics for an infection: no  · Anticoagulants:  · On a blood thinner:  yes   · Any history of bleeding disorder: no   · MRI Contraindication: no   · Previous Pain Management: no   · Goal for treatment decrease pain   How long can you stand? 1 minute     Sit?   1 hour      Walk? - 1 minute    Past medical, surgical, social and family history reviewed with the patient.  No pertinent relevant history            Past Medical History:   Past Medical History:   Diagnosis Date    Anemia     NOS    Arthritis     knee     Atrial fibrillation (HCC)     on coumadin    Chronic kidney disease (CKD), stage II (mild)     Community acquired pneumonia of left lower lobe of lung ([facility] [address] 12/26/2017    Depression     Diverticulosis     Foot pain     GI bleed 4/18/2018    Due to esophageal tear     Gout     Hemorrhoids     Hyperlipidemia     Hypertension     Hyperuricemia  Iron deficiency anemia 1/8/2014    Iron deficiency anemia-s/p EGD and colonoscopy 2/11/2014 Esophageal tear likely source     Medical history reviewed with no changes     Menopausal syndrome     Obesity     Pelvic relaxation     PONV (postoperative nausea and vomiting)     Stress 8/2006    NL stress    Syncope     Unspecified sleep apnea 03/2002    uvulectomy -hx of    Vitamin D deficiency     20ng/ml 6/2009    Wears dentures     Wears glasses       Past Surgical History:     Past Surgical History:   Procedure Laterality Date    BLADDER SUSPENSION  1997    CHOLECYSTECTOMY  1974    COLONOSCOPY      2013    COLONOSCOPY  11/2014    10yr    EYE SURGERY      macular tear and cataract right    HYSTERECTOMY  1977    partial    JOINT REPLACEMENT Right 2017    KNEE ARTHROSCOPY  2005    knee surgery    UVULOPALATOPHARYGOPLASTY  3/2002     Current Medications:     Current Outpatient Medications:     atorvastatin (LIPITOR) 20 MG tablet, TAKE ONE TABLET BY MOUTH ONCE NIGHTLY, Disp: 90 tablet, Rfl: 0    allopurinol (ZYLOPRIM) 300 MG tablet, TAKE ONE TABLET BY MOUTH DAILY, Disp: 90 tablet, Rfl: 2    dilTIAZem (CARTIA XT) 300 MG extended release capsule, Take 1 capsule by mouth daily, Disp: 90 capsule, Rfl: 3    dilTIAZem (TIAZAC) 300 MG extended release capsule, TAKE ONE CAPSULE BY MOUTH DAILY, Disp: 90 capsule, Rfl: 3    warfarin (COUMADIN) 1 MG tablet, TAKE ONE TABLET BY MOUTH ON SUNDAY, TUESDAY AND THURSDAY.   TAKE ONE-HALF TABLET BY MOUTH ON ALL OTHER DAYS OR AS DIRECTED BY CLINIC, Disp: 65 tablet, Rfl: 5    omeprazole (PRILOSEC) 20 MG delayed release capsule, TAKE ONE CAPSULE BY MOUTH DAILY, Disp: 90 capsule, Rfl: 3    sertraline (ZOLOFT) 50 MG tablet, TAKE ONE TABLET BY MOUTH DAILY, Disp: 90 tablet, Rfl: 3    metoprolol tartrate (LOPRESSOR) 25 MG tablet, Take 1 tablet by mouth daily, Disp: 90 tablet, Rfl: 3    Cholecalciferol (VITAMIN D) 2000 UNITS CAPS capsule, Take 2,000 Units by mouth daily, Disp: , Rfl:     docusate sodium (COLACE) 100 MG capsule, Take 100 mg by mouth daily, Disp: , Rfl:   Allergies:  Diclofenac; Adhesive tape; and Penicillins  Social History:    reports that she has never smoked. She has never used smokeless tobacco. She reports that she does not drink alcohol or use drugs. Family History:   Family History   Problem Relation Age of Onset    Heart Attack Father     Heart Disease Father     Stroke Brother        REVIEW OF SYSTEMS: ROS - 14 point    Constitutional: No fevers, chills, night sweats, unexplained weight loss  Eye: No vision changes or diplopia  ENT: No nasal congestion, postnasal drip or sore throat. No tinnitus  Respiratory: No cough or SOB  CV: No chest pain or palpitations  GI: No nausea, abdominal pain, stool changes  : No dysuria or hematuria  Skin: No new or changing skin lesions, no rashes  MSK: No joint swelling, morning stiffness, unusual joint pain  Neurological: No headache, confusion, syncope  Psychiatric: No excessive anxiety or depression  Endocrine: No polyuria or polydipsia  Hematologic: No lymph node enlargement or excessive bleeding  Immunologic:No history of immune deficiency or immunomodulating drugs           PHYSICAL EXAM:    Vitals: Temperature 96.4 °F (35.8 °C), resp. rate 16, height 5' 6.5\" (1.689 m), weight 279 lb 15.8 oz (127 kg), not currently breastfeeding. GENERAL EXAM:  · General Apparence: Patient is adequately groomed with no evidence of malnutrition. · Psychiatric: Orientation: The patient is oriented to time, place and person. The patient's mood and affect are appropriate   · Vascular: Examination reveals no swelling and palpation reveals no tenderness in upper or lower extremities. Good capillary refill.    · The lymphatic examination of the neck, axillae and groin reveals all areas to be without enlargement or induration   Sensation is intact without deficit in the upper and lower extremities to light touch and pinprick  · Coordination of the upper and lower extremities are normal.    CERVICAL EXAMINATION:  · Inspection: Local inspection shows no step-off or bruising. Cervical alignment is normal. No instability is noted. · Palpation and Percussion: No evidence of tenderness at the midline. Paraspinal tenderness is not present. There is no paraspinal spasm. · Range of Motion:  pain-free ROM   · Strength: 5/5 bilateral upper extremities  · Special Tests:   Spurling's and Nagel's are negative bilaterally. Ojeda and Impingement tests are negative bilaterally. · Skin:There are no rashes, ulcerations or lesions. · Reflexes: Bilaterally triceps, biceps and brachioradialis are 1+. Clonus absent bilaterally at the feet. No pathological reflexes are noted. · Gait & station:  uses a single point cane to ambulate and no ataxia  · Additional Examinations:  · RIGHT UPPER EXTREMITY:  Inspection/examination of the right upper extremity does not show any tenderness, deformity or injury. Range of motion is normal and pain-free. There is no gross instability. There are no rashes, ulcerations or lesions. Strength and tone are normal. No atrophy or abnormal movements are noted. · LEFT UPPER EXTREMITY: Inspection/examination of the left upper extremity does not show any tenderness, deformity or injury. Range of motion is normal and pain-free. There is no gross instability. There are no rashes, ulcerations or lesions. Strength and tone are normal. No atrophy or abnormal movements are noted. LUMBAR/SACRAL EXAMINATION:  · Inspection: Local inspection shows no step-off or bruising. Lumbar alignment is normal. No instability is noted. · Palpation:   No evidence of tenderness at the midline. Lumbar paraspinal tenderness Mild L4/5 and L5/S1 tenderness  Bursal tenderness No tenderness bilaterally  There is no paraspinal spasm.   · Range of Motion: limited by 25% in all planes due to pain  · Strength:   Strength testing is 5/5 in all muscle groups tested. · Special Tests:   Straight leg raise + on left and crossed SLR negative. Zack's testing is negative bilaterally. FADIR's testing is negative bilaterally. Slump test negative. Bowstring test negative  · Skin: There are no rashes, ulcerations or lesions. · Reflexes: Reflexes are symmetrically 1+ at the patellar and ankle tendons. Clonus absent bilaterally at the feet. · Gait & station: uses a single point cane to ambulate and no ataxia  · Additional Examinations:  · RIGHT LOWER EXTREMITY: Inspection/examination of the right lower extremity does not show any tenderness or deformity. Patient presents wearing boot due to right 5th metatarsal fracture. Range of motion is unremarkable. There is no gross instability. There are no rashes, ulcerations or lesions. Strength and tone are normal. No atrophy or abnormal movements are noted. · LEFT LOWER EXTREMITY:  Inspection/examination of the left lower extremity does not show any tenderness, deformity or injury. Range of motion is unremarkable. There is no gross instability. There are no rashes, ulcerations or lesions. Strength and tone are normal. No atrophy or abnormal movements are noted. Diagnostic Testing:    Xrays:   AP and lateral of the lumbar spine taken today in the office show L5/S1 retrolisthesis and multilevel degenerative disc disease  MRI or CT:  None  EMG:  None  Results for orders placed or performed in visit on 07/17/20   Protime-INR   Result Value Ref Range    INR 4        Impression (Medical Decision Making):       1. Lumbar radiculopathy    2. Spondylolisthesis of lumbar region    3. Degenerative disc disease, lumbar        Plan (Medical Decision Making):    I discussed the diagnosis and the treatment options with Leopoldo Conners today.      In Summary:  The various treatment options were outlined and discussed with Leopoldo Conners including:  Conservative care options: physical therapy, ice, medications, bracing, and activity modification. The indications for therapeutic injections. The indications for additional imaging/laboratory studies. The indications for (possible future) interventions. After considering the various options discussed, Anabela Molina elected to pursue a course of treatment that includes the followin. Medications: I will add a Tramadol to the current regimen. Counseled on risks, benefits and alternatives and recommended not to take the medicine and drive or operate heavy machinery. OARRS reviewed and patient is low risk. Opioid risk tool:  Family history of alcohol, illegal or prescription drug abuse: no (F1,2,4) (M3,3,4)  Personal history of alcohol, illegal or prescription drug abuse:no (FM3,4,5)  Age 16-45:no (FM 1)  Personal history of pre-adolescent sexual abuse:no (F 3:M1)  ADD, OCD, bipolar, schizophrenia:no (FM2)  Depression:no (FM1)  0 risk on Opioid Risk Tool. We will limit the opioid use to as minimal as possible and utilize multiple treatment modalities to help. The patient's goals were reviewed which include the ability to remain independent with ADLs, continue to function with tolerable pain levels and make continued functional improvements such as walking, standing, sleeping. Informed verbal consent was obtained. Goals of the current treatment regimen include: improvement in pain, improvement in overall in physical performance, ability to perform daily activities, work or disability, emotional distress, health care utilization and decreased medication consumption. Progress will be monitored towards achieving/maintaining therapeutic goals:    1. Improving perceived interference of pain with ADL's, ability to perform HEP, continue to improve in overall flexibility, ROM, strength and endurance, ability to do household activities indoor and/or outdoor, work and social/leisure activities. Improve psychosocial and physical functioning.     2. Improving sleep specifically instructed to contact the office between now & her scheduled appointment if she has concerns related to her condition or if she needs assistance in scheduling the above tests. She is welcome to call for an appointment sooner if she has any additional concerns or questions. René Payne ATC, am scribing for and in the presence of Dr. Gabrielle Wilkes.   07/21/20 11:30 AM Noe Lockhart ATC    The physical examination was performed between the patient and Dr. Gabrielle Wilkes. All counseling during the appointment was performed between the patient and provider. I, Dr. Rose Stapleton. Josh, personally performed the services described in this documentation as scribed by NAVEED La in my presence and it is both accurate and complete. Martina Teresa. Everlina Riedel, MD, HELEN, [facility]  Board Certified in [address] [place] [address] Certified and Fellowship Trained in [facility] ([facility])     This dictation was performed with a verbal recognition program [facility]) and it was checked for errors. It is possible that there are still dictated errors within this office note. If so, please bring any errors to my attention for an addendum. All efforts were made to ensure that this office note is accurate.

## 2020-07-23 ENCOUNTER — HOSPITAL ENCOUNTER (OUTPATIENT)
Dept: MRI IMAGING | Age: 80
Discharge: HOME | End: 2020-07-23
Payer: MEDICARE

## 2020-07-23 PROCEDURE — 72148 MRI LUMBAR SPINE W/O DYE: CPT

## 2020-07-28 ENCOUNTER — TELEPHONE (OUTPATIENT)
Dept: ORTHOPEDIC SURGERY | Age: 80
End: 2020-07-28

## 2020-07-28 ENCOUNTER — OFFICE VISIT (OUTPATIENT)
Dept: ORTHOPEDIC SURGERY | Age: 80
End: 2020-07-28
Payer: MEDICARE

## 2020-07-28 VITALS — BODY MASS INDEX: 45 KG/M2 | WEIGHT: 279.98 LBS | HEIGHT: 66 IN | RESPIRATION RATE: 16 BRPM | TEMPERATURE: 97.6 F

## 2020-07-28 PROCEDURE — G8427 DOCREV CUR MEDS BY ELIG CLIN: HCPCS | Performed by: PHYSICAL MEDICINE & REHABILITATION

## 2020-07-28 PROCEDURE — 99214 OFFICE O/P EST MOD 30 MIN: CPT | Performed by: PHYSICAL MEDICINE & REHABILITATION

## 2020-07-28 PROCEDURE — 4040F PNEUMOC VAC/ADMIN/RCVD: CPT | Performed by: PHYSICAL MEDICINE & REHABILITATION

## 2020-07-28 PROCEDURE — 1036F TOBACCO NON-USER: CPT | Performed by: PHYSICAL MEDICINE & REHABILITATION

## 2020-07-28 PROCEDURE — G8399 PT W/DXA RESULTS DOCUMENT: HCPCS | Performed by: PHYSICAL MEDICINE & REHABILITATION

## 2020-07-28 PROCEDURE — G8417 CALC BMI ABV UP PARAM F/U: HCPCS | Performed by: PHYSICAL MEDICINE & REHABILITATION

## 2020-07-28 PROCEDURE — 1123F ACP DISCUSS/DSCN MKR DOCD: CPT | Performed by: PHYSICAL MEDICINE & REHABILITATION

## 2020-07-28 PROCEDURE — 1090F PRES/ABSN URINE INCON ASSESS: CPT | Performed by: PHYSICAL MEDICINE & REHABILITATION

## 2020-07-28 NOTE — TELEPHONE ENCOUNTER
L/m for approval to hold COUMADIN for 5 days prior to [facility] on 8/4/20. Patient aware of hold date.

## 2020-07-28 NOTE — PROGRESS NOTES
Follow up: Charito Zamudio  1940  [id number]         Chief Complaint   Patient presents with    Lower Back Pain     TR MRI LSP          HISTORY OF PRESENT ILLNESS:  Ms. Thai Ortega is a [de-identified] y.o. female returns for a follow up visit for multiple medical problems. Her current presenting problems are   1. Lumbar radiculopathy    2. Spondylolisthesis of lumbar region    3. Lumbar stenosis with neurogenic claudication    4. Degenerative disc disease, lumbar    . As per information/history obtained from the PADT(patient assessment and documentation tool) - She complains of pain in the lower back with radiation to the hips Left She rates the pain 10/10 and describes it as sharp, aching, stabbing. Pain is made worse by: walking, standing. She denies side effects from the current pain regimen. Patient reports that since the last follow up visit the physical functioning is unchanged, family/social relationships are unchanged, mood is unchanged and sleep patterns are unchanged, and that the overall functioning is worse. Patient denies neurological bowel or bladder. Ms Thai Ortega presents today regarding her lumbar MRI completed on 7/23/2020. She describes that her pain continues to be constant. Patient does continue to ambulate with a cane. The patient also continues to wear a pnuematic walking boot. Alleviating factors include sitting. The patient describes being able to sit for 1-2 hours, stand for limited and walk for limited with minimal to no symptoms. Patient was prescribed Tramadol which did make her very nauseous. She only took three tablets and discontinued use of this. She wishes to discuss alternatives today. Associated signs and symptoms:   Neurogenic bowel or bladder symptoms:  no   Perceived weakness:  no   Difficulty walking:  yes            Past medical, surgical, social and family history reviewed with the patient.  No pertinent relevant history  Past Medical History:   Past Medical History:   Diagnosis Date    Anemia     NOS    Arthritis     knee     Atrial fibrillation (HCC)     on coumadin    Chronic kidney disease (CKD), stage II (mild)     Community acquired pneumonia of left lower lobe of lung ([facility] [address] 12/26/2017    Depression     Diverticulosis     Foot pain     GI bleed 4/18/2018    Due to esophageal tear     Gout     Hemorrhoids     Hyperlipidemia     Hypertension     Hyperuricemia     Iron deficiency anemia 1/8/2014    Iron deficiency anemia-s/p EGD and colonoscopy 2/11/2014 Esophageal tear likely source     Medical history reviewed with no changes     Menopausal syndrome     Obesity     Pelvic relaxation     PONV (postoperative nausea and vomiting)     Stress 8/2006    NL stress    Syncope     Unspecified sleep apnea 03/2002    uvulectomy -hx of    Vitamin D deficiency     20ng/ml 6/2009    Wears dentures     Wears glasses       Past Surgical History:     Past Surgical History:   Procedure Laterality Date    BLADDER SUSPENSION  1997    CHOLECYSTECTOMY  1974    COLONOSCOPY      2013    COLONOSCOPY  11/2014    10yr    EYE SURGERY      macular tear and cataract right    HYSTERECTOMY  1977    partial    JOINT REPLACEMENT Right 2017    KNEE ARTHROSCOPY  2005    knee surgery    UVULOPALATOPHARYGOPLASTY  3/2002     Current Medications:     Current Outpatient Medications:     traMADol (ULTRAM) 50 MG tablet, Take 1 tablet by mouth 3 times daily for 7 days. , Disp: 21 tablet, Rfl: 0    atorvastatin (LIPITOR) 20 MG tablet, TAKE ONE TABLET BY MOUTH ONCE NIGHTLY, Disp: 90 tablet, Rfl: 0    allopurinol (ZYLOPRIM) 300 MG tablet, TAKE ONE TABLET BY MOUTH DAILY, Disp: 90 tablet, Rfl: 2    dilTIAZem (CARTIA XT) 300 MG extended release capsule, Take 1 capsule by mouth daily, Disp: 90 capsule, Rfl: 3    dilTIAZem (TIAZAC) 300 MG extended release capsule, TAKE ONE CAPSULE BY MOUTH DAILY, Disp: 90 capsule, Rfl: 3    warfarin (COUMADIN) 1 MG tablet, TAKE ONE intact without deficit in the upper and lower extremities to light touch and pinprick  · Coordination of the upper and lower extremities are normal.  · Additional Examinations:  · RIGHT UPPER EXTREMITY:  Inspection/examination of the right upper extremity does not show any tenderness, deformity or injury. Range of motion is unremarkable and pain-free. There is no gross instability. There are no rashes, ulcerations or lesions. Strength and tone are normal. No atrophy or abnormal movements are noted. · LEFT UPPER EXTREMITY: Inspection/examination of the left upper extremity does not show any tenderness, deformity or injury. Range of motion is unremarkable and pain-free. There is no gross instability. There are no rashes, ulcerations or lesions. Strength and tone are normal. No atrophy or abnormal movements are noted. LUMBAR/SACRAL EXAMINATION:  · Inspection: Local inspection shows no step-off or bruising. Lumbar alignment is normal. No instability is noted. · Palpation:   No evidence of tenderness at the midline. Lumbar paraspinal tenderness: Mild L4/5 and L5/S1 tenderness  Bursal tenderness No tenderness bilaterally  There is no paraspinal spasm. · Range of Motion: limited by 25% in all planes due to pain  · Strength:   Strength testing is 5/5 in all muscle groups tested. · Special Tests:   Straight leg raise + on left and crossed SLR negative. Zack's testing is negative bilaterally. FADIR's testing is negative bilaterally. Bowstring test negative. Slump test negative. · Skin: There are no rashes, ulcerations or lesions. · Reflexes: Reflexes are symmetrically 1+ at the patellar and ankle tendons. Clonus absent bilaterally at the feet. · Gait & station: antalgic on the left and no ataxia  · Additional Examinations:  · RIGHT LOWER EXTREMITY: Inspection/examination of the right lower extremity does not show any tenderness, deformity or injury. Range of motion is normal and pain-free.  There is no gross instability. There are no rashes, ulcerations or lesions. Strength and tone are normal. No atrophy or abnormal movements are noted. Boot on the right leg  · LEFT LOWER EXTREMITY:  Inspection/examination of the left lower extremity does not show any tenderness, deformity or injury. Range of motion is normal and pain-free. There is no gross instability. There are no rashes, ulcerations or lesions. Strength and tone are normal. No atrophy or abnormal movements are noted      Diagnostic Testing:    MR Lumbar spine shows  which I personally reviewed 2020  1. Moderate multifactorial L2-L3 spinal canal stenosis and mild bilateral    neural foraminal stenosis. 2. Mild multifactorial spinal canal stenosis at L3-L4 and L4-L5 . 3. Mild-to-moderate left L3-L4 and moderate right L4-L5 neural foraminal    stenosis. Results for orders placed or performed in visit on 20   Protime-INR   Result Value Ref Range    INR 4      Impression:       1. Lumbar radiculopathy    2. Spondylolisthesis of lumbar region    3. Lumbar stenosis with neurogenic claudication    4. Degenerative disc disease, lumbar        Plan:  Clinical Course: Above diagnoses are worsening    I discussed the diagnosis and the treatment options with Tan Bowden today. In Summary:  The various treatment options were outlined and discussed with Tan Bowden including:  Conservative care options: physical therapy, ice, medications, bracing, and activity modification. The indications for therapeutic injections. The indications for additional imaging/laboratory studies. The indications for (possible future) interventions. After considering the various options discussed, Tan Bowden elected to pursue a course of treatment that includes the followin. Medications:  She will change to just Tylenol    2.  PT:  Encouraged to continue with Home exercise program.    3. Further studies: COVID-19 PCR testing prior to [facility] has any additional concerns or questions. Sue Wynne ATC, am scribing for and in the presence of Dr. Morales Jacobson.   07/28/20 12:54 PM Katiuska Dhillon ATC    The physical examination was performed between the patient and Dr. Morales Jacobson. All counseling during the appointment was performed between the patient and provider. I, Dr. Roxana Garay. Josh, personally performed the services described in this documentation as scribed by NAVEED Dupont in my presence and it is both accurate and complete. Stratton Batch. Lizett Galindo MD, HELEN, [facility]  Board Certified in [address] [place] [address] Certified and Fellowship Trained in [facility] ([facility])             This dictation was performed with a verbal recognition program [facility]) and it was checked for errors. It is possible that there are still dictated errors within this office note. If so, please bring any errors to my attention for an addendum. All efforts were made to ensure that this office note is accurate.

## 2020-07-28 NOTE — LETTER
Please schedule the following with:     Date:   20 @ 9:45    Account: [id number]  Patient: Kellee Rodriguez    : 1940  Address:  [facility] [place] [address]    Phone (H):  [phone] (home)      ----------------------------------------------------------------------------------------------  Diagnosis:     ICD-10-CM    1. Lumbar radiculopathy  M54.16    2. Spondylolisthesis of lumbar region  M43.16    3. Lumbar stenosis with neurogenic claudication  M48.062    4. Degenerative disc disease, lumbar  M51.36          Levels:Left L3 and L4 Transforaminal ANTOLIN  CPT Codes I541344, [id number]    ----------------------------------------------------------------------------------------------  Injection # 1   [email or web address]    Attending Physician       Violette Ochoa.  Nan Marquez MD.      ----------------------------------------------------------------------------------------------  Injection Scheduled For:    At:    1st Insurance MCR    Pre-Cert#    2nd Insurance     Pre-Cert#    Comments or Special instructions:    · Infection control  · Tested positive for MRSA in past 12 months:  no  · Tested positive for MSSA \"staph infection\" in past 12 months: no  · Tested positive for VRE (Vancomycin Resistant Enterococci) in past 12 months:   no  · Currently on any antibiotics for an infection: no  · Anticoagulants:  · On a blood thinner:  yes - coumadin elias hughes, aprn-cnp [phone]  · Any history of bleeding disorder: no   · Advanced Liver disease: no   · Advanced Renal disease: no   · Glaucoma: no   · Diabetes: no     Sedation:  Yes  -----------------------------------------------------------------------------------------------  Allergies   Allergen Reactions    Diclofenac Hives     Severe itching    Adhesive Tape Rash    Penicillins Nausea And Vomiting     \"years ago\"

## 2020-07-29 ENCOUNTER — OFFICE VISIT (OUTPATIENT)
Dept: PRIMARY CARE CLINIC | Age: 80
End: 2020-07-29
Payer: MEDICARE

## 2020-07-29 PROCEDURE — 99211 OFF/OP EST MAY X REQ PHY/QHP: CPT | Performed by: NURSE PRACTITIONER

## 2020-07-29 PROCEDURE — G8417 CALC BMI ABV UP PARAM F/U: HCPCS | Performed by: NURSE PRACTITIONER

## 2020-07-29 PROCEDURE — G8428 CUR MEDS NOT DOCUMENT: HCPCS | Performed by: NURSE PRACTITIONER

## 2020-07-29 NOTE — PROGRESS NOTES
Recardo Cousin received a viral test for COVID-19. They were educated on isolation and quarantine as appropriate. For any symptoms, they were directed to seek care from their PCP, given contact information to establish with a doctor, directed to an urgent care or the emergency room.

## 2020-07-30 ENCOUNTER — TELEPHONE (OUTPATIENT)
Dept: ORTHOPEDIC SURGERY | Age: 80
End: 2020-07-30

## 2020-07-30 NOTE — TELEPHONE ENCOUNTER
Auth: #  NPR    Date: 08/04/2020  Type of SX:  OP  Location: [facility]  CPT: 42823   04507   63959.70  [id number]   DX Code: M54.16   M43.16   M48.062   M51.36  SX area: Left L3  L4 Trans foraminal ANTOLIN  Insurance: Medicare

## 2020-07-31 LAB
REPORT: NORMAL
SARS-COV-2: NOT DETECTED
THIS TEST SENT TO: NORMAL

## 2020-08-04 ENCOUNTER — TELEPHONE (OUTPATIENT)
Dept: ORTHOPEDIC SURGERY | Age: 80
End: 2020-08-04

## 2020-08-04 NOTE — TELEPHONE ENCOUNTER
She needs to contact the Coumadin clinic, her levels need to be 1.5 or below before we will reschedule. This was discussed with Dr. Maximino Schaffer.

## 2020-08-04 NOTE — TELEPHONE ENCOUNTER
Patient's LSP TF ANTOLIN was cancelled today due to INR elevated. Please advise for appropriate instructions to reschedule.

## 2020-08-04 NOTE — TELEPHONE ENCOUNTER
S/w patient. Gave instructions attached.  She will call back to this office after her appt with the coumadin clinic on Thursday 8/6/20

## 2020-08-05 ENCOUNTER — OFFICE VISIT (OUTPATIENT)
Dept: ORTHOPEDIC SURGERY | Age: 80
End: 2020-08-05
Payer: MEDICARE

## 2020-08-05 VITALS — BODY MASS INDEX: 42.43 KG/M2 | HEIGHT: 66 IN | WEIGHT: 264 LBS

## 2020-08-05 PROCEDURE — 1123F ACP DISCUSS/DSCN MKR DOCD: CPT | Performed by: PHYSICIAN ASSISTANT

## 2020-08-05 PROCEDURE — G8427 DOCREV CUR MEDS BY ELIG CLIN: HCPCS | Performed by: PHYSICIAN ASSISTANT

## 2020-08-05 PROCEDURE — 1036F TOBACCO NON-USER: CPT | Performed by: PHYSICIAN ASSISTANT

## 2020-08-05 PROCEDURE — G8417 CALC BMI ABV UP PARAM F/U: HCPCS | Performed by: PHYSICIAN ASSISTANT

## 2020-08-05 PROCEDURE — 4040F PNEUMOC VAC/ADMIN/RCVD: CPT | Performed by: PHYSICIAN ASSISTANT

## 2020-08-05 PROCEDURE — 1090F PRES/ABSN URINE INCON ASSESS: CPT | Performed by: PHYSICIAN ASSISTANT

## 2020-08-05 PROCEDURE — 99213 OFFICE O/P EST LOW 20 MIN: CPT | Performed by: PHYSICIAN ASSISTANT

## 2020-08-05 PROCEDURE — G8399 PT W/DXA RESULTS DOCUMENT: HCPCS | Performed by: PHYSICIAN ASSISTANT

## 2020-08-05 NOTE — PROGRESS NOTES
CHIEF COMPLAINT:    Chief Complaint   Patient presents with    Foot Pain     RIGHT FOOT 5TH METATARSAL FX        HISTORY OF PRESENT ILLNESS:                The patient is a [de-identified] y.o. female returns to clinic today following up from her right fifth metatarsal fracture. She has been wearing her boot since her last visit and states her pain is improving. Using a cane for ambulation.     Past Medical History:   Diagnosis Date    Anemia     NOS    Arthritis     knee     Atrial fibrillation (HCC)     on coumadin    Chronic kidney disease (CKD), stage II (mild)     Community acquired pneumonia of left lower lobe of lung ([facility] [address] 12/26/2017    Depression     Diverticulosis     Foot pain     GI bleed 4/18/2018    Due to esophageal tear     Gout     Hemorrhoids     Hyperlipidemia     Hypertension     Hyperuricemia     Iron deficiency anemia 1/8/2014    Iron deficiency anemia-s/p EGD and colonoscopy 2/11/2014 Esophageal tear likely source     Medical history reviewed with no changes     Menopausal syndrome     Obesity     Pelvic relaxation     PONV (postoperative nausea and vomiting)     Stress 8/2006    NL stress    Syncope     Unspecified sleep apnea 03/2002    uvulectomy -hx of    Vitamin D deficiency     20ng/ml 6/2009    Wears dentures     Wears glasses         Work Status: Retired    The pain assessment was noted & is as follows:  Pain Assessment  Location of Pain: Foot  Location Modifiers: Right  Severity of Pain: 3  Quality of Pain: Aching, Dull  Duration of Pain: Persistent  Frequency of Pain: Constant  Aggravating Factors: Bending, Stairs, Walking, Standing  Limiting Behavior: Yes  Result of Injury: No  Work-Related Injury: No  Are there other pain locations you wish to document?: No]      Work Status/Functionality:     Past Medical History: Medical history form was reviewed today & can be found in the media tab  Past Medical History:   Diagnosis Date    Anemia     NOS    Arthritis knee     Atrial fibrillation (HCC)     on coumadin    Chronic kidney disease (CKD), stage II (mild)     Community acquired pneumonia of left lower lobe of lung ([facility] [address] 12/26/2017    Depression     Diverticulosis     Foot pain     GI bleed 4/18/2018    Due to esophageal tear     Gout     Hemorrhoids     Hyperlipidemia     Hypertension     Hyperuricemia     Iron deficiency anemia 1/8/2014    Iron deficiency anemia-s/p EGD and colonoscopy 2/11/2014 Esophageal tear likely source     Medical history reviewed with no changes     Menopausal syndrome     Obesity     Pelvic relaxation     PONV (postoperative nausea and vomiting)     Stress 8/2006    NL stress    Syncope     Unspecified sleep apnea 03/2002    uvulectomy -hx of    Vitamin D deficiency     20ng/ml 6/2009    Wears dentures     Wears glasses       Past Surgical History:     Past Surgical History:   Procedure Laterality Date    BLADDER SUSPENSION  1997    CHOLECYSTECTOMY  1974    COLONOSCOPY      2013    COLONOSCOPY  11/2014    10yr    EYE SURGERY      macular tear and cataract right    HYSTERECTOMY  1977    partial    JOINT REPLACEMENT Right 2017    KNEE ARTHROSCOPY  2005    knee surgery    UVULOPALATOPHARYGOPLASTY  3/2002     Current Medications:     Current Outpatient Medications:     atorvastatin (LIPITOR) 20 MG tablet, TAKE ONE TABLET BY MOUTH ONCE NIGHTLY, Disp: 90 tablet, Rfl: 0    allopurinol (ZYLOPRIM) 300 MG tablet, TAKE ONE TABLET BY MOUTH DAILY, Disp: 90 tablet, Rfl: 2    dilTIAZem (CARTIA XT) 300 MG extended release capsule, Take 1 capsule by mouth daily, Disp: 90 capsule, Rfl: 3    dilTIAZem (TIAZAC) 300 MG extended release capsule, TAKE ONE CAPSULE BY MOUTH DAILY, Disp: 90 capsule, Rfl: 3    warfarin (COUMADIN) 1 MG tablet, TAKE ONE TABLET BY MOUTH ON SUNDAY, TUESDAY AND THURSDAY.   TAKE ONE-HALF TABLET BY MOUTH ON ALL OTHER DAYS OR AS DIRECTED BY CLINIC, Disp: 65 tablet, Rfl: 5    omeprazole (PRILOSEC) 20 MG delayed release capsule, TAKE ONE CAPSULE BY MOUTH DAILY, Disp: 90 capsule, Rfl: 3    sertraline (ZOLOFT) 50 MG tablet, TAKE ONE TABLET BY MOUTH DAILY, Disp: 90 tablet, Rfl: 3    metoprolol tartrate (LOPRESSOR) 25 MG tablet, Take 1 tablet by mouth daily, Disp: 90 tablet, Rfl: 3    Cholecalciferol (VITAMIN D) 2000 UNITS CAPS capsule, Take 2,000 Units by mouth daily, Disp: , Rfl:     docusate sodium (COLACE) 100 MG capsule, Take 100 mg by mouth daily, Disp: , Rfl:   Allergies:  Diclofenac; Adhesive tape; and Penicillins  Social History:    reports that she has never smoked. She has never used smokeless tobacco. She reports that she does not drink alcohol or use drugs. Family History:   Family History   Problem Relation Age of Onset    Heart Attack Father     Heart Disease Father     Stroke Brother        REVIEW OF SYSTEMS:   For new problems, a full review of systems will be found scanned in the patient's chart. CONSTITUTIONAL: Denies unexplained weight loss, fevers, chills   NEUROLOGICAL: Denies unsteady gait or progressive weakness  SKIN: Denies skin changes, delayed healing, rash, itching       PHYSICAL EXAM:    Vitals: Height 5' 5.98\" (1.676 m), weight 264 lb (119.7 kg), not currently breastfeeding. GENERAL EXAM:  · General Apparence: Patient is adequately groomed with no evidence of malnutrition. · Orientation: The patient is oriented to time, place and person. · Mood & Affect:The patient's mood and affect are appropriate       Right foot PHYSICAL EXAMINATION:  · Inspection: No visible deformity. No significant edema, erythema or ecchymosis. · Palpation: No tenderness to palpation over the fifth metatarsal    · Range of Motion: Normal range of motion of the foot and ankle    · Strength: No strength deficits are noted    · Special Tests: Neurovascular exam is intact to the distal extremity          · Skin:  There are no rashes, ulcerations or lesions.   · There are no dysvascular changes

## 2020-08-06 ENCOUNTER — ANTI-COAG VISIT (OUTPATIENT)
Dept: PHARMACY | Age: 80
End: 2020-08-06
Payer: MEDICARE

## 2020-08-06 LAB
INR BLD: 1.8
INR BLD: 1.9

## 2020-08-06 PROCEDURE — 99211 OFF/OP EST MAY X REQ PHY/QHP: CPT

## 2020-08-06 PROCEDURE — 85610 PROTHROMBIN TIME: CPT

## 2020-08-06 NOTE — PROGRESS NOTES
Ms. Corrie Rayo is here for management of anticoagulation for Afib. PMH also significant for HTN, Gout, CKD II/III, Hyperlipidemia, and depression. She presents today w/out complaint. Pt verifies dosing regimen as listed above. Pt denies s/sx bleeding/bruising/ CP/HA/swelling/SOB  No missed doses. No changes in Rx/OTC/herbal medications. No major changes in diet. Pt does not drink EtOH or smoke. INR 1.8 is below the acceptable therapeutic range of 2-3. Recommend to hold until after her procedure. Pt is getting a shot in her back on Tuesday and the doctor wants her INR to be < 1.3. Patient has been holding her warfarin since 7/30. Patient has 1 mg tablets. Will continue to monitor and check INR in 4 days. Dosing reminder card given with phone number, appointment date and time.   Return to clinic: 8/10 @ 8:30AM  Referring Provider: Dr. Armani Forde, PharmD Candidate 8/6/20 2:10 PM

## 2020-08-10 ENCOUNTER — ANTI-COAG VISIT (OUTPATIENT)
Dept: PHARMACY | Age: 80
End: 2020-08-10
Payer: MEDICARE

## 2020-08-10 ENCOUNTER — TELEPHONE (OUTPATIENT)
Dept: ORTHOPEDIC SURGERY | Age: 80
End: 2020-08-10

## 2020-08-10 LAB — INR BLD: 1.3

## 2020-08-10 PROCEDURE — 85610 PROTHROMBIN TIME: CPT | Performed by: INTERNAL MEDICINE

## 2020-08-10 PROCEDURE — 99211 OFF/OP EST MAY X REQ PHY/QHP: CPT | Performed by: INTERNAL MEDICINE

## 2020-08-10 NOTE — TELEPHONE ENCOUNTER
S/w patient who reports PT/INR is 1.3  Waiting for confirmation concerning covid testing prior to rescheduling to 8/11/20 from [facility]

## 2020-08-10 NOTE — PROGRESS NOTES
Ms. Ronda Horn is here for management of anticoagulation for Afib. PMH also significant for HTN, Gout, CKD II/III, Hyperlipidemia, and depression. She presents today w/out complaint. Pt verifies dosing regimen as listed above. Pt denies s/sx bleeding/bruising/ CP/HA/swelling/SOB  No missed doses. No changes in Rx/OTC/herbal medications. No major changes in diet. Pt does not drink EtOH or smoke. INR 1.3 is below the acceptable therapeutic range of 2-3. Recommend to hold until after her procedure. Pt is getting a shot in her back tomorrow and the doctor wants her INR to be < 1.3. Patient has been holding her warfarin since 7/30. She will restart warfarin after the procedure. Patient has 1 mg tablets. Will continue to monitor and check INR in 10 days. Dosing reminder card given with phone number, appointment date and time. Return to clinic: 8/21 @ 11:00AM  Referring Provider: Dr. Adis Curtis, PharmD Candidate 8/10/20    I have seen the patient and reviewed the progress note written by the PharmD Candidate. I agree with this assessment and plan.    Pramod Norwood PharmD 8/10/2020 9:06 AM

## 2020-08-11 ENCOUNTER — TELEPHONE (OUTPATIENT)
Dept: INTERNAL MEDICINE CLINIC | Age: 80
End: 2020-08-11

## 2020-08-11 ENCOUNTER — HOSPITAL ENCOUNTER (OUTPATIENT)
Age: 80
Setting detail: OUTPATIENT SURGERY
Discharge: HOME | End: 2020-08-11
Attending: PHYSICAL MEDICINE & REHABILITATION | Admitting: PHYSICAL MEDICINE & REHABILITATION
Payer: MEDICARE

## 2020-08-11 VITALS
HEART RATE: 92 BPM | DIASTOLIC BLOOD PRESSURE: 83 MMHG | RESPIRATION RATE: 16 BRPM | OXYGEN SATURATION: 95 % | SYSTOLIC BLOOD PRESSURE: 159 MMHG

## 2020-08-11 PROCEDURE — 2709999900 HC NON-CHARGEABLE SUPPLY: Performed by: PHYSICAL MEDICINE & REHABILITATION

## 2020-08-11 PROCEDURE — 3600000002 HC SURGERY LEVEL 2 BASE: Performed by: PHYSICAL MEDICINE & REHABILITATION

## 2020-08-11 PROCEDURE — 2500000003 HC RX 250 WO HCPCS: Performed by: PHYSICAL MEDICINE & REHABILITATION

## 2020-08-11 PROCEDURE — 99152 MOD SED SAME PHYS/QHP 5/>YRS: CPT | Performed by: PHYSICAL MEDICINE & REHABILITATION

## 2020-08-11 PROCEDURE — 6360000002 HC RX W HCPCS: Performed by: PHYSICAL MEDICINE & REHABILITATION

## 2020-08-11 PROCEDURE — 7100000011 HC PHASE II RECOVERY - ADDTL 15 MIN: Performed by: PHYSICAL MEDICINE & REHABILITATION

## 2020-08-11 PROCEDURE — 6360000004 HC RX CONTRAST MEDICATION: Performed by: PHYSICAL MEDICINE & REHABILITATION

## 2020-08-11 PROCEDURE — 2580000003 HC RX 258: Performed by: PHYSICAL MEDICINE & REHABILITATION

## 2020-08-11 PROCEDURE — 7100000010 HC PHASE II RECOVERY - FIRST 15 MIN: Performed by: PHYSICAL MEDICINE & REHABILITATION

## 2020-08-11 RX ORDER — FENTANYL CITRATE 50 UG/ML
INJECTION, SOLUTION INTRAMUSCULAR; INTRAVENOUS PRN
Status: DISCONTINUED | OUTPATIENT
Start: 2020-08-11 | End: 2020-08-11 | Stop reason: ALTCHOICE

## 2020-08-11 RX ORDER — LIDOCAINE HYDROCHLORIDE 10 MG/ML
INJECTION, SOLUTION EPIDURAL; INFILTRATION; INTRACAUDAL; PERINEURAL PRN
Status: DISCONTINUED | OUTPATIENT
Start: 2020-08-11 | End: 2020-08-11 | Stop reason: ALTCHOICE

## 2020-08-11 RX ORDER — MIDAZOLAM HYDROCHLORIDE 1 MG/ML
INJECTION INTRAMUSCULAR; INTRAVENOUS
Status: DISCONTINUED
Start: 2020-08-11 | End: 2020-08-11 | Stop reason: HOSPADM

## 2020-08-11 RX ORDER — FENTANYL CITRATE 50 UG/ML
INJECTION, SOLUTION INTRAMUSCULAR; INTRAVENOUS
Status: DISCONTINUED
Start: 2020-08-11 | End: 2020-08-11 | Stop reason: HOSPADM

## 2020-08-11 RX ORDER — SODIUM CHLORIDE, SODIUM LACTATE, POTASSIUM CHLORIDE, CALCIUM CHLORIDE 600; 310; 30; 20 MG/100ML; MG/100ML; MG/100ML; MG/100ML
INJECTION, SOLUTION INTRAVENOUS CONTINUOUS
Status: CANCELLED | OUTPATIENT
Start: 2020-08-11

## 2020-08-11 RX ORDER — MIDAZOLAM HYDROCHLORIDE 1 MG/ML
INJECTION INTRAMUSCULAR; INTRAVENOUS PRN
Status: DISCONTINUED | OUTPATIENT
Start: 2020-08-11 | End: 2020-08-11 | Stop reason: ALTCHOICE

## 2020-08-11 RX ORDER — SODIUM CHLORIDE, SODIUM LACTATE, POTASSIUM CHLORIDE, CALCIUM CHLORIDE 600; 310; 30; 20 MG/100ML; MG/100ML; MG/100ML; MG/100ML
INJECTION, SOLUTION INTRAVENOUS CONTINUOUS
Status: DISCONTINUED | OUTPATIENT
Start: 2020-08-11 | End: 2020-08-11 | Stop reason: HOSPADM

## 2020-08-11 RX ADMIN — SODIUM CHLORIDE, POTASSIUM CHLORIDE, SODIUM LACTATE AND CALCIUM CHLORIDE: 600; 310; 30; 20 INJECTION, SOLUTION INTRAVENOUS at 10:42

## 2020-08-11 NOTE — H&P
HISTORY AND PHYSICAL/PRE-SEDATION ASSESSMENT    Patient:  Joel Olivares   :  1940  Medical Record No.:  [id number]   Date:  2020  Physician:  Todd Cho M.D. Facility: [facility]    HISTORY OF PRESENT ILLNESS:                 The patient is a [de-identified] y.o. female whom presents with lower back and left leg pain. Review of the imaging and physical exam of the patient confirmed the pre-procedure diagnosis. After a thorough discussion of risks, benefits and alternatives informed consent was obtained.                 Past Medical History:   Past Medical History:   Diagnosis Date    Anemia     NOS    Arthritis     knee     Atrial fibrillation (HCC)     on coumadin    Chronic kidney disease (CKD), stage II (mild)     Community acquired pneumonia of left lower lobe of lung ([facility] [address] 2017    Depression     Diverticulosis     Foot pain     GI bleed 2018    Due to esophageal tear     Gout     Hemorrhoids     Hyperlipidemia     Hypertension     Hyperuricemia     Iron deficiency anemia 2014    Iron deficiency anemia-s/p EGD and colonoscopy 2014 Esophageal tear likely source     Medical history reviewed with no changes     Menopausal syndrome     Obesity     Pelvic relaxation     PONV (postoperative nausea and vomiting)     Stress 2006    NL stress    Syncope     Unspecified sleep apnea 2002    uvulectomy -hx of    Vitamin D deficiency     20ng/ml 2009    Wears dentures     Wears glasses       Past Surgical History:     Past Surgical History:   Procedure Laterality Date    BLADDER SUSPENSION      CHOLECYSTECTOMY      COLONOSCOPY          COLONOSCOPY  2014    10yr    EYE SURGERY      macular tear and cataract right    HYSTERECTOMY  1977    partial    JOINT REPLACEMENT Right 2017    KNEE ARTHROSCOPY  2005    knee surgery    UVULOPALATOPHARYGOPLASTY  3/2002     Current Medications:   Prior to Admission medications Medication Sig Start Date End Date Taking? Authorizing Provider   atorvastatin (LIPITOR) 20 MG tablet TAKE ONE TABLET BY MOUTH ONCE NIGHTLY 8/7/20  Yes Katiana Eller MD   allopurinol (ZYLOPRIM) 300 MG tablet TAKE ONE TABLET BY MOUTH DAILY 3/16/20  Yes Katiana Eller MD   dilTIAZem (CARTIA XT) 300 MG extended release capsule Take 1 capsule by mouth daily 2/20/20  Yes Benjamen Leventhal, MD   omeprazole (PRILOSEC) 20 MG delayed release capsule TAKE ONE CAPSULE BY MOUTH DAILY 11/13/19  Yes Katiana Eller MD   sertraline (ZOLOFT) 50 MG tablet TAKE ONE TABLET BY MOUTH DAILY 11/13/19  Yes Katiana Eller MD   metoprolol tartrate (LOPRESSOR) 25 MG tablet Take 1 tablet by mouth daily 9/30/19  Yes Benjamen Leventhal, MD   Cholecalciferol (VITAMIN D) 2000 UNITS CAPS capsule Take 2,000 Units by mouth daily   Yes Historical Provider, MD   docusate sodium (COLACE) 100 MG capsule Take 100 mg by mouth daily   Yes Historical Provider, MD   dilTIAZem (TIAZAC) 300 MG extended release capsule TAKE ONE CAPSULE BY MOUTH DAILY 2/20/20   RADHA Morejon CNP   warfarin (COUMADIN) 1 MG tablet TAKE ONE TABLET BY MOUTH ON SUNDAY, TUESDAY AND THURSDAY. TAKE ONE-HALF TABLET BY MOUTH ON ALL OTHER DAYS OR AS DIRECTED BY CLINIC 2/10/20   RADHA Morejon CNP     Allergies:  Diclofenac; Adhesive tape; and Penicillins  Social History:    reports that she has never smoked. She has never used smokeless tobacco. She reports that she does not drink alcohol or use drugs. Family History:   Family History   Problem Relation Age of Onset    Heart Attack Father     Heart Disease Father     Stroke Brother        Vitals: not currently breastfeeding. PHYSICAL EXAM:  HENT: Airway patent and reviewed  Cardiovascular: Normal rate, regular rhythm, normal heart sounds. Pulmonary/Chest: No wheezes. No rhonchi. No rales. Abdominal: Soft. Bowel sounds are normal. No distension.   Extremities: Moves all extremities equally  Lumbar Spine: Painful range of motion, no midline tenderness       Diagnosis:Lumbar radiculopathy    Plan: Proceed with planned procedure    The patient was counseled at length about the risks of aidan Covid-19 in the almaz-operative and post-operative states including the recovery window of their procedure. The patient was made aware that aidan Covid-19 after a surgical procedure may worsen their prognosis for recovering from the virus and lend to a higher morbidity and or mortality risk. The patient was given the options of postponing their procedure. All of the risks, benefits, and alternatives were discussed. The patient does wish to proceed with the procedure. ASA CLASS:         []   I. Normal, healthy adult           [x]   II.  Mild systemic disease            []   III. Severe systemic disease      Mallampati: Mallampati Class II - (soft palate, fauces & uvula are visible)      Sedation plan:   [x]  Local              []  Minimal                  []  General anesthesia    Patient's condition acceptable for planned procedure/sedation. Post Procedure Plan   Return to same level of care   ______________________     The risks and benefits as well as alternatives to the procedure have been discussed with the patient and or family. The patient and or next of kin understands and agrees to proceed.     Jose Cueto M.D.

## 2020-08-11 NOTE — OP NOTE
Patient:  Rafy De Luna  YOB: 1940  Medical Record #:  [id number]   Place: [address], [place]  Date:  8/11/2020   Physician:  Debora Lobo MD, HELEN    Procedure: 1. Transforaminal Lumbar Epidural Steroid Injection -  left L3  CPT 25507          2. Transforaminal Lumbar Epidural Steroid Injection -  left L4  CPT 48379    Pre-Procedure Diagnosis: Lumbar radiculopathy      Post-Procedure Diagnosis: Same    Sedation: Local with 1% Lidocaine 3 ml and 1 mg of IV Versed and 25 mcg of IV Fentanyl    EBL: None    Complications: None    Procedure Summary:        The patient was brought to the procedure suite and placed in the prone position. The skin overlying the lumbar spine was prepped and draped in the usual sterile fashion. Using fluoroscopic guidance, the left L3 foramen was identified. Through anesthetized skin, a 22 gauge 5 inch curved tip spinal needle was advanced into the foramen. Isovue M 300 was instilled showing an epidurogram/nerve root outline pattern without evidence of vascular or intrathecal spread. Following which, 7.5 mg of Celestone mixed with 1 ml of 0.5% Marcaine was instilled. The needle was removed. Using fluoroscopic guidance, the left L4 foramen was identified. Through anesthetized skin, a 22 gauge 5 inch curved tip spinal needle was advanced into the foramen. Isovue M 300 was instilled showing an epidurogram/nerve root outline pattern without evidence of vascular or intrathecal spread. Following which, 7.5 mg of Celestone mixed with 1 ml of 0.5% Marcaine was instilled. The needle was removed and band-aids were applied. The patient was transferred to the post-operative area in stable condition.

## 2020-08-11 NOTE — PROGRESS NOTES
Pt ready for d/c home, instructions given and reviewed with pt and friend, they denied questions, pt taken by wheelchair to private auto for d/c home in stable condition.

## 2020-08-11 NOTE — TELEPHONE ENCOUNTER
I think its for before dental work?  Can you check to be sure and if so its disp #2 caps take 2 1 hr before procedure 5 refills

## 2020-08-11 NOTE — TELEPHONE ENCOUNTER
Wm Pérez with the pharmacy called to request a refill for clindamycin (CLEOCIN) 300 MG capsule. [address], [address] [place] [address] [address] [phone] - F [phone]     Please advise.

## 2020-08-12 ENCOUNTER — TELEPHONE (OUTPATIENT)
Dept: INTERNAL MEDICINE CLINIC | Age: 80
End: 2020-08-12

## 2020-08-12 RX ORDER — CLINDAMYCIN HYDROCHLORIDE 300 MG/1
CAPSULE ORAL
Qty: 2 CAPSULE | Refills: 5 | Status: ON HOLD
Start: 2020-08-12 | End: 2020-10-15 | Stop reason: HOSPADM

## 2020-08-12 NOTE — TELEPHONE ENCOUNTER
Yes per yesterday's note- she takes it prior to dental work- maybe call her to confirm this but ok to fill

## 2020-08-12 NOTE — TELEPHONE ENCOUNTER
Pharmacy calling for clarification of the following medication:        clindamycin (CLEOCIN) 300 MG capsule  2 tabs 1 hr before procedure      [facility] BERNIEC[facility]ORAIDA [address] [place] [address] [phone] - f [phone]        Pharmacy is asking if the pt is still supposed to be on this, it has 5 refills and is . Someone dropped the paper script to them.       Please advise

## 2020-08-18 ENCOUNTER — TELEPHONE (OUTPATIENT)
Dept: ORTHOPEDIC SURGERY | Age: 80
End: 2020-08-18

## 2020-08-18 ENCOUNTER — OFFICE VISIT (OUTPATIENT)
Dept: ORTHOPEDIC SURGERY | Age: 80
End: 2020-08-18
Payer: MEDICARE

## 2020-08-18 VITALS — HEIGHT: 66 IN | BODY MASS INDEX: 42.41 KG/M2 | RESPIRATION RATE: 14 BRPM | WEIGHT: 263.89 LBS | TEMPERATURE: 97.3 F

## 2020-08-18 PROCEDURE — G8427 DOCREV CUR MEDS BY ELIG CLIN: HCPCS | Performed by: PHYSICAL MEDICINE & REHABILITATION

## 2020-08-18 PROCEDURE — 1036F TOBACCO NON-USER: CPT | Performed by: PHYSICAL MEDICINE & REHABILITATION

## 2020-08-18 PROCEDURE — G8399 PT W/DXA RESULTS DOCUMENT: HCPCS | Performed by: PHYSICAL MEDICINE & REHABILITATION

## 2020-08-18 PROCEDURE — G8417 CALC BMI ABV UP PARAM F/U: HCPCS | Performed by: PHYSICAL MEDICINE & REHABILITATION

## 2020-08-18 PROCEDURE — 99214 OFFICE O/P EST MOD 30 MIN: CPT | Performed by: PHYSICAL MEDICINE & REHABILITATION

## 2020-08-18 PROCEDURE — 4040F PNEUMOC VAC/ADMIN/RCVD: CPT | Performed by: PHYSICAL MEDICINE & REHABILITATION

## 2020-08-18 PROCEDURE — 1123F ACP DISCUSS/DSCN MKR DOCD: CPT | Performed by: PHYSICAL MEDICINE & REHABILITATION

## 2020-08-18 PROCEDURE — 1090F PRES/ABSN URINE INCON ASSESS: CPT | Performed by: PHYSICAL MEDICINE & REHABILITATION

## 2020-08-18 NOTE — PROGRESS NOTES
tear likely source     Medical history reviewed with no changes     Menopausal syndrome     Obesity     Pelvic relaxation     PONV (postoperative nausea and vomiting)     Stress 8/2006    NL stress    Syncope     Unspecified sleep apnea 03/2002    uvulectomy -hx of    Vitamin D deficiency     20ng/ml 6/2009    Wears dentures     Wears glasses       Past Surgical History:     Past Surgical History:   Procedure Laterality Date    BLADDER SUSPENSION  1997    CHOLECYSTECTOMY  1974    COLONOSCOPY      2013    COLONOSCOPY  11/2014    10yr    EYE SURGERY      macular tear and cataract right    HYSTERECTOMY  1977    partial    JOINT REPLACEMENT Right 2017    KNEE ARTHROSCOPY  2005    knee surgery    PAIN MANAGEMENT PROCEDURE Left 8/11/2020    LEFT LUMBAR THREE AND LUMBAR FOUR TRANSFORAMINAL EPIDURAL STEROID INJECTION SITE CONFIRMED BY FLUOROSCOPY performed by Sonu Tse MD at [facility]  3/2002     Current Medications:     Current Outpatient Medications:     clindamycin (CLEOCIN) 300 MG capsule, Take 2 tabs 1 hr before procedure, Disp: 2 capsule, Rfl: 5    atorvastatin (LIPITOR) 20 MG tablet, TAKE ONE TABLET BY MOUTH ONCE NIGHTLY, Disp: 90 tablet, Rfl: 0    allopurinol (ZYLOPRIM) 300 MG tablet, TAKE ONE TABLET BY MOUTH DAILY, Disp: 90 tablet, Rfl: 2    dilTIAZem (CARTIA XT) 300 MG extended release capsule, Take 1 capsule by mouth daily, Disp: 90 capsule, Rfl: 3    dilTIAZem (TIAZAC) 300 MG extended release capsule, TAKE ONE CAPSULE BY MOUTH DAILY, Disp: 90 capsule, Rfl: 3    warfarin (COUMADIN) 1 MG tablet, TAKE ONE TABLET BY MOUTH ON SUNDAY, TUESDAY AND THURSDAY.   TAKE ONE-HALF TABLET BY MOUTH ON ALL OTHER DAYS OR AS DIRECTED BY CLINIC, Disp: 65 tablet, Rfl: 5    omeprazole (PRILOSEC) 20 MG delayed release capsule, TAKE ONE CAPSULE BY MOUTH DAILY, Disp: 90 capsule, Rfl: 3    sertraline (ZOLOFT) 50 MG tablet, TAKE ONE TABLET BY MOUTH DAILY, Disp: 90 tablet, Rfl: 3    metoprolol tartrate (LOPRESSOR) 25 MG tablet, Take 1 tablet by mouth daily, Disp: 90 tablet, Rfl: 3    Cholecalciferol (VITAMIN D) 2000 UNITS CAPS capsule, Take 2,000 Units by mouth daily, Disp: , Rfl:     docusate sodium (COLACE) 100 MG capsule, Take 100 mg by mouth daily, Disp: , Rfl:   Allergies:  Diclofenac; Adhesive tape; and Penicillins  Social History:    reports that she has never smoked. She has never used smokeless tobacco. She reports that she does not drink alcohol or use drugs. Family History:   Family History   Problem Relation Age of Onset    Heart Attack Father     Heart Disease Father     Stroke Brother        REVIEW OF SYSTEMS:   CONSTITUTIONAL: Denies unexplained weight loss, fevers, chills or fatigue  NEUROLOGICAL: Denies unsteady gait or progressive weakness  MUSCULOSKELETAL: Denies joint swelling or redness  GI: Denies nausea, vomiting, diarrhea   : Denies bowel or bladder issues       PHYSICAL EXAM:  Limitations present due to Telehealth  Vitals: Weight 263 pounds, temperature 97.3, respiratory rate 12/min      GENERAL EXAM:  · General Apparence: Patient is adequately groomed with no evidence of malnutrition. · Psychiatric: Orientation: The patient is oriented to time, place and person. The patient's mood and affect are appropriate   · Vascular: Examination reveals no swelling and palpation reveals no tenderness in upper or lower extremities. Good capillary refill. · The lymphatic examination of the neck, axillae and groin reveals all areas to be without enlargement or induration   Sensation is intact without deficit in the upper and lower extremities to light touch   · Coordination of the upper and lower extremities are normal.  · Additional Examinations:  · RIGHT UPPER EXTREMITY:  Inspection/examination of the right upper extremity does not show any tenderness, deformity or injury. Range of motion is normal and pain-free. There is no gross instability.   There are no rashes, ulcerations or lesions. Strength and tone are normal. No atrophy or abnormal movements are noted. · LEFT UPPER EXTREMITY: Inspection/examination of the left upper extremity does not show any tenderness, deformity or injury. Range of motion is normal and pain-free. There is no gross instability. There are no rashes, ulcerations or lesions. Strength and tone are normal. No atrophy or abnormal movements are noted. LUMBAR/SACRAL EXAMINATION:  · Inspection: Local inspection shows no step-off or bruising. Lumbar alignment is normal.   · Palpation by patient:   No evidence of tenderness at the midline. Lumbar paraspinal tenderness Mild L4/5 and L5/S1 tenderness  Bursal tenderness No tenderness bilaterally  There is no paraspinal spasm. · Range of Motion: limited by 50% in all planes due to pain  · Strength:   Strength testing is at least 4/5 in all muscle groups tested based on visual exam  · Special Tests:   Straight leg raise + on left and crossed SLR negative. · Skin: There are no rashes, ulcerations or lesions. · Gait & station: antalgic on the left and no ataxia  · Additional Examinations:  · RIGHT LOWER EXTREMITY: Inspection/examination of the right lower extremity does not show any tenderness, deformity or injury. Range of motion is unremarkable. There is no gross instability. There are no rashes, ulcerations or lesions. Strength and tone are normal. No atrophy or abnormal movements are noted. · LEFT LOWER EXTREMITY:  Inspection/examination of the left lower extremity does not show any tenderness, deformity or injury. Range of motion is unremarkable. There is no gross instability. There are no rashes, ulcerations or lesions. Strength and tone are normal. No atrophy or abnormal movements are noted. Diagnostic Testing:    MR Lumbar spine shows    1. Moderate multifactorial L2-L3 spinal canal stenosis and mild bilateral    neural foraminal stenosis.     2. Mild multifactorial spinal canal stenosis at L3-L4 and L4-L5 . 3. Mild-to-moderate left L3-L4 and moderate right L4-L5 neural foraminal    stenosis. Results for orders placed or performed in visit on 08/10/20   Protime-INR   Result Value Ref Range    INR 1.3      Impression:       1. Lumbar radiculopathy    2. Spondylolisthesis of lumbar region    3. Lumbar stenosis with neurogenic claudication        Plan:  Clinical Course: Above diagnoses are worsening    I discussed the diagnosis and the treatment options with Lillian Fraga today. In Summary:  The various treatment options were outlined and discussed with Lillian Fraga including:  Conservative care options: physical therapy, ice, medications, bracing, and activity modification. The indications for therapeutic injections. The indications for additional imaging/laboratory studies. The indications for (possible future) interventions. After considering the various options discussed, Lillian Fraga elected to pursue a course of treatment that includes the followin. Medications: Will call us with the results of her INR testing on Thursday or Friday when she follows up with Coumadin clinic. She was supratherapeutic even after 5 days of stopping her Coumadin and thus that led to her injection being canceled. She was then rechecked a week later and her INR was just 1.3. If she is still subtherapeutic we can had set up for an injection next week    2. PT:  Encouraged to continue with Home exercise program.    3. Further studies: Await results of INR testing at coumadin clinic      4. Interventional:  We discussed pursuing a L4/5 IL epidural steroid injection to address the pain. Radiologic imaging and symptoms confirm the pain etiology. Risks, benefits and alternatives of interventional options were discussed. These include and are not limited to bleeding, infection, spinal headache, nerve injury and lack of pain relief.   The patient verbalized understanding and would like to proceed. The patient will be scheduled accordingly. 5. Follow up:  2-3 weeks      Chloé Díaz was instructed to call the office if her symptoms worsen or if new symptoms appear prior to the next scheduled visit. She is specifically instructed to contact the office between now & her scheduled appointment if she has concerns related to her condition or if she needs assistance in scheduling the above tests. She is welcome to call for an appointment sooner if she has any additional concerns or questions. Ashwin Koenig. Cora Dakins, MD, HELEN, [facility]  Board Certified in [address] [place] [address] Certified and Fellowship Trained in [facility] ([facility])             This dictation was performed with a verbal recognition program [facility]) and it was checked for errors. It is possible that there are still dictated errors within this office note. If so, please bring any errors to my attention for an addendum. All efforts were made to ensure that this office note is accurate.

## 2020-08-18 NOTE — TELEPHONE ENCOUNTER
Auth: NPR  Date: 8/25/2020  Reference # None  Spoke with: None  Type of SX: Outpatient  Location: [address] [place] [address]  CPT 01748, [id number]   SX area: Lumbar spine  Insurance: Medicare A&B

## 2020-08-18 NOTE — LETTER
Please schedule the following with:     Date:   2020 @ 9:30AM   Account: [id number]  Patient: Amy Mccormick    : 1940  Address:  [place] [address]    Phone (H):  [phone] (home)      ----------------------------------------------------------------------------------------------  Diagnosis:     ICD-10-CM    1. Lumbar radiculopathy  M54.16    2. Spondylolisthesis of lumbar region  M43.16    3. Lumbar stenosis with neurogenic claudication  M48.062          Levels:L5/S1 Interlaminar ANTOLIN  Procedure type Interlaminar  Side Midline  CPT Codes 65559    ----------------------------------------------------------------------------------------------  Injection # 2   [email or web address]    Attending Physician       Elvin James.  Clare Leavitt MD.      ----------------------------------------------------------------------------------------------  Injection Scheduled For:    At:    1st Insurance MEDICARE   Pre-Cert#    2nd Insurance NONE    Pre-Cert#    Comments or Special instructions:    · Infection control  · Tested positive for MRSA in past 12 months:  no  · Tested positive for MSSA \"staph infection\" in past 12 months: no  · Tested positive for VRE (Vancomycin Resistant Enterococci) in past 12 months:   no  · Currently on any antibiotics for an infection: no  · Anticoagulants:  · On a blood thinner:  yes - coumadin - has appt with coumadin clinic on Thurs/Fri  · Any history of bleeding disorder: no   · Advanced Liver disease: no   · Advanced Renal disease: no   · Glaucoma: no   · Diabetes: no     Sedation:  Yes  -----------------------------------------------------------------------------------------------  Allergies   Allergen Reactions    Diclofenac Hives     Severe itching    Adhesive Tape Rash    Penicillins Nausea And Vomiting     \"years ago\"

## 2020-08-20 ENCOUNTER — TELEPHONE (OUTPATIENT)
Dept: ORTHOPEDIC SURGERY | Age: 80
End: 2020-08-20

## 2020-08-20 ENCOUNTER — ANTI-COAG VISIT (OUTPATIENT)
Dept: PHARMACY | Age: 80
End: 2020-08-20
Payer: MEDICARE

## 2020-08-20 LAB — INR BLD: 1.6

## 2020-08-20 PROCEDURE — 85610 PROTHROMBIN TIME: CPT

## 2020-08-20 PROCEDURE — 99211 OFF/OP EST MAY X REQ PHY/QHP: CPT

## 2020-08-20 NOTE — TELEPHONE ENCOUNTER
Patient asked pharmacist to call and report her INR of 1.6. She is going to hold her coumadin starting today and will resume after her procedure.

## 2020-08-25 ENCOUNTER — HOSPITAL ENCOUNTER (OUTPATIENT)
Age: 80
Setting detail: OUTPATIENT SURGERY
Discharge: HOME | End: 2020-08-25
Attending: PHYSICAL MEDICINE & REHABILITATION | Admitting: PHYSICAL MEDICINE & REHABILITATION
Payer: MEDICARE

## 2020-08-25 VITALS
TEMPERATURE: 97.5 F | RESPIRATION RATE: 16 BRPM | HEART RATE: 75 BPM | SYSTOLIC BLOOD PRESSURE: 137 MMHG | WEIGHT: 263 LBS | OXYGEN SATURATION: 92 % | BODY MASS INDEX: 42.27 KG/M2 | DIASTOLIC BLOOD PRESSURE: 86 MMHG | HEIGHT: 66 IN

## 2020-08-25 PROCEDURE — 7100000010 HC PHASE II RECOVERY - FIRST 15 MIN: Performed by: PHYSICAL MEDICINE & REHABILITATION

## 2020-08-25 PROCEDURE — 2580000003 HC RX 258: Performed by: PHYSICAL MEDICINE & REHABILITATION

## 2020-08-25 PROCEDURE — 99152 MOD SED SAME PHYS/QHP 5/>YRS: CPT | Performed by: PHYSICAL MEDICINE & REHABILITATION

## 2020-08-25 PROCEDURE — 7100000011 HC PHASE II RECOVERY - ADDTL 15 MIN: Performed by: PHYSICAL MEDICINE & REHABILITATION

## 2020-08-25 PROCEDURE — 2709999900 HC NON-CHARGEABLE SUPPLY: Performed by: PHYSICAL MEDICINE & REHABILITATION

## 2020-08-25 PROCEDURE — 3600000002 HC SURGERY LEVEL 2 BASE: Performed by: PHYSICAL MEDICINE & REHABILITATION

## 2020-08-25 PROCEDURE — 2500000003 HC RX 250 WO HCPCS: Performed by: PHYSICAL MEDICINE & REHABILITATION

## 2020-08-25 PROCEDURE — 6360000002 HC RX W HCPCS: Performed by: PHYSICAL MEDICINE & REHABILITATION

## 2020-08-25 PROCEDURE — 6360000004 HC RX CONTRAST MEDICATION: Performed by: PHYSICAL MEDICINE & REHABILITATION

## 2020-08-25 RX ORDER — METHYLPREDNISOLONE ACETATE 80 MG/ML
INJECTION, SUSPENSION INTRA-ARTICULAR; INTRALESIONAL; INTRAMUSCULAR; SOFT TISSUE PRN
Status: DISCONTINUED | OUTPATIENT
Start: 2020-08-25 | End: 2020-08-25 | Stop reason: ALTCHOICE

## 2020-08-25 RX ORDER — METHYLPREDNISOLONE ACETATE 80 MG/ML
INJECTION, SUSPENSION INTRA-ARTICULAR; INTRALESIONAL; INTRAMUSCULAR; SOFT TISSUE
Status: DISCONTINUED
Start: 2020-08-25 | End: 2020-08-25 | Stop reason: HOSPADM

## 2020-08-25 RX ORDER — MIDAZOLAM HYDROCHLORIDE 1 MG/ML
INJECTION INTRAMUSCULAR; INTRAVENOUS
Status: DISCONTINUED
Start: 2020-08-25 | End: 2020-08-25 | Stop reason: HOSPADM

## 2020-08-25 RX ORDER — SODIUM CHLORIDE, SODIUM LACTATE, POTASSIUM CHLORIDE, CALCIUM CHLORIDE 600; 310; 30; 20 MG/100ML; MG/100ML; MG/100ML; MG/100ML
INJECTION, SOLUTION INTRAVENOUS ONCE
Status: COMPLETED | OUTPATIENT
Start: 2020-08-25 | End: 2020-08-25

## 2020-08-25 RX ORDER — FENTANYL CITRATE 50 UG/ML
INJECTION, SOLUTION INTRAMUSCULAR; INTRAVENOUS PRN
Status: DISCONTINUED | OUTPATIENT
Start: 2020-08-25 | End: 2020-08-25 | Stop reason: ALTCHOICE

## 2020-08-25 RX ORDER — SODIUM CHLORIDE 0.9 % (FLUSH) 0.9 %
SYRINGE (ML) INJECTION PRN
Status: DISCONTINUED | OUTPATIENT
Start: 2020-08-25 | End: 2020-08-25 | Stop reason: ALTCHOICE

## 2020-08-25 RX ORDER — FENTANYL CITRATE 50 UG/ML
INJECTION, SOLUTION INTRAMUSCULAR; INTRAVENOUS
Status: DISCONTINUED
Start: 2020-08-25 | End: 2020-08-25 | Stop reason: HOSPADM

## 2020-08-25 RX ORDER — MIDAZOLAM HYDROCHLORIDE 1 MG/ML
INJECTION INTRAMUSCULAR; INTRAVENOUS PRN
Status: DISCONTINUED | OUTPATIENT
Start: 2020-08-25 | End: 2020-08-25 | Stop reason: ALTCHOICE

## 2020-08-25 RX ORDER — LIDOCAINE HYDROCHLORIDE 10 MG/ML
INJECTION, SOLUTION EPIDURAL; INFILTRATION; INTRACAUDAL; PERINEURAL PRN
Status: DISCONTINUED | OUTPATIENT
Start: 2020-08-25 | End: 2020-08-25 | Stop reason: ALTCHOICE

## 2020-08-25 RX ADMIN — SODIUM CHLORIDE, POTASSIUM CHLORIDE, SODIUM LACTATE AND CALCIUM CHLORIDE: 600; 310; 30; 20 INJECTION, SOLUTION INTRAVENOUS at 09:43

## 2020-08-25 ASSESSMENT — PAIN DESCRIPTION - DESCRIPTORS: DESCRIPTORS: SHARP;SHOOTING;CONSTANT

## 2020-08-25 ASSESSMENT — PAIN - FUNCTIONAL ASSESSMENT
PAIN_FUNCTIONAL_ASSESSMENT: 0-10
PAIN_FUNCTIONAL_ASSESSMENT: PREVENTS OR INTERFERES WITH ALL ACTIVE AND SOME PASSIVE ACTIVITIES

## 2020-08-25 NOTE — H&P
HISTORY AND PHYSICAL/PRE-SEDATION ASSESSMENT    Patient:  Zane Ruvalcaba   :  1940  Medical Record No.:  [id number]   Date:  2020  Physician:  Lila Vallejo M.D. Facility: [facility]    HISTORY OF PRESENT ILLNESS:                 The patient is a [de-identified] y.o. female whom presents with lower back and leg pain. Review of the imaging and physical exam of the patient confirmed the pre-procedure diagnosis. After a thorough discussion of risks, benefits and alternatives informed consent was obtained.                 Past Medical History:   Past Medical History:   Diagnosis Date    Anemia     NOS    Arthritis     knee     Atrial fibrillation (HCC)     on coumadin    Chronic kidney disease (CKD), stage II (mild)     Community acquired pneumonia of left lower lobe of lung ([facility] [address] 2017    Depression     Diverticulosis     Foot pain     GI bleed 2018    Due to esophageal tear     Gout     Hemorrhoids     Hyperlipidemia     Hypertension     Hyperuricemia     Iron deficiency anemia 2014    Iron deficiency anemia-s/p EGD and colonoscopy 2014 Esophageal tear likely source     Medical history reviewed with no changes     Menopausal syndrome     Obesity     Pelvic relaxation     PONV (postoperative nausea and vomiting)     Stress 2006    NL stress    Syncope     Unspecified sleep apnea 2002    uvulectomy -hx of    Vitamin D deficiency     20ng/ml 2009    Wears dentures     Wears glasses       Past Surgical History:     Past Surgical History:   Procedure Laterality Date    BLADDER SUSPENSION      CHOLECYSTECTOMY      COLONOSCOPY          COLONOSCOPY  2014    10yr    EYE SURGERY      macular tear and cataract right    HYSTERECTOMY  1977    partial    JOINT REPLACEMENT Right 2017    KNEE ARTHROSCOPY  2005    knee surgery    PAIN MANAGEMENT PROCEDURE Left 2020    LEFT LUMBAR THREE AND LUMBAR FOUR TRANSFORAMINAL EPIDURAL STEROID INJECTION SITE CONFIRMED BY FLUOROSCOPY performed by Cortez Jimenez MD at [address]  3/2002     Current Medications:   Prior to Admission medications    Medication Sig Start Date End Date Taking? Authorizing Provider   atorvastatin (LIPITOR) 20 MG tablet TAKE ONE TABLET BY MOUTH ONCE NIGHTLY 8/7/20  Yes Alejo Vaca MD   allopurinol (ZYLOPRIM) 300 MG tablet TAKE ONE TABLET BY MOUTH DAILY 3/16/20  Yes Alejo Vaca MD   dilTIAZem [facility]) 300 MG extended release capsule TAKE ONE CAPSULE BY MOUTH DAILY 2/20/20  Yes RADHA Stubbs CNP   omeprazole (PRILOSEC) 20 MG delayed release capsule TAKE ONE CAPSULE BY MOUTH DAILY 11/13/19  Yes Alejo Vaca MD   sertraline (ZOLOFT) 50 MG tablet TAKE ONE TABLET BY MOUTH DAILY 11/13/19  Yes Alejo Vaca MD   metoprolol tartrate (LOPRESSOR) 25 MG tablet Take 1 tablet by mouth daily 9/30/19  Yes Niyah Kauffman MD   Cholecalciferol (VITAMIN D) 2000 UNITS CAPS capsule Take 2,000 Units by mouth daily   Yes Historical Provider, MD   clindamycin (CLEOCIN) 300 MG capsule Take 2 tabs 1 hr before procedure 8/12/20   Alejo Vaca MD   dilTIAZem (CARTIA XT) 300 MG extended release capsule Take 1 capsule by mouth daily 2/20/20   Niyah Kauffman MD   warfarin (COUMADIN) 1 MG tablet TAKE ONE TABLET BY MOUTH ON SUNDAY, TUESDAY AND THURSDAY. TAKE ONE-HALF TABLET BY MOUTH ON ALL OTHER DAYS OR AS DIRECTED BY CLINIC 2/10/20   RADHA Stubbs CNP   docusate sodium (COLACE) 100 MG capsule Take 100 mg by mouth daily    Historical Provider, MD     Allergies:  Diclofenac; Adhesive tape; and Penicillins  Social History:    reports that she has never smoked. She has never used smokeless tobacco. She reports that she does not drink alcohol or use drugs.   Family History:   Family History   Problem Relation Age of Onset    Heart Attack Father     Heart Disease Father     Stroke Brother        Vitals: Blood pressure (!) 131/91, pulse

## 2020-08-25 NOTE — OP NOTE
Patient:  Grey Grewal  YOB: 1940  Medical Record #:  [id number]   Place:  [address] [place], [place]  Date:  8/25/2020   Physician:  Georgi Leong MD, HELEN    Procedure:  Lumbar Epidural Steroid Injection - Interlaminar approach L5-S1     CPT 95782     Pre-Procedure Diagnosis: Lumbar radiculopathy     Post-Procedure Diagnosis: Same     Sedation: Local with 1% Lidocaine 3 ml and no IV sedation     EBL: None     Complications: None     Procedure Summary:     The patient was brought to the procedure suite and placed in the prone position. The skin overlying the lumbar spine was prepped and draped in the usual sterile fashion. Using fluoroscopic guidance, the L5-S1 interlaminar space was identified. Through anesthetized skin a 20 gauge Touhy needle was advanced into the epidural space using continuous loss of resistance to saline technique. Isovue M300 was instilled and an epidurogram was noted without evidence of intrathecal or vascular spread. 10 ml of a solution containing preservative free normal saline and 80 mg of Depomedrol was instilled. The needle was removed and a band-aid applied. The patient was transferred to the post-operative area in stable condition.

## 2020-08-26 ENCOUNTER — OFFICE VISIT (OUTPATIENT)
Dept: ORTHOPEDIC SURGERY | Age: 80
End: 2020-08-26
Payer: MEDICARE

## 2020-08-26 VITALS — HEIGHT: 66 IN | WEIGHT: 263 LBS | BODY MASS INDEX: 42.27 KG/M2

## 2020-08-26 PROCEDURE — G8399 PT W/DXA RESULTS DOCUMENT: HCPCS | Performed by: PHYSICIAN ASSISTANT

## 2020-08-26 PROCEDURE — G8427 DOCREV CUR MEDS BY ELIG CLIN: HCPCS | Performed by: PHYSICIAN ASSISTANT

## 2020-08-26 PROCEDURE — 1036F TOBACCO NON-USER: CPT | Performed by: PHYSICIAN ASSISTANT

## 2020-08-26 PROCEDURE — G8417 CALC BMI ABV UP PARAM F/U: HCPCS | Performed by: PHYSICIAN ASSISTANT

## 2020-08-26 PROCEDURE — 4040F PNEUMOC VAC/ADMIN/RCVD: CPT | Performed by: PHYSICIAN ASSISTANT

## 2020-08-26 PROCEDURE — 99213 OFFICE O/P EST LOW 20 MIN: CPT | Performed by: PHYSICIAN ASSISTANT

## 2020-08-26 PROCEDURE — 1123F ACP DISCUSS/DSCN MKR DOCD: CPT | Performed by: PHYSICIAN ASSISTANT

## 2020-08-26 PROCEDURE — 1090F PRES/ABSN URINE INCON ASSESS: CPT | Performed by: PHYSICIAN ASSISTANT

## 2020-08-26 NOTE — PROGRESS NOTES
CHIEF COMPLAINT:    Chief Complaint   Patient presents with    Foot Pain     RIGHT FOOT 5TH METATARSAL FX        HISTORY OF PRESENT ILLNESS:                The patient is a [de-identified] y.o. female returns to clinic today following up from her right fifth metatarsal fracture. This injury occurred on 7/6/2020. She was initially immobilized in a boot and since last visit, has weaned herself out of the boot. She states that her pain over the fracture site is been improving. She is wearing a good supportive tennis shoe. Ambulates unassisted.       Past Medical History:   Diagnosis Date    Anemia     NOS    Arthritis     knee     Atrial fibrillation (HCC)     on coumadin    Chronic kidney disease (CKD), stage II (mild)     Community acquired pneumonia of left lower lobe of lung ([facility] [address] 12/26/2017    Depression     Diverticulosis     Foot pain     GI bleed 4/18/2018    Due to esophageal tear     Gout     Hemorrhoids     Hyperlipidemia     Hypertension     Hyperuricemia     Iron deficiency anemia 1/8/2014    Iron deficiency anemia-s/p EGD and colonoscopy 2/11/2014 Esophageal tear likely source     Medical history reviewed with no changes     Menopausal syndrome     Obesity     Pelvic relaxation     PONV (postoperative nausea and vomiting)     Stress 8/2006    NL stress    Syncope     Unspecified sleep apnea 03/2002    uvulectomy -hx of    Vitamin D deficiency     20ng/ml 6/2009    Wears dentures     Wears glasses         Work Status: Retired    The pain assessment was noted & is as follows:  Pain Assessment  Location of Pain: Foot  Location Modifiers: Right  Severity of Pain: 1  Quality of Pain: Aching  Duration of Pain: Persistent  Frequency of Pain: Constant]      Work Status/Functionality:     Past Medical History: Medical history form was reviewed today & can be found in the media tab  Past Medical History:   Diagnosis Date    Anemia     NOS    Arthritis     knee     Atrial fibrillation ([facility] [address]     on coumadin    Chronic kidney disease (CKD), stage II (mild)     Community acquired pneumonia of left lower lobe of lung ([facility] [address] 12/26/2017    Depression     Diverticulosis     Foot pain     GI bleed 4/18/2018    Due to esophageal tear     Gout     Hemorrhoids     Hyperlipidemia     Hypertension     Hyperuricemia     Iron deficiency anemia 1/8/2014    Iron deficiency anemia-s/p EGD and colonoscopy 2/11/2014 Esophageal tear likely source     Medical history reviewed with no changes     Menopausal syndrome     Obesity     Pelvic relaxation     PONV (postoperative nausea and vomiting)     Stress 8/2006    NL stress    Syncope     Unspecified sleep apnea 03/2002    uvulectomy -hx of    Vitamin D deficiency     20ng/ml 6/2009    Wears dentures     Wears glasses       Past Surgical History:     Past Surgical History:   Procedure Laterality Date    BLADDER SUSPENSION  1997    CHOLECYSTECTOMY  1974    COLONOSCOPY      2013    COLONOSCOPY  11/2014    10yr    EYE SURGERY      macular tear and cataract right    HYSTERECTOMY  1977    partial    JOINT REPLACEMENT Right 2017    KNEE ARTHROSCOPY  2005    knee surgery    PAIN MANAGEMENT PROCEDURE Left 8/11/2020    LEFT LUMBAR THREE AND LUMBAR FOUR TRANSFORAMINAL EPIDURAL STEROID INJECTION SITE CONFIRMED BY FLUOROSCOPY performed by Pete Wells MD at [address] [facility] N/A 8/25/2020    MIDLINE LUMBAR FIVE SACRAL ONE INTERLAMINAR EPIDURAL STEROID INJECTION SITE CONFIRMED BY FLUOROSCOPY performed by Pete Wells MD at [facility]  3/2002     Current Medications:     Current Outpatient Medications:     clindamycin (CLEOCIN) 300 MG capsule, Take 2 tabs 1 hr before procedure, Disp: 2 capsule, Rfl: 5    atorvastatin (LIPITOR) 20 MG tablet, TAKE ONE TABLET BY MOUTH ONCE NIGHTLY, Disp: 90 tablet, Rfl: 0    allopurinol (ZYLOPRIM) 300 MG tablet, TAKE ONE TABLET BY MOUTH DAILY, Disp: 90 tablet, Rfl: 2    dilTIAZem (CARTIA XT) 300 MG extended release capsule, Take 1 capsule by mouth daily, Disp: 90 capsule, Rfl: 3    dilTIAZem (TIAZAC) 300 MG extended release capsule, TAKE ONE CAPSULE BY MOUTH DAILY, Disp: 90 capsule, Rfl: 3    warfarin (COUMADIN) 1 MG tablet, TAKE ONE TABLET BY MOUTH ON SUNDAY, TUESDAY AND THURSDAY. TAKE ONE-HALF TABLET BY MOUTH ON ALL OTHER DAYS OR AS DIRECTED BY CLINIC, Disp: 65 tablet, Rfl: 5    omeprazole (PRILOSEC) 20 MG delayed release capsule, TAKE ONE CAPSULE BY MOUTH DAILY, Disp: 90 capsule, Rfl: 3    sertraline (ZOLOFT) 50 MG tablet, TAKE ONE TABLET BY MOUTH DAILY, Disp: 90 tablet, Rfl: 3    metoprolol tartrate (LOPRESSOR) 25 MG tablet, Take 1 tablet by mouth daily, Disp: 90 tablet, Rfl: 3    Cholecalciferol (VITAMIN D) 2000 UNITS CAPS capsule, Take 2,000 Units by mouth daily, Disp: , Rfl:     docusate sodium (COLACE) 100 MG capsule, Take 100 mg by mouth daily, Disp: , Rfl:   Allergies:  Diclofenac; Adhesive tape; and Penicillins  Social History:    reports that she has never smoked. She has never used smokeless tobacco. She reports that she does not drink alcohol or use drugs. Family History:   Family History   Problem Relation Age of Onset    Heart Attack Father     Heart Disease Father     Stroke Brother        REVIEW OF SYSTEMS:   For new problems, a full review of systems will be found scanned in the patient's chart. CONSTITUTIONAL: Denies unexplained weight loss, fevers, chills   NEUROLOGICAL: Denies unsteady gait or progressive weakness  SKIN: Denies skin changes, delayed healing, rash, itching       PHYSICAL EXAM:    Vitals: Height 5' 6\" (1.676 m), weight 263 lb (119.3 kg), not currently breastfeeding. GENERAL EXAM:  · General Apparence: Patient is adequately groomed with no evidence of malnutrition. · Orientation: The patient is oriented to time, place and person.    · Mood & Affect:The patient's mood and affect are appropriate Right foot PHYSICAL EXAMINATION:  · Inspection: No visible deformity. No significant edema, erythema or ecchymosis. · Palpation: No tenderness to palpation over the fracture site. · Range of Motion: Range of motion of the foot and ankle is not limited    · Strength: No gross strength deficits are noted    · Special Tests: Neurovascular exam is intact to the distal extremity. · Skin:  There are no rashes, ulcerations or lesions. · There are no dysvascular changes     Gait & station: Normal      Additional Examinations:        Left Lower Extremity: Examination of the left lower extremity does not show any tenderness, deformity or injury. Range of motion is unremarkable. There is no gross instability. There are no rashes, ulcerations or lesions. Strength and tone are normal.      Diagnostic Testing: The following x rays were read and interpreted by myself      1.  3 x-rays of the right foot were taken today and reveal a healing proximal fifth metatarsal fracture    Orders     Orders Placed This Encounter   Procedures    XR FOOT RIGHT (MIN 3 VIEWS)     Standing Status:   Future     Number of Occurrences:   1     Standing Expiration Date:   8/26/2021     Order Specific Question:   Reason for exam:     Answer:   PAIN         Assessment / Treatment Plan:     1. Right proximal fifth metatarsal fracture    She is done very well with this injury and may continue to increase her activity as tolerated. She will return to the clinic if her symptoms do not continue to improve over the next several weeks. I spent 15 minutes face-to-face with the patient and greater than 50% of that time was spent counseling/coordinating care for the above-stated diagnosis       [facility]    This dictation was performed with a verbal recognition program (DRAGON) and it was checked for errors. It is possible that there are still dictated errors within this office note.  If so, please bring any errors to my attention for an addendum. All efforts were made to ensure that this office note is accurate.

## 2020-08-28 ENCOUNTER — ANTI-COAG VISIT (OUTPATIENT)
Dept: PHARMACY | Age: 80
End: 2020-08-28
Payer: MEDICARE

## 2020-08-28 LAB — INR BLD: 1.5

## 2020-08-28 PROCEDURE — 99211 OFF/OP EST MAY X REQ PHY/QHP: CPT

## 2020-08-28 PROCEDURE — 85610 PROTHROMBIN TIME: CPT

## 2020-08-28 NOTE — PROGRESS NOTES
Ms. Gattis Lefort is here for management of anticoagulation for Afib. PMH also significant for HTN, Gout, CKD II/III, Hyperlipidemia, and depression. She presents today w/out complaint. Pt verifies dosing regimen as listed above. Pt denies s/sx bleeding/bruising/ CP/HA/swelling/SOB  No missed doses. No changes in Rx/OTC/herbal medications. No major changes in diet. Pt does not drink EtOH or smoke. INR 1.5 is below the acceptable therapeutic range of 2-3  Recommend to take 1mg today then continue 1mg on Sun/Tue/Thu and 0.5mg all other days. Patient has 1 mg tablets. Will continue to monitor and check INR in 1 week. Dosing reminder card given with phone number, appointment date and time.   Return to clinic: 9/4 @ [phone]  Referring Provider: Dr. Wendy Phipps, PharmD Candidate 8/28/20

## 2020-08-31 ENCOUNTER — OFFICE VISIT (OUTPATIENT)
Dept: CARDIOLOGY CLINIC | Age: 80
End: 2020-08-31
Payer: MEDICARE

## 2020-08-31 VITALS
OXYGEN SATURATION: 92 % | WEIGHT: 280.5 LBS | BODY MASS INDEX: 45.08 KG/M2 | DIASTOLIC BLOOD PRESSURE: 80 MMHG | HEIGHT: 66 IN | SYSTOLIC BLOOD PRESSURE: 120 MMHG | HEART RATE: 72 BPM

## 2020-08-31 PROCEDURE — G8427 DOCREV CUR MEDS BY ELIG CLIN: HCPCS | Performed by: INTERNAL MEDICINE

## 2020-08-31 PROCEDURE — G8417 CALC BMI ABV UP PARAM F/U: HCPCS | Performed by: INTERNAL MEDICINE

## 2020-08-31 PROCEDURE — 99214 OFFICE O/P EST MOD 30 MIN: CPT | Performed by: INTERNAL MEDICINE

## 2020-08-31 PROCEDURE — 1090F PRES/ABSN URINE INCON ASSESS: CPT | Performed by: INTERNAL MEDICINE

## 2020-08-31 PROCEDURE — 1123F ACP DISCUSS/DSCN MKR DOCD: CPT | Performed by: INTERNAL MEDICINE

## 2020-08-31 PROCEDURE — G8399 PT W/DXA RESULTS DOCUMENT: HCPCS | Performed by: INTERNAL MEDICINE

## 2020-08-31 PROCEDURE — 4040F PNEUMOC VAC/ADMIN/RCVD: CPT | Performed by: INTERNAL MEDICINE

## 2020-08-31 PROCEDURE — 1036F TOBACCO NON-USER: CPT | Performed by: INTERNAL MEDICINE

## 2020-08-31 NOTE — PROGRESS NOTES
[address]   Cardiac Follow Up    Primary Care Physician: Ben Amaro MD     Chief Complaint:   Chief Complaint   Patient presents with    6 Month Follow-Up    Shortness of Breath    Atrial Fibrillation      HPI:  Brodie Martines is a [de-identified] y.o. female with PMH of CKD, HLD, HTN, and AF. She was first diagnosed with AF during a routine physical with PCP in 1/2016. She was asymptomatic at that time. ECG from 2013 showed SR. S/p DCCV (2/2016, Dr. Lopez Ray) with recurrence of AF within a week (?). She denied feeling any different during the brief time that she was in NSR following the DCCV. Review of all ECG's since CV have shown persistent AF. She was admitted for AF RVR in 12/2017, presenting with SOB. Treatment has been focused on rate control and anticoagulation. She had a normal Myoview (1/2016) and a normal echo (2/6/18) with EF of 55%. On 8/28/19 she presented follow up for AF. 24 hour monitor worn 2/2019 demonstrated avg HR 79 () with AF, 46 pauses > 2.0 seconds, longest lasting 2.7 seconds, no symptoms. Her coumadin/INR is monitored by [facility] coumadin clinic. Her EKG 2/19/20 showed AF 53 bpm. She is monitored by the coumadin clinic for her INR's. Today 8/31/20 she reports she has had a rough summer. She reports she broke her foot at the beginning of the summer. She reports she also has had two injections in her lower back that have not improved her pain. She reports she has had some SOB. She reports this has been ongoing for years but has been substantially worse since having back pain for two months. She reports she has SOB with ascending stairs or activity. She reports when trying to clean or do anything the pain is very severe and causes her SOB to worsen. She brought a record of heart rates with her today. Avg HR per home report was 70's -90's. She reports she is taking her medications as prescribed and is tolerating them well.  She denies any abnormal bleeding or bruising. Patient denies edema, chest pain, palpitations, dizziness or syncope. Past Medical History:   has a past medical history of Anemia, Arthritis, Atrial fibrillation ([address] Chronic kidney disease (CKD), stage II (mild), Community acquired pneumonia of left lower lobe of lung ([facility] [address] Depression, Diverticulosis, Foot pain, GI bleed, Gout, Hemorrhoids, Hyperlipidemia, Hypertension, Hyperuricemia, Iron deficiency anemia, Medical history reviewed with no changes, Menopausal syndrome, Obesity, Pelvic relaxation, PONV (postoperative nausea and vomiting), Stress, Syncope, Unspecified sleep apnea, Vitamin D deficiency, Wears dentures, and Wears glasses. Surgical History:   has a past surgical history that includes Cholecystectomy (1974); Hysterectomy (1977); Knee arthroscopy (2005); bladder suspension (1997); Uvulopalatopharygoplasty (3/2002); eye surgery; Colonoscopy; Colonoscopy (11/2014); joint replacement (Right, 2017); Pain management procedure (Left, 8/11/2020); and Pain management procedure (N/A, 8/25/2020). Social History:   reports that she has never smoked. She has never used smokeless tobacco. She reports that she does not drink alcohol or use drugs. Family History:  family history includes Heart Attack in her father; Heart Disease in her father; Stroke in her brother.      Home Medications:  Outpatient Encounter Medications as of 8/31/2020   Medication Sig Dispense Refill    clindamycin (CLEOCIN) 300 MG capsule Take 2 tabs 1 hr before procedure 2 capsule 5    atorvastatin (LIPITOR) 20 MG tablet TAKE ONE TABLET BY MOUTH ONCE NIGHTLY 90 tablet 0    allopurinol (ZYLOPRIM) 300 MG tablet TAKE ONE TABLET BY MOUTH DAILY 90 tablet 2    dilTIAZem (CARTIA XT) 300 MG extended release capsule Take 1 capsule by mouth daily 90 capsule 3    dilTIAZem (TIAZAC) 300 MG extended release capsule TAKE ONE CAPSULE BY MOUTH DAILY 90 capsule 3    warfarin (COUMADIN) 1 MG tablet TAKE ONE TABLET BY MOUTH ON SUNDAY, TUESDAY AND THURSDAY. TAKE ONE-HALF TABLET BY MOUTH ON ALL OTHER DAYS OR AS DIRECTED BY CLINIC 65 tablet 5    omeprazole (PRILOSEC) 20 MG delayed release capsule TAKE ONE CAPSULE BY MOUTH DAILY 90 capsule 3    sertraline (ZOLOFT) 50 MG tablet TAKE ONE TABLET BY MOUTH DAILY 90 tablet 3    metoprolol tartrate (LOPRESSOR) 25 MG tablet Take 1 tablet by mouth daily 90 tablet 3    Cholecalciferol (VITAMIN D) 2000 UNITS CAPS capsule Take 2,000 Units by mouth daily      docusate sodium (COLACE) 100 MG capsule Take 100 mg by mouth daily       Facility-Administered Encounter Medications as of 8/31/2020   Medication Dose Route Frequency Provider Last Rate Last Dose    hyaluronate (HYLAN) injection 16 mg  16 mg Intra-articular Once KAREN Marroquin           Allergies:  Diclofenac; Adhesive tape; and Penicillins     Review of Systems   Constitutional: Negative. HENT: Negative. Eyes: Negative. Respiratory: Negative. Cardiovascular: Negative. Gastrointestinal: Negative. Genitourinary: Negative. Musculoskeletal: Negative. Skin: Negative. Neurological: Negative. Hematological: Negative. Psychiatric/Behavioral: Negative. /80   Pulse 72   Ht 5' 6\" (1.676 m)   Wt 280 lb 8 oz (127.2 kg)   SpO2 92%   BMI 45.27 kg/m²     DATA:  ECHO: 2/6/18: Summary   Left ventricle size is normal with normal left ventricular wall thickness.   Left ventricular function is borderline with ejection fraction estimated at   50 %.   No regional wall motion abnormalities   Diastolic dysfunction is indeterminate.   Trivial mitral regurgitation .   Moderate tricuspid regurgitation with RVSP estimated at 52 mmHg.   Biatrial enlargement. GXT STRESS TEST: 1/15/16: IMPRESSION     Normal myocardial perfusion scan    Ejection fraction 59 %     Objective:  Physical Exam   Constitutional: She is oriented to person, place, and time. She appears well-developed and well-nourished.    HENT:   Head: Normocephalic and atraumatic. Eyes: Pupils are equal, round, and reactive to light. Neck: Normal range of motion. Cardiovascular:  Irregular S1, S1,  irregular heart sounds. Pulmonary/Chest: Effort normal and breath sounds normal.   Abdominal: Soft. No tenderness. Musculoskeletal: Normal range of motion. She exhibits no edema. Neurological: She is alert and oriented to person, place, and time. Skin: Skin is warm and dry. Psychiatric: She has a normal mood and affect. Assessment:  1. AF- persistent  - first seen in 1/2016. S/p CV in 2/2016 with recurrence within a month. ECG's since have shown persistent AF. 24 hour monitor worn 2/2019 demonstrated AF with adequate HR control. Coumadin/INR monitored by [facility]madin [facility]. 2. Low Back Pain     Plan:  1. 2D Echocardiogram   2. Continue current medications as prescribed  3. Follow up with EP NP in 6 months       This note was scribed in the presence of Poonam Crow MD by Leonor Tyson. Vielka King, Dr. Poonam Crow, personally performed the services described in this documentation as scribed by Leonor Tyson. Roel Flores RN. in my presence, and it is both accurate and complete. I, Dr. Poonam Crow, personally performed the services described in this documentation as scribed by Leonor Tyson. Roel Flores RN in my presence, and it is both accurate and complete.          Poonam Crow M.D.

## 2020-08-31 NOTE — PATIENT INSTRUCTIONS
Plan:  1. 2D Echocardiogram   2. Continue current medications as prescribed  3. Follow up with EP NP in 6 months     Your provider has ordered testing for further evaluation. An order/prescription has been included in your paper work.  To schedule outpatient testing, contact Central Scheduling by calling [id number] ([phone]).

## 2020-09-04 ENCOUNTER — HOSPITAL ENCOUNTER (OUTPATIENT)
Dept: CARDIOLOGY | Age: 80
Discharge: HOME | End: 2020-09-04
Payer: MEDICARE

## 2020-09-04 LAB
LV EF: 53 %
LVEF MODALITY: NORMAL

## 2020-09-04 PROCEDURE — 93306 TTE W/DOPPLER COMPLETE: CPT

## 2020-09-04 PROCEDURE — 93000 ELECTROCARDIOGRAM COMPLETE: CPT | Performed by: INTERNAL MEDICINE

## 2020-09-08 ENCOUNTER — OFFICE VISIT (OUTPATIENT)
Dept: ORTHOPEDIC SURGERY | Age: 80
End: 2020-09-08
Payer: MEDICARE

## 2020-09-08 VITALS — WEIGHT: 280 LBS | HEIGHT: 66 IN | BODY MASS INDEX: 45 KG/M2 | RESPIRATION RATE: 12 BRPM

## 2020-09-08 PROCEDURE — 1123F ACP DISCUSS/DSCN MKR DOCD: CPT | Performed by: PHYSICAL MEDICINE & REHABILITATION

## 2020-09-08 PROCEDURE — G8417 CALC BMI ABV UP PARAM F/U: HCPCS | Performed by: PHYSICAL MEDICINE & REHABILITATION

## 2020-09-08 PROCEDURE — 4040F PNEUMOC VAC/ADMIN/RCVD: CPT | Performed by: PHYSICAL MEDICINE & REHABILITATION

## 2020-09-08 PROCEDURE — G8399 PT W/DXA RESULTS DOCUMENT: HCPCS | Performed by: PHYSICAL MEDICINE & REHABILITATION

## 2020-09-08 PROCEDURE — G8427 DOCREV CUR MEDS BY ELIG CLIN: HCPCS | Performed by: PHYSICAL MEDICINE & REHABILITATION

## 2020-09-08 PROCEDURE — 1036F TOBACCO NON-USER: CPT | Performed by: PHYSICAL MEDICINE & REHABILITATION

## 2020-09-08 PROCEDURE — 1090F PRES/ABSN URINE INCON ASSESS: CPT | Performed by: PHYSICAL MEDICINE & REHABILITATION

## 2020-09-08 PROCEDURE — 99213 OFFICE O/P EST LOW 20 MIN: CPT | Performed by: PHYSICAL MEDICINE & REHABILITATION

## 2020-09-24 ENCOUNTER — TELEPHONE (OUTPATIENT)
Dept: ORTHOPEDIC SURGERY | Age: 80
End: 2020-09-24

## 2020-09-25 ENCOUNTER — TELEPHONE (OUTPATIENT)
Dept: ORTHOPEDIC SURGERY | Age: 80
End: 2020-09-25

## 2020-09-25 NOTE — TELEPHONE ENCOUNTER
S/w patient. Per [facility] it is too soon for her to be scheduled for genaro #2. Patient will complete physical therapy (ordered by her pcp) she will call our office once complete and schedule a follow up visit.

## 2020-09-25 NOTE — TELEPHONE ENCOUNTER
Per provider, it is too early for an additional ANTOLIN. Patient may do PT. Patient will be contacted at the conclusion of clinic today.

## 2020-09-29 ENCOUNTER — HOSPITAL ENCOUNTER (OUTPATIENT)
Dept: PHYSICAL THERAPY | Age: 80
Setting detail: THERAPIES SERIES
Discharge: HOME | End: 2020-09-29
Payer: MEDICARE

## 2020-09-29 PROCEDURE — 97110 THERAPEUTIC EXERCISES: CPT

## 2020-09-29 PROCEDURE — 97161 PT EVAL LOW COMPLEX 20 MIN: CPT

## 2020-09-29 PROCEDURE — 97140 MANUAL THERAPY 1/> REGIONS: CPT

## 2020-09-29 NOTE — PLAN OF CARE
[address], [place]. [address]01 [facility], [address] [place] [address] [address]  Phone: [phone]   Fax:     [phone]     Physical Therapy Certification    Dear Referring Practitioner: Ivelisse Rai,    We had the pleasure of evaluating the following patient for physical therapy services at [address] [place] [address]. A summary of our findings can be found in the initial assessment below. This includes our plan of care. If you have any questions or concerns regarding these findings, please do not hesitate to contact me at the office phone number checked above. Thank you for the referral.       Physician Signature:_______________________________Date:__________________  By signing above (or electronic signature), therapists plan is approved by physician      Patient: Angeline Piper   : 1940   MRN: [id number]  Referring Physician: Referring Practitioner: Ivelisse Rai      Evaluation Date: 2020      Medical Diagnosis Information:  Diagnosis: Lumbar stenosis M48.07   Treatment Diagnosis: Left hip pain                                         Insurance information: PT Insurance Information: Medicare     Precautions/ Contra-indications: A-fib    Latex Allergy:  [x]NO      []YES    Preferred Language for Healthcare:   [x]English       []other:    SUBJECTIVE: Patient stated complaint: Pt states she began having left side LBP in May, has had progressive increase in pain aand now occasionally shooting down to ankle. Had an injection which she got a couple of days of relief but now back to baseline. Sitting usually feels best but also gets shooting pains while sitting. Difficult with position changes.       Bowel/bladder incontinence or retention None    Saddle paraesthesias None    Has imaging occurred Radiographs and MRI    Relevant Medical History: A-Fib     Pain Scale: 8/10, Max 10/10, Best ./10    Easing factors: Sitting    Provocative factors: Walking, position changes. Type: []Constant   []Intermittent  []Radiating []Localized []other:     Numbness/Tingling: None    Occupation/School: Retired    Living Status/Prior Level of Function: Independent with ADLs and IADLs. C-SSRS Triggered by Intake questionnaire (Past 2 wk assessment):   [x] No, Questionnaire did not trigger screening.   [] Yes, Patient intake triggered further evaluation      [] C-SSRS Screening completed  [] PCP notified via Plan of Care  [] Emergency services notified     OBJECTIVE:     ROM  Comments   Standing Lumbar Flexion Distal 1/3 tibia         Standing Lumbar Extension 25% limited      ROM RIGHT LEFT Comments   Lumbar Side Bend      Hip Flexion 105 105    Hip Abd 40 40          Hamstring Flex 155 155                     Myotomes Normal Abnormal Comments   Hip flexion (L1-L2) x     Knee extension (L2-L4) x     Dorsiflexion (L4-L5) x     Great Toe Ext (L5) x     Ankle Eversion (S1-S2) x     Ankle PF(S1-S2) x         Neural dynamic tension testing Normal Abnormal Comments   Slump Test  - Degree of knee flexion:  x     SLR       0-30 x     30-70 x     Femoral nerve (L2-4)          Joint mobility: NA   []Normal    []Hypo   []Hyper    Palpation: no ttp    Functional Mobility/Transfers: Independent    Posture: WNL    Gait: (include devices/WB status) WNL    Bandages/Dressings/Incisions: NA    Orthopedic Special Tests:    Normal Abnormal N/A Comments   HALLIE  x     FADIR  x     SLR x      Crossed SLR x                 [x] Patient history, allergies, meds reviewed. Medical chart reviewed. See intake form. Review Of Systems (ROS):  [x]Performed Review of systems (Integumentary, CardioPulmonary, Neurological) by intake and observation. Intake form has been scanned into medical record. Patient has been instructed to contact their primary care physician regarding ROS issues if not already being addressed at this time.       Co-morbidities/Complexities (which will affect course of rehabilitation):   []None           Arthritic conditions   []Rheumatoid arthritis (M05.9)  []Osteoarthritis (M19.91)   Cardiovascular conditions   []Hypertension (I10)  []Hyperlipidemia (E78.5)  []Angina pectoris (I20)  []Atherosclerosis (I70)   Musculoskeletal conditions   []Disc pathology   []Congenital spine pathologies   []Prior surgical intervention  []Osteoporosis (M81.8)  []Osteopenia (M85.8)   Endocrine conditions   []Hypothyroid (E03.9)  []Hyperthyroid Gastrointestinal conditions   []Constipation (C26.52)   Metabolic conditions   [x]Morbid obesity (E66.01)  []Diabetes type 1(E10.65) or 2 (E11.65)   []Neuropathy (G60.9)     Pulmonary conditions   []Asthma (J45)  []Coughing   []COPD (J44.9)   Psychological Disorders  []Anxiety (F41.9)  []Depression (F32.9)   []Other:   []Other:           Barriers to/and or personal factors that will affect rehab potential:              [x]Age  []Sex              []Motivation/Lack of Motivation                        []Co-Morbidities              []Cognitive Function, education/learning barriers              []Environmental, home barriers              []profession/work barriers  []past PT/medical experience  []other:  Justification:    Falls Risk Assessment (30 days):   [x] Falls Risk assessed and no intervention required. [] Falls Risk assessed and Patient requires intervention due to being higher risk   TUG score (>12s at risk):     [] Falls education provided, including      Functional Questionnaire: LEFS 84%    ASSESSMENT: Babita Freeman is a 79 y.o. female reporting to OP PT with c/c of left side LBP and intermittent shooting leg pain into ankle which has been occurring since May without known STANTON. Pt is noted to have reduce lumbar ROM and mild reduction in hip ROM. No direction preference noted through eval. Will begin with flexion biased exercises.          Functional Impairments:     [x]Noted lumbar/proximal hip hypomobility   []Noted lumbosacral and/or generalized hypermobility   []Decreased Lumbosacral/hip/LE functional ROM   []Decreased core/proximal hip strength and neuromuscular control    []Decreased LE functional strength    []Abnormal reflexes/sensation/myotomal/dermatomal deficits  []Reduced balance/proprioceptive control    []other:      Functional Activity Limitations (from functional questionnaire and intake)   [x]Reduced ability to tolerate prolonged functional positions   [x]Reduced ability or difficulty with changes of positions or transfers between positions   []Reduced ability to maintain good posture and demonstrate good body mechanics with sitting, bending, and lifting   []Reduced ability to sleep   [] Reduced ability or tolerance with driving and/or computer work   [x]Reduced ability to perform lifting, reaching, carrying tasks   [x]Reduced ability to squat   [x]Reduced ability to forward bend   [x]Reduced ability to ambulate prolonged functional periods/distances/surfaces   []Reduced ability to ascend/descend stairs   []other:       Participation Restrictions   [x]Reduced participation in self care activities   [x]Reduced participation in home management activities   []Reduced participation in work activities   [x]Reduced participation in social activities. []Reduced participation in sport/recreational activities. Classification:   []Signs/symptoms consistent with Lumbar instability/stabilization subgroup. []Signs/symptoms consistent with Lumbar mobilization/manipulation subgroup, myotomes and dermatomes intact. Meets manipulation criteria.     []Signs/symptoms consistent with Lumbar direction specific/centralization subgroup   []Signs/symptoms consistent with Lumbar traction subgroup     []Signs/symptoms consistent with lumbar facet dysfunction   [x]Signs/symptoms consistent with lumbar stenosis type dysfunction   []Signs/symptoms consistent with nerve root involvement including myotome & dermatome

## 2020-09-29 NOTE — FLOWSHEET NOTE
[address] [place] [address] and [facility], [address] [place] [address] [address]  Phone: [phone]   Fax:     [phone]      Physical Therapy Treatment Note/ Progress Report:           Date:  2020    Patient Name:  Ugo Young    :  1940  MRN: [id number]  Restrictions/Precautions:    Medical/Treatment Diagnosis Information:  · Diagnosis: Lumbar stenosis M48.07  · Treatment Diagnosis: Left hip pain  Insurance/Certification information:  PT Insurance Information: Medicare  Physician Information:  Referring Practitioner: Twan Vazquez  Has the plan of care been signed (Y/N):        []  Yes  [x]  No     Date of Patient follow up with Physician:     Assessment Summary: Lesli Quintero is a 79 y.o. female reporting to OP PT with c/c of left side LBP and intermittent shooting leg pain into ankle which has been occurring since May without known STANTON. Pt is noted to have reduce lumbar ROM and mild reduction in hip ROM. No direction preference noted through eval. Will begin with flexion biased exercises.        Is this a Progress Report:     []  Yes  [x]  No        If Yes:  Date Range for reporting period:  Beginning 20  Ending 10/29/20    Progress report will be due (10 Rx or 30 days whichever is less):        Recertification will be due (POC Duration  / 90 days whichever is less): 20         Visit # Insurance Allowable Auth Required   1 BOMN []  Yes []  No        Functional Scale:  LEFS 84%   Date assessed:       Latex Allergy:  [x]NO      []YES  Preferred Language for Healthcare:   [x]English       []other:      Pain level:  8/10     SUBJECTIVE:  See eval    OBJECTIVE: See eval      RESTRICTIONS/PRECAUTIONS: A-fib    Exercises/Interventions:   Therapeutic Ex (18205) HEP 20     Warm-up                     TABLE       SKTC x x15 B     PPT x 10x2     Lumbar rotations x 10x2                           SEATED STANDING       HS stretch x 30\"x2 B     Lunge hip flexor stretch x 30\"x2                                   Manual: Longitudinal hip distraction, HS stretching, piriformis strethcing    Therapeutic Exercise and NMR EXR  [] (25577) Provided verbal/tactile cueing for activities related to strengthening, flexibility, endurance, ROM  for improvements in proximal hip and core control with self care, mobility, lifting and ambulation.  [] (13829) Provided verbal/tactile cueing for activities related to improving balance, coordination, kinesthetic sense, posture, motor skill, proprioception  to assist with core control in self care, mobility, lifting, and ambulation.      Therapeutic Activities:    [] (94300 or 47897) Provided verbal/tactile cueing for activities related to improving balance, coordination, kinesthetic sense, posture, motor skill, proprioception and motor activation to allow for proper function  with self care and ADLs  [] (80052) Provided training and instruction to the patient for proper core and proximal hip recruitment and positioning with ambulation re-education     Home Exercise Program:    [x] (78853) Reviewed/Progressed HEP activities related to strengthening, flexibility, endurance, ROM of core, proximal hip and LE for functional self-care, mobility, lifting and ambulation   [] (69839) Reviewed/Progressed HEP activities related to improving balance, coordination, kinesthetic sense, posture, motor skill, proprioception of core, proximal hip and LE for self care, mobility, lifting, and ambulation      Manual Treatments:  PROM / STM / Oscillations-Mobs:  G-I, II, III, IV (PA's, Inf., Post.)  [x] (23731) Provided manual therapy to mobilize proximal hip and LS spine soft tissue/joints for the purpose of modulating pain, promoting relaxation,  increasing ROM, reducing/eliminating soft tissue swelling/inflammation/restriction, improving soft tissue extensibility and allowing for proper ROM for normal function with self care, mobility, lifting and ambulation. Modalities:     [] GAME READY (VASO)- for significant edema, swelling, pain control. Charges:  Timed Code Treatment Minutes: 23   Total Treatment Minutes: 40      [x] EVAL (LOW) 02786 (typically 20 minutes face-to-face)  [] EVAL (MOD) 48732 (typically 30 minutes face-to-face)  [] EVAL (HIGH) 90568 (typically 45 minutes face-to-face)  [] RE-EVAL     [x] JY(45722) x     [] IONTO  [] NMR (02003) x     [] VASO  [x] Manual (88591) x     [] Other:  [] TA x      [] Mech Traction (56720)  [] ES(attended) (98585)      [] ES (un) (99126):       GOALS:     Patient stated goal: No pain    Therapist goals for Patient:   Short Term Goals: To be achieved in: 2 weeks  1. Independent in HEP and progression per patient tolerance, in order to prevent re-injury. [] Progressing: [] Met: [] Not Met: [] Adjusted  2. Patient will have a decrease in pain to facilitate improvement in movement, function, and ADLs as indicated by Functional Deficits. [] Progressing: [] Met: [] Not Met: [] Adjusted    Long Term Goals: To be achieved in: 6 weeks  1. Disability index score of 64% or less for the LEFS to assist with reaching prior level of function. [] Progressing: [] Met: [] Not Met: [] Adjusted  2. Patient will demonstrate increased AROM to WNL, good LS mobility, good hip ROM to allow for proper joint functioning as indicated by patients Functional Deficits. [] Progressing: [] Met: [] Not Met: [] Adjusted  3. Patient will demonstrate an increase in Strength to good proximal hip and core activation to allow for proper functional mobility as indicated by patients Functional Deficits. [] Progressing: [] Met: [] Not Met: [] Adjusted  4. Patient will return to functional activities without increased symptoms or restriction. [] Progressing: [] Met: [] Not Met: [] Adjusted  5.  Pt will report less stiffness when getting up in the morning. (patient specific functional goal) [] Progressing: [] Met: [] Not Met: [] Adjusted        ASSESSMENT:  See eval    Patient received education on their current pathology and how their condition effects them with their functional activities. Patient understood discussion and questions were answered. Patient understands their activity limitations and understands rational for treatment progression. Pt educated on plan of care and HEP, if worsening symptoms to d/c that exercise. Overall Progression Towards Functional goals/ Treatment Progress Update:  [] Patient is progressing as expected towards functional goals listed. [] Progression is slowed due to complexities/Impairments listed. [] Progression has been slowed due to co-morbidities. [x] Plan just implemented, too soon to assess goals progression <30days   [] Goals require adjustment due to lack of progress  [] Patient is not progressing as expected and requires additional follow up with physician  [] Other    Prognosis for POC: [x] Good [] Fair  [] Poor      Patient requires continued skilled intervention: [x] Yes  [] No    Treatment/Activity Tolerance:  [x] Patient able to complete treatment  [] Patient limited by fatigue  [] Patient limited by pain    [] Patient limited by other medical complications  [] Other:           PLAN: See eval  [] Continue per plan of care [] Alter current plan (see comments above)  [x] Plan of care initiated [] Hold pending MD visit [] Discharge      Electronically signed by:  Krunal Green PT    Note: If patient does not return for scheduled/ recommended follow up visits, this note will serve as a discharge from care along with most recent update on progress.

## 2020-09-30 ENCOUNTER — HOSPITAL ENCOUNTER (OUTPATIENT)
Dept: WOMENS IMAGING | Age: 80
Discharge: HOME | End: 2020-09-30
Payer: MEDICARE

## 2020-09-30 PROCEDURE — 77080 DXA BONE DENSITY AXIAL: CPT

## 2020-10-02 ENCOUNTER — ANTI-COAG VISIT (OUTPATIENT)
Dept: PHARMACY | Age: 80
End: 2020-10-02
Payer: MEDICARE

## 2020-10-02 LAB — INR BLD: 4

## 2020-10-02 PROCEDURE — 99211 OFF/OP EST MAY X REQ PHY/QHP: CPT | Performed by: FAMILY MEDICINE

## 2020-10-02 PROCEDURE — 85610 PROTHROMBIN TIME: CPT | Performed by: FAMILY MEDICINE

## 2020-10-02 NOTE — PROGRESS NOTES
Ms. Nuvia Crystal is here for management of anticoagulation for Afib. PMH also significant for HTN, Gout, CKD II/III, Hyperlipidemia, and depression. She presents today w/out complaint. Pt verifies dosing regimen as listed above. Pt denies s/sx bleeding/bruising/ CP/HA/swelling/SOB  No missed doses. No changes in Rx/OTC/herbal medications. No major changes in diet. Pt does not drink EtOH or smoke. Now taking gabapentin for pain    INR 4.0 is above the acceptable therapeutic range of 2-3  Recommend to hold dose today, then decrease to 1mg on Tue/Thu and 0.5mg all other days. Patient has 1 mg tablets. Will continue to monitor and check INR in 4 weeks. Dosing reminder card given with phone number, appointment date and time.   Return to clinic: 10/16 @ [phone]   Referring Provider: Dr. Corine Garcia, PharmD 10/2/20 10:39 AM

## 2020-10-06 ENCOUNTER — HOSPITAL ENCOUNTER (OUTPATIENT)
Dept: PHYSICAL THERAPY | Age: 80
Setting detail: THERAPIES SERIES
Discharge: HOME | End: 2020-10-06
Payer: MEDICARE

## 2020-10-06 PROCEDURE — 97110 THERAPEUTIC EXERCISES: CPT

## 2020-10-06 PROCEDURE — 97140 MANUAL THERAPY 1/> REGIONS: CPT

## 2020-10-07 ENCOUNTER — TELEPHONE (OUTPATIENT)
Dept: CARDIOLOGY CLINIC | Age: 80
End: 2020-10-07

## 2020-10-07 NOTE — TELEPHONE ENCOUNTER
Attempted to call patient. No answer. LM to return call to schedule patient on LAB schedule for EKG per TRACY.

## 2020-10-07 NOTE — TELEPHONE ENCOUNTER
CHF--SOB--EDEMA    1) What type of symptoms are you having? SOB getting worse. How long have you been having these symptoms? For awhile    2) Any swelling? no   If so, where? 3) Any weight gain?not sure   What is your current weight? What is your normal (dry) weight? 4) Are you taking a diuretic (water pill)? she doesn't know. Takes little orange pill in AM.  Medication Name:   Dosage:  How often:     Pt LOV was 8/31/2020  Had Echo on 9/4/2020 pt did get results of testing.

## 2020-10-07 NOTE — TELEPHONE ENCOUNTER
Last OV with TRACY on 8/31/20. Spoke with patient. She reports she has a pinched sciatic nerve and when she is in great pain the SOB is worse. Reports her SOB has been worse for about one week. She reports she has more SOB with ambulating around the room. She reports she was up every 2 hours to urinate last night. She denies any edema to lower extremities or abdomen. She does not take any diuretics. She denies any CP or palpitations. She reports her HR is 74 BPM and her SPO2 is 94%. She also had ECHO on 9/4/20. Recommendations? Thank you.

## 2020-10-07 NOTE — TELEPHONE ENCOUNTER
HR and BP have been normal. LV function is normal on recent echo. She could stop in for an ECG to reevaluate her HR.

## 2020-10-09 ENCOUNTER — HOSPITAL ENCOUNTER (OUTPATIENT)
Dept: PHYSICAL THERAPY | Age: 80
Setting detail: THERAPIES SERIES
Discharge: HOME | End: 2020-10-09
Payer: MEDICARE

## 2020-10-09 ENCOUNTER — OFFICE VISIT (OUTPATIENT)
Dept: ORTHOPEDIC SURGERY | Age: 80
End: 2020-10-09
Payer: MEDICARE

## 2020-10-09 ENCOUNTER — NURSE ONLY (OUTPATIENT)
Dept: CARDIOLOGY CLINIC | Age: 80
End: 2020-10-09
Payer: MEDICARE

## 2020-10-09 ENCOUNTER — TELEPHONE (OUTPATIENT)
Dept: CARDIOLOGY CLINIC | Age: 80
End: 2020-10-09

## 2020-10-09 VITALS — HEIGHT: 66 IN | BODY MASS INDEX: 45 KG/M2 | WEIGHT: 280 LBS

## 2020-10-09 PROCEDURE — 1036F TOBACCO NON-USER: CPT | Performed by: ORTHOPAEDIC SURGERY

## 2020-10-09 PROCEDURE — 1090F PRES/ABSN URINE INCON ASSESS: CPT | Performed by: ORTHOPAEDIC SURGERY

## 2020-10-09 PROCEDURE — 20610 DRAIN/INJ JOINT/BURSA W/O US: CPT | Performed by: ORTHOPAEDIC SURGERY

## 2020-10-09 PROCEDURE — 4040F PNEUMOC VAC/ADMIN/RCVD: CPT | Performed by: ORTHOPAEDIC SURGERY

## 2020-10-09 PROCEDURE — G8484 FLU IMMUNIZE NO ADMIN: HCPCS | Performed by: ORTHOPAEDIC SURGERY

## 2020-10-09 PROCEDURE — G8427 DOCREV CUR MEDS BY ELIG CLIN: HCPCS | Performed by: ORTHOPAEDIC SURGERY

## 2020-10-09 PROCEDURE — 97140 MANUAL THERAPY 1/> REGIONS: CPT

## 2020-10-09 PROCEDURE — 97112 NEUROMUSCULAR REEDUCATION: CPT

## 2020-10-09 PROCEDURE — G8417 CALC BMI ABV UP PARAM F/U: HCPCS | Performed by: ORTHOPAEDIC SURGERY

## 2020-10-09 PROCEDURE — 99213 OFFICE O/P EST LOW 20 MIN: CPT | Performed by: ORTHOPAEDIC SURGERY

## 2020-10-09 PROCEDURE — 1123F ACP DISCUSS/DSCN MKR DOCD: CPT | Performed by: ORTHOPAEDIC SURGERY

## 2020-10-09 PROCEDURE — 97110 THERAPEUTIC EXERCISES: CPT

## 2020-10-09 PROCEDURE — G8399 PT W/DXA RESULTS DOCUMENT: HCPCS | Performed by: ORTHOPAEDIC SURGERY

## 2020-10-09 RX ORDER — METHYLPREDNISOLONE ACETATE 40 MG/ML
80 INJECTION, SUSPENSION INTRA-ARTICULAR; INTRALESIONAL; INTRAMUSCULAR; SOFT TISSUE ONCE
Status: COMPLETED | OUTPATIENT
Start: 2020-10-09 | End: 2020-10-09

## 2020-10-09 RX ORDER — BUPIVACAINE HYDROCHLORIDE 2.5 MG/ML
60 INJECTION, SOLUTION INFILTRATION; PERINEURAL ONCE
Status: COMPLETED | OUTPATIENT
Start: 2020-10-09 | End: 2020-10-09

## 2020-10-09 RX ORDER — LIDOCAINE HYDROCHLORIDE 10 MG/ML
20 INJECTION, SOLUTION INFILTRATION; PERINEURAL ONCE
Status: COMPLETED | OUTPATIENT
Start: 2020-10-09 | End: 2020-10-09

## 2020-10-09 RX ADMIN — BUPIVACAINE HYDROCHLORIDE 150 MG: 2.5 INJECTION, SOLUTION INFILTRATION; PERINEURAL at 09:50

## 2020-10-09 RX ADMIN — LIDOCAINE HYDROCHLORIDE 20 ML: 10 INJECTION, SOLUTION INFILTRATION; PERINEURAL at 09:51

## 2020-10-09 RX ADMIN — METHYLPREDNISOLONE ACETATE 80 MG: 40 INJECTION, SUSPENSION INTRA-ARTICULAR; INTRALESIONAL; INTRAMUSCULAR; SOFT TISSUE at 09:51

## 2020-10-09 NOTE — PROGRESS NOTES
MD Paxton Patel, [place]         Orthopaedic Surgery and Sports Medicine      Patient Name: Manisha Flores  YOB: 1940  Patient's PCP is Yue Mckay MD    SUBJECTIVE  Chief Complaint:  Shoulder Pain (RIGHT  INJECTION 06/10/2020)      History of Present Illness:  Manisha Flores is a right handed 79 y.o. female here regarding right shoulder pain. The pain began years ago. Also has seen Dr. Luis Miguel Mejia for lumbar spine injection. He sees Dr. Thomas Shah for cardiology visit when she leaves here today. He reportedly is having an EKG done. The pain has a diffuse location around the shoulder and is not well localized. She describes the symptoms as aching and sharp. She rates pain at 6/10. Symptoms improve with rest.   The symptoms are worse with elevation, activity, lifting, work and repetitive activity. The shoulder has not dislocated/subluxed and/or felt unstable. There is no numbness or tingling in hand/fingers. Sleeping habits related to chief complaint: fair        The patient has had an injection. The patient has not taken NSAIDs, as she is on Coumadin. The patient is working.       Pain Assessment:  Pain Assessment  Location of Pain: Shoulder  Location Modifiers: Right  Severity of Pain: 10  Quality of Pain: Dull, Aching  Frequency of Pain: Intermittent  Aggravating Factors: Straightening, Exercise  Relieving Factors: Rest, Other (Comment), Heat  Result of Injury: No  Work-Related Injury: No  Are there other pain locations you wish to document?: No    Past Medical History:  Past Medical History:   Diagnosis Date    Anemia     NOS    Arthritis     knee     Atrial fibrillation (HCC)     on coumadin    Chronic kidney disease (CKD), stage II (mild)     Community acquired pneumonia of left lower lobe of lung ([facility] [address] 12/26/2017    Depression     tenderness. Peripheral pulses are palpable and 2+. Neurological: The patient has good coordination. There is no weakness or sensory deficit. Right Shoulder Examination, repeat examination  Inspection:    Visual deformity noted: No    no swelling noted. No erythema or ecchymosis. Skin is intact with no cellulitis, rashes, ulcerations, lymphedema     or cutaneous lesions noted. Palpation:  mild tenderness to palpation both anterior and posterior    Range of Motion: Her active range of motion today with the right shoulder seems to be a little better than it was on her previous visit. She can reach up to 150 degrees of forward flexion about 90 degrees of abduction today. Special Tests:  Obriens test: positive       Apprehension test: negative       Load and Shift test: Negative                  Impingement test: positive       Sulcus sign: negative       Bear Hug test: negative       Lift-Off test: negative       Cuff Drop Arm test:  negative    Strength: Forward flexion: 4/5        Abduction: 4/5        Internal Rotation: 5/5        External Rotation: 5/5    Neurologic & Vascular: Sensation to intact to light touch throughout median, ulnar and radial nerve distribution. The bilateral upper extremities are warm and well-perfused with brisk capillary refill. Additional Examinations:  Left Upper Extremity: Examination of the left upper extremity does not show any tenderness, deformity or injury. Range of motion is within normal limits. There is no gross instability. There are no rashes, ulcerations or lesions. Strength and tone are normal.  Neck: Examination of the neck does not show any tenderness, deformity or injury. Range of motion is unremarkable. There is no gross instability. There are no rashes, ulcerations or lesions.   Strength and tone are normal.      DIAGNOSTICS:  Xrays obtained in office today: No  Xrays reviewed today: Yes  Four views of the right shoulder show   Fracture: No  Dislocation: No  Acromion morphology: Type II   Acromioclavicular joint arthritis: mild  Glenohumeral joint arthritis: mild  Humeral head superior migration: none    ASSESSMENT (Medical Decision Making)    Rama Bustamante is a 79 y.o. 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 No diagnosis found. Her overall course is worsening despite conservative treatment      PLAN (Medical Decision Making)  Office Procedures:  No orders of the defined types were placed in this encounter. The patient was given the option of performing today's injection using ultrasound guidance. We discussed the pros and cons of using the ultrasound for guidance. The patient chose to proceed, and today's injection was performed under sterile conditions using and [place] ultrasound unit with a variable frequency (6.0-15.0  Mhz) linear transducer for localization and needle placement. The image was saved for the medical record. The risks and benefits of an injection were discussed with the patient. The patient had full opportunity to ask questions and all were answered. The patient then provided verbal informed consent. The skin was then prepped with betadine solution and alcohol. Under aseptic conditions, the right shoulder, subacromial space, was injected with 2cc of 1% xylocaine, then a mixture of 3cc of 0.25% marcaine and 2cc (80 mg) of Depomedrol. There were no immediate complications following the injection. The patient was advised of the possibility of injection site reaction and instructed to apply ice to the area. Treatment Plan:    I discussed the diagnosis and treatment options with Rama Bustamante today. Current plan is to proceed with an injection into the glenohumeral joint using Depo-Medrol and Marcaine. She tolerated today's injection well.     She will follow-up in 3 to 4 months for reevaluation. Non-steroidal anti-inflammatories medications (NSAIDs) can be used to assist with pain control and to reduce inflammatory changes. These medications may be over-the-counter or prescribed. We discussed taking the NSAID properly and the precautions. The patient understands that this medication may potentially interfere with other medications. Patient was also instructed to immediately discontinue the medication is there is any possible complication. Matthias Cardenas was instructed to call the office if her symptoms worsen or if new symptoms appear prior to the next scheduled visit. She is specifically instructed to contact the office between now and schedule appointment if she has concerns related to her condition or if she needs assistance in scheduling any above tests. She is welcome to call for an appointment sooner if she has any additional concerns or questions. This dictation was performed with a verbal recognition program (DRAGON) and it was checked for errors. It is possible that there are still dictated errors within this office note. If so, please bring any errors to my attention for an addendum. All efforts were made to ensure that this office note is accurate.

## 2020-10-09 NOTE — TELEPHONE ENCOUNTER
She has normal HR and BP, but O2 sat seem a little low. Lets see if she can get in to see cardiology NP for evaluation.

## 2020-10-09 NOTE — FLOWSHEET NOTE
[address] [place] [address] and Sports Rehabilitation[address], [address] [place] [address] [address]  Phone: [phone]   Fax:     [phone]      Physical Therapy Treatment Note/ Progress Report:           Date:  10/9/2020    Patient Name:  Eliud Tran    :  1940  MRN: [id number]  Restrictions/Precautions:    Medical/Treatment Diagnosis Information:  · Diagnosis: Lumbar stenosis M48.07  · Treatment Diagnosis: Left hip pain  Insurance/Certification information:  PT Insurance Information: Medicare  Physician Information:  Referring Practitioner: Shanique Haskins  Has the plan of care been signed (Y/N):        []  Yes  [x]  No     Date of Patient follow up with Physician:     Assessment Summary: Toño Giordano is a 79 y.o. female reporting to OP PT with c/c of left side LBP and intermittent shooting leg pain into ankle which has been occurring since May without known STANTON. Pt is noted to have reduce lumbar ROM and mild reduction in hip ROM. No direction preference noted through eval. Will begin with flexion biased exercises. Is this a Progress Report:     []  Yes  [x]  No        If Yes:  Date Range for reporting period:  Beginning 20  Ending 10/29/20    Progress report will be due (10 Rx or 30 days whichever is less):        Recertification will be due (POC Duration  / 90 days whichever is less): 20         Visit # Insurance Allowable Auth Required   3 BOMN []  Yes []  No        Functional Scale:  LEFS 84%   Date assessed:       Latex Allergy:  [x]NO      []YES  Preferred Language for Healthcare:   [x]English       []other:      Pain level:  3/10     SUBJECTIVE:  Pt states hip is hurting today.      OBJECTIVE:   /78  Ox 96%  HR 71 bpm    RESTRICTIONS/PRECAUTIONS: A-fib    Exercises/Interventions:   Therapeutic Ex (15514) HEP 9/29/20 10/6/20 10/9/20   Warm-up                     TABLE       SKTC x x15 B -- --   PPT x 10x2 10x2 10x2   Lumbar rotations x 10x2  10x2 x10 B   Bridge   10x2 x10   HL adduction    10x3   HL CS isometric    45\"x3                 SEATED       Sit<>stands   10x2                                STANDING       HS stretch x 30\"x2 B 30\"x2 B --   Lunge hip flexor stretch x 30\"x2 -- --   Hip abduction   10x2 B    Hip extension   10x2 B x15 B                   Manual: Longitudinal hip distraction, HS stretching, piriformis stretching, SKTC    Therapeutic Exercise and NMR EXR  [x] (78206) Provided verbal/tactile cueing for activities related to strengthening, flexibility, endurance, ROM  for improvements in proximal hip and core control with self care, mobility, lifting and ambulation.  [] (05355) Provided verbal/tactile cueing for activities related to improving balance, coordination, kinesthetic sense, posture, motor skill, proprioception  to assist with core control in self care, mobility, lifting, and ambulation.      Therapeutic Activities:    [x] (24602 or 64224) Provided verbal/tactile cueing for activities related to improving balance, coordination, kinesthetic sense, posture, motor skill, proprioception and motor activation to allow for proper function  with self care and ADLs  [] (61554) Provided training and instruction to the patient for proper core and proximal hip recruitment and positioning with ambulation re-education     Home Exercise Program:    [x] (21525) Reviewed/Progressed HEP activities related to strengthening, flexibility, endurance, ROM of core, proximal hip and LE for functional self-care, mobility, lifting and ambulation   [] (21839) Reviewed/Progressed HEP activities related to improving balance, coordination, kinesthetic sense, posture, motor skill, proprioception of core, proximal hip and LE for self care, mobility, lifting, and ambulation      Manual Treatments:  PROM / STM / Oscillations-Mobs:  G-I, II, III, IV (PA's, Inf., Post.)  [x] (27538) Provided manual therapy to mobilize proximal hip and LS spine soft tissue/joints for the purpose of modulating pain, promoting relaxation,  increasing ROM, reducing/eliminating soft tissue swelling/inflammation/restriction, improving soft tissue extensibility and allowing for proper ROM for normal function with self care, mobility, lifting and ambulation. Modalities:     [] GAME READY (VASO)- for significant edema, swelling, pain control. Charges:  Timed Code Treatment Minutes: 38   Total Treatment Minutes: 38      [] EVAL (LOW) 82886 (typically 20 minutes face-to-face)  [] EVAL (MOD) 84107 (typically 30 minutes face-to-face)  [] EVAL (HIGH) 19824 (typically 45 minutes face-to-face)  [] RE-EVAL     [x] GI(02700) 2     [] IONTO  [] NMR (02863) x     [] VASO  [x] Manual (36098) 1     [] Other:  [] TA x      [] Mech Traction (16638)  [] ES(attended) (47031)      [] ES (un) (77222):       GOALS:     Patient stated goal: No pain    Therapist goals for Patient:   Short Term Goals: To be achieved in: 2 weeks  1. Independent in HEP and progression per patient tolerance, in order to prevent re-injury. [] Progressing: [] Met: [] Not Met: [] Adjusted  2. Patient will have a decrease in pain to facilitate improvement in movement, function, and ADLs as indicated by Functional Deficits. [] Progressing: [] Met: [] Not Met: [] Adjusted    Long Term Goals: To be achieved in: 6 weeks  1. Disability index score of 64% or less for the LEFS to assist with reaching prior level of function. [] Progressing: [] Met: [] Not Met: [] Adjusted  2. Patient will demonstrate increased AROM to WNL, good LS mobility, good hip ROM to allow for proper joint functioning as indicated by patients Functional Deficits. [] Progressing: [] Met: [] Not Met: [] Adjusted  3. Patient will demonstrate an increase in Strength to good proximal hip and core activation to allow for proper functional mobility as indicated by patients Functional Deficits. [] Progressing: [] Met: [] Not Met: [] Adjusted  4.  Patient will return to functional activities without increased symptoms or restriction. [] Progressing: [] Met: [] Not Met: [] Adjusted  5. Pt will report less stiffness when getting up in the morning. (patient specific functional goal)    [] Progressing: [] Met: [] Not Met: [] Adjusted        ASSESSMENT:  Pt enrrique session well. Continued increased manual and table exercises. Pt continues to be SOB, vitals were within normal ranges. Significant reduction in pain per reports at end of session. Overall Progression Towards Functional goals/ Treatment Progress Update:  [] Patient is progressing as expected towards functional goals listed. [] Progression is slowed due to complexities/Impairments listed. [] Progression has been slowed due to co-morbidities. [x] Plan just implemented, too soon to assess goals progression <30days   [] Goals require adjustment due to lack of progress  [] Patient is not progressing as expected and requires additional follow up with physician  [] Other    Prognosis for POC: [x] Good [] Fair  [] Poor      Patient requires continued skilled intervention: [x] Yes  [] No    Treatment/Activity Tolerance:  [x] Patient able to complete treatment  [] Patient limited by fatigue  [] Patient limited by pain    [] Patient limited by other medical complications  [] Other:           PLAN: See eval  [x] Continue per plan of care [] Alter current plan (see comments above)  [] Plan of care initiated [] Hold pending MD visit [] Discharge      Electronically signed by:  Barbara Wong PT    Note: If patient does not return for scheduled/ recommended follow up visits, this note will serve as a discharge from care along with most recent update on progress.

## 2020-10-10 ENCOUNTER — HOSPITAL ENCOUNTER (INPATIENT)
Age: 80
LOS: 5 days | Discharge: HOME | DRG: 291 | End: 2020-10-15
Attending: EMERGENCY MEDICINE | Admitting: HOSPITALIST
Payer: MEDICARE

## 2020-10-10 ENCOUNTER — APPOINTMENT (OUTPATIENT)
Dept: GENERAL RADIOLOGY | Age: 80
DRG: 291 | End: 2020-10-10
Payer: MEDICARE

## 2020-10-10 PROBLEM — I50.31 CHF (CONGESTIVE HEART FAILURE), NYHA CLASS I, ACUTE, DIASTOLIC (HCC): Status: ACTIVE | Noted: 2020-10-10

## 2020-10-10 LAB
A/G RATIO: 1.6 (ref 1.1–2.2)
ALBUMIN SERPL-MCNC: 4.2 G/DL (ref 3.4–5)
ALP BLD-CCNC: 60 U/L (ref 40–129)
ALT SERPL-CCNC: 9 U/L (ref 10–40)
ANION GAP SERPL CALCULATED.3IONS-SCNC: 11 MMOL/L (ref 3–16)
AST SERPL-CCNC: 20 U/L (ref 15–37)
BASOPHILS ABSOLUTE: 0 K/UL (ref 0–0.2)
BASOPHILS RELATIVE PERCENT: 0.7 %
BILIRUB SERPL-MCNC: 0.8 MG/DL (ref 0–1)
BUN BLDV-MCNC: 22 MG/DL (ref 7–20)
CALCIUM SERPL-MCNC: 8.7 MG/DL (ref 8.3–10.6)
CHLORIDE BLD-SCNC: 104 MMOL/L (ref 99–110)
CO2: 23 MMOL/L (ref 21–32)
CREAT SERPL-MCNC: 1.1 MG/DL (ref 0.6–1.2)
EOSINOPHILS ABSOLUTE: 0.1 K/UL (ref 0–0.6)
EOSINOPHILS RELATIVE PERCENT: 0.7 %
GFR AFRICAN AMERICAN: 58
GFR NON-AFRICAN AMERICAN: 48
GLOBULIN: 2.7 G/DL
GLUCOSE BLD-MCNC: 113 MG/DL (ref 70–99)
HCT VFR BLD CALC: 38.9 % (ref 36–48)
HEMOGLOBIN: 12.8 G/DL (ref 12–16)
INR BLD: 2.18 (ref 0.86–1.14)
LACTIC ACID: 1.3 MMOL/L (ref 0.4–2)
LYMPHOCYTES ABSOLUTE: 1.2 K/UL (ref 1–5.1)
LYMPHOCYTES RELATIVE PERCENT: 17 %
MCH RBC QN AUTO: 33.4 PG (ref 26–34)
MCHC RBC AUTO-ENTMCNC: 33 G/DL (ref 31–36)
MCV RBC AUTO: 101.3 FL (ref 80–100)
MONOCYTES ABSOLUTE: 0.4 K/UL (ref 0–1.3)
MONOCYTES RELATIVE PERCENT: 5.8 %
NEUTROPHILS ABSOLUTE: 5.4 K/UL (ref 1.7–7.7)
NEUTROPHILS RELATIVE PERCENT: 75.8 %
PDW BLD-RTO: 16.9 % (ref 12.4–15.4)
PLATELET # BLD: 184 K/UL (ref 135–450)
PMV BLD AUTO: 7.8 FL (ref 5–10.5)
POTASSIUM SERPL-SCNC: 4.5 MMOL/L (ref 3.5–5.1)
PRO-BNP: 2448 PG/ML (ref 0–449)
PROTHROMBIN TIME: 25.5 SEC (ref 10–13.2)
RBC # BLD: 3.84 M/UL (ref 4–5.2)
SODIUM BLD-SCNC: 138 MMOL/L (ref 136–145)
TOTAL PROTEIN: 6.9 G/DL (ref 6.4–8.2)
TROPONIN: <0.01 NG/ML
WBC # BLD: 7.1 K/UL (ref 4–11)

## 2020-10-10 PROCEDURE — 6360000002 HC RX W HCPCS: Performed by: STUDENT IN AN ORGANIZED HEALTH CARE EDUCATION/TRAINING PROGRAM

## 2020-10-10 PROCEDURE — 83880 ASSAY OF NATRIURETIC PEPTIDE: CPT

## 2020-10-10 PROCEDURE — 84484 ASSAY OF TROPONIN QUANT: CPT

## 2020-10-10 PROCEDURE — 99285 EMERGENCY DEPT VISIT HI MDM: CPT

## 2020-10-10 PROCEDURE — 71045 X-RAY EXAM CHEST 1 VIEW: CPT

## 2020-10-10 PROCEDURE — 85610 PROTHROMBIN TIME: CPT

## 2020-10-10 PROCEDURE — 93005 ELECTROCARDIOGRAM TRACING: CPT | Performed by: EMERGENCY MEDICINE

## 2020-10-10 PROCEDURE — 6370000000 HC RX 637 (ALT 250 FOR IP): Performed by: HOSPITALIST

## 2020-10-10 PROCEDURE — 1200000000 HC SEMI PRIVATE

## 2020-10-10 PROCEDURE — 85025 COMPLETE CBC W/AUTO DIFF WBC: CPT

## 2020-10-10 PROCEDURE — 2580000003 HC RX 258: Performed by: HOSPITALIST

## 2020-10-10 PROCEDURE — 6360000002 HC RX W HCPCS: Performed by: HOSPITALIST

## 2020-10-10 PROCEDURE — 83605 ASSAY OF LACTIC ACID: CPT

## 2020-10-10 PROCEDURE — 80053 COMPREHEN METABOLIC PANEL: CPT

## 2020-10-10 PROCEDURE — 36415 COLL VENOUS BLD VENIPUNCTURE: CPT

## 2020-10-10 RX ORDER — ACETAMINOPHEN 325 MG/1
650 TABLET ORAL EVERY 6 HOURS PRN
Status: DISCONTINUED | OUTPATIENT
Start: 2020-10-10 | End: 2020-10-15 | Stop reason: HOSPADM

## 2020-10-10 RX ORDER — ATORVASTATIN CALCIUM 10 MG/1
20 TABLET, FILM COATED ORAL NIGHTLY
Status: DISCONTINUED | OUTPATIENT
Start: 2020-10-10 | End: 2020-10-15 | Stop reason: HOSPADM

## 2020-10-10 RX ORDER — SODIUM CHLORIDE 0.9 % (FLUSH) 0.9 %
10 SYRINGE (ML) INJECTION PRN
Status: DISCONTINUED | OUTPATIENT
Start: 2020-10-10 | End: 2020-10-15 | Stop reason: HOSPADM

## 2020-10-10 RX ORDER — VITAMIN B COMPLEX
2000 TABLET ORAL DAILY
Status: DISCONTINUED | OUTPATIENT
Start: 2020-10-10 | End: 2020-10-15 | Stop reason: HOSPADM

## 2020-10-10 RX ORDER — PROMETHAZINE HYDROCHLORIDE 25 MG/1
12.5 TABLET ORAL EVERY 6 HOURS PRN
Status: DISCONTINUED | OUTPATIENT
Start: 2020-10-10 | End: 2020-10-15 | Stop reason: HOSPADM

## 2020-10-10 RX ORDER — ACETAMINOPHEN 650 MG/1
650 SUPPOSITORY RECTAL EVERY 6 HOURS PRN
Status: DISCONTINUED | OUTPATIENT
Start: 2020-10-10 | End: 2020-10-15 | Stop reason: HOSPADM

## 2020-10-10 RX ORDER — FUROSEMIDE 10 MG/ML
40 INJECTION INTRAMUSCULAR; INTRAVENOUS ONCE
Status: COMPLETED | OUTPATIENT
Start: 2020-10-10 | End: 2020-10-10

## 2020-10-10 RX ORDER — POLYETHYLENE GLYCOL 3350 17 G/17G
17 POWDER, FOR SOLUTION ORAL DAILY PRN
Status: DISCONTINUED | OUTPATIENT
Start: 2020-10-10 | End: 2020-10-15 | Stop reason: HOSPADM

## 2020-10-10 RX ORDER — SODIUM CHLORIDE 0.9 % (FLUSH) 0.9 %
10 SYRINGE (ML) INJECTION EVERY 12 HOURS SCHEDULED
Status: DISCONTINUED | OUTPATIENT
Start: 2020-10-10 | End: 2020-10-15 | Stop reason: HOSPADM

## 2020-10-10 RX ORDER — FUROSEMIDE 10 MG/ML
40 INJECTION INTRAMUSCULAR; INTRAVENOUS 2 TIMES DAILY
Status: DISCONTINUED | OUTPATIENT
Start: 2020-10-10 | End: 2020-10-11

## 2020-10-10 RX ORDER — ONDANSETRON 2 MG/ML
4 INJECTION INTRAMUSCULAR; INTRAVENOUS EVERY 6 HOURS PRN
Status: DISCONTINUED | OUTPATIENT
Start: 2020-10-10 | End: 2020-10-15 | Stop reason: HOSPADM

## 2020-10-10 RX ORDER — PANTOPRAZOLE SODIUM 40 MG/1
40 TABLET, DELAYED RELEASE ORAL
Status: DISCONTINUED | OUTPATIENT
Start: 2020-10-11 | End: 2020-10-15 | Stop reason: HOSPADM

## 2020-10-10 RX ORDER — ALLOPURINOL 300 MG/1
300 TABLET ORAL DAILY
Status: DISCONTINUED | OUTPATIENT
Start: 2020-10-10 | End: 2020-10-15 | Stop reason: HOSPADM

## 2020-10-10 RX ORDER — WARFARIN SODIUM 2 MG/1
1 TABLET ORAL
Status: COMPLETED | OUTPATIENT
Start: 2020-10-10 | End: 2020-10-10

## 2020-10-10 RX ADMIN — Medication 10 ML: at 20:35

## 2020-10-10 RX ADMIN — ACETAMINOPHEN 650 MG: 325 TABLET ORAL at 23:44

## 2020-10-10 RX ADMIN — FUROSEMIDE 40 MG: 10 INJECTION, SOLUTION INTRAMUSCULAR; INTRAVENOUS at 13:55

## 2020-10-10 RX ADMIN — FUROSEMIDE 40 MG: 10 INJECTION, SOLUTION INTRAMUSCULAR; INTRAVENOUS at 17:42

## 2020-10-10 RX ADMIN — ATORVASTATIN CALCIUM 20 MG: 10 TABLET, FILM COATED ORAL at 20:35

## 2020-10-10 RX ADMIN — WARFARIN SODIUM 1 MG: 2 TABLET ORAL at 17:41

## 2020-10-10 RX ADMIN — SERTRALINE HYDROCHLORIDE 50 MG: 50 TABLET, FILM COATED ORAL at 20:35

## 2020-10-10 ASSESSMENT — ENCOUNTER SYMPTOMS
NAUSEA: 0
EYE DISCHARGE: 0
ABDOMINAL DISTENTION: 0
SINUS PRESSURE: 0
APNEA: 0
RHINORRHEA: 0
STRIDOR: 0
CONSTIPATION: 0
DIARRHEA: 0
EYE REDNESS: 0
EYE PAIN: 0
CHEST TIGHTNESS: 0
ABDOMINAL PAIN: 0
COUGH: 0
COLOR CHANGE: 0
BACK PAIN: 0
WHEEZING: 1
TROUBLE SWALLOWING: 0
BLOOD IN STOOL: 0
RECTAL PAIN: 0
SHORTNESS OF BREATH: 1
VOMITING: 0
PHOTOPHOBIA: 0
SORE THROAT: 0
VOICE CHANGE: 0

## 2020-10-10 ASSESSMENT — PAIN DESCRIPTION - PAIN TYPE: TYPE: ACUTE PAIN

## 2020-10-10 ASSESSMENT — PAIN SCALES - GENERAL
PAINLEVEL_OUTOF10: 0
PAINLEVEL_OUTOF10: 5
PAINLEVEL_OUTOF10: 5

## 2020-10-10 ASSESSMENT — PAIN DESCRIPTION - ONSET: ONSET: ON-GOING

## 2020-10-10 ASSESSMENT — PAIN DESCRIPTION - DESCRIPTORS: DESCRIPTORS: HEADACHE

## 2020-10-10 ASSESSMENT — PAIN DESCRIPTION - LOCATION: LOCATION: HEAD

## 2020-10-10 NOTE — ED PROVIDER NOTES
Pt seen and evaluated under supervision from [facility] E of Breath (pt with sob and wheezing . Lisa Magaña hx of afib. ..given one duo in route. Lisa Magaña )      HISTORY OF PRESENT ILLNESS  Phu Gonzalez is a 79 y.o. female with a history of A. fib, hypertension hyperlipidemia iron deficiency anemia who presents to the ED complaining of new-onset shortness of breath. She states the shortness of breath has been worsening over the last couple of weeks but became really bad the over the last couple of days. She states she becomes acutely short of breath while laying down in bed, and is trying to sleep in the chair. She does follow cardiology for her A. fib last echo was done 9/4/2020. She does not use any oxygen at home. Denies any history of smoking. Reports PND, orthopnea    No other complaints, modifying factors or associated symptoms. I have reviewed the following from the nursing documentation.     Past Medical History:   Diagnosis Date    Anemia     NOS    Arthritis     knee     Atrial fibrillation (HCC)     on coumadin    Chronic kidney disease (CKD), stage II (mild)     Community acquired pneumonia of left lower lobe of lung 12/26/2017    Depression     Diverticulosis     Foot pain     GI bleed 4/18/2018    Due to esophageal tear     Gout     Hemorrhoids     Hyperlipidemia     Hypertension     Hyperuricemia     Iron deficiency anemia 1/8/2014    Iron deficiency anemia-s/p EGD and colonoscopy 2/11/2014 Esophageal tear likely source     Medical history reviewed with no changes     Menopausal syndrome     Obesity     Pelvic relaxation     PONV (postoperative nausea and vomiting)     Stress 8/2006    NL stress    Syncope     Unspecified sleep apnea 03/2002    uvulectomy -hx of    Vitamin D deficiency     20ng/ml 6/2009    Wears dentures     Wears glasses      Past Surgical History:   Procedure Laterality Date   [facility] Patient was found to have an elevated BNP at 2448, negative lactic acid, troponin 0.01, PTT 25.5 and INR 2.18, GFR 48. EKG consistent with rate controlled A. fib. Chest x-ray showed cardiomegaly with mild vascular congestion. Labs and imaging reviewed and results discussed with patient. Patient was given 40 mg of IV Lasix in the ED with good symptomatic relief. Patient was reassessed as noted above . Due to the new onset CHF I believe admission is warranted. I Spoke to Dr. Ron Lin agreed to accept patient. Plan of care discussed with patient and family. Patient and family in agreement with plan. CLINICAL IMPRESSION:  1. New onset of congestive heart failure (HCC)        BP (!) 149/94   Pulse 77   Temp 97.6 °F (36.4 °C) (Oral)   Resp 19   Ht 5' 6\" (1.676 m)   Wt 280 lb (127 kg)   SpO2 93%   BMI 45.19 kg/m²     Patient was given scripts for the following medications. I counseled patient how to take these medications. New Prescriptions    No medications on file       CLINICAL IMPRESSION  1. New onset of congestive heart failure (HCC)        Blood pressure (!) 164/106, pulse 94, temperature 97.6 °F (36.4 °C), temperature source Oral, resp. rate 16, height 5' 6\" (1.676 m), weight 280 lb (127 kg), SpO2 96 %, not currently breastfeeding. DISPOSITION  Juany Larkin was admitted in stable condition.       Willam Sandifer, DO  Resident  10/10/20 9860

## 2020-10-10 NOTE — ED NOTES
1256 - PS to hospitalists  Re:  New onset chf  1306m - Dr Kelly Francisco called back to speak with Dr Mora Massey (resident working with Dr Jim Gray)     [facility]  10/10/20 4043

## 2020-10-10 NOTE — H&P
HOSPITAL MEDICINE     History & Physical        Patient:  Shamika Sanchez  YOB: 1940    MRN: [id number]     Acct: [de-identified]    PCP: Carlos Sebastian MD    Date of Admission: 10/10/2020    Date of Service:   Pt seen/examined on 10/10/2020     Admitted to Inpatient with expected LOS greater than two midnights due to medical therapy. History obtained from reviewing the medical record and patient/family Interview. Assessment:     Shamika Sanchez is a 79 y.o. female who presented/brought to  on 10/10/2020  For  chf     1. Acute diastolic CHF, last echocardiogram 9/2020 revealed EF of 50 to 55%, RVSP 39 mmHg. 2.      comorbidities:    · Atrial fibrillation with controlled ventricular rate, anticoagulated with Coumadin  · Mood disorder, on Zoloft  · Hyperuricemia, on allopurinol  · Dyslipidemia, on a statin,  ·     Plan:  1. Admit to acute care unit  2. Respiratory care protocol, continue IV diuresis, monitor I's and O's, monitor body weight. 3. Pharmacy to manage Coumadin continue on diltiazem for rate control, cardiology consult. 4.        Code Status: Full Code          DVT prophylaxis: [] Lovenox                                 [] SCDs                                 [] SQ Heparin                                 [] Encourage ambulation           [x] Already on Anticoagulation     Disposition:    [x] Home       [] TCU       [] Rehab       [] Psych       [] SNF       [] [place]       [] Other-    -------------------------------------------------------------  Chief Complaint:   Arlis Gores of breath      History Of Present Illness:       79 y.o. female who presented to ED with with a complaint of shortness of breath. Symptoms has been gradual but progressive in the last 2 weeks. Symptoms worsen with exertion, laying flat on her back. No relieving factor (she tried to sleep on a chair with suboptimal result). She denied associated chest pain or palpitation.   She also had associated orthopnea and PND. No cough, fever or chills. Patient has history of atrial fibrillation, seen by cardiology last month, echocardiogram obtained at that time showed EF in the 50% range      In the ED chest x-ray suggested pulmonary edema, received IV diuretics, hospitalist was consulted for admission.           Past Medical History:          Diagnosis Date    Anemia     NOS    Arthritis     knee     Atrial fibrillation (HCC)     on coumadin    Chronic kidney disease (CKD), stage II (mild)     Community acquired pneumonia of left lower lobe of lung 12/26/2017    Depression     Diverticulosis     Foot pain     GI bleed 4/18/2018    Due to esophageal tear     Gout     Hemorrhoids     Hyperlipidemia     Hypertension     Hyperuricemia     Iron deficiency anemia 1/8/2014    Iron deficiency anemia-s/p EGD and colonoscopy 2/11/2014 Esophageal tear likely source     Medical history reviewed with no changes     Menopausal syndrome     Obesity     Pelvic relaxation     PONV (postoperative nausea and vomiting)     Stress 8/2006    NL stress    Syncope     Unspecified sleep apnea 03/2002    uvulectomy -hx of    Vitamin D deficiency     20ng/ml 6/2009    Wears dentures     Wears glasses        Past Surgical History:          Procedure Laterality Date    BLADDER SUSPENSION  1997    CHOLECYSTECTOMY  1974    COLONOSCOPY      2013    COLONOSCOPY  11/2014    10yr    EYE SURGERY      macular tear and cataract right    HYSTERECTOMY  1977    partial    JOINT REPLACEMENT Right 2017    KNEE ARTHROSCOPY  2005    knee surgery    PAIN MANAGEMENT PROCEDURE Left 8/11/2020    LEFT LUMBAR THREE AND LUMBAR FOUR TRANSFORAMINAL EPIDURAL STEROID INJECTION SITE CONFIRMED BY FLUOROSCOPY performed by Cosme Severe, MD at [address] [facility] N/A 8/25/2020    MIDLINE LUMBAR FIVE SACRAL ONE INTERLAMINAR EPIDURAL STEROID INJECTION SITE CONFIRMED BY FLUOROSCOPY performed by Harshil Rizo MD at [address] [facility]  3/2002       Medications Prior to Admission:      Prior to Admission medications    Medication Sig Start Date End Date Taking? Authorizing Provider   clindamycin (CLEOCIN) 300 MG capsule Take 2 tabs 1 hr before procedure 8/12/20   Dipika Leal MD   atorvastatin (LIPITOR) 20 MG tablet TAKE ONE TABLET BY MOUTH ONCE NIGHTLY 8/7/20   Dipika Leal MD   allopurinol (ZYLOPRIM) 300 MG tablet TAKE ONE TABLET BY MOUTH DAILY 3/16/20   Dipika Leal MD   dilTIAZem (CARTIA XT) 300 MG extended release capsule Take 1 capsule by mouth daily 2/20/20   Chucky Walter MD   dilTIAZem [facility]) 300 MG extended release capsule TAKE ONE CAPSULE BY MOUTH DAILY 2/20/20   RADHA Heaton CNP   warfarin (COUMADIN) 1 MG tablet TAKE ONE TABLET BY MOUTH ON SUNDAY, TUESDAY AND THURSDAY. TAKE ONE-HALF TABLET BY MOUTH ON ALL OTHER DAYS OR AS DIRECTED BY CLINIC 2/10/20   RADHA Heaton CNP   omeprazole (PRILOSEC) 20 MG delayed release capsule TAKE ONE CAPSULE BY MOUTH DAILY 11/13/19   Dipika Leal MD   sertraline (ZOLOFT) 50 MG tablet TAKE ONE TABLET BY MOUTH DAILY 11/13/19   Dipika Leal MD   metoprolol tartrate (LOPRESSOR) 25 MG tablet Take 1 tablet by mouth daily 9/30/19   Chucky Walter MD   Cholecalciferol (VITAMIN D) 2000 UNITS CAPS capsule Take 2,000 Units by mouth daily    Historical Provider, MD   docusate sodium (COLACE) 100 MG capsule Take 100 mg by mouth daily    Historical Provider, MD       Allergies:  Diclofenac; Adhesive tape; and Penicillins    Social History:          TOBACCO:   reports that she has never smoked. She has never used smokeless tobacco.  ETOH:   reports no history of alcohol use. Family History:       Reviewed in detail .  Positive as follows:          Problem Relation Age of Onset    Heart Attack Father     Heart Disease Father     Stroke Brother        Diet:  No diet orders on file    REVIEW OF SYSTEMS: Pertinent positives as noted in the HPI. All other systems reviewed and negative. PHYSICAL EXAM:    BP (!) 144/83   Pulse 74   Temp 97.6 °F (36.4 °C) (Oral)   Resp 19   Ht 5' 6\" (1.676 m)   Wt 280 lb (127 kg)   SpO2 92%   BMI 45.19 kg/m²         Vitals:    10/10/20 1315 10/10/20 1345 10/10/20 1415 10/10/20 1445   BP: (!) 154/90 (!) 149/94 (!) 161/91 (!) 144/83   Pulse: 81 77 87 74   Resp: 18 19 22 19   Temp:       TempSrc:       SpO2: 92% 93% 92% 92%   Weight:       Height:           General appearance:  No apparent distress, appears stated age and cooperative. HEENT:  Normal cephalic, atraumatic without obvious deformity. Pupils equal, round, and reactive to light. Extra ocular muscles intact. Conjunctivae/corneas clear. Neck: Supple, with full range of motion. No jugular venous distention. Trachea midline. Respiratory: Reduced air entry in the bases with Rales cardiovascular:  Regular rate and rhythm with normal S1/S2 without murmurs, rubs or gallops. Abdomen: Soft, non-tender, non-distended with normal bowel sounds. Musculoskeletal: 2+ edema bilateral y. Full range of motion without deformity. Skin: Skin color, texture, turgor normal.  No rashes or lesions. Neurologic:  Neurovascularly intact without any focal sensory/motor deficits.  Cranial nerves: II-XII intact, grossly non-focal.  Psychiatric:  Alert and oriented, thought content appropriate, normal insight  Capillary Refill: Brisk,< 3 seconds   Peripheral Pulses: +2 palpable, equal bilaterally       Labs:     Recent Labs     10/10/20  1135   WBC 7.1   HGB 12.8   HCT 38.9        Recent Labs     10/10/20  1135      K 4.5      CO2 23   BUN 22*   CREATININE 1.1   CALCIUM 8.7     Recent Labs     10/10/20  1135   AST 20   ALT 9*   BILITOT 0.8   ALKPHOS 60     Recent Labs     10/10/20  1135   INR 2.18*     Recent Labs     10/10/20  1135   TROPONINI <0.01       Urinalysis:      Lab Results   Component Value Date    NITRU Negative 01/19/2017    WBCUA 3-5 01/19/2017    BACTERIA Rare 01/19/2017    RBCUA 0-2 01/19/2017    BLOODU TRACE-INTACT 01/19/2017    SPECGRAV 1.020 01/19/2017    GLUCOSEU Negative 01/19/2017       Radiology:     CXR: I have reviewed the CXR with the following interpretation: Vascular congestion   EKG:  I have reviewed the EKG with the following interpretation:  A. fib, controlled ventricular rate, specific ST anterior changes      XR CHEST PORTABLE   Final Result   Cardiomegaly with mild vascular congestion. No acute finding or significant   change. Thank you Dany Granados MD for the opportunity to be involved in this patient's care.     Electronically signed by Kole Weston MD on 10/10/2020 at 2:50 PM

## 2020-10-10 NOTE — ED NOTES
Bed: 04  Expected date:   Expected time:   Means of arrival:   Comments:  [place] [address], RN  10/10/20 1128

## 2020-10-10 NOTE — ED NOTES
Purewick placed and connected to continuous moderate wall suction. Pt denies discomfort.      Lilliam Harrington RN  10/10/20 1479

## 2020-10-10 NOTE — ED PROVIDER NOTES
I interviewed and examined this patient with Dr. Malka Mchugh, resident. Please see his note for details of H&P. Lee Morse is an 79year old female with a history of atrial fibrillation; she takes coumadin and Metoprolol. No recent changes in medications. For the past few days she has been increasingly more short of breath with minimal exertion. She is unable to lie down flat, but she denies chest pain. Her cardiologist is Dr. Rehana Jaimes. Yesterday she went to his office for an EKG, but did not see the doctor. She feels that she has been in almost continuous atrial fibrillation the last few days, which is unusual for her. She denies chest pain or peripheral edema. BP (!) 141/87   Pulse 84   Temp 97.6 °F (36.4 °C) (Oral)   Resp 17   Ht 5' 6\" (1.676 m)   Wt 280 lb (127 kg)   SpO2 96%   BMI 45.19 kg/m²     I have reviewed the following from the nursing documentation:      Prior to Admission medications    Medication Sig Start Date End Date Taking? Authorizing Provider   clindamycin (CLEOCIN) 300 MG capsule Take 2 tabs 1 hr before procedure 8/12/20   Osmel Goodman MD   atorvastatin (LIPITOR) 20 MG tablet TAKE ONE TABLET BY MOUTH ONCE NIGHTLY 8/7/20   Osmel Goodman MD   allopurinol (ZYLOPRIM) 300 MG tablet TAKE ONE TABLET BY MOUTH DAILY 3/16/20   Osmel Goodman MD   dilTIAZem (CARTIA XT) 300 MG extended release capsule Take 1 capsule by mouth daily 2/20/20   MD melina [facility]) 300 MG extended release capsule TAKE ONE CAPSULE BY MOUTH DAILY 2/20/20   RADHA Street CNP   warfarin (COUMADIN) 1 MG tablet TAKE ONE TABLET BY MOUTH ON SUNDAY, TUESDAY AND THURSDAY.   TAKE ONE-HALF TABLET BY MOUTH ON ALL OTHER DAYS OR AS DIRECTED BY CLINIC 2/10/20   RADHA Street CNP   omeprazole (PRILOSEC) 20 MG delayed release capsule TAKE ONE CAPSULE BY MOUTH DAILY 11/13/19   Osmel Goodman MD   sertraline (ZOLOFT) 50 MG tablet TAKE ONE TABLET BY MOUTH DAILY 11/13/19   Martha Misael Schaefer MD   metoprolol tartrate (LOPRESSOR) 25 MG tablet Take 1 tablet by mouth daily 9/30/19   Suzie Jo MD   Cholecalciferol (VITAMIN D) 2000 UNITS CAPS capsule Take 2,000 Units by mouth daily    Historical Provider, MD   docusate sodium (COLACE) 100 MG capsule Take 100 mg by mouth daily    Historical Provider, MD       Allergies as of 10/10/2020 - Review Complete 10/10/2020   Allergen Reaction Noted    Diclofenac Hives 01/06/2014    Adhesive tape Rash 01/13/2016    Penicillins Nausea And Vomiting 06/12/2010       Past Medical History:   Diagnosis Date    Anemia     NOS    Arthritis     knee     Atrial fibrillation (HCC)     on coumadin    Chronic kidney disease (CKD), stage II (mild)     Community acquired pneumonia of left lower lobe of lung 12/26/2017    Depression     Diverticulosis     Foot pain     GI bleed 4/18/2018    Due to esophageal tear     Gout     Hemorrhoids     Hyperlipidemia     Hypertension     Hyperuricemia     Iron deficiency anemia 1/8/2014    Iron deficiency anemia-s/p EGD and colonoscopy 2/11/2014 Esophageal tear likely source     Medical history reviewed with no changes     Menopausal syndrome     Obesity     Pelvic relaxation     PONV (postoperative nausea and vomiting)     Stress 8/2006    NL stress    Syncope     Unspecified sleep apnea 03/2002    uvulectomy -hx of    Vitamin D deficiency     20ng/ml 6/2009    Wears dentures     Wears glasses         Surgical History:   Past Surgical History:   Procedure Laterality Date    BLADDER SUSPENSION  1997    CHOLECYSTECTOMY  1974    COLONOSCOPY      2013    COLONOSCOPY  11/2014    10yr    EYE SURGERY      macular tear and cataract right    HYSTERECTOMY  1977    partial    JOINT REPLACEMENT Right 2017    KNEE ARTHROSCOPY  2005    knee surgery    PAIN MANAGEMENT PROCEDURE Left 8/11/2020    LEFT LUMBAR THREE AND LUMBAR FOUR TRANSFORAMINAL EPIDURAL STEROID INJECTION SITE CONFIRMED BY FLUOROSCOPY performed by Isa Salazar MD at [address] [facility] N/A 8/25/2020    MIDLINE LUMBAR FIVE SACRAL ONE INTERLAMINAR EPIDURAL STEROID INJECTION SITE CONFIRMED BY FLUOROSCOPY performed by Isa Salazar MD at [address] [facility]  3/2002        Family History:    Family History   Problem Relation Age of Onset    Heart Attack Father     Heart Disease Father     Stroke Brother        Social History     Socioeconomic History    Marital status:      Spouse name: Not on file    Number of children: Not on file    Years of education: Not on file    Highest education level: Not on file   Occupational History    Not on file   Social Needs    Financial resource strain: Not on file    Food insecurity     Worry: Not on file     Inability: Not on file    Transportation needs     Medical: Not on file     Non-medical: Not on file   Tobacco Use    Smoking status: Never Smoker    Smokeless tobacco: Never Used   Substance and Sexual Activity    Alcohol use: No    Drug use: Never    Sexual activity: Not on file   Lifestyle    Physical activity     Days per week: Not on file     Minutes per session: Not on file    Stress: Not on file   Relationships    Social connections     Talks on phone: Not on file     Gets together: Not on file     Attends [employer] service: Not on file     Active member of club or organization: Not on file     Attends meetings of clubs or organizations: Not on file     Relationship status: Not on file    Intimate partner violence     Fear of current or ex partner: Not on file     Emotionally abused: Not on file     Physically abused: Not on file     Forced sexual activity: Not on file   Other Topics Concern    Not on file   Social History Narrative    Not on file         Review of Systems   Constitutional: Positive for fatigue.  Negative for activity change, appetite change, chills, diaphoresis, fever and unexpected weight and Rhythm: Normal rate and regular rhythm. Heart sounds: Normal heart sounds. No murmur. No friction rub. No gallop. Pulmonary:      Effort: Pulmonary effort is normal. No respiratory distress. Breath sounds: No stridor. Examination of the right-lower field reveals rales. Examination of the left-lower field reveals rales. Rales present. No wheezing. Chest:      Chest wall: No tenderness. Abdominal:      General: Bowel sounds are normal. There is no distension. Palpations: Abdomen is soft. There is no mass. Tenderness: There is no abdominal tenderness. There is no guarding or rebound. Musculoskeletal: Normal range of motion. General: No tenderness. Right lower leg: Edema present. Left lower leg: Edema present. Comments: Trace ankle edema bilaterally. Lymphadenopathy:      Cervical: No cervical adenopathy. Skin:     Findings: No rash. Neurological:      Mental Status: She is alert and oriented to person, place, and time. Cranial Nerves: No cranial nerve deficit. Motor: No abnormal muscle tone. Coordination: Coordination normal.      Deep Tendon Reflexes: Reflexes normal.   Psychiatric:         Behavior: Behavior normal.         Thought Content:  Thought content normal.         Judgment: Judgment normal.          Procedures     MDM   Results for orders placed or performed during the hospital encounter of 10/10/20   CBC Auto Differential   Result Value Ref Range    WBC 7.1 4.0 - 11.0 K/uL    RBC 3.84 (L) 4.00 - 5.20 M/uL    Hemoglobin 12.8 12.0 - 16.0 g/dL    Hematocrit 38.9 36.0 - 48.0 %    .3 (H) 80.0 - 100.0 fL    MCH 33.4 26.0 - 34.0 pg    MCHC 33.0 31.0 - 36.0 g/dL    RDW 16.9 (H) 12.4 - 15.4 %    Platelets 669 284 - 452 K/uL    MPV 7.8 5.0 - 10.5 fL    Neutrophils % 75.8 %    Lymphocytes % 17.0 %    Monocytes % 5.8 %    Eosinophils % 0.7 %    Basophils % 0.7 %    Neutrophils Absolute 5.4 1.7 - 7.7 K/uL    Lymphocytes Absolute 1.2 1.0 - 5.1 K/uL    Monocytes Absolute 0.4 0.0 - 1.3 K/uL    Eosinophils Absolute 0.1 0.0 - 0.6 K/uL    Basophils Absolute 0.0 0.0 - 0.2 K/uL   Comprehensive Metabolic Panel   Result Value Ref Range    Sodium 138 136 - 145 mmol/L    Potassium 4.5 3.5 - 5.1 mmol/L    Chloride 104 99 - 110 mmol/L    CO2 23 21 - 32 mmol/L    Anion Gap 11 3 - 16    Glucose 113 (H) 70 - 99 mg/dL    BUN 22 (H) 7 - 20 mg/dL    CREATININE 1.1 0.6 - 1.2 mg/dL    GFR Non- 48 (A) >60    GFR  58 (A) >60    Calcium 8.7 8.3 - 10.6 mg/dL    Total Protein 6.9 6.4 - 8.2 g/dL    Alb 4.2 3.4 - 5.0 g/dL    Albumin/Globulin Ratio 1.6 1.1 - 2.2    Total Bilirubin 0.8 0.0 - 1.0 mg/dL    Alkaline Phosphatase 60 40 - 129 U/L    ALT 9 (L) 10 - 40 U/L    AST 20 15 - 37 U/L    Globulin 2.7 g/dL   Protime-INR   Result Value Ref Range    Protime 25.5 (H) 10.0 - 13.2 sec    INR 2.18 (H) 0.86 - 1.14   Brain Natriuretic Peptide   Result Value Ref Range    Pro-BNP 2,448 (H) 0 - 449 pg/mL   Lactic Acid, Plasma   Result Value Ref Range    Lactic Acid 1.3 0.4 - 2.0 mmol/L   Troponin   Result Value Ref Range    Troponin <0.01 <0.01 ng/mL   EKG 12 Lead   Result Value Ref Range    Ventricular Rate 86 BPM    Atrial Rate 117 BPM    QRS Duration 86 ms    Q-T Interval 368 ms    QTc Calculation (Bazett) 440 ms    R Axis 22 degrees    T Axis 13 degrees    Diagnosis       Atrial fibrillationNonspecific ST and T wave abnormality , probably digitalis effectAbnormal ECGWhen compared with ECG of 06-FEB-2018 06:13,Previous ECG has undetermined rhythm, needs reviewNonspecific T wave abnormality now evident in Inferior leadsNonspecific T wave abnormality now evident in Anterior leadsNonspecific T wave abnormality no longer evident in Lateral leads       We've decided to admit Milly Real for further observation and evaluation of Samul Wade Robb's dyspnea.   As I have deemed necessary from their history, physical, and studies, I have considered and evaluated Eliud Tran for the following diagnoses:  ACUTE CORONARY SYNDROME, CHRONIC OBSTRUCTIVE PULMONARY DISEASE, CONGESTIVE HEART FAILURE, PERICARDIAL TAMPONADE, PNEUMONIA, PNEUMOTHORAX, PULMONARY EMBOLISM, SEPSIS, and THORACIC DISSECTION. FINAL IMPRESSION  1. New onset of congestive heart failure (HCC)        Vitals:  Blood pressure (!) 140/111, pulse 99, temperature 97.6 °F (36.4 °C), temperature source Oral, resp. rate 23, height 5' 6\" (1.676 m), weight 280 lb (127 kg), SpO2 95 %, not currently breastfeeding. Radiology  Dexa Bone Density Axial Skeleton    Result Date: 9/30/2020  Osteopenia by WHO criteria. Xr Chest Portable    Result Date: 10/10/2020  Cardiomegaly with mild vascular congestion. No acute finding or significant change. EKG Interpretation. The Ekg interpreted by me in the absence of a cardiologist shows. atrial fibrillation with a rate of 86  Axis is   Normal  QTc is  within an acceptable range  Intervals and Durations are unremarkable. No specific ST-T wave changes appreciated. No evidence of acute ischemia. No significant change from prior EKG dated 9 October 2020.           Johnson Sen MD  10/10/20 [address], MD  10/10/20 3640

## 2020-10-10 NOTE — CONSULTS
Pharmacy to Dose Warfarin     Dx: Afib  Goal INR range 2-3   Home Warfarin dose: 1 mg daily       Date                 INR                  Warfarin  10/10              2.18                    1 mg     Recommend Warfarin 1 mg tonight x1. Daily INR ordered. Rx will continue to manage therapy per consult order.   Crispin Lopez PharmD  10/10/2020 at 5:58 PM

## 2020-10-10 NOTE — ED TRIAGE NOTES
Sriram Linda is a 80 y/o F who presents to the ED via EMS for SOB. Pt reports symptoms began months ago but has progressively gotten worse. Pt also reports Hx of A-fib. Pt received 1 duoneb via EMS en route to [facility]. Pt denies smoking Hx, resp Hx, recent dietary/medication changes, CP. Pt resting in bed with call light within reach and updated on plan of care; pending lab results, pending x-ray results. Pt denies any needs at this time.

## 2020-10-11 LAB
ALBUMIN SERPL-MCNC: 4.6 G/DL (ref 3.4–5)
ANION GAP SERPL CALCULATED.3IONS-SCNC: 13 MMOL/L (ref 3–16)
BASOPHILS ABSOLUTE: 0.1 K/UL (ref 0–0.2)
BASOPHILS RELATIVE PERCENT: 0.7 %
BUN BLDV-MCNC: 33 MG/DL (ref 7–20)
CALCIUM SERPL-MCNC: 9.3 MG/DL (ref 8.3–10.6)
CHLORIDE BLD-SCNC: 99 MMOL/L (ref 99–110)
CHOLESTEROL, TOTAL: 141 MG/DL (ref 0–199)
CO2: 29 MMOL/L (ref 21–32)
CREAT SERPL-MCNC: 1.5 MG/DL (ref 0.6–1.2)
EKG ATRIAL RATE: 117 BPM
EKG DIAGNOSIS: NORMAL
EKG Q-T INTERVAL: 368 MS
EKG QRS DURATION: 86 MS
EKG QTC CALCULATION (BAZETT): 440 MS
EKG R AXIS: 22 DEGREES
EKG T AXIS: 13 DEGREES
EKG VENTRICULAR RATE: 86 BPM
EOSINOPHILS ABSOLUTE: 0.1 K/UL (ref 0–0.6)
EOSINOPHILS RELATIVE PERCENT: 1.6 %
GFR AFRICAN AMERICAN: 40
GFR NON-AFRICAN AMERICAN: 33
GLUCOSE BLD-MCNC: 101 MG/DL (ref 70–99)
HCT VFR BLD CALC: 44.6 % (ref 36–48)
HDLC SERPL-MCNC: 66 MG/DL (ref 40–60)
HEMOGLOBIN: 14.9 G/DL (ref 12–16)
INR BLD: 2.15 (ref 0.86–1.14)
LDL CHOLESTEROL CALCULATED: 56 MG/DL
LYMPHOCYTES ABSOLUTE: 1.4 K/UL (ref 1–5.1)
LYMPHOCYTES RELATIVE PERCENT: 16.4 %
MAGNESIUM: 1.1 MG/DL (ref 1.8–2.4)
MCH RBC QN AUTO: 34.1 PG (ref 26–34)
MCHC RBC AUTO-ENTMCNC: 33.3 G/DL (ref 31–36)
MCV RBC AUTO: 102.4 FL (ref 80–100)
MONOCYTES ABSOLUTE: 0.6 K/UL (ref 0–1.3)
MONOCYTES RELATIVE PERCENT: 6.6 %
NEUTROPHILS ABSOLUTE: 6.4 K/UL (ref 1.7–7.7)
NEUTROPHILS RELATIVE PERCENT: 74.7 %
PDW BLD-RTO: 17.2 % (ref 12.4–15.4)
PHOSPHORUS: 4.6 MG/DL (ref 2.5–4.9)
PLATELET # BLD: 195 K/UL (ref 135–450)
PMV BLD AUTO: 8.4 FL (ref 5–10.5)
POTASSIUM SERPL-SCNC: 3.8 MMOL/L (ref 3.5–5.1)
PROTHROMBIN TIME: 25.1 SEC (ref 10–13.2)
RBC # BLD: 4.36 M/UL (ref 4–5.2)
SODIUM BLD-SCNC: 141 MMOL/L (ref 136–145)
TRIGL SERPL-MCNC: 97 MG/DL (ref 0–150)
VLDLC SERPL CALC-MCNC: 19 MG/DL
WBC # BLD: 8.6 K/UL (ref 4–11)

## 2020-10-11 PROCEDURE — 80061 LIPID PANEL: CPT

## 2020-10-11 PROCEDURE — 80069 RENAL FUNCTION PANEL: CPT

## 2020-10-11 PROCEDURE — 83735 ASSAY OF MAGNESIUM: CPT

## 2020-10-11 PROCEDURE — 85610 PROTHROMBIN TIME: CPT

## 2020-10-11 PROCEDURE — 93010 ELECTROCARDIOGRAM REPORT: CPT | Performed by: INTERNAL MEDICINE

## 2020-10-11 PROCEDURE — 1200000000 HC SEMI PRIVATE

## 2020-10-11 PROCEDURE — 36415 COLL VENOUS BLD VENIPUNCTURE: CPT

## 2020-10-11 PROCEDURE — 6360000002 HC RX W HCPCS

## 2020-10-11 PROCEDURE — 2580000003 HC RX 258: Performed by: HOSPITALIST

## 2020-10-11 PROCEDURE — 6370000000 HC RX 637 (ALT 250 FOR IP): Performed by: HOSPITALIST

## 2020-10-11 PROCEDURE — 6360000002 HC RX W HCPCS: Performed by: HOSPITALIST

## 2020-10-11 PROCEDURE — 85025 COMPLETE CBC W/AUTO DIFF WBC: CPT

## 2020-10-11 PROCEDURE — 6370000000 HC RX 637 (ALT 250 FOR IP): Performed by: INTERNAL MEDICINE

## 2020-10-11 PROCEDURE — 99223 1ST HOSP IP/OBS HIGH 75: CPT | Performed by: INTERNAL MEDICINE

## 2020-10-11 RX ORDER — WARFARIN SODIUM 1 MG/1
1.5 TABLET ORAL
Status: COMPLETED | OUTPATIENT
Start: 2020-10-11 | End: 2020-10-11

## 2020-10-11 RX ORDER — FUROSEMIDE 10 MG/ML
40 INJECTION INTRAMUSCULAR; INTRAVENOUS DAILY
Status: DISCONTINUED | OUTPATIENT
Start: 2020-10-12 | End: 2020-10-12

## 2020-10-11 RX ORDER — FUROSEMIDE 10 MG/ML
INJECTION INTRAMUSCULAR; INTRAVENOUS
Status: COMPLETED
Start: 2020-10-11 | End: 2020-10-11

## 2020-10-11 RX ORDER — LISINOPRIL 10 MG/1
10 TABLET ORAL DAILY
Status: DISCONTINUED | OUTPATIENT
Start: 2020-10-11 | End: 2020-10-12

## 2020-10-11 RX ORDER — MAGNESIUM SULFATE IN WATER 40 MG/ML
4 INJECTION, SOLUTION INTRAVENOUS ONCE
Status: COMPLETED | OUTPATIENT
Start: 2020-10-11 | End: 2020-10-11

## 2020-10-11 RX ADMIN — ACETAMINOPHEN 650 MG: 325 TABLET ORAL at 11:16

## 2020-10-11 RX ADMIN — Medication 10 ML: at 20:51

## 2020-10-11 RX ADMIN — SERTRALINE HYDROCHLORIDE 50 MG: 50 TABLET, FILM COATED ORAL at 20:51

## 2020-10-11 RX ADMIN — Medication 10 ML: at 10:02

## 2020-10-11 RX ADMIN — MAGNESIUM SULFATE HEPTAHYDRATE 4 G: 40 INJECTION, SOLUTION INTRAVENOUS at 12:19

## 2020-10-11 RX ADMIN — PANTOPRAZOLE SODIUM 40 MG: 40 TABLET, DELAYED RELEASE ORAL at 05:42

## 2020-10-11 RX ADMIN — DILTIAZEM HYDROCHLORIDE 300 MG: 180 CAPSULE, COATED, EXTENDED RELEASE ORAL at 10:02

## 2020-10-11 RX ADMIN — LISINOPRIL 10 MG: 10 TABLET ORAL at 20:51

## 2020-10-11 RX ADMIN — ATORVASTATIN CALCIUM 20 MG: 10 TABLET, FILM COATED ORAL at 20:51

## 2020-10-11 RX ADMIN — FUROSEMIDE 40 MG: 10 INJECTION, SOLUTION INTRAMUSCULAR; INTRAVENOUS at 10:58

## 2020-10-11 RX ADMIN — WARFARIN SODIUM 1.5 MG: 1 TABLET ORAL at 18:29

## 2020-10-11 RX ADMIN — ALLOPURINOL 300 MG: 300 TABLET ORAL at 10:02

## 2020-10-11 RX ADMIN — METOPROLOL TARTRATE 25 MG: 25 TABLET, FILM COATED ORAL at 10:02

## 2020-10-11 RX ADMIN — Medication 2000 UNITS: at 10:02

## 2020-10-11 ASSESSMENT — PAIN SCALES - GENERAL
PAINLEVEL_OUTOF10: 0
PAINLEVEL_OUTOF10: 3
PAINLEVEL_OUTOF10: 3

## 2020-10-11 ASSESSMENT — PAIN DESCRIPTION - PAIN TYPE: TYPE: CHRONIC PAIN

## 2020-10-11 ASSESSMENT — PAIN DESCRIPTION - LOCATION: LOCATION: HIP

## 2020-10-11 NOTE — PLAN OF CARE
Education Time and Topics, please see Education Tab    Progressive Mobility Assessment:  What is this patient's Current Level of Mobility?: Ambulatory- with Assistance  How was this patient Mobilized today?: Edge of Bed, Up to Chair, Bedside Commode,  Up to Toilet/Shower, Up in Room, and Up in Hallway                 With Whom? Nurse, PCA, PT, and OT                 Level of Difficulty/Assistance: 1x Assist     Pt resting in bed at this time on room air. Pt with complaints of shortness of breath. Pt without lower extremity edema.      Patient and/or Family's stated Goal of Care this Admission: reduce shortness of breath, increase activity tolerance, better understand heart failure and disease management, be more comfortable, and reduce lower extremity edema prior to discharge        :

## 2020-10-11 NOTE — PROGRESS NOTES
Improved air entry  Gastrointestinal: Soft, non tender, non distend, . Musculoskeletal: No edema. Warm  Neuro: No focal deficit. Moves extremity spontaneously. Psychiatry: Appropriate affect. Not agitated. Medications: Personally reviewed reviewed        Intake/Output Summary (Last 24 hours) at 10/11/2020 1302  Last data filed at 10/11/2020 0958  Gross per 24 hour   Intake 600 ml   Output 3700 ml   Net -3100 ml             Labs:   Recent Labs     10/10/20  1135 10/11/20  0839   WBC 7.1 8.6   HGB 12.8 14.9   HCT 38.9 44.6    195     Recent Labs     10/10/20  1135 10/11/20  0839    141   K 4.5 3.8    99   CO2 23 29   BUN 22* 33*   CREATININE 1.1 1.5*   CALCIUM 8.7 9.3   PHOS  --  4.6     Recent Labs     10/10/20  1135   AST 20   ALT 9*   BILITOT 0.8   ALKPHOS 60     Recent Labs     10/10/20  1135 10/11/20  0839   INR 2.18* 2.15*     Recent Labs     10/10/20  1518 10/10/20  1816 10/10/20  2104   TROPONINI <0.01 <0.01 <0.01       Urinalysis:      Lab Results   Component Value Date    NITRU Negative 01/19/2017    WBCUA 3-5 01/19/2017    BACTERIA Rare 01/19/2017    RBCUA 0-2 01/19/2017    BLOODU TRACE-INTACT 01/19/2017    SPECGRAV 1.020 01/19/2017    GLUCOSEU Negative 01/19/2017       Radiology:  XR CHEST PORTABLE   Final Result   Cardiomegaly with mild vascular congestion. No acute finding or significant   change.                      Electronically signed by Selam Palmer MD on 10/11/2020 at 1:02 PM

## 2020-10-11 NOTE — PROGRESS NOTES
Physician Progress Note      Jesus Castellano  TERRANCE #:                  [id number]  :                       1940  ADMIT DATE:       10/10/2020 11:29 AM  DISCH DATE:  RESPONDING  PROVIDER #:        Elvin Veloz MD          QUERY TEXT:    Patient admitted with acute diastolic CHF, noted to have atrial fibrillation. If possible, please document in progress notes and discharge summary further   specificity regarding the type of atrial fibrillation: The medical record reflects the following:  Risk Factors: atrial fibrillation with controlled ventricular rate  Clinical Indicators: EKG- Atrial fibrillation  Treatment:  Continue Coumadin, monitoring INR, daily weight, cardiology   consult    Thank You Sheri Worthington RN, CDS [email or web address]    Chronic: nonspecific term that could be referring to paroxysmal, persistent,   or permanent  Longstanding persistent: persistent and continuous, lasting > 1 year. Paroxysmal - self-terminating or intermittent; resolves with or without   intervention within 7 days of onset; may recur with various frequency. Persistent - Fails to terminate within 7 days; Often requires meds or   cardioversion to restore to NSR. Permanent - longstanding & persistent; Medication has been ineffective in   restoring NSR &/or cardioversion is contraindicated    Definitions per MS-DRG Training Guide and Quick Reference Guide, [address] 5   Diseases and Disorders of the Circulatory System; 2019; [employer]. Software content   from the [employer]?  Advanced CDI Transformation Program  Options provided:  -- Paroxysmal Atrial Fibrillation  -- Longstanding Persistent Atrial Fibrillation  -- Permanent Atrial Fibrillation  -- Persistent Atrial Fibrillation  -- Chronic Atrial Fibrillation, unspecified  -- Other - I will add my own diagnosis  -- Disagree - Not applicable / Not valid  -- Disagree - Clinically unable to determine / Unknown  -- Refer to Clinical Documentation Reviewer    PROVIDER RESPONSE TEXT:    This patient has paroxysmal atrial fibrillation.     Query created by: Mitch Delgado on 10/11/2020 2:19 PM      Electronically signed by:  Gama Mai MD 10/11/2020 4:20 PM

## 2020-10-11 NOTE — CONSULTS
[address]  Advanced CHF/Pulmonary Hypertension   Cardiac Evaluation      Ilda Doan  YOB: 1940    Requesting PHysician:  Dr. Adis Gann      Chief Complaint   Patient presents with    Shortness of Breath     pt with sob and wheezing . Camila Talavera hx of afib. ..given one duo in route. Camila Talavera History of Present Illness:  Rani Reece is an 80 yo female with a history of atrial fibrillation, hypertension, CKD, hyperlipidemia, iron deficiency anemia who presented to the Ed with new onset shortness of breath. The shortness of breath had been worsening over the past few weeks but was much worse the last couple of days. The breathing is worse lying down or trying to sleep in the chair. She follows with Dr. Marko Tucker for her atrial fibrillation. She just had an echo 9/4/2020. She does not use oxygen at home. She has never been diagnosed with heart failure. She is treated by Dr. Marko Tucker for atrial fibrillation. Denies smoking. Has never been evaluated for sleep apnea. She was first diagnosed with AF during a routine physical with her PCP in January, 2016. She was asymptomatic at the time. She has been in persistent atrial fibrillation now for several years. Her treatment has focused on rate control and anticoagulation. She had a normal Myoview in January, 2016. She wore a Holter monitor for 24 hours February 2019 with average HR 79 with chronic atrial fibrillation, 46 pauses > 2.0 seconds, longest lasting 2.7 seconds. Her coumadin is monitored by [facility]. We are consulted for new onset heart failure. She has diuresed overnight and feels better. Denies fever, chills, nausea, vomiting, diarrhea, constipation, blood in stool. She has been given iv lasix. I/o's negative 4100 cc. She is not on diuretics at home.     Labs in ED:  Troponin < 0.01 x 3  BNP 2448  BUN/Cre 33/1.5    Echo:  9/4/2020:   Left ventricular systolic function is low normal Cristina Renee MD   2,000 Units at 10/11/20 1002    dilTIAZem (CARDIZEM CD) extended release capsule 300 mg  300 mg Oral Daily Cristina Renee MD   300 mg at 10/11/20 1002    metoprolol tartrate (LOPRESSOR) tablet 25 mg  25 mg Oral Daily Cristina Renee MD   25 mg at 10/11/20 1002    pantoprazole (PROTONIX) tablet 40 mg  40 mg Oral QAM AC Cristina Renee MD   40 mg at 10/11/20 0542    sertraline (ZOLOFT) tablet 50 mg  50 mg Oral Daily Cristina Renee MD   50 mg at 10/10/20 2035    warfarin (COUMADIN) daily dosing (placeholder)   Other RX Biju Amaya MD           Past Medical History:   Diagnosis Date    Anemia     NOS    Arthritis     knee     Atrial fibrillation ([facility] [address] [address]     on coumadin    Chronic kidney disease (CKD), stage II (mild)     Community acquired pneumonia of left lower lobe of lung 12/26/2017    Depression     Diverticulosis     Foot pain     GI bleed 4/18/2018    Due to esophageal tear     Gout     Hemorrhoids     Hyperlipidemia     Hypertension     Hyperuricemia     Iron deficiency anemia 1/8/2014    Iron deficiency anemia-s/p EGD and colonoscopy 2/11/2014 Esophageal tear likely source     Medical history reviewed with no changes     Menopausal syndrome     Obesity     Pelvic relaxation     PONV (postoperative nausea and vomiting)     Stress 8/2006    NL stress    Syncope     Unspecified sleep apnea 03/2002    uvulectomy -hx of    Vitamin D deficiency     20ng/ml 6/2009    Wears dentures     Wears glasses      Past Surgical History:   Procedure Laterality Date    BLADDER SUSPENSION  1997    CHOLECYSTECTOMY  1974    COLONOSCOPY      2013    COLONOSCOPY  11/2014    10yr    EYE SURGERY      macular tear and cataract right    HYSTERECTOMY  1977    partial    JOINT REPLACEMENT Right 2017    KNEE ARTHROSCOPY  2005    knee surgery    PAIN MANAGEMENT PROCEDURE Left 8/11/2020    LEFT LUMBAR THREE AND LUMBAR FOUR TRANSFORAMINAL EPIDURAL STEROID INJECTION SITE CONFIRMED BY FLUOROSCOPY performed by Shanelle Tinajero MD at [address] N/A 8/25/2020    MIDLINE LUMBAR FIVE SACRAL ONE INTERLAMINAR EPIDURAL STEROID INJECTION SITE CONFIRMED BY FLUOROSCOPY performed by Shanelle Tinajero MD at [facility]  3/2002     Family History   Problem Relation Age of Onset    Heart Attack Father     Heart Disease Father     Stroke Brother      Social History     Socioeconomic History    Marital status:      Spouse name: Not on file    Number of children: Not on file    Years of education: Not on file    Highest education level: Not on file   Occupational History    Not on file   Social Needs    Financial resource strain: Not on file    Food insecurity     Worry: Not on file     Inability: Not on file    Transportation needs     Medical: Not on file     Non-medical: Not on file   Tobacco Use    Smoking status: Never Smoker    Smokeless tobacco: Never Used   Substance and Sexual Activity    Alcohol use: No    Drug use: Never    Sexual activity: Not on file   Lifestyle    Physical activity     Days per week: Not on file     Minutes per session: Not on file    Stress: Not on file   Relationships    Social connections     Talks on phone: Not on file     Gets together: Not on file     Attends [employer] service: Not on file     Active member of club or organization: Not on file     Attends meetings of clubs or organizations: Not on file     Relationship status: Not on file    Intimate partner violence     Fear of current or ex partner: Not on file     Emotionally abused: Not on file     Physically abused: Not on file     Forced sexual activity: Not on file   Other Topics Concern    Not on file   Social History Narrative    Not on file       Review of Systems:   · Constitutional: there has been no unanticipated weight loss.  There's been no change in energy level, sleep pattern, or activity level.     · Eyes: No visual changes or diplopia. No scleral icterus. · ENT: No Headaches, hearing loss or vertigo. No mouth sores or sore throat. · Cardiovascular: Reviewed in HPI  · Respiratory: No cough or wheezing, no sputum production. No hematemesis. · Gastrointestinal: No abdominal pain, appetite loss, blood in stools. No change in bowel or bladder habits. · Genitourinary: No dysuria, trouble voiding, or hematuria. · Musculoskeletal:  No gait disturbance, weakness or joint complaints. · Integumentary: No rash or pruritis. · Neurological: No headache, diplopia, change in muscle strength, numbness or tingling. No change in gait, balance, coordination, mood, affect, memory, mentation, behavior. · Psychiatric: No anxiety, no depression. · Endocrine: No malaise, fatigue or temperature intolerance. No excessive thirst, fluid intake, or urination. No tremor. · Hematologic/Lymphatic: No abnormal bruising or bleeding, blood clots or swollen lymph nodes. · Allergic/Immunologic: No nasal congestion or hives. Physical Examination:    Vitals:    10/11/20 0505 10/11/20 0545 10/11/20 0945 10/11/20 1147   BP: 115/71  123/85 137/85   Pulse: 100  87 86   Resp: 18  18 17   Temp: 98.3 °F (36.8 °C)  97.8 °F (36.6 °C) 98 °F (36.7 °C)   TempSrc: Oral  Oral Oral   SpO2: 93%  93% 92%   Weight:  275 lb 8 oz (125 kg)     Height:         Body mass index is 44.47 kg/m².      Wt Readings from Last 3 Encounters:   10/11/20 275 lb 8 oz (125 kg)   10/09/20 280 lb (127 kg)   09/08/20 280 lb (127 kg)     BP Readings from Last 3 Encounters:   10/11/20 137/85   08/31/20 120/80   08/25/20 137/86     Constitutional and General Appearance:   WD/WN in NAD, obese  HEENT:  NC/AT  SILVERIO  No problems with hearing  Skin:  Warm, dry  Respiratory:  · Normal excursion and expansion without use of accessory muscles  · Resp Auscultation: Normal breath sounds without dullness  Cardiovascular:  · The apical impulses not displaced  · Heart tones are crisp and normal  · Cervical veins are not engorged  · The carotid upstroke is normal in amplitude and contour without delay or bruit  · JVP less than 8 cm H2O  Irregularly irregular rhythm with nl S1 and S2 without m,r,g  · Peripheral pulses are symmetrical and full  · There is no clubbing, cyanosis of the extremities. · Trace bilateral edema  · Femoral Arteries: 2+ and equal  · Pedal Pulses: 2+ and equal   Neck:  · No thyromegaly  Abdomen:  · No masses or tenderness  · Liver/Spleen: No Abnormalities Noted  Neurological/Psychiatric:  · Alert and oriented in all spheres  · Moves all extremities well  · Exhibits normal gait balance and coordination  · No abnormalities of mood, affect, memory, mentation, or behavior are noted    Labs were reviewed including labs from other hospital systems through [facility]. Cardiac testing was reviewed including echos, nuclear scans, cardiac catheterization, including from other hospital systems through [facility]. Assessment:    1. New onset of congestive heart failure ([address] :  diastolic    2. Chronic atrial fibrillation  3. Probable sleep apnea  4. CKD  5.  hypertension    Plan:  1. Get a [address] to rule out CAD as cause for her new onset HF  2. Continue IV lasix another day  3. Continue metoprolol and diltiazem for now  4. Start lisinopril 10 mg po qd for HF  5. Needs to be evaluated for sleep apnea as an outpatient. 6.  Consider repeat holter monitor to evaluate for worsening pauses  7. CHF education reinforced. ~salt restriction  ~fluid restriction  ~medication compliance  ~daily weights and notify of any significant weight gain/loss  ~establish with CHF nurse  ~outpatient follow-up with our CHF team          I appreciate the opportunity of cooperating in the care of this patient.     Andres Lozano M.D., [facility] [place]

## 2020-10-12 ENCOUNTER — APPOINTMENT (OUTPATIENT)
Dept: NUCLEAR MEDICINE | Age: 80
DRG: 291 | End: 2020-10-12
Payer: MEDICARE

## 2020-10-12 LAB
ALBUMIN SERPL-MCNC: 4.4 G/DL (ref 3.4–5)
ANION GAP SERPL CALCULATED.3IONS-SCNC: 13 MMOL/L (ref 3–16)
BASOPHILS ABSOLUTE: 0 K/UL (ref 0–0.2)
BASOPHILS RELATIVE PERCENT: 0.4 %
BUN BLDV-MCNC: 48 MG/DL (ref 7–20)
CALCIUM SERPL-MCNC: 9.1 MG/DL (ref 8.3–10.6)
CHLORIDE BLD-SCNC: 99 MMOL/L (ref 99–110)
CO2: 28 MMOL/L (ref 21–32)
CREAT SERPL-MCNC: 1.8 MG/DL (ref 0.6–1.2)
EOSINOPHILS ABSOLUTE: 0 K/UL (ref 0–0.6)
EOSINOPHILS RELATIVE PERCENT: 0.5 %
GFR AFRICAN AMERICAN: 33
GFR NON-AFRICAN AMERICAN: 27
GLUCOSE BLD-MCNC: 120 MG/DL (ref 70–99)
HCT VFR BLD CALC: 44.5 % (ref 36–48)
HEMOGLOBIN: 15 G/DL (ref 12–16)
INR BLD: 2.19 (ref 0.86–1.14)
LV EF: 65 %
LVEF MODALITY: NORMAL
LYMPHOCYTES ABSOLUTE: 1.3 K/UL (ref 1–5.1)
LYMPHOCYTES RELATIVE PERCENT: 14.7 %
MAGNESIUM: 2 MG/DL (ref 1.8–2.4)
MCH RBC QN AUTO: 33.6 PG (ref 26–34)
MCHC RBC AUTO-ENTMCNC: 33.6 G/DL (ref 31–36)
MCV RBC AUTO: 100.2 FL (ref 80–100)
MONOCYTES ABSOLUTE: 0.8 K/UL (ref 0–1.3)
MONOCYTES RELATIVE PERCENT: 8.6 %
NEUTROPHILS ABSOLUTE: 6.9 K/UL (ref 1.7–7.7)
NEUTROPHILS RELATIVE PERCENT: 75.8 %
PDW BLD-RTO: 17 % (ref 12.4–15.4)
PHOSPHORUS: 5.2 MG/DL (ref 2.5–4.9)
PLATELET # BLD: 220 K/UL (ref 135–450)
PMV BLD AUTO: 8.1 FL (ref 5–10.5)
POTASSIUM SERPL-SCNC: 3.8 MMOL/L (ref 3.5–5.1)
PROTHROMBIN TIME: 25.6 SEC (ref 10–13.2)
RBC # BLD: 4.45 M/UL (ref 4–5.2)
SODIUM BLD-SCNC: 140 MMOL/L (ref 136–145)
WBC # BLD: 9.1 K/UL (ref 4–11)

## 2020-10-12 PROCEDURE — 6370000000 HC RX 637 (ALT 250 FOR IP): Performed by: NURSE PRACTITIONER

## 2020-10-12 PROCEDURE — 85610 PROTHROMBIN TIME: CPT

## 2020-10-12 PROCEDURE — 6370000000 HC RX 637 (ALT 250 FOR IP): Performed by: HOSPITALIST

## 2020-10-12 PROCEDURE — 83735 ASSAY OF MAGNESIUM: CPT

## 2020-10-12 PROCEDURE — 85025 COMPLETE CBC W/AUTO DIFF WBC: CPT

## 2020-10-12 PROCEDURE — 93000 ELECTROCARDIOGRAM COMPLETE: CPT | Performed by: INTERNAL MEDICINE

## 2020-10-12 PROCEDURE — 3430000000 HC RX DIAGNOSTIC RADIOPHARMACEUTICAL: Performed by: INTERNAL MEDICINE

## 2020-10-12 PROCEDURE — 93017 CV STRESS TEST TRACING ONLY: CPT

## 2020-10-12 PROCEDURE — 6370000000 HC RX 637 (ALT 250 FOR IP): Performed by: INTERNAL MEDICINE

## 2020-10-12 PROCEDURE — 6360000002 HC RX W HCPCS: Performed by: HOSPITALIST

## 2020-10-12 PROCEDURE — 2580000003 HC RX 258: Performed by: HOSPITALIST

## 2020-10-12 PROCEDURE — 1200000000 HC SEMI PRIVATE

## 2020-10-12 PROCEDURE — 80069 RENAL FUNCTION PANEL: CPT

## 2020-10-12 PROCEDURE — 6360000002 HC RX W HCPCS: Performed by: INTERNAL MEDICINE

## 2020-10-12 PROCEDURE — A9502 TC99M TETROFOSMIN: HCPCS | Performed by: INTERNAL MEDICINE

## 2020-10-12 PROCEDURE — 36415 COLL VENOUS BLD VENIPUNCTURE: CPT

## 2020-10-12 PROCEDURE — 78452 HT MUSCLE IMAGE SPECT MULT: CPT

## 2020-10-12 PROCEDURE — 99233 SBSQ HOSP IP/OBS HIGH 50: CPT | Performed by: NURSE PRACTITIONER

## 2020-10-12 RX ORDER — WARFARIN SODIUM 1 MG/1
1.5 TABLET ORAL
Status: COMPLETED | OUTPATIENT
Start: 2020-10-12 | End: 2020-10-12

## 2020-10-12 RX ADMIN — ONDANSETRON 4 MG: 2 INJECTION INTRAMUSCULAR; INTRAVENOUS at 09:55

## 2020-10-12 RX ADMIN — REGADENOSON 0.4 MG: 0.08 INJECTION, SOLUTION INTRAVENOUS at 13:00

## 2020-10-12 RX ADMIN — METOPROLOL TARTRATE 25 MG: 25 TABLET, FILM COATED ORAL at 14:43

## 2020-10-12 RX ADMIN — WARFARIN SODIUM 1.5 MG: 1 TABLET ORAL at 18:32

## 2020-10-12 RX ADMIN — DILTIAZEM HYDROCHLORIDE 300 MG: 180 CAPSULE, COATED, EXTENDED RELEASE ORAL at 14:43

## 2020-10-12 RX ADMIN — LISINOPRIL 10 MG: 10 TABLET ORAL at 09:55

## 2020-10-12 RX ADMIN — PANTOPRAZOLE SODIUM 40 MG: 40 TABLET, DELAYED RELEASE ORAL at 04:35

## 2020-10-12 RX ADMIN — ACETAMINOPHEN 650 MG: 325 TABLET ORAL at 09:55

## 2020-10-12 RX ADMIN — METOPROLOL TARTRATE 25 MG: 25 TABLET, FILM COATED ORAL at 22:43

## 2020-10-12 RX ADMIN — TETROFOSMIN 33.9 MILLICURIE: 1.38 INJECTION, POWDER, LYOPHILIZED, FOR SOLUTION INTRAVENOUS at 12:59

## 2020-10-12 RX ADMIN — Medication 10 ML: at 09:55

## 2020-10-12 RX ADMIN — Medication 10 ML: at 21:55

## 2020-10-12 RX ADMIN — ATORVASTATIN CALCIUM 20 MG: 10 TABLET, FILM COATED ORAL at 20:39

## 2020-10-12 RX ADMIN — SERTRALINE HYDROCHLORIDE 50 MG: 50 TABLET, FILM COATED ORAL at 20:39

## 2020-10-12 RX ADMIN — ALLOPURINOL 300 MG: 300 TABLET ORAL at 09:55

## 2020-10-12 ASSESSMENT — PAIN SCALES - GENERAL
PAINLEVEL_OUTOF10: 5
PAINLEVEL_OUTOF10: 0

## 2020-10-12 ASSESSMENT — ENCOUNTER SYMPTOMS
GASTROINTESTINAL NEGATIVE: 1
RESPIRATORY NEGATIVE: 1

## 2020-10-12 NOTE — PROGRESS NOTES
A Marixa Myoview stress test was completed on this patient as ordered. The patient tolerated the procedure well. Awaiting stress imaging at this time.

## 2020-10-12 NOTE — PLAN OF CARE
Problem: Falls - Risk of:  Goal: Will remain free from falls  Description: Will remain free from falls  Outcome: Ongoing   Fall precautions in place, bed alarm on, nonskid foot wear applied, bed in lowest position, and call light within reach. Will continue to monitor. Problem: HEMODYNAMIC STATUS  Goal: Patient has stable vital signs and fluid balance  Outcome: Ongoing     Patient's EF (Ejection Fraction) is greater than 40%    Heart Failure Medications:  Diuretics[de-identified] Furosemide    (One of the following REQUIRED for EF <40%/SYSTOLIC FAILURE but MAY be used in EF% >40%/DIASTOLIC FAILURE)        ACE[de-identified] Lisinopril        ARB[de-identified] None         ARNI[de-identified] None    (Beta Blockers)  NON- Evidenced Based Beta Blocker (for EF% >40%/DIASTOLIC FAILURE): Metoprolol TARTrate- Lopressor    Evidenced Based Beta Blocker::(REQUIRED for EF% <40%/SYSTOLIC FAILURE) Metoprolol SUCCinate- Toprol XL  . .................................................................................................................................................. Patient's weights and intake/output reviewed: Yes    Patient's Last Weight: 275 lbs obtained by standing scale. Difference of 5 lbs less than last documented weight.       Intake/Output Summary (Last 24 hours) at 10/12/2020 0029  Last data filed at 10/11/2020 2039  Gross per 24 hour   Intake 700 ml   Output 2450 ml   Net -1750 ml       Comorbidities Reviewed Yes    Patient has a past medical history of Anemia, Arthritis, Atrial fibrillation ([facility] [address] Chronic kidney disease (CKD), stage II (mild), Community acquired pneumonia of left lower lobe of lung, Depression, Diverticulosis, Foot pain, GI bleed, Gout, Hemorrhoids, Hyperlipidemia, Hypertension, Hyperuricemia, Iron deficiency anemia, Medical history reviewed with no changes, Menopausal syndrome, Obesity, Pelvic relaxation, PONV (postoperative nausea and vomiting), Stress, Syncope, Unspecified sleep apnea, Vitamin D deficiency, Wears dentures, and Wears glasses. >>For CHF and Comorbidity documentation on Education Time and Topics, please see Education Tab    Progressive Mobility Assessment:  What is this patient's Current Level of Mobility?: Ambulatory- with Assistance  How was this patient Mobilized today?: Edge of Bed, Up to Chair, Bedside Commode,  Up to Toilet/Shower, Up in Room, and Up in Hallway                 With Whom? Nurse, PCA, PT, and OT                 Level of Difficulty/Assistance: 1x Assist     Pt resting in bed at this time on room air. Pt denies shortness of breath. Pt without lower extremity edema.      Patient and/or Family's stated Goal of Care this Admission: reduce shortness of breath, increase activity tolerance, better understand heart failure and disease management, be more comfortable, and reduce lower extremity edema prior to discharge        :

## 2020-10-12 NOTE — PROGRESS NOTES
[facility]  Cardiology  Progress Note    Admission date:  10/10/2020    Reason for follow up visit: CHF, AF    HPI/CC: Ilda Doan is a 79 y.o. female who was admitted 10/10/2020 for shortness of breath. Treated for CHF. Stress test today pending. Rhythm has been -140s. Subjective: Shortness of breath greatly improved, no chest pain. Vitals:  Blood pressure (!) 117/54, pulse 86, temperature 97.7 °F (36.5 °C), temperature source Oral, resp. rate 18, height 5' 6\" (1.676 m), weight 275 lb 6.4 oz (124.9 kg), SpO2 90 %, not currently breastfeeding.   Temp  Av °F (36.7 °C)  Min: 97.5 °F (36.4 °C)  Max: 98.6 °F (37 °C)  Pulse  Av.4  Min: 71  Max: 86  BP  Min: 101/63  Max: 120/78  SpO2  Av.1 %  Min: 90 %  Max: 96 %    24 hour I/O    Intake/Output Summary (Last 24 hours) at 10/12/2020 1510  Last data filed at 10/12/2020 1119  Gross per 24 hour   Intake 340 ml   Output 2050 ml   Net -1710 ml     Current Facility-Administered Medications   Medication Dose Route Frequency Provider Last Rate Last Dose    warfarin (COUMADIN) tablet 1.5 mg  1.5 mg Oral Once Angelica Gibbons MD        lisinopril (PRINIVIL;ZESTRIL) tablet 10 mg  10 mg Oral Daily Steven Clayton MD   10 mg at 10/12/20 0955    sodium chloride flush 0.9 % injection 10 mL  10 mL Intravenous 2 times per day Jeraldine Closs, MD   10 mL at 10/12/20 0955    sodium chloride flush 0.9 % injection 10 mL  10 mL Intravenous PRN Jeraldine Closs, MD        acetaminophen (TYLENOL) tablet 650 mg  650 mg Oral Q6H PRN Jeraldine Closs, MD   650 mg at 10/12/20 8754    Or    acetaminophen (TYLENOL) suppository 650 mg  650 mg Rectal Q6H PRN Jeraldine Closs, MD        polyethylene glycol (GLYCOLAX) packet 17 g  17 g Oral Daily PRN Jeraldine Closs, MD        promethazine (PHENERGAN) tablet 12.5 mg  12.5 mg Oral Q6H PRN Jeraldine Closs, MD        Or    ondansetron (ZOFRAN) injection 4 mg  4 mg Intravenous Q6H PRN Jeraldine Closs, MD   4 mg at 10/12/20 0955    allopurinol (ZYLOPRIM) tablet 300 mg  300 mg Oral Daily Aravind Jo MD   300 mg at 10/12/20 0955    atorvastatin (LIPITOR) tablet 20 mg  20 mg Oral Nightly Aravind Jo MD   20 mg at 10/11/20 2051    Vitamin D (CHOLECALCIFEROL) tablet 2,000 Units  2,000 Units Oral Daily Aravind Jo MD   2,000 Units at 10/11/20 1002    dilTIAZem (CARDIZEM CD) extended release capsule 300 mg  300 mg Oral Daily Aravind Jo MD   300 mg at 10/12/20 1443    metoprolol tartrate (LOPRESSOR) tablet 25 mg  25 mg Oral Daily Aravind Jo MD   25 mg at 10/12/20 1443    pantoprazole (PROTONIX) tablet 40 mg  40 mg Oral QAM AC Aravind Jo MD   40 mg at 10/12/20 0435    sertraline (ZOLOFT) tablet 50 mg  50 mg Oral Daily Aravind Jo MD   50 mg at 10/11/20 2051    warfarin (COUMADIN) daily dosing (placeholder)   Other Bertha Ospina MD         Review of Systems   Constitutional: Negative. Respiratory: Negative. Cardiovascular: Negative. Gastrointestinal: Negative. Neurological: Negative. Objective:     Telemetry monitor: -140s    Physical Exam:  Constitutional:  Comfortable and alert, NAD, appears stated age  Eyes: PERRL, sclera nonicteric  Neck:  Supple, no masses, no thyroidmegaly, no JVD  Skin:  Warm and dry; no rash or lesions  Heart:  Irregular, normal apex, S1 and S2 normal, no M/G/R  Lungs:  Normal respiratory effort; clear; no wheezing/rhonchi/rales  Abdomen: soft, non tender, + bowel sounds  Extremities:  Trace BLE edema  Neuro: alert and oriented, moves legs and arms equally, normal mood and affect    Data Reviewed:    Stress test 10/12/2020:  Pending    Echo 9/2020:  Left ventricular systolic function is low normal with a visually estimated   ejection fraction of 50-55%. Normal wall motion   The left atrium appears moderate to severely enlarged. Mild tricuspid regurgitation.    Systolic pulmonary artery pressure (SPAP) is normal and estimated at 36

## 2020-10-12 NOTE — PLAN OF CARE
EMR and notes reviewed, attempted to see pt off floor at Nuclear medicine will attempt to see if time available

## 2020-10-12 NOTE — PROGRESS NOTES
Pharmacy to Dose Warfarin    Dx: A-fib  Goal INR range 2-3   Home Warfarin dose: 1 mg po daily    Date  INR  Warfarin  10/10              2.18                    1 mg  10/11              2.14                    1.5 mg   10/12              2.19                    1.5 mg    Recommend Warfarin 1.5  mg tonight x1. Daily INR ordered. Rx will continue to manage therapy per consult order.   Radha Allan, PharmD 10/12/2020  11:01 AM

## 2020-10-12 NOTE — PROGRESS NOTES
or gallops. Abdomen: Soft, non-tender, non-distended with normal bowel sounds. Musculoskeletal: No clubbing, cyanosis or edema bilaterally. Full range of motion without deformity. Skin: Skin color, texture, turgor normal.  No rashes or lesions. Neurologic:  Neurovascularly intact without any focal sensory/motor deficits. Cranial nerves: II-XII intact, grossly non-focal.  Psychiatric: Alert and oriented, thought content appropriate, normal insight  Capillary Refill: Brisk,< 3 seconds   Peripheral Pulses: +2 palpable, equal bilaterally       Labs:   Recent Labs     10/10/20  1135 10/11/20  0839 10/12/20  0748   WBC 7.1 8.6 9.1   HGB 12.8 14.9 15.0   HCT 38.9 44.6 44.5    195 220     Recent Labs     10/10/20  1135 10/11/20  0839 10/12/20  0748    141 140   K 4.5 3.8 3.8    99 99   CO2 23 29 28   BUN 22* 33* 48*   CREATININE 1.1 1.5* 1.8*   CALCIUM 8.7 9.3 9.1   PHOS  --  4.6 5.2*     Recent Labs     10/10/20  1135   AST 20   ALT 9*   BILITOT 0.8   ALKPHOS 60     Recent Labs     10/10/20  1135 10/11/20  0839 10/12/20  0748   INR 2.18* 2.15* 2.19*     Recent Labs     10/10/20  1518 10/10/20  1816 10/10/20  2104   TROPONINI <0.01 <0.01 <0.01       Urinalysis:      Lab Results   Component Value Date    NITRU Negative 01/19/2017    WBCUA 3-5 01/19/2017    BACTERIA Rare 01/19/2017    RBCUA 0-2 01/19/2017    BLOODU TRACE-INTACT 01/19/2017    SPECGRAV 1.020 01/19/2017    GLUCOSEU Negative 01/19/2017       Radiology:  XR CHEST PORTABLE   Final Result   Cardiomegaly with mild vascular congestion. No acute finding or significant   change.          NM Cardiac Stress Test Nuclear Imaging    (Results Pending)           Assessment/Plan:    Active Hospital Problems    Diagnosis    CHF (congestive heart failure), NYHA class I, acute, diastolic (HCC) [V05.65]     Acute on chronic diastolic heart failure   - POA with dyspnea, BNP 2448, CXR with mild vascular congestion   - Recent ECHO 9/2020 Summary   Left ventricular systolic function is low normal with a visually estimated   ejection fraction of 50-55%. Normal wall motion   The left atrium appears moderate to severely enlarged. Mild tricuspid regurgitation. Systolic pulmonary artery pressure (SPAP) is normal and estimated at 36 mmHg   (right atrial pressure 8 mmHg). - Cardiology was consulted on admission   - Current plan of diuretics, I/O, daily weight   - Stress in process to r/o ACS   - continue BB and ACE therapy   - 10/13 lasix on ACE on hold 2/2 to SAMANTA     Persistent AF: rate controlled   -  - [id number] score >2 on coumadin as outpatient  - CV in 2/2016  - tele  - continue BB and CCB. Ac with coumadin     Sleep Apnea: unconfirmed but has period of hypoxia and dips when sleeping  - Op sleep study     Acute on chronic kidney disease;   - suspect bump in serum crt 2/2 to diuresis will hold off on disuretics for now and monitor labs and urine Op     HTN- controlled, continue meds as BP and labs allow    Gout: cont allopurinol    GERD; controlled cont ppi    HLD- cont statin     Morbid Obesity - BMI 00.5 Complicating assessment and treatment. Placing patient at risk for multiple co-morbidities as well as early death and contributing to the patient's presentation. Counseled on weight loss.              DVT Prophylaxis: Coumadin   Diet: Diet NPO, After Midnight  Code Status: Full Code    PT/OT Eval Status: NA     Dispo - pending work up     RADHA Koroma - CNP

## 2020-10-12 NOTE — DISCHARGE INSTR - DIET
sodium. o Leave the salt out of recipes for pasta, casseroles, and soups.  Be a smart . o Look for food packages that say salt-free or sodium-free.  These items contain less than 5 milligrams of sodium per serving. o Very-low-sodium products contain less than 35 milligrams of sodium per serving. o Low-sodium products contain less than 140 milligrams of sodium per serving. o Unsalted or no added salt products may still be high in sodium. Check the nutrition label.  Add flavors to your food without adding sodium. o Try lemon juice, lime juice, fruit juice, or vinegar. o Dry or fresh herbs add flavor. Try basil, bay leaf, dill, rosemary, parsley, meir, dry mustard, nutmeg, thyme, and paprika.  o Pepper, red pepper flakes, and cayenne pepper can add spice to your meals without adding sodium. Hot sauce contains sodium, but if you use just a drop or two, it will not add up to much.  o Buy a sodium-free seasoning blend or make your own at home.  Use caution when you eat outside your home.   o Restaurant foods can be very high in sodium. o Ask for nutrition information. Many restaurants provide nutrition facts on their menus or websites. o Let your  know that you want your food to be cooked without salt. Ask for your salad dressing and sauces to come on the side.     Fluid Restriction  Your doctor may ask you to follow a fluid restriction in addition to taking diuretics (water pills). Ask your doctor how much fluid you can have. Foods that are liquid at room temperature are considered a fluid, such as popsicles, soup, ice cream, and Jell-O.      Foods Not Recommended   Breads or crackers topped with salt   Prepackaged bread crumbs    Self-rising flours    Canned vegetables (unless they are salt free or low sodium)   Sauerkraut and pickled vegetables    Canned or dried soups (unless they are salt free or low sodium)   Western [place] fries, onion rings, and other fried foods  Processed cheeses    Cottage cheese    Cured meats: batista, ham, sausage, pepperoni, and hot dogs    Canned meats   Smoked fish and meats    Frozen meals (that have more than 600 mg sodium)   Salter butter or margarine    Any type of salt seasoning (sea salt, kosher salt, onion salt, garlic salt, seasoning blends made with salt)   Ketchup, BBQ sauce, soy sauce, [place] sauce   Salsa, pickles, olives, relish   Salad dressings: ranch, blue cheese, [place], Western [place]

## 2020-10-12 NOTE — CONSULTS
Nutrition Note    RD consulted for heart failure diet guidelines. Attempted to see pt x3 but was unable to visit with pt. Will provide diet guidelines in discharge instructions. RD will follow up with pt as RD schedule allows.     Electronically signed by Luz Maria Malloy RD, SOFIA on 10/12/20 at 12:52 PM EDT    Contact: [phone]

## 2020-10-13 ENCOUNTER — APPOINTMENT (OUTPATIENT)
Dept: PHYSICAL THERAPY | Age: 80
End: 2020-10-13
Payer: MEDICARE

## 2020-10-13 ENCOUNTER — APPOINTMENT (OUTPATIENT)
Dept: NUCLEAR MEDICINE | Age: 80
DRG: 291 | End: 2020-10-13
Payer: MEDICARE

## 2020-10-13 LAB
ALBUMIN SERPL-MCNC: 4.2 G/DL (ref 3.4–5)
ANION GAP SERPL CALCULATED.3IONS-SCNC: 13 MMOL/L (ref 3–16)
BASOPHILS ABSOLUTE: 0.1 K/UL (ref 0–0.2)
BASOPHILS RELATIVE PERCENT: 0.7 %
BUN BLDV-MCNC: 57 MG/DL (ref 7–20)
CALCIUM SERPL-MCNC: 8.9 MG/DL (ref 8.3–10.6)
CHLORIDE BLD-SCNC: 96 MMOL/L (ref 99–110)
CO2: 28 MMOL/L (ref 21–32)
CREAT SERPL-MCNC: 2.3 MG/DL (ref 0.6–1.2)
D DIMER: <200 NG/ML DDU (ref 0–229)
EKG ATRIAL RATE: 133 BPM
EKG DIAGNOSIS: NORMAL
EKG Q-T INTERVAL: 408 MS
EKG QRS DURATION: 90 MS
EKG QTC CALCULATION (BAZETT): 452 MS
EKG R AXIS: 11 DEGREES
EKG T AXIS: 6 DEGREES
EKG VENTRICULAR RATE: 74 BPM
EOSINOPHILS ABSOLUTE: 0 K/UL (ref 0–0.6)
EOSINOPHILS RELATIVE PERCENT: 0.5 %
GFR AFRICAN AMERICAN: 25
GFR NON-AFRICAN AMERICAN: 20
GLUCOSE BLD-MCNC: 112 MG/DL (ref 70–99)
GLUCOSE BLD-MCNC: 115 MG/DL (ref 70–99)
HCT VFR BLD CALC: 44.9 % (ref 36–48)
HEMOGLOBIN: 15 G/DL (ref 12–16)
INR BLD: 2.99 (ref 0.86–1.14)
LYMPHOCYTES ABSOLUTE: 1.6 K/UL (ref 1–5.1)
LYMPHOCYTES RELATIVE PERCENT: 15.2 %
MAGNESIUM: 1.8 MG/DL (ref 1.8–2.4)
MCH RBC QN AUTO: 33.7 PG (ref 26–34)
MCHC RBC AUTO-ENTMCNC: 33.4 G/DL (ref 31–36)
MCV RBC AUTO: 101.1 FL (ref 80–100)
MONOCYTES ABSOLUTE: 0.9 K/UL (ref 0–1.3)
MONOCYTES RELATIVE PERCENT: 8.7 %
NEUTROPHILS ABSOLUTE: 8 K/UL (ref 1.7–7.7)
NEUTROPHILS RELATIVE PERCENT: 74.9 %
PDW BLD-RTO: 16.6 % (ref 12.4–15.4)
PERFORMED ON: ABNORMAL
PHOSPHORUS: 5.5 MG/DL (ref 2.5–4.9)
PLATELET # BLD: 230 K/UL (ref 135–450)
PMV BLD AUTO: 8.1 FL (ref 5–10.5)
POTASSIUM SERPL-SCNC: 3.9 MMOL/L (ref 3.5–5.1)
PRO-BNP: 935 PG/ML (ref 0–449)
PROTHROMBIN TIME: 35.1 SEC (ref 10–13.2)
RBC # BLD: 4.44 M/UL (ref 4–5.2)
SODIUM BLD-SCNC: 137 MMOL/L (ref 136–145)
TROPONIN: <0.01 NG/ML
WBC # BLD: 10.7 K/UL (ref 4–11)

## 2020-10-13 PROCEDURE — 6360000002 HC RX W HCPCS: Performed by: HOSPITALIST

## 2020-10-13 PROCEDURE — 94761 N-INVAS EAR/PLS OXIMETRY MLT: CPT

## 2020-10-13 PROCEDURE — 93005 ELECTROCARDIOGRAM TRACING: CPT | Performed by: INTERNAL MEDICINE

## 2020-10-13 PROCEDURE — 84484 ASSAY OF TROPONIN QUANT: CPT

## 2020-10-13 PROCEDURE — A9502 TC99M TETROFOSMIN: HCPCS | Performed by: INTERNAL MEDICINE

## 2020-10-13 PROCEDURE — 80069 RENAL FUNCTION PANEL: CPT

## 2020-10-13 PROCEDURE — 93010 ELECTROCARDIOGRAM REPORT: CPT | Performed by: INTERNAL MEDICINE

## 2020-10-13 PROCEDURE — 3430000000 HC RX DIAGNOSTIC RADIOPHARMACEUTICAL: Performed by: INTERNAL MEDICINE

## 2020-10-13 PROCEDURE — 1200000000 HC SEMI PRIVATE

## 2020-10-13 PROCEDURE — 2580000003 HC RX 258: Performed by: HOSPITALIST

## 2020-10-13 PROCEDURE — 83735 ASSAY OF MAGNESIUM: CPT

## 2020-10-13 PROCEDURE — 6370000000 HC RX 637 (ALT 250 FOR IP): Performed by: NURSE PRACTITIONER

## 2020-10-13 PROCEDURE — 85379 FIBRIN DEGRADATION QUANT: CPT

## 2020-10-13 PROCEDURE — 2580000003 HC RX 258: Performed by: INTERNAL MEDICINE

## 2020-10-13 PROCEDURE — 83880 ASSAY OF NATRIURETIC PEPTIDE: CPT

## 2020-10-13 PROCEDURE — 2700000000 HC OXYGEN THERAPY PER DAY

## 2020-10-13 PROCEDURE — 36415 COLL VENOUS BLD VENIPUNCTURE: CPT

## 2020-10-13 PROCEDURE — 99233 SBSQ HOSP IP/OBS HIGH 50: CPT | Performed by: INTERNAL MEDICINE

## 2020-10-13 PROCEDURE — 6370000000 HC RX 637 (ALT 250 FOR IP): Performed by: HOSPITALIST

## 2020-10-13 PROCEDURE — 85610 PROTHROMBIN TIME: CPT

## 2020-10-13 PROCEDURE — 85025 COMPLETE CBC W/AUTO DIFF WBC: CPT

## 2020-10-13 RX ORDER — CALCIUM CARBONATE 200(500)MG
500 TABLET,CHEWABLE ORAL 3 TIMES DAILY PRN
Status: DISCONTINUED | OUTPATIENT
Start: 2020-10-13 | End: 2020-10-15 | Stop reason: HOSPADM

## 2020-10-13 RX ORDER — SODIUM CHLORIDE 9 MG/ML
INJECTION, SOLUTION INTRAVENOUS CONTINUOUS
Status: DISPENSED | OUTPATIENT
Start: 2020-10-13 | End: 2020-10-14

## 2020-10-13 RX ADMIN — ONDANSETRON 4 MG: 2 INJECTION INTRAMUSCULAR; INTRAVENOUS at 16:18

## 2020-10-13 RX ADMIN — TETROFOSMIN 32 MILLICURIE: 1.38 INJECTION, POWDER, LYOPHILIZED, FOR SOLUTION INTRAVENOUS at 09:25

## 2020-10-13 RX ADMIN — ALLOPURINOL 300 MG: 300 TABLET ORAL at 11:02

## 2020-10-13 RX ADMIN — DILTIAZEM HYDROCHLORIDE 300 MG: 180 CAPSULE, COATED, EXTENDED RELEASE ORAL at 11:01

## 2020-10-13 RX ADMIN — METOPROLOL TARTRATE 25 MG: 25 TABLET, FILM COATED ORAL at 11:01

## 2020-10-13 RX ADMIN — SERTRALINE HYDROCHLORIDE 50 MG: 50 TABLET, FILM COATED ORAL at 20:43

## 2020-10-13 RX ADMIN — Medication 10 ML: at 16:18

## 2020-10-13 RX ADMIN — ATORVASTATIN CALCIUM 20 MG: 10 TABLET, FILM COATED ORAL at 20:43

## 2020-10-13 RX ADMIN — METOPROLOL TARTRATE 25 MG: 25 TABLET, FILM COATED ORAL at 20:43

## 2020-10-13 RX ADMIN — PANTOPRAZOLE SODIUM 40 MG: 40 TABLET, DELAYED RELEASE ORAL at 05:46

## 2020-10-13 RX ADMIN — ANTACID TABLETS 500 MG: 500 TABLET, CHEWABLE ORAL at 21:59

## 2020-10-13 RX ADMIN — SODIUM CHLORIDE: 9 INJECTION, SOLUTION INTRAVENOUS at 18:35

## 2020-10-13 RX ADMIN — POLYETHYLENE GLYCOL 3350 17 G: 17 POWDER, FOR SOLUTION ORAL at 16:18

## 2020-10-13 ASSESSMENT — PAIN SCALES - GENERAL
PAINLEVEL_OUTOF10: 0
PAINLEVEL_OUTOF10: 0

## 2020-10-13 ASSESSMENT — ENCOUNTER SYMPTOMS
GASTROINTESTINAL NEGATIVE: 1
RESPIRATORY NEGATIVE: 1

## 2020-10-13 NOTE — PROGRESS NOTES
Pt dyspneic on exertion, sat remains >92% on 1L O2. C/o nausea and constipation. Medicated per [facility] for both.  Had one small BM

## 2020-10-13 NOTE — PROGRESS NOTES
Pt A&Ox4 VSS on RA. Denies any needs at this time. Shift assessment complete. Will continue to monitor. Bed in lowest position and locked. Bedside table in reach, call light in reach.  Electronically signed by Bulmaro Huang RN on 10/12/2020 at 11:24 PM

## 2020-10-13 NOTE — PROGRESS NOTES
To [facility] for second part stress test. [facility] notified

## 2020-10-13 NOTE — CARE COORDINATION
CASE MANAGEMENT INITIAL ASSESSMENT      Reviewed chart and completed assessment with patient at bedside    Explained Case Management role/services. Primary contact information: Betsy Mayo (daughter/POA))    [address]  Who do you trust or have selected to make healthcare decisions for you? Name:   Betsy Myao  Phone  Number: [phone]  Can this person be reached and be able to respond quickly, such as within a few minutes or hours? Yes  Who would be your back-up decision maker? Name Andrew Paul  Phone Number: [phone]    Admit date/status:inpatient  Diagnosis:CHF  Is this a Readmission?: NO    Insurance:Medicare   Precert required for SNF: No       3 night stay required: Yes    Living arrangements, Adls, care needs, prior to admission:Lives in private 3 story condo with friend. 14 steps between eacf floor. Transportation self    Durable Medical Equipment at home:  Walker__Cane_x_RTS__ BSC__Shower Chair__  02__ HHN__ CPAP__  BiPap__  Hospital Bed__ W/C___ Other__________    Services in the home and/or outpatient, prior to admission: none    Dialysis Facility (if applicable)   · Name:  · Address:  · Dialysis Schedule:  · Phone:  · Fax:    PT/OT recs:not ordered    Hospital Exemption Notification (HEN) NA    Barriers to discharge:none    Plan/comments:[facility] home.  Denies needs    ECOC on chart for MD signature yes      Génesis Mckinley RN

## 2020-10-13 NOTE — PROGRESS NOTES
[address]  Cardiology  Progress Note    Admission date:  10/10/2020    Reason for follow up visit: CHF, AF    HPI/CC: Corry Calle is a 79 y.o. female who was admitted 10/10/2020 for shortness of breath. Treated for CHF. Subjective: SOB improved     Vitals:  Blood pressure 106/60, pulse 78, temperature 97.6 °F (36.4 °C), temperature source Oral, resp. rate 18, height 5' 6\" (1.676 m), weight 275 lb 3.2 oz (124.8 kg), SpO2 94 %, not currently breastfeeding.   Temp  Av.8 °F (36.6 °C)  Min: 97.4 °F (36.3 °C)  Max: 98.4 °F (36.9 °C)  Pulse  Av.3  Min: 62  Max: 83  BP  Min: 83/40  Max: 125/90  SpO2  Av.8 %  Min: 84 %  Max: 94 %    24 hour I/O    Intake/Output Summary (Last 24 hours) at 10/13/2020 1654  Last data filed at 10/13/2020 1539  Gross per 24 hour   Intake 755 ml   Output 550 ml   Net 205 ml     Current Facility-Administered Medications   Medication Dose Route Frequency Provider Last Rate Last Dose    metoprolol tartrate (LOPRESSOR) tablet 25 mg  25 mg Oral BID RADHA Parikh CNP   25 mg at 10/13/20 1101    sodium chloride flush 0.9 % injection 10 mL  10 mL Intravenous 2 times per day Alfreida Siemens, MD   10 mL at 10/12/20 2155    sodium chloride flush 0.9 % injection 10 mL  10 mL Intravenous PRN Alfreida Siemens, MD   10 mL at 10/13/20 1618    acetaminophen (TYLENOL) tablet 650 mg  650 mg Oral Q6H PRN Alfreida Siemens, MD   650 mg at 10/12/20 0955    Or    acetaminophen (TYLENOL) suppository 650 mg  650 mg Rectal Q6H PRN Alfreida Siemens, MD        polyethylene glycol (GLYCOLAX) packet 17 g  17 g Oral Daily PRN Alfreida Siemens, MD   17 g at 10/13/20 1618    promethazine (PHENERGAN) tablet 12.5 mg  12.5 mg Oral Q6H PRN Alfreida Siemens, MD        Or    ondansetron (ZOFRAN) injection 4 mg  4 mg Intravenous Q6H PRN Alfreida Siemens, MD   4 mg at 10/13/20 1618    allopurinol (ZYLOPRIM) tablet 300 mg  300 mg Oral Daily Alfreida Siemens, MD   300 mg at 10/13/20 1102    atorvastatin (LIPITOR) tablet 20 mg  20 mg Oral Nightly Jp Meza MD   20 mg at 10/12/20 2039    Vitamin D (CHOLECALCIFEROL) tablet 2,000 Units  2,000 Units Oral Daily Jp Meza MD   2,000 Units at 10/11/20 1002    dilTIAZem (CARDIZEM CD) extended release capsule 300 mg  300 mg Oral Daily Jp Meza MD   300 mg at 10/13/20 1101    pantoprazole (PROTONIX) tablet 40 mg  40 mg Oral QAM AC Jp Meza MD   40 mg at 10/13/20 0546    sertraline (ZOLOFT) tablet 50 mg  50 mg Oral Daily Jp Meza MD   50 mg at 10/12/20 2039    warfarin (COUMADIN) daily dosing (placeholder)   Other Belvin Councilman, MD         Review of Systems   Constitutional: Negative. Respiratory: Negative. Cardiovascular: Negative. Gastrointestinal: Negative. Neurological: Negative. Objective:     Telemetry monitor: -140s    Physical Exam:  Constitutional:  Comfortable and alert, NAD, appears stated age  Eyes: PERRL, sclera nonicteric  Neck:  Supple, no masses, no thyroidmegaly, no JVD  Skin:  Warm and dry; no rash or lesions  Heart:  Irregular, normal apex, S1 and S2 normal, no M/G/R  Lungs:  Normal respiratory effort; clear; no wheezing/rhonchi/rales  Abdomen: soft, non tender, + bowel sounds  Extremities:  Trace BLE edema  Neuro: alert and oriented, moves legs and arms equally, normal mood and affect    Data Reviewed:    Stress test 10/12/2020:  Pending    Echo 9/2020:  Left ventricular systolic function is low normal with a visually estimated   ejection fraction of 50-55%. Normal wall motion   The left atrium appears moderate to severely enlarged. Mild tricuspid regurgitation. Systolic pulmonary artery pressure (SPAP) is normal and estimated at 36 mmHg   (right atrial pressure 8 mmHg).     Lab Reviewed:     Renal Profile:  Lab Results   Component Value Date    CREATININE 2.3 10/13/2020    BUN 57 10/13/2020     10/13/2020    K 3.9 10/13/2020    K 3.9 02/08/2018    CL 96 10/13/2020 CO2 28 10/13/2020     CBC:    Lab Results   Component Value Date    WBC 10.7 10/13/2020    RBC 4.44 10/13/2020    HGB 15.0 10/13/2020    HCT 44.9 10/13/2020    .1 10/13/2020    RDW 16.6 10/13/2020     10/13/2020     BNP:    Lab Results   Component Value Date    PROBNP 935 10/13/2020    PROBNP 2,448 10/10/2020    PROBNP 1,910 12/30/2017    PROBNP 4,052 12/28/2017    PROBNP 3,583 12/26/2017     Fasting Lipid Panel:    Lab Results   Component Value Date    CHOL 141 10/11/2020    HDL 66 10/11/2020    HDL 53 06/04/2010    TRIG 97 10/11/2020     Cardiac Enzymes:  CK/MbTroponin  Lab Results   Component Value Date    TROPONINI <0.01 10/13/2020     PT/ INR   Lab Results   Component Value Date    INR 2.99 10/13/2020    INR 2.19 10/12/2020    INR 2.15 10/11/2020    PROTIME 35.1 10/13/2020    PROTIME 25.6 10/12/2020    PROTIME 25.1 10/11/2020     PTT No results found for: PTT   Lab Results   Component Value Date    MG 1.80 10/13/2020      Lab Results   Component Value Date    TSH 3.78 12/06/2019     All labs and imaging reviewed today    Assessment:  Shortness of breath: significantly improved  Acute diastolic CHF: improved, -8.3L net negative  Persistent atrial fibrillation: rate controlled   - [id number] score >2 on coumadin as outpatient   - s/p CV 2/2016  HTN: stable  A/CKD  Likely YUMIKO    Plan:   1. Stress test pending  2. Hold lasix and lisinopril due to A/CKD   - restart once able  3. IVF for SAMANTA   - 500Ml NS over 10 hrs  4. Cont lopressor and dilt, HR improved   - ideally in future, wean down to 1 med for polypharmacy  5. Outpatient YUMIKO evaluation  6.  Daily weights, strict I/Os, monitor BMP    Evie Garcia MD  [facility]  [phone]

## 2020-10-13 NOTE — PROGRESS NOTES
Nutrition Education    Previously consulted for CHF nutrition education. Discussed low sodium, fluid restriction and weight monitoring. Pt reports little added salt. High amounts of sodium from processed meats and snacks. Pt and roommate planning on making big changes to diet, bought low sodium cookbooks. Encouraged pt to read restaurant nutrition facts for sodium content. Will continue to monitor. · Verbally reviewed information with Patient and Family  · Educated on CHF  · Written educational materials provided. · Contact name and number provided. · Refer to Patient Education activity for more details.     Electronically signed by Tasia Kebede MS, RD, LD on 10/13/20 at 2:18 PM EDT    Contact: [phone]

## 2020-10-14 LAB
ALBUMIN SERPL-MCNC: 4.1 G/DL (ref 3.4–5)
ANION GAP SERPL CALCULATED.3IONS-SCNC: 10 MMOL/L (ref 3–16)
BASOPHILS ABSOLUTE: 0.1 K/UL (ref 0–0.2)
BASOPHILS RELATIVE PERCENT: 0.7 %
BUN BLDV-MCNC: 61 MG/DL (ref 7–20)
CALCIUM SERPL-MCNC: 9.1 MG/DL (ref 8.3–10.6)
CHLORIDE BLD-SCNC: 100 MMOL/L (ref 99–110)
CO2: 30 MMOL/L (ref 21–32)
CREAT SERPL-MCNC: 2.1 MG/DL (ref 0.6–1.2)
EOSINOPHILS ABSOLUTE: 0 K/UL (ref 0–0.6)
EOSINOPHILS RELATIVE PERCENT: 0.5 %
GFR AFRICAN AMERICAN: 27
GFR NON-AFRICAN AMERICAN: 23
GLUCOSE BLD-MCNC: 109 MG/DL (ref 70–99)
HCT VFR BLD CALC: 45.1 % (ref 36–48)
HEMOGLOBIN: 14.9 G/DL (ref 12–16)
INR BLD: 3.66 (ref 0.86–1.14)
LYMPHOCYTES ABSOLUTE: 1.3 K/UL (ref 1–5.1)
LYMPHOCYTES RELATIVE PERCENT: 13.2 %
MAGNESIUM: 1.9 MG/DL (ref 1.8–2.4)
MCH RBC QN AUTO: 33.4 PG (ref 26–34)
MCHC RBC AUTO-ENTMCNC: 32.9 G/DL (ref 31–36)
MCV RBC AUTO: 101.4 FL (ref 80–100)
MONOCYTES ABSOLUTE: 0.9 K/UL (ref 0–1.3)
MONOCYTES RELATIVE PERCENT: 8.6 %
NEUTROPHILS ABSOLUTE: 7.8 K/UL (ref 1.7–7.7)
NEUTROPHILS RELATIVE PERCENT: 77 %
PDW BLD-RTO: 16.3 % (ref 12.4–15.4)
PHOSPHORUS: 4.8 MG/DL (ref 2.5–4.9)
PLATELET # BLD: 200 K/UL (ref 135–450)
PMV BLD AUTO: 8.6 FL (ref 5–10.5)
POTASSIUM SERPL-SCNC: 4.3 MMOL/L (ref 3.5–5.1)
PROTHROMBIN TIME: 43 SEC (ref 10–13.2)
RBC # BLD: 4.45 M/UL (ref 4–5.2)
SODIUM BLD-SCNC: 140 MMOL/L (ref 136–145)
WBC # BLD: 10.1 K/UL (ref 4–11)

## 2020-10-14 PROCEDURE — 85025 COMPLETE CBC W/AUTO DIFF WBC: CPT

## 2020-10-14 PROCEDURE — 83735 ASSAY OF MAGNESIUM: CPT

## 2020-10-14 PROCEDURE — 80069 RENAL FUNCTION PANEL: CPT

## 2020-10-14 PROCEDURE — 85610 PROTHROMBIN TIME: CPT

## 2020-10-14 PROCEDURE — 6370000000 HC RX 637 (ALT 250 FOR IP): Performed by: NURSE PRACTITIONER

## 2020-10-14 PROCEDURE — 99232 SBSQ HOSP IP/OBS MODERATE 35: CPT | Performed by: NURSE PRACTITIONER

## 2020-10-14 PROCEDURE — 1200000000 HC SEMI PRIVATE

## 2020-10-14 PROCEDURE — 2580000003 HC RX 258: Performed by: HOSPITALIST

## 2020-10-14 PROCEDURE — 2580000003 HC RX 258: Performed by: NURSE PRACTITIONER

## 2020-10-14 PROCEDURE — 6370000000 HC RX 637 (ALT 250 FOR IP): Performed by: HOSPITALIST

## 2020-10-14 PROCEDURE — 36415 COLL VENOUS BLD VENIPUNCTURE: CPT

## 2020-10-14 RX ORDER — SODIUM CHLORIDE 9 MG/ML
INJECTION, SOLUTION INTRAVENOUS CONTINUOUS
Status: DISPENSED | OUTPATIENT
Start: 2020-10-14 | End: 2020-10-14

## 2020-10-14 RX ADMIN — DILTIAZEM HYDROCHLORIDE 300 MG: 180 CAPSULE, COATED, EXTENDED RELEASE ORAL at 08:53

## 2020-10-14 RX ADMIN — ALLOPURINOL 300 MG: 300 TABLET ORAL at 08:53

## 2020-10-14 RX ADMIN — METOPROLOL TARTRATE 25 MG: 25 TABLET, FILM COATED ORAL at 08:53

## 2020-10-14 RX ADMIN — PANTOPRAZOLE SODIUM 40 MG: 40 TABLET, DELAYED RELEASE ORAL at 05:23

## 2020-10-14 RX ADMIN — SODIUM CHLORIDE: 9 INJECTION, SOLUTION INTRAVENOUS at 13:26

## 2020-10-14 RX ADMIN — Medication 2000 UNITS: at 08:53

## 2020-10-14 RX ADMIN — Medication 10 ML: at 08:54

## 2020-10-14 RX ADMIN — SERTRALINE HYDROCHLORIDE 50 MG: 50 TABLET, FILM COATED ORAL at 20:02

## 2020-10-14 RX ADMIN — ATORVASTATIN CALCIUM 20 MG: 10 TABLET, FILM COATED ORAL at 20:02

## 2020-10-14 ASSESSMENT — ENCOUNTER SYMPTOMS
RESPIRATORY NEGATIVE: 1
GASTROINTESTINAL NEGATIVE: 1

## 2020-10-14 ASSESSMENT — PAIN SCALES - GENERAL: PAINLEVEL_OUTOF10: 0

## 2020-10-14 NOTE — PROGRESS NOTES
[address]  Cardiology  Progress Note    Admission date:  10/10/2020    Reason for follow up visit: CHF, AF    HPI/CC: Mari Dallas is a 79 y.o. female who was admitted 10/10/2020 for shortness of breath. Treated for CHF. Stress test negative for ischemia. Rhythm has been AF 70s. Subjective: Feels better. Shortness of breath greatly improved, no chest pain. Vitals:  Blood pressure 134/61, pulse 83, temperature 98.6 °F (37 °C), temperature source Oral, resp. rate 18, height 5' 6\" (1.676 m), weight 276 lb 9.6 oz (125.5 kg), SpO2 97 %, not currently breastfeeding.   Temp  Av °F (36.7 °C)  Min: 97.4 °F (36.3 °C)  Max: 98.6 °F (37 °C)  Pulse  Av.3  Min: 78  Max: 95  BP  Min: 95/44  Max: 141/64  SpO2  Av.8 %  Min: 94 %  Max: 97 %    24 hour I/O    Intake/Output Summary (Last 24 hours) at 10/14/2020 1010  Last data filed at 10/14/2020 0932  Gross per 24 hour   Intake 730 ml   Output 950 ml   Net -220 ml     Current Facility-Administered Medications   Medication Dose Route Frequency Provider Last Rate Last Dose    calcium carbonate (TUMS) chewable tablet 500 mg  500 mg Oral TID PRN RADHA Damico NP   500 mg at 10/13/20 2159    metoprolol tartrate (LOPRESSOR) tablet 25 mg  25 mg Oral BID RADHA Zuñiga CNP   25 mg at 10/14/20 0853    sodium chloride flush 0.9 % injection 10 mL  10 mL Intravenous 2 times per day Braeden Cartwright MD   10 mL at 10/14/20 0854    sodium chloride flush 0.9 % injection 10 mL  10 mL Intravenous PRN Braeden Cartwright MD   10 mL at 10/13/20 1618    acetaminophen (TYLENOL) tablet 650 mg  650 mg Oral Q6H PRN Braeden Cartwright MD   650 mg at 10/12/20 0955    Or    acetaminophen (TYLENOL) suppository 650 mg  650 mg Rectal Q6H PRN Braeden Cartwright MD        polyethylene glycol (GLYCOLAX) packet 17 g  17 g Oral Daily PRN Braeden Cartwright MD   17 g at 10/13/20 1618    promethazine (PHENERGAN) tablet 12.5 mg  12.5 mg Oral Q6H PRN Braeden Cartwright MD        Or wall motion   The left atrium appears moderate to severely enlarged. Mild tricuspid regurgitation. Systolic pulmonary artery pressure (SPAP) is normal and estimated at 36 mmHg   (right atrial pressure 8 mmHg). Lab Reviewed:     Renal Profile:  Lab Results   Component Value Date    CREATININE 2.1 10/14/2020    BUN 61 10/14/2020     10/14/2020    K 4.3 10/14/2020    K 3.9 02/08/2018     10/14/2020    CO2 30 10/14/2020     CBC:    Lab Results   Component Value Date    WBC 10.1 10/14/2020    RBC 4.45 10/14/2020    HGB 14.9 10/14/2020    HCT 45.1 10/14/2020    .4 10/14/2020    RDW 16.3 10/14/2020     10/14/2020     BNP:    Lab Results   Component Value Date    PROBNP 935 10/13/2020    PROBNP 2,448 10/10/2020    PROBNP 1,910 12/30/2017    PROBNP 4,052 12/28/2017    PROBNP 3,583 12/26/2017     Fasting Lipid Panel:    Lab Results   Component Value Date    CHOL 141 10/11/2020    HDL 66 10/11/2020    HDL 53 06/04/2010    TRIG 97 10/11/2020     Cardiac Enzymes:  CK/MbTroponin  Lab Results   Component Value Date    TROPONINI <0.01 10/13/2020     PT/ INR   Lab Results   Component Value Date    INR 3.66 10/14/2020    INR 2.99 10/13/2020    INR 2.19 10/12/2020    PROTIME 43.0 10/14/2020    PROTIME 35.1 10/13/2020    PROTIME 25.6 10/12/2020     PTT No results found for: PTT   Lab Results   Component Value Date    MG 1.90 10/14/2020      Lab Results   Component Value Date    TSH 3.78 12/06/2019     All labs and imaging reviewed today    Assessment:  Shortness of breath: significantly improved  Acute diastolic CHF: improved, -2.8K  Persistent atrial fibrillation: rate controlled   - [id number] score >2 on coumadin as outpatient   - s/p CV 2/2016  HTN: stable  A/CKD  Likely YUMIKO    Plan:   1. Continue to hold lasix and lisinopril due to A/CKD  2. Continue lopressor and diltiazem  3. Coumadin dosing per pharmacy  4. Outpatient YUMIKO evaluation  5. Discharge planning pending renal function  6.  Daily weights, strict I/Os, monitor BMP    RADHA Goff-CNP  [facility]  [phone]

## 2020-10-14 NOTE — PLAN OF CARE
Problem: OXYGENATION/RESPIRATORY FUNCTION  Goal: Patient will maintain patent airway  Outcome: Ongoing     Patient's EF (Ejection Fraction) is greater than 40%    Heart Failure Medications:  Diuretics[de-identified] None    (One of the following REQUIRED for EF <40%/SYSTOLIC FAILURE but MAY be used in EF% >40%/DIASTOLIC FAILURE)        ACE[de-identified] None        ARB[de-identified] None         ARNI[de-identified] None    (Beta Blockers)  NON- Evidenced Based Beta Blocker (for EF% >40%/DIASTOLIC FAILURE): Metoprolol TARTrate- Lopressor    Evidenced Based Beta Blocker::(REQUIRED for EF% <40%/SYSTOLIC FAILURE) None  . .................................................................................................................................................. Patient's weights and intake/output reviewed: Yes    Patient's Last Weight: 275 lbs obtained by standing scale. Intake/Output Summary (Last 24 hours) at 10/14/2020 0003  Last data filed at 10/13/2020 2030  Gross per 24 hour   Intake 680 ml   Output 550 ml   Net 130 ml       Comorbidities Reviewed Yes    Patient has a past medical history of Anemia, Arthritis, Atrial fibrillation ([address] Chronic kidney disease (CKD), stage II (mild), Community acquired pneumonia of left lower lobe of lung, Depression, Diverticulosis, Foot pain, GI bleed, Gout, Hemorrhoids, Hyperlipidemia, Hypertension, Hyperuricemia, Iron deficiency anemia, Medical history reviewed with no changes, Menopausal syndrome, Obesity, Pelvic relaxation, PONV (postoperative nausea and vomiting), Stress, Syncope, Unspecified sleep apnea, Vitamin D deficiency, Wears dentures, and Wears glasses. >>For CHF and Comorbidity documentation on Education Time and Topics, please see Education Tab    Progressive Mobility Assessment:  What is this patient's Current Level of Mobility?: Ambulatory- with Assistance  How was this patient Mobilized today?: Edge of Bed, Up to Chair,  Up to Toilet/Shower, and Up in Room                 With Whom?  Nurse, PCA, PT, and OT                 Level of Difficulty/Assistance: 1x Assist     Pt resting in bed at this time on  1 L O2. Pt denies shortness of breath. Pt without lower extremity edema.      Patient and/or Family's stated Goal of Care this Admission: reduce shortness of breath, increase activity tolerance, and better understand heart failure and disease management prior to discharge        :

## 2020-10-14 NOTE — PROGRESS NOTES
Patient's EF (Ejection Fraction) is less than 40%    Heart Failure Medications:   Diuretics[de-identified] Furosemide, Torsemide, Spironolactone, Metalozone, Other and None     (One of the following REQUIRED for EF <40%/SYSTOLIC FAILURE but MAY be used in EF% >40%/DIASTOLIC FAILURE)        ACE[de-identified] None        ARB[de-identified] None         ARNI[de-identified] None    (Beta Blockers)   NON- Evidenced Based Beta Blocker (for EF% >40%/DIASTOLIC FAILURE): Metoprolol TARTrate- Lopressor     Evidenced Based Beta Blocker::(REQUIRED for EF% <40%/SYSTOLIC FAILURE) Metoprolol SUCCinate- Toprol XL  . .................................................................................................................................................. Patient's weights and intake/output reviewed: Yes    Patient's Last Weight: 276 lbs obtained by standing scale. Difference of 1 lbs 6.4oz  less than last documented weight. Intake/Output Summary (Last 24 hours) at 10/14/2020 1435  Last data filed at 10/14/2020 1354  Gross per 24 hour   Intake 730 ml   Output 1000 ml   Net -270 ml       Comorbidities Reviewed Yes    Patient has a past medical history of Anemia, Arthritis, Atrial fibrillation ([address] Chronic kidney disease (CKD), stage II (mild), Community acquired pneumonia of left lower lobe of lung, Depression, Diverticulosis, Foot pain, GI bleed, Gout, Hemorrhoids, Hyperlipidemia, Hypertension, Hyperuricemia, Iron deficiency anemia, Medical history reviewed with no changes, Menopausal syndrome, Obesity, Pelvic relaxation, PONV (postoperative nausea and vomiting), Stress, Syncope, Unspecified sleep apnea, Vitamin D deficiency, Wears dentures, and Wears glasses. >>For CHF and Comorbidity documentation on Education Time and Topics, please see Education Tab    Progressive Mobility Assessment:  What is this patient's Current Level of Mobility?: Ambulatory-Up Ad [place]  How was this patient Mobilized today?: Up in Room                 With Whom?  Nurse and PCA Level of Difficulty/Assistance: 1x Assist     Pt resting in bed at this time on 1 L O2. Pt denies shortness of breath. Pt without lower extremity edema.      Patient and/or Family's stated Goal of Care this Admission: reduce lower extremity edema prior to discharge        :

## 2020-10-14 NOTE — PROGRESS NOTES
Pharmacy to Dose Warfarin     Dx: A-fib  Goal INR range 2-3   Home Warfarin dose: 1 mg po daily     Date                 INR                  Warfarin  10/10              2.18                    1 mg  10/11              2.14                    1.5 mg   10/12              2.19                    1.5 mg  10/13              2.99                    Hold  10/14              3.66                    Hold     Recommend hold Warfarin tonight x1 d/t supratherapeutic INR. Daily INR ordered. Rx will continue to manage therapy per consult order.   Yonathan Santos PharmD  10/14/2020 at 12:37 PM

## 2020-10-14 NOTE — PROGRESS NOTES
Hospitalist Progress Note      PCP: Dipika Leal MD    Date of Admission: 10/10/2020    Chief Complaint: Shortness of breath    Hospital Course: Bimal Brady is a 79 y.o. female hospitalized on 10/10/2020 for acute CHF exacerbation        Subjective: EMR and notes reviewed pt seen and examined. No complaints. No acute issues  No further dizziness or syncopal issues. Medications:  Reviewed    Infusion Medications    sodium chloride 100 mL/hr at 10/14/20 1326     Scheduled Medications    metoprolol tartrate  25 mg Oral BID    sodium chloride flush  10 mL Intravenous 2 times per day    allopurinol  300 mg Oral Daily    atorvastatin  20 mg Oral Nightly    Vitamin D  2,000 Units Oral Daily    dilTIAZem  300 mg Oral Daily    pantoprazole  40 mg Oral QAM AC    sertraline  50 mg Oral Daily    warfarin (COUMADIN) daily dosing (placeholder)   Other RX Placeholder     PRN Meds: calcium carbonate, sodium chloride flush, acetaminophen **OR** acetaminophen, polyethylene glycol, promethazine **OR** ondansetron      Intake/Output Summary (Last 24 hours) at 10/14/2020 1552  Last data filed at 10/14/2020 1354  Gross per 24 hour   Intake 730 ml   Output 900 ml   Net -170 ml       Physical Exam Performed:    BP 99/66   Pulse 75   Temp 97.4 °F (36.3 °C) (Oral)   Resp 16   Ht 5' 6\" (1.676 m)   Wt 276 lb 9.6 oz (125.5 kg)   SpO2 95%   BMI 44.64 kg/m²     General appearance: No apparent distress, appears stated age and cooperative. HEENT: Pupils equal, round, and reactive to light. Conjunctivae/corneas clear. Neck: Supple, with full range of motion. No jugular venous distention. Trachea midline. Respiratory:  Normal respiratory effort. Clear, decreased bilaterally with 2 liters per nasal cannula   Cardiovascular: Regular rate and rhythm with normal S1/S2 without murmurs, rubs or gallops. Abdomen: Soft, non-tender, non-distended with normal bowel sounds.   Musculoskeletal: No clubbing, cyanosis or edema bilaterally. Full range of motion without deformity. Skin: Skin color, texture, turgor normal.  No rashes or lesions. Neurologic:  Neurovascularly intact without any focal sensory/motor deficits. Cranial nerves: II-XII intact, grossly non-focal.  Psychiatric: Alert and oriented, thought content appropriate, normal insight  Capillary Refill: Brisk,< 3 seconds   Peripheral Pulses: +2 palpable, equal bilaterally       Labs:   Recent Labs     10/12/20  0748 10/13/20  0939 10/14/20  0653   WBC 9.1 10.7 10.1   HGB 15.0 15.0 14.9   HCT 44.5 44.9 45.1    230 200     Recent Labs     10/12/20  0748 10/13/20  0938 10/14/20  0653    137 140   K 3.8 3.9 4.3   CL 99 96* 100   CO2 28 28 30   BUN 48* 57* 61*   CREATININE 1.8* 2.3* 2.1*   CALCIUM 9.1 8.9 9.1   PHOS 5.2* 5.5* 4.8     No results for input(s): AST, ALT, BILIDIR, BILITOT, ALKPHOS in the last 72 hours. Recent Labs     10/12/20  0748 10/13/20  0939 10/14/20  0653   INR 2.19* 2.99* 3.66*     Recent Labs     10/13/20  0939   TROPONINI <0.01       Urinalysis:      Lab Results   Component Value Date    NITRU Negative 01/19/2017    WBCUA 3-5 01/19/2017    BACTERIA Rare 01/19/2017    RBCUA 0-2 01/19/2017    BLOODU TRACE-INTACT 01/19/2017    SPECGRAV 1.020 01/19/2017    GLUCOSEU Negative 01/19/2017       Radiology:  NM Cardiac Stress Test Nuclear Imaging   Final Result      XR CHEST PORTABLE   Final Result   Cardiomegaly with mild vascular congestion. No acute finding or significant   change.                  Assessment/Plan:    Active Hospital Problems    Diagnosis    New onset of congestive heart failure ([facility] [address] [I50.9]    SAMANTA (acute kidney injury) ([facility] [address] [N17.9]    Acute diastolic congestive heart failure (HCC) [I50.31]    Atrial fibrillation (HCC) [I48.91]     Acute on chronic diastolic heart failure   - POA with dyspnea, BNP 2448, CXR with mild vascular congestion   - Recent ECHO 9/2020 Summary   Left ventricular systolic function is low normal with a visually estimated   ejection fraction of 50-55%. Normal wall motion   The left atrium appears moderate to severely enlarged. Mild tricuspid regurgitation. Systolic pulmonary artery pressure (SPAP) is normal and estimated at 36 mmHg   (right atrial pressure 8 mmHg). - Cardiology was consulted on admission   - Current plan of diuretics, I/O, daily weight   - Stress in process to r/o ACS   - continue BB and ACE therapy   - 10/13 lasix on ACE on hold 2/2 to SAMANTA, hold again 10/14     Persistent AF: rate controlled   -  - [id number] score >2 on coumadin as outpatient  - CV in 2/2016  - tele  - continue BB and CCB. Ac with coumadin     Sleep Apnea: unconfirmed but has period of hypoxia and dips when sleeping  - Op sleep study     Acute on chronic kidney disease III;   - suspect bump in serum crt 2/2 to diuresis will hold off on disuretics for now and monitor labs and urine Op   - giving very gently fluids     HTN- controlled, continue meds as BP and labs allow    Gout: cont allopurinol    GERD; controlled cont ppi    HLD- cont statin     Morbid Obesity - BMI 37.8 Complicating assessment and treatment. Placing patient at risk for multiple co-morbidities as well as early death and contributing to the patient's presentation. Counseled on weight loss.              DVT Prophylaxis: Coumadin   Diet: DIET CARDIAC; Daily Fluid Restriction: 2000 ml  Code Status: Full Code    PT/OT Eval Status: NA     Dispo - pending work up     RADHA Spence - CNP

## 2020-10-15 ENCOUNTER — APPOINTMENT (OUTPATIENT)
Dept: PHYSICAL THERAPY | Age: 80
End: 2020-10-15
Payer: MEDICARE

## 2020-10-15 VITALS
SYSTOLIC BLOOD PRESSURE: 112 MMHG | WEIGHT: 273.4 LBS | HEIGHT: 66 IN | TEMPERATURE: 98.1 F | BODY MASS INDEX: 43.94 KG/M2 | DIASTOLIC BLOOD PRESSURE: 68 MMHG | OXYGEN SATURATION: 92 % | HEART RATE: 76 BPM | RESPIRATION RATE: 18 BRPM

## 2020-10-15 LAB
ALBUMIN SERPL-MCNC: 3.7 G/DL (ref 3.4–5)
ANION GAP SERPL CALCULATED.3IONS-SCNC: 10 MMOL/L (ref 3–16)
BASOPHILS ABSOLUTE: 0 K/UL (ref 0–0.2)
BASOPHILS RELATIVE PERCENT: 0.5 %
BUN BLDV-MCNC: 59 MG/DL (ref 7–20)
CALCIUM SERPL-MCNC: 8.6 MG/DL (ref 8.3–10.6)
CHLORIDE BLD-SCNC: 104 MMOL/L (ref 99–110)
CO2: 27 MMOL/L (ref 21–32)
CREAT SERPL-MCNC: 1.9 MG/DL (ref 0.6–1.2)
EOSINOPHILS ABSOLUTE: 0.1 K/UL (ref 0–0.6)
EOSINOPHILS RELATIVE PERCENT: 1.3 %
GFR AFRICAN AMERICAN: 31
GFR NON-AFRICAN AMERICAN: 25
GLUCOSE BLD-MCNC: 112 MG/DL (ref 70–99)
HCT VFR BLD CALC: 40 % (ref 36–48)
HEMOGLOBIN: 13 G/DL (ref 12–16)
INR BLD: 3.35 (ref 0.86–1.14)
LYMPHOCYTES ABSOLUTE: 1.2 K/UL (ref 1–5.1)
LYMPHOCYTES RELATIVE PERCENT: 15.2 %
MAGNESIUM: 1.9 MG/DL (ref 1.8–2.4)
MCH RBC QN AUTO: 33.5 PG (ref 26–34)
MCHC RBC AUTO-ENTMCNC: 32.6 G/DL (ref 31–36)
MCV RBC AUTO: 102.6 FL (ref 80–100)
MONOCYTES ABSOLUTE: 0.7 K/UL (ref 0–1.3)
MONOCYTES RELATIVE PERCENT: 9 %
NEUTROPHILS ABSOLUTE: 5.9 K/UL (ref 1.7–7.7)
NEUTROPHILS RELATIVE PERCENT: 74 %
PDW BLD-RTO: 16.8 % (ref 12.4–15.4)
PHOSPHORUS: 3.9 MG/DL (ref 2.5–4.9)
PLATELET # BLD: 154 K/UL (ref 135–450)
PMV BLD AUTO: 8.1 FL (ref 5–10.5)
POTASSIUM SERPL-SCNC: 4.5 MMOL/L (ref 3.5–5.1)
PROTHROMBIN TIME: 39.3 SEC (ref 10–13.2)
RBC # BLD: 3.89 M/UL (ref 4–5.2)
SODIUM BLD-SCNC: 141 MMOL/L (ref 136–145)
WBC # BLD: 8 K/UL (ref 4–11)

## 2020-10-15 PROCEDURE — 85610 PROTHROMBIN TIME: CPT

## 2020-10-15 PROCEDURE — 2580000003 HC RX 258: Performed by: HOSPITALIST

## 2020-10-15 PROCEDURE — 85025 COMPLETE CBC W/AUTO DIFF WBC: CPT

## 2020-10-15 PROCEDURE — 80069 RENAL FUNCTION PANEL: CPT

## 2020-10-15 PROCEDURE — 6370000000 HC RX 637 (ALT 250 FOR IP): Performed by: NURSE PRACTITIONER

## 2020-10-15 PROCEDURE — 36415 COLL VENOUS BLD VENIPUNCTURE: CPT

## 2020-10-15 PROCEDURE — 2700000000 HC OXYGEN THERAPY PER DAY

## 2020-10-15 PROCEDURE — 99232 SBSQ HOSP IP/OBS MODERATE 35: CPT | Performed by: NURSE PRACTITIONER

## 2020-10-15 PROCEDURE — 94761 N-INVAS EAR/PLS OXIMETRY MLT: CPT

## 2020-10-15 PROCEDURE — 83735 ASSAY OF MAGNESIUM: CPT

## 2020-10-15 PROCEDURE — 6370000000 HC RX 637 (ALT 250 FOR IP): Performed by: HOSPITALIST

## 2020-10-15 RX ORDER — FUROSEMIDE 20 MG/1
20 TABLET ORAL DAILY PRN
Qty: 30 TABLET | Refills: 0 | Status: SHIPPED | OUTPATIENT
Start: 2020-10-15 | End: 2020-12-04

## 2020-10-15 RX ADMIN — DILTIAZEM HYDROCHLORIDE 300 MG: 180 CAPSULE, COATED, EXTENDED RELEASE ORAL at 09:22

## 2020-10-15 RX ADMIN — ACETAMINOPHEN 650 MG: 325 TABLET ORAL at 04:22

## 2020-10-15 RX ADMIN — Medication 2000 UNITS: at 09:22

## 2020-10-15 RX ADMIN — METOPROLOL TARTRATE 25 MG: 25 TABLET, FILM COATED ORAL at 09:23

## 2020-10-15 RX ADMIN — Medication 10 ML: at 09:24

## 2020-10-15 RX ADMIN — ALLOPURINOL 300 MG: 300 TABLET ORAL at 09:23

## 2020-10-15 RX ADMIN — PANTOPRAZOLE SODIUM 40 MG: 40 TABLET, DELAYED RELEASE ORAL at 05:39

## 2020-10-15 ASSESSMENT — ENCOUNTER SYMPTOMS
GASTROINTESTINAL NEGATIVE: 1
RESPIRATORY NEGATIVE: 1
BACK PAIN: 1

## 2020-10-15 ASSESSMENT — PAIN DESCRIPTION - LOCATION: LOCATION: HIP

## 2020-10-15 ASSESSMENT — PAIN SCALES - GENERAL
PAINLEVEL_OUTOF10: 0
PAINLEVEL_OUTOF10: 10
PAINLEVEL_OUTOF10: 10

## 2020-10-15 ASSESSMENT — PAIN DESCRIPTION - PAIN TYPE: TYPE: CHRONIC PAIN

## 2020-10-15 NOTE — PROGRESS NOTES
[address]  Cardiology  Progress Note    Admission date:  10/10/2020    Reason for follow up visit: CHF, AF    HPI/CC: Mari Dallas is a 79 y.o. female who was admitted 10/10/2020 for shortness of breath. Treated for CHF. Stress test negative for ischemia. Subjective: Denies chest pain or shortness of breath. Questioning etiology of CHF as never had in past.  She admits to increased poor diet as well as recent stressor with death of long time friend. Her household is also changing. Vitals:  Blood pressure 118/65, pulse 85, temperature 97.8 °F (36.6 °C), temperature source Oral, resp. rate 18, height 5' 6\" (1.676 m), weight 273 lb 6.4 oz (124 kg), SpO2 95 %, not currently breastfeeding.   Temp  Av °F (36.7 °C)  Min: 97.8 °F (36.6 °C)  Max: 98.3 °F (36.8 °C)  Pulse  Av  Min: 63  Max: 93  BP  Min: 100/59  Max: 125/81  SpO2  Av.1 %  Min: 92 %  Max: 98 %    24 hour I/O    Intake/Output Summary (Last 24 hours) at 10/15/2020 1419  Last data filed at 10/15/2020 1111  Gross per 24 hour   Intake 710 ml   Output 1351 ml   Net -641 ml     Current Facility-Administered Medications   Medication Dose Route Frequency Provider Last Rate Last Dose    calcium carbonate (TUMS) chewable tablet 500 mg  500 mg Oral TID PRN RADHA Damico NP   500 mg at 10/13/20 2159    metoprolol tartrate (LOPRESSOR) tablet 25 mg  25 mg Oral BID RADHA Zuñiga - CNP   25 mg at 10/15/20 0987    sodium chloride flush 0.9 % injection 10 mL  10 mL Intravenous 2 times per day Braeden Cartwright MD   10 mL at 10/15/20 0924    sodium chloride flush 0.9 % injection 10 mL  10 mL Intravenous PRN Braeden Cartwright MD   10 mL at 10/13/20 1618    acetaminophen (TYLENOL) tablet 650 mg  650 mg Oral Q6H PRN Braeden Cartwright MD   650 mg at 10/15/20 0422    Or    acetaminophen (TYLENOL) suppository 650 mg  650 mg Rectal Q6H PRN Braeden Cartwright MD        polyethylene glycol (GLYCOLAX) packet 17 g  17 g Oral Daily PRN Mejia systolic function with HG>28%     with uniform wall motion. Low risk study. Echo 9/2020:  Left ventricular systolic function is low normal with a visually estimated   ejection fraction of 50-55%. Normal wall motion   The left atrium appears moderate to severely enlarged. Mild tricuspid regurgitation. Systolic pulmonary artery pressure (SPAP) is normal and estimated at 36 mmHg   (right atrial pressure 8 mmHg).     Lab Reviewed:     Renal Profile:  Lab Results   Component Value Date    CREATININE 1.9 10/15/2020    BUN 59 10/15/2020     10/15/2020    K 4.5 10/15/2020    K 3.9 02/08/2018     10/15/2020    CO2 27 10/15/2020     CBC:    Lab Results   Component Value Date    WBC 8.0 10/15/2020    RBC 3.89 10/15/2020    HGB 13.0 10/15/2020    HCT 40.0 10/15/2020    .6 10/15/2020    RDW 16.8 10/15/2020     10/15/2020     BNP:    Lab Results   Component Value Date    PROBNP 935 10/13/2020    PROBNP 2,448 10/10/2020    PROBNP 1,910 12/30/2017    PROBNP 4,052 12/28/2017    PROBNP 3,583 12/26/2017     Fasting Lipid Panel:    Lab Results   Component Value Date    CHOL 141 10/11/2020    HDL 66 10/11/2020    HDL 53 06/04/2010    TRIG 97 10/11/2020     Cardiac Enzymes:  CK/MbTroponin  Lab Results   Component Value Date    TROPONINI <0.01 10/13/2020     PT/ INR   Lab Results   Component Value Date    INR 3.35 10/15/2020    INR 3.66 10/14/2020    INR 2.99 10/13/2020    PROTIME 39.3 10/15/2020    PROTIME 43.0 10/14/2020    PROTIME 35.1 10/13/2020     PTT No results found for: PTT   Lab Results   Component Value Date    MG 1.90 10/15/2020      Lab Results   Component Value Date    TSH 3.78 12/06/2019     All labs and imaging reviewed today    Assessment:  Shortness of breath: significantly improved, at baseline  Acute diastolic CHF: improved, -9.7V  Persistent atrial fibrillation: rate controlled   - [id number] score >2 on coumadin as outpatient   - s/p CV 2/2016   - follows with Dr. Jing Neely  HTN: stable  A/CKD- improving  Likely YUMIKO    Plan:   1. Continue current treatment plan. 2. Instructed patient to weigh herself tomorrow AM and consider this her baseline weight. If her daily AM weight is > 3 lbs over this baseline weight, she is to start Lasix 20 mg daily  3. Okay for discharge from cardiac perspective. Will review cardiac medications on discharge medication reconciliation form. Patient has follow up appointment with Alo Jesus CNP in 2 weeks.         RADHA Castillo - CNP, 10/15/2020, 2:23 PM   [facility]  [phone]

## 2020-10-15 NOTE — CARE COORDINATION
Case Management/Follow up:    Chart reviewed for length of stay. Hospital day # 5 Unit: B3  Diagnosis and current status as per MD progress: Pending Cardio and IM rounding today, pt's labs are improving. Anticipated d/c date: today or tomorrow  Expected plan for discharge: home without new needs  Potential barriers: none noted  Will continue to follow and coordinate discharge arrangements as needed.   HENRIK Benz-KRISTEN

## 2020-10-15 NOTE — PLAN OF CARE
Problem: OXYGENATION/RESPIRATORY FUNCTION  Goal: Patient will maintain patent airway    Patient's EF (Ejection Fraction) is greater than 40%    Heart Failure Medications:  Diuretics[de-identified] Furosemide    (One of the following REQUIRED for EF <40%/SYSTOLIC FAILURE but MAY be used in EF% >40%/DIASTOLIC FAILURE)        ACE[de-identified] None        ARB[de-identified] None         ARNI[de-identified] None    (Beta Blockers)  NON- Evidenced Based Beta Blocker (for EF% >40%/DIASTOLIC FAILURE): Metoprolol TARTrate- Lopressor    Evidenced Based Beta Blocker::(REQUIRED for EF% <40%/SYSTOLIC FAILURE) None  . .................................................................................................................................................. Patient's weights and intake/output reviewed: Yes    Patient's Last Weight: 176 lbs obtained by standing scale. Difference of 1 lbs more than last documented weight. Intake/Output Summary (Last 24 hours) at 10/15/2020 0053  Last data filed at 10/14/2020 2329  Gross per 24 hour   Intake 960 ml   Output 1751 ml   Net -791 ml       Comorbidities Reviewed Yes    Patient has a past medical history of Anemia, Arthritis, Atrial fibrillation ([facility] [address] Chronic kidney disease (CKD), stage II (mild), Community acquired pneumonia of left lower lobe of lung, Depression, Diverticulosis, Foot pain, GI bleed, Gout, Hemorrhoids, Hyperlipidemia, Hypertension, Hyperuricemia, Iron deficiency anemia, Medical history reviewed with no changes, Menopausal syndrome, Obesity, Pelvic relaxation, PONV (postoperative nausea and vomiting), Stress, Syncope, Unspecified sleep apnea, Vitamin D deficiency, Wears dentures, and Wears glasses.      >>For CHF and Comorbidity documentation on Education Time and Topics, please see Education Tab    Progressive Mobility Assessment:  What is this patient's Current Level of Mobility?: Ambulatory- with Assistance  How was this patient Mobilized today?: Edge of Bed, Up to Chair,  Up to Toilet/Shower, and Up in

## 2020-10-15 NOTE — PROGRESS NOTES
Nutrition Assessment     Type and Reason for Visit: Initial, RD Nutrition Re-Screen/LOS    Nutrition Recommendations/Plan:   1. Continue cardiac diet, 2000 mL FR  2. Encourage diet compliance and healthy lifestyle  3. Monitor nutrition adequacy, pertinent labs, bowel habits, wt changes, and clinical progress    Nutrition Assessment:  Pt admitted with CHF. Pt nutritionally stable AEB good appetite and PO intakes of % this admission. Weights trending down this admission, -4.7 L per I&Os. Encouraged continued good PO intakes. Further discussed CHF nutrition education. Pt questioning specific foods and beverages. Encouraged healthy lifestyle and diet compliance, appears motivated to change. Will continue to monitor.     Malnutrition Assessment:  Malnutrition Status: No malnutrition    Current Nutrition Therapies:    DIET CARDIAC; Daily Fluid Restriction: 2000 ml    Anthropometric Measures:  · Height: 5' 6\" (167.6 cm)  · Current Body Wt: 273 lb (123.8 kg)   · BMI: 44.1    Nutrition Diagnosis:   No nutrition diagnosis at this time    Nutrition Interventions:   Food and/or Nutrient Delivery:  Continue Current Diet  Nutrition Education/Counseling:  Education completed   Coordination of Nutrition Care:  Continued Inpatient Monitoring    Discharge Planning:    Continue current diet     Electronically signed by Meche Santa, MS, RD, LD on 10/15/20 at 2:35 PM EDT    Contact: [phone]

## 2020-10-15 NOTE — PROGRESS NOTES
deformity. Skin: Skin color, texture, turgor normal.  No rashes or lesions. Neurologic:  Neurovascularly intact without any focal sensory/motor deficits. Cranial nerves: II-XII intact, grossly non-focal.  Psychiatric: Alert and oriented, thought content appropriate, normal insight  Capillary Refill: Brisk,< 3 seconds   Peripheral Pulses: +2 palpable, equal bilaterally       Labs:   Recent Labs     10/13/20  0939 10/14/20  0653 10/15/20  0639   WBC 10.7 10.1 8.0   HGB 15.0 14.9 13.0   HCT 44.9 45.1 40.0    200 154     Recent Labs     10/13/20  0938 10/14/20  0653 10/15/20  0639    140 141   K 3.9 4.3 4.5   CL 96* 100 104   CO2 28 30 27   BUN 57* 61* 59*   CREATININE 2.3* 2.1* 1.9*   CALCIUM 8.9 9.1 8.6   PHOS 5.5* 4.8 3.9     No results for input(s): AST, ALT, BILIDIR, BILITOT, ALKPHOS in the last 72 hours. Recent Labs     10/13/20  0939 10/14/20  0653 10/15/20  0639   INR 2.99* 3.66* 3.35*     Recent Labs     10/13/20  0939   TROPONINI <0.01       Urinalysis:      Lab Results   Component Value Date    NITRU Negative 01/19/2017    WBCUA 3-5 01/19/2017    BACTERIA Rare 01/19/2017    RBCUA 0-2 01/19/2017    BLOODU TRACE-INTACT 01/19/2017    SPECGRAV 1.020 01/19/2017    GLUCOSEU Negative 01/19/2017       Radiology:  NM Cardiac Stress Test Nuclear Imaging   Final Result      XR CHEST PORTABLE   Final Result   Cardiomegaly with mild vascular congestion. No acute finding or significant   change.                  Assessment/Plan:    Active Hospital Problems    Diagnosis    New onset of congestive heart failure ([facility] [address] [I50.9]    SAMANTA (acute kidney injury) ([facility] [address] [N17.9]    Acute diastolic congestive heart failure (HCC) [I50.31]    Atrial fibrillation (HCC) [I48.91]     Acute on chronic diastolic heart failure   - POA with dyspnea, BNP 2448, CXR with mild vascular congestion   - Recent ECHO 9/2020 Summary   Left ventricular systolic function is low normal with a visually estimated   ejection fraction of 50-55%. Normal wall motion   The left atrium appears moderate to severely enlarged. Mild tricuspid regurgitation. Systolic pulmonary artery pressure (SPAP) is normal and estimated at 36 mmHg   (right atrial pressure 8 mmHg). - Cardiology was consulted on admission   - Current plan of diuretics, I/O, daily weight   - Stress in process to r/o ACS   - continue BB and ACE therapy   - 10/13 lasix on ACE on hold 2/2 to SAMANTA, hold again 10/14     Persistent AF: rate controlled   -  - [id number] score >2 on coumadin as outpatient  - CV in 2/2016  - tele  - continue BB and CCB. Ac with coumadin     Sleep Apnea: unconfirmed but has period of hypoxia and dips when sleeping  - Op sleep study     Acute on chronic kidney disease III;   - suspect bump in serum crt 2/2 to diuresis will hold off on disuretics for now and monitor labs and urine Op   - giving very gently fluids     HTN- controlled, continue meds as BP and labs allow    Gout: cont allopurinol    GERD; controlled cont ppi    HLD- cont statin     Morbid Obesity - BMI 31.2 Complicating assessment and treatment. Placing patient at risk for multiple co-morbidities as well as early death and contributing to the patient's presentation. Counseled on weight loss.              DVT Prophylaxis: Coumadin   Diet: DIET CARDIAC; Daily Fluid Restriction: 2000 ml  Code Status: Full Code    PT/OT Eval Status: NA     Dispo - pending work up     RADHA Ortega - CNP

## 2020-10-15 NOTE — PROGRESS NOTES
Pharmacy to Dose Warfarin     Dx: A-fib  Goal INR range 2-3   Home Warfarin dose: 1 mg po daily     Date                 INR                  Warfarin  10/10              2.18                    1 mg  10/11              2.14                    1.5 mg   10/12              2.19                    1.5 mg  10/13              2.99                    Hold  10/14              3.66                    Hold  10/15              3.35                    Hold     Recommend hold Warfarin tonight x1 d/t supratherapeutic INR. Daily INR ordered. Rx will continue to manage therapy per consult order.   Adarsh Pelletier PharmD  10/15/2020 at 8:15 AM

## 2020-10-15 NOTE — PROGRESS NOTES
Pt titrated off oxygen today sating at 95% on room air. Pt has been up and too restroom with no complaints of shortness of breath. Pt resting comfortably in room with TV on.

## 2020-10-16 ENCOUNTER — CARE COORDINATION (OUTPATIENT)
Dept: CASE MANAGEMENT | Age: 80
End: 2020-10-16

## 2020-10-16 ENCOUNTER — TELEPHONE (OUTPATIENT)
Dept: PHARMACY | Facility: CLINIC | Age: 80
End: 2020-10-16

## 2020-10-16 ENCOUNTER — ANTI-COAG VISIT (OUTPATIENT)
Dept: PHARMACY | Age: 80
End: 2020-10-16
Payer: MEDICARE

## 2020-10-16 LAB — INR BLD: 2.7

## 2020-10-16 PROCEDURE — 1111F DSCHRG MED/CURRENT MED MERGE: CPT

## 2020-10-16 PROCEDURE — 85610 PROTHROMBIN TIME: CPT | Performed by: FAMILY MEDICINE

## 2020-10-16 PROCEDURE — 99211 OFF/OP EST MAY X REQ PHY/QHP: CPT | Performed by: FAMILY MEDICINE

## 2020-10-16 NOTE — PROGRESS NOTES
Ms. Nicki Tao is here for management of anticoagulation for Afib. PMH also significant for HTN, Gout, CKD II/III, Hyperlipidemia, and depression. She presents today w/out complaint. Pt verifies dosing regimen as listed above. Pt denies s/sx bleeding/bruising/ CP/HA/swelling/SOB  No missed doses. No changes in Rx/OTC/herbal medications. No major changes in diet. Pt does not drink EtOH or smoke. Patient was admitted to hospital 10/10 for congestive heart failure and discharged 10/15. She was started on Furosemide and her Metoprolol Tartrate was increased. Neither of these medications have an interaction. The last 3 days her Warfarin has been held. INR 2.7 is within the acceptable therapeutic range of 2-3. Recommend to continue dose of 0.5mg daily except 1mg on Tue/Thu.  Patient has 1 mg tablets. Will continue to monitor and check INR in 2 weeks. Dosing reminder card given with phone number, appointment date and time.   Return to clinic: 10/30 @ 11:00 am  Referring Provider: Dr. Erwin Valerio PharmD Candidate 2021    I have seen the patient and I agree with the assessment and plan created by the PharmD Candidate  Dianna Whiteside PharmD 10/16/2020 12:42 PM

## 2020-10-16 NOTE — CARE COORDINATION
[address] Transitions Initial Follow Up Call    Call within 2 business days of discharge: Yes    Patient: Matthias Cardenas Patient : 1940   MRN: [id number]  Reason for Admission: CHF  Discharge Date: 10/15/20 RARS: Readmission Risk Score: 17      Last Discharge [facility]       Complaint Diagnosis Description Type Department Provider    10/10/20 Shortness of Breath New onset of congestive heart failure [facility]) ED to Hosp-Admission (Discharged) (ADMITTED) ADRIANE Sutherland MD; Ezio Minaya. .. Spoke with: Piyush [address] [facility]: [facility]    Non-face-to-face services provided:  Obtained and reviewed discharge summary and/or continuity of care documents  Communication with home health agencies or other community services the patient is currently using-   Education of patient/family/caregiver/guardian to support self-management-     Care Transitions 24 Hour Call    Do you have any ongoing symptoms?:  No  Do you have a copy of your discharge instructions?:  Yes  Do you have all of your prescriptions and are they filled?:  Yes  Have you been contacted by a [address]?:  No  Have you scheduled your follow up appointment?:  No (Comment: plans to do so today)  How are you going to get to your appointment?:  Car - family or friend to transport  Were you discharged with any Home Care or Post Acute Services:  No  Do you have support at home?:  Roommate/Housemate  Do you feel like you have everything you need to keep you well at home?:  Yes  Are you an active caregiver in your home?:  No  Care Transitions Interventions     Pt states she's doing well this morning. Denies SOB, swelling, weight gain, CP, palpitations, dizziness, fever, or flu-like symptoms. Reviewed importance of daily weights and when to call the doctor and pt verbalized understanding. Meds reviewed and pt reports taking as prescribed.      DC instructions discrepency - after visit summary instructs pt to continue taking diltiazem and to discontinue it. Advised pt, per MD note, to continue taking calcium channel blocker (diltiazem) for persistent a fib. Advised her to also confirm this with PCP when she calls today for f/u appt. Pt verbalized understanding. Pt is having her blood drawn this morning for INR. Denies s/s of bleeding.    Follow Up  Future Appointments   Date Time Provider Juan Jose Ramirez   10/16/2020 11:15 AM [address]   10/28/2020  2:30 PM Curry President, APRN - PHILIP Rice

## 2020-10-16 NOTE — TELEPHONE ENCOUNTER
ONE TABLET BY MOUTH ON SUNDAY, TUESDAY AND THURSDAY. TAKE ONE-HALF TABLET BY MOUTH ON ALL OTHER DAYS OR AS DIRECTED BY CLINIC    omeprazole (PRILOSEC) 20 MG delayed release capsule TAKE ONE CAPSULE BY MOUTH DAILY    sertraline (ZOLOFT) 50 MG tablet TAKE ONE TABLET BY MOUTH DAILY    Cholecalciferol (VITAMIN D) 2000 UNITS CAPS capsule Take 2,000 Units by mouth daily    docusate sodium (COLACE) 100 MG capsule Take 100 mg by mouth daily as needed for Constipation      These are the medications you have told us you were taking at home, STOP taking them after you leave the hospital   · Diltiazem and clindamycin - advised patient that she should continue diltiazem (see above) and patient reports she would take clindamycin before she would go see her dentist. Elvis Coronadoz patient that she should take clindamycin if her dentist prescribes it, but appears we were trying to \"clean up\" her active home med list at discharge (why it appears to be stopped). Meds to Beds:No    Estimated Creatinine Clearance: 31 mL/min (A) (based on SCr of 1.9 mg/dL (H)). Assessment/Plan:  - Medication reconciliation completed. Number of medications reviewed: 10    - Pt is taking medications as directed by discharging physician. Number of discrepancies: 0.     - [facility] active medication list updated:  · Medications Added (0)  · Medications Removed (0)  · Medications Changed (0)    - Identified Potential Medication Interactions: No clinically significant interactions identified via Lexicomp Interaction Analysis as category D or higher.    - Renal Dosing: No renal adjustments necessary.    - Follow up appointment date (7 days for more severe illness, 14 days for others):    · Patient reminded of upcoming appointment for INR check today.      Thank you,    Humberto Kuo, PharmD, [facility]  Direct: [phone]  Department, toll free [phone], jerica Jonas Only    TCM Call Made?: Yes  [facility] [place] [facility] Patient?: Yes  Total # of Interventions Recommended: 1 -   Total # Interventions Accepted: 1  Intervention Severity:   - Level 1 Intervention Present?: No   - Level 2 #: 0   - Level 3 #: 0  Outreach Status: Review Complete  Care Coordinator Outreach to Patient?: Yes  Provider Contacted?: No  Time Spent (min): 30

## 2020-10-20 ENCOUNTER — CARE COORDINATION (OUTPATIENT)
Dept: CASE MANAGEMENT | Age: 80
End: 2020-10-20

## 2020-10-20 NOTE — CARE COORDINATION
[facility] Transitions Follow Up Call    10/20/2020    Patient: Shaan Matamoros  Patient : 1940   MRN: [id number]  Reason for Admission: CHF   Discharge Date: 10/15/20 RARS: Readmission Risk Score: 16         Spoke with: Shaan Matamoros    Spoke with patient who reported she is doing ok. Patient denied any CP or SOB and stated her weight today was 275 lbs, which patient reports is down from 277 lbs. Patient has apt with Dr. Lorraine Farooq on 10/23. Denies any acute needs at present time. Agreeable to f/u calls. Educated on the use of urgent care or physicians 24 hr access line if assistance is needed after hours. Care Transitions Subsequent and Final Call    Subsequent and Final Calls  Do you have any ongoing symptoms?:  No  Have your medications changed?:  No  Do you have any questions related to your medications?:  No  Do you currently have any active services?:  No  Do you have any needs or concerns that I can assist you with?:  No  Identified Barriers:  None  Care Transitions Interventions  Other Interventions:             Follow Up  Future Appointments   Date Time Provider Juan Jose Ramirez   10/23/2020  2:00 PM MD GARY Gimenez [address] [facility]   10/28/2020  2:30 PM [facility]   10/30/2020 11:00 AM [address] [facility]   10/30/2020  2:30 PM Leanne Beasley EG [facility]       Brien Pearson RN

## 2020-10-23 ENCOUNTER — TELEPHONE (OUTPATIENT)
Dept: INTERNAL MEDICINE CLINIC | Age: 80
End: 2020-10-23

## 2020-10-23 NOTE — TELEPHONE ENCOUNTER
I spoke w pt and she will do an audio hosp follow up Monday at 1 pm- she was scheduled for nov 2 so that's 1 we don'tneed to change!!!  Will you put her in??

## 2020-10-26 ENCOUNTER — VIRTUAL VISIT (OUTPATIENT)
Dept: INTERNAL MEDICINE CLINIC | Age: 80
End: 2020-10-26
Payer: MEDICARE

## 2020-10-26 PROBLEM — R05.9 COUGH: Status: ACTIVE | Noted: 2020-10-26

## 2020-10-26 PROCEDURE — 99495 TRANSJ CARE MGMT MOD F2F 14D: CPT | Performed by: INTERNAL MEDICINE

## 2020-10-26 PROCEDURE — 1111F DSCHRG MED/CURRENT MED MERGE: CPT | Performed by: INTERNAL MEDICINE

## 2020-10-26 ASSESSMENT — PATIENT HEALTH QUESTIONNAIRE - PHQ9
SUM OF ALL RESPONSES TO PHQ9 QUESTIONS 1 & 2: 0
SUM OF ALL RESPONSES TO PHQ QUESTIONS 1-9: 0
SUM OF ALL RESPONSES TO PHQ QUESTIONS 1-9: 0
2. FEELING DOWN, DEPRESSED OR HOPELESS: 0
SUM OF ALL RESPONSES TO PHQ QUESTIONS 1-9: 0
1. LITTLE INTEREST OR PLEASURE IN DOING THINGS: 0

## 2020-10-26 ASSESSMENT — ENCOUNTER SYMPTOMS
COUGH: 1
WHEEZING: 0
SHORTNESS OF BREATH: 0
COLOR CHANGE: 0
BACK PAIN: 0
CHEST TIGHTNESS: 0
ABDOMINAL PAIN: 0

## 2020-10-26 NOTE — ASSESSMENT & PLAN NOTE
Controlled w current regimen- continue and monitor closely  Cont f/u w cardiology and cont on anti-coagulation

## 2020-10-26 NOTE — PROGRESS NOTES
Post-Discharge Transitional Care Management Services or Hospital Follow Up      Brodie Martines   YOB: 1940    Date of Office Visit:  10/26/2020  Date of Hospital Admission: 10/10/20  Date of Hospital Discharge: 10/15/20  Readmission Risk Score(high >=14%.  Medium >=10%):Readmission Risk Score: 17      Care management risk score Rising risk (score 2-5) and Complex Care (Scores >=6): 2     Non face to face  following discharge, date last encounter closed (first attempt may have been earlier): 10/16/2020 11:54 AM 10/16/2020 11:54 AM    Call initiated 2 business days of discharge: Yes     Patient Active Problem List   Diagnosis    Sleep apnea    Diverticulosis    Hemorrhoids    Pelvic relaxation    Hyperuricemia    Menopausal syndrome    Gout    Bilateral knee pain    Morbid obesity with BMI of 40.0-44.9, adult ([address]    Essential hypertension    Atrial fibrillation ([address]    Primary localized osteoarthrosis, hand    Primary osteoarthritis of right knee    Chronic diastolic heart failure (HCC)    Stage 3 chronic kidney disease    Pure hypercholesterolemia    Gastroesophageal reflux disease without esophagitis    Hyperglycemia    Gait disturbance    Acute diastolic congestive heart failure ([address]    New onset of congestive heart failure (HCC)    SAMANTA (acute kidney injury) (HCC)    Cough       Allergies   Allergen Reactions    Diclofenac Hives     Severe itching    Adhesive Tape Rash    Penicillins Nausea And Vomiting     \"years ago\"       Medications listed as ordered at the time of discharge from hospital   Oumou Stands E   Home Medication Instructions SHERITA:    Printed on:10/26/20 5187   Medication Information                      allopurinol (ZYLOPRIM) 300 MG tablet  TAKE ONE TABLET BY MOUTH DAILY             atorvastatin (LIPITOR) 20 MG tablet  TAKE ONE TABLET BY MOUTH ONCE NIGHTLY             Cholecalciferol (VITAMIN D) 2000 UNITS CAPS capsule  Take 2,000 Units by mouth daily             dilTIAZem (CARTIA XT) 300 MG extended release capsule  Take 1 capsule by mouth daily             docusate sodium (COLACE) 100 MG capsule  Take 100 mg by mouth daily as needed for Constipation              furosemide (LASIX) 20 MG tablet  Take 1 tablet by mouth daily as needed (weight gain 3 lbs over base weight)             metoprolol tartrate (LOPRESSOR) 25 MG tablet  Take 1 tablet by mouth 2 times daily             omeprazole (PRILOSEC) 20 MG delayed release capsule  TAKE ONE CAPSULE BY MOUTH DAILY             sertraline (ZOLOFT) 50 MG tablet  TAKE ONE TABLET BY MOUTH DAILY             warfarin (COUMADIN) 1 MG tablet  TAKE ONE TABLET BY MOUTH ON SUNDAY, TUESDAY AND THURSDAY. TAKE ONE-HALF TABLET BY MOUTH ON ALL OTHER DAYS OR AS DIRECTED BY CLINIC                   Medications marked \"taking\" at this time  Outpatient Medications Marked as Taking for the 10/26/20 encounter (Virtual Visit) with Dipika Leal MD   Medication Sig Dispense Refill    metoprolol tartrate (LOPRESSOR) 25 MG tablet Take 1 tablet by mouth 2 times daily 180 tablet 3    furosemide (LASIX) 20 MG tablet Take 1 tablet by mouth daily as needed (weight gain 3 lbs over base weight) 30 tablet 0    atorvastatin (LIPITOR) 20 MG tablet TAKE ONE TABLET BY MOUTH ONCE NIGHTLY 90 tablet 0    allopurinol (ZYLOPRIM) 300 MG tablet TAKE ONE TABLET BY MOUTH DAILY 90 tablet 2    dilTIAZem (CARTIA XT) 300 MG extended release capsule Take 1 capsule by mouth daily 90 capsule 3    warfarin (COUMADIN) 1 MG tablet TAKE ONE TABLET BY MOUTH ON SUNDAY, TUESDAY AND THURSDAY.   TAKE ONE-HALF TABLET BY MOUTH ON ALL OTHER DAYS OR AS DIRECTED BY CLINIC 65 tablet 5    omeprazole (PRILOSEC) 20 MG delayed release capsule TAKE ONE CAPSULE BY MOUTH DAILY 90 capsule 3    sertraline (ZOLOFT) 50 MG tablet TAKE ONE TABLET BY MOUTH DAILY 90 tablet 3    Cholecalciferol (VITAMIN D) 2000 UNITS CAPS capsule Take 2,000 Units by mouth daily      docusate sodium (COLACE) 100 MG capsule Take 100 mg by mouth daily as needed for Constipation           Medications patient taking as of now reconciled against medications ordered at time of hospital discharge: Yes  Virtual audio visit for f/u due to inability to use technology for video visit and pt out of town for recent death in family. Chief Complaint   Patient presents with    Follow-Up from Hospital       Mourning the death of her son-in-law- feeling much better since discharge from the hospital for CHF- diuresed a great deal and is sticking to a low salt diet- doing daily weights-had been getting progressively more sob prior to her admission- also had been gaining weight-had stress test during hospitalization w nl EF but diastolic dysfunction and no ischemia- has had cough lingering for months-non productive and no fevers        Inpatient course: Discharge summary reviewed- see chart. Interval history/Current status: stable    Review of Systems   Constitutional: Positive for fatigue. Negative for activity change and appetite change. HENT: Negative for congestion. Eyes: Negative for visual disturbance. Respiratory: Positive for cough. Negative for chest tightness, shortness of breath and wheezing. Cardiovascular: Negative for chest pain, palpitations and leg swelling. Gastrointestinal: Negative for abdominal pain. Musculoskeletal: Negative for arthralgias and back pain. Skin: Negative for color change and rash. Neurological: Negative for weakness, light-headedness and headaches. There were no vitals filed for this visit. There is no height or weight on file to calculate BMI.    Wt Readings from Last 3 Encounters:   10/15/20 273 lb 6.4 oz (124 kg)   10/09/20 280 lb (127 kg)   09/08/20 280 lb (127 kg)     BP Readings from Last 3 Encounters:   10/15/20 112/68   08/31/20 120/80   08/25/20 137/86       Physical Exam  Pulmonary:      Effort: Pulmonary effort is normal.   Neurological:      Mental Status: She is alert. Psychiatric:         Mood and Affect: Mood normal.         Behavior: Behavior normal.         Thought Content: Thought content normal.     exam limited by virtual visit           Assessment and Plan:      Acute diastolic congestive heart failure (HCC)  Cont low salt diet- encouraged her to take her meds regularly and for f/u soon w cardiology    Stage 3 chronic kidney disease  Monitor closely w diuresis    Cough  ? ?  Allergy or CHF related -trial of allegra and mucinex     Bilateral knee pain  conts to work on exercises and wt loss    Atrial fibrillation ([address]  Controlled w current regimen- continue and monitor closely  Cont f/u w cardiology and cont on anti-coagulation    Morbid obesity with BMI of 40.0-44.9, adult [facility])  Discussed with patient at length health risks of obesity and need for diet and exercise      Pure hypercholesterolemia  Recheck at physical    Hyperglycemia  Recheck before physical             Medical Decision Making: high complexity

## 2020-10-27 ENCOUNTER — CARE COORDINATION (OUTPATIENT)
Dept: CASE MANAGEMENT | Age: 80
End: 2020-10-27

## 2020-10-28 NOTE — DISCHARGE SUMMARY
Hospital Medicine Discharge Summary    Patient ID: Macy Lilly      Patient's PCP: Davie Hernandez MD    Admit Date: 10/10/2020     Discharge Date: 10/15/2020      Admitting Physician: Adeel Wright MD     Discharge Physician: RADHA Silvestre - PHILIP     Discharge Diagnoses: Active Hospital Problems    Diagnosis    New onset of congestive heart failure ([facility] [I50.9]    SAMANTA (acute kidney injury) ([facility] [N17.9]    Acute diastolic congestive heart failure (HCC) [I50.31]    Atrial fibrillation ([facility] [I48.91]    Gout [M10.9]    Essential hypertension [I10]    Morbid obesity with BMI of 40.0-44.9, adult ([facility] [E66.01, Z68.41]       The patient was seen and examined on day of discharge and this discharge summary is in conjunction with any daily progress note from day of discharge. Hospital Course:   Alma ma 8 y.o. female hospitalized on 10/10/2020 for acute CHF exacerbation        Acute on chronic diastolic heart failure   - POA with dyspnea, BNP 2448, CXR with mild vascular congestion   - Recent ECHO 9/2020 Summary   Left ventricular systolic function is low normal with a visually estimated   ejection fraction of 50-55%.   Normal wall motion   The left atrium appears moderate to severely enlarged.   Mild tricuspid regurgitation.   Systolic pulmonary artery pressure (SPAP) is normal and estimated at 36 mmHg   (right atrial pressure 8 mmHg). - Cardiology was consulted on admission   - Current plan of diuretics, I/O, daily weight   - Stress in process to r/o ACS   - continue BB and ACE therapy   - 10/13 lasix on ACE on hold 2/2 to SAMANTA, hold again 10/14   -Resume daily Lasix on discharge instructed on daily weights we will follow-up with cardiology in 2 weeks     Persistent AF: rate controlled   -  - [id number] score >2 on coumadin as outpatient  - CV in 2/2016  - tele  - continued BB and CCB.  Ac with coumadin      Sleep Apnea: unconfirmed but has period of hypoxia and dips when sleeping  - Op sleep study      Acute on chronic kidney disease III;   - suspect bump in serum crt 2/2 to diuresis will hold off on disuretics for now and monitor labs and urine Op   - giving very gently fluids -stabilized     HTN- controlled, continue meds as BP and labs allow     Gout: cont allopurinol     GERD; controlled cont ppi     HLD- cont statin      Morbid Obesity - BMI 25.2 Complicating assessment and treatment. Placing patient at risk for multiple co-morbidities as well as early death and contributing to the patient's presentation. Counseled on weight loss.           Physical Exam Performed:     /68   Pulse 76   Temp 98.1 °F (36.7 °C) (Oral)   Resp 18   Ht 5' 6\" (1.676 m)   Wt 273 lb 6.4 oz (124 kg)   SpO2 92%   BMI 44.13 kg/m²       General appearance:  No apparent distress, appears stated age and cooperative. HEENT:  Normal cephalic, atraumatic without obvious deformity. Pupils equal, round, and reactive to light. Extra ocular muscles intact. Conjunctivae/corneas clear. Neck: Supple, with full range of motion. No jugular venous distention. Trachea midline. Respiratory:  Normal respiratory effort. Clear to auscultation, bilaterally without Rales/Wheezes/Rhonchi. Cardiovascular:  Regular rate and rhythm with normal S1/S2 without murmurs, rubs or gallops. Abdomen: Soft, non-tender, non-distended with normal bowel sounds. Musculoskeletal:  No clubbing, cyanosis or edema bilaterally. Full range of motion without deformity. Skin: Skin color, texture, turgor normal.  No rashes or lesions. Neurologic:  Neurovascularly intact without any focal sensory/motor deficits. Cranial nerves: II-XII intact, grossly non-focal.  Psychiatric:  Alert and oriented, thought content appropriate, normal insight  Capillary Refill: Brisk,< 3 seconds   Peripheral Pulses: +2 palpable, equal bilaterally       Labs:  For convenience and continuity at follow-up the following most recent labs are

## 2020-10-30 ENCOUNTER — HOSPITAL ENCOUNTER (OUTPATIENT)
Dept: PHYSICAL THERAPY | Age: 80
Setting detail: THERAPIES SERIES
Discharge: HOME | End: 2020-10-30
Payer: MEDICARE

## 2020-10-30 NOTE — FLOWSHEET NOTE
[address] [facility], [address] [place] [address] [address]  Phone: [phone]   Fax:     [phone]    Physical Therapy  Cancellation/No-show Note  Patient Name:  Ilda Doan  :  1940   Date:  10/30/2020    Cancelled visits to date: 0  No-shows to date: 1    For today's appointment patient:  []  Cancelled  []  Rescheduled appointment  [x]  No-show     Reason given by patient:  []  Patient ill  []  Conflicting appointment  []  No transportation    []  Conflict with work  []  No reason given  []  Other:     Comments:      Phone call information:   []  Phone call made today to patient at _ time at number provided:      []  Patient answered, conversation as follows:    []  Patient did not answer, message left as follows:  []  Phone call not made today  []  Phone call not needed - pt contacted us to cancel and provided reason for cancellation.      Electronically signed by:  Barbara Wong PT

## 2020-11-02 ENCOUNTER — CARE COORDINATION (OUTPATIENT)
Dept: CASE MANAGEMENT | Age: 80
End: 2020-11-02

## 2020-11-02 NOTE — CARE COORDINATION
[facility] Transitions Follow Up Call    2020    Patient: Tim Parrish  Patient : 1940   MRN: [id number]  Reason for Admission: CHF   Discharge Date: 10/15/20 RARS: Readmission Risk Score: 16         Spoke with: Tim Parrish    Spoke with patient who reported her son in law  suddenly last week and she had to travel to [place]. CTN offered condolences to patient. Patient stated she has been stable and denied any worsening sob or cp. Patient reported her weight has been stable and reported today's weight is 275 lbs. Patient reported she had apt with PCP via phone on 10/23 and will follow up again on . CTN advised patient of use of urgent care or physicians 24 hr access line if assistance is needed after hours. Care Transitions Subsequent and Final Call    Subsequent and Final Calls  Do you have any ongoing symptoms?:  No  Have your medications changed?:  No  Do you have any questions related to your medications?:  No  Do you currently have any active services?:  No  Do you have any needs or concerns that I can assist you with?:  No  Identified Barriers:  None  Care Transitions Interventions  Other Interventions:             Follow Up  Future Appointments   Date Time Provider Juan Jose Ramirez   2020 11:15 AM [address] [facility]   11/10/2020  1:30 PM KEREN Meza EG PT [facility]   2020  1:00 PM KEREN Meza EG PT [facility]   2020  1:00 PM MD GARY [address]  DANIELA   2020  2:30 PM RADHA Vincent - PHILIP Kwan RN

## 2020-11-06 ENCOUNTER — HOSPITAL ENCOUNTER (OUTPATIENT)
Age: 80
Discharge: HOME | End: 2020-11-06
Payer: MEDICARE

## 2020-11-06 ENCOUNTER — ANTI-COAG VISIT (OUTPATIENT)
Dept: PHARMACY | Age: 80
End: 2020-11-06
Payer: MEDICARE

## 2020-11-06 LAB
A/G RATIO: 1.3 (ref 1.1–2.2)
ALBUMIN SERPL-MCNC: 3.9 G/DL (ref 3.4–5)
ALP BLD-CCNC: 77 U/L (ref 40–129)
ALT SERPL-CCNC: 13 U/L (ref 10–40)
ANION GAP SERPL CALCULATED.3IONS-SCNC: 10 MMOL/L (ref 3–16)
AST SERPL-CCNC: 15 U/L (ref 15–37)
BASOPHILS ABSOLUTE: 0 K/UL (ref 0–0.2)
BASOPHILS RELATIVE PERCENT: 0.4 %
BILIRUB SERPL-MCNC: 0.7 MG/DL (ref 0–1)
BUN BLDV-MCNC: 24 MG/DL (ref 7–20)
CALCIUM SERPL-MCNC: 9.2 MG/DL (ref 8.3–10.6)
CHLORIDE BLD-SCNC: 103 MMOL/L (ref 99–110)
CO2: 25 MMOL/L (ref 21–32)
CREAT SERPL-MCNC: 1.3 MG/DL (ref 0.6–1.2)
CREATININE URINE: 364.9 MG/DL (ref 28–259)
EOSINOPHILS ABSOLUTE: 0.1 K/UL (ref 0–0.6)
EOSINOPHILS RELATIVE PERCENT: 1.7 %
GFR AFRICAN AMERICAN: 48
GFR NON-AFRICAN AMERICAN: 39
GLOBULIN: 3 G/DL
GLUCOSE BLD-MCNC: 92 MG/DL (ref 70–99)
HCT VFR BLD CALC: 42.2 % (ref 36–48)
HEMOGLOBIN: 13.9 G/DL (ref 12–16)
INTERNATIONAL NORMALIZATION RATIO, POC: 3.2
LYMPHOCYTES ABSOLUTE: 1.5 K/UL (ref 1–5.1)
LYMPHOCYTES RELATIVE PERCENT: 18.1 %
MCH RBC QN AUTO: 33.7 PG (ref 26–34)
MCHC RBC AUTO-ENTMCNC: 32.9 G/DL (ref 31–36)
MCV RBC AUTO: 102.4 FL (ref 80–100)
MICROALBUMIN UR-MCNC: 16.4 MG/DL
MICROALBUMIN/CREAT UR-RTO: 44.9 MG/G (ref 0–30)
MONOCYTES ABSOLUTE: 0.5 K/UL (ref 0–1.3)
MONOCYTES RELATIVE PERCENT: 6.4 %
NEUTROPHILS ABSOLUTE: 6 K/UL (ref 1.7–7.7)
NEUTROPHILS RELATIVE PERCENT: 73.4 %
PDW BLD-RTO: 16.2 % (ref 12.4–15.4)
PLATELET # BLD: 191 K/UL (ref 135–450)
PMV BLD AUTO: 8.9 FL (ref 5–10.5)
POTASSIUM SERPL-SCNC: 4.1 MMOL/L (ref 3.5–5.1)
PRO-BNP: 1335 PG/ML (ref 0–449)
RBC # BLD: 4.12 M/UL (ref 4–5.2)
SODIUM BLD-SCNC: 138 MMOL/L (ref 136–145)
TOTAL PROTEIN: 6.9 G/DL (ref 6.4–8.2)
TSH SERPL DL<=0.05 MIU/L-ACNC: 2.58 UIU/ML (ref 0.27–4.2)
WBC # BLD: 8.2 K/UL (ref 4–11)

## 2020-11-06 PROCEDURE — 85025 COMPLETE CBC W/AUTO DIFF WBC: CPT

## 2020-11-06 PROCEDURE — 82570 ASSAY OF URINE CREATININE: CPT

## 2020-11-06 PROCEDURE — 85610 PROTHROMBIN TIME: CPT | Performed by: PHARMACIST

## 2020-11-06 PROCEDURE — 83880 ASSAY OF NATRIURETIC PEPTIDE: CPT

## 2020-11-06 PROCEDURE — 99211 OFF/OP EST MAY X REQ PHY/QHP: CPT | Performed by: PHARMACIST

## 2020-11-06 PROCEDURE — 36415 COLL VENOUS BLD VENIPUNCTURE: CPT

## 2020-11-06 PROCEDURE — 83036 HEMOGLOBIN GLYCOSYLATED A1C: CPT

## 2020-11-06 PROCEDURE — 82043 UR ALBUMIN QUANTITATIVE: CPT

## 2020-11-06 PROCEDURE — 84443 ASSAY THYROID STIM HORMONE: CPT

## 2020-11-06 PROCEDURE — 80053 COMPREHEN METABOLIC PANEL: CPT

## 2020-11-06 NOTE — PROGRESS NOTES
Ms. Kimberli Seals is here for management of anticoagulation for Afib. PMH also significant for HTN, Gout, CKD II/III, Hyperlipidemia, and depression. She presents today w/out complaint. Pt verifies dosing regimen as listed above. Pt denies s/sx bleeding/bruising/ CP/HA/swelling/SOB  No missed doses. No changes in Rx/OTC/herbal medications. No major changes in diet. Pt does not drink EtOH or smoke. Her son-in-law recently passed away suddenly. INR can be affected by stress, will try to continue same dose for now and recheck in 2 weeks. INR 3.2 is slightly above the acceptable therapeutic range of 2-3. Recommend to continue dose of 0.5mg daily except 1mg on Tue/Thu.  Patient has 1 mg tablets. Will continue to monitor and check INR in 2 weeks. Dosing reminder card given with phone number, appointment date and time.   Return to clinic: 11/20 @ 11:15 am  Referring Provider: Dr. Ximena King, PharmD 11:20 AM EST 11/6/20

## 2020-11-07 LAB
ESTIMATED AVERAGE GLUCOSE: 102.5 MG/DL
HBA1C MFR BLD: 5.2 %

## 2020-11-09 ENCOUNTER — CARE COORDINATION (OUTPATIENT)
Dept: CASE MANAGEMENT | Age: 80
End: 2020-11-09

## 2020-11-09 NOTE — CARE COORDINATION
[facility] Transitions Follow Up Call    2020    Patient: Rama Bustamante  Patient : 1940   MRN: [id number]  Reason for Admission: CHF, SAMANTA, A Fib  Discharge Date: 10/15/20 RARS: Readmission Risk Score: 17         Spoke with: Rama Bustamante (patient)    Reports weight stable at 275#. Denies SOB, edema. States she had a cough but it resolved. Denies fever, chills. Has not needed prn furosemide. Doing well with limiting fluid and salt in diet. Has PT tomorrow. Hopeful to find relief from a pinched nerve. CHF education:  -weigh daily in the morning at the same time and in the same clothing  -report weight gain of >3#/day or >5#/week  -report increased SOB, edema, abd fullness, difficulty lying flat  -report palpitations, cough, difficulty urinating    She denies needs/concerns today. Care Transitions Subsequent and Final Call    Schedule Follow Up Appointment with PCP:  Completed  Subsequent and Final Calls  Do you have any ongoing symptoms?:  No  Have your medications changed?:  No  Do you have any questions related to your medications?:  No  Do you currently have any active services?:  Yes  Are you currently active with any services?:  Other  Do you have any needs or concerns that I can assist you with?:  No  Identified Barriers:  None  Care Transitions Interventions  No Identified Needs  Other Interventions:             Follow Up  Future Appointments   Date Time Provider Juan Jose Ramirez   11/10/2020  1:30 PM Salvador Ozuna, PT [facility] EG PT [facility]   2020  1:00 PM Salvador Ozuna, PT [facility]   2020  1:00 PM MD GARY [address] [facility]   2020 11:15 AM [address] [facility]   2020  2:30 PM RADHA Chappell - CNP [facility]       Arelis Stone RN

## 2020-11-10 ENCOUNTER — HOSPITAL ENCOUNTER (OUTPATIENT)
Dept: PHYSICAL THERAPY | Age: 80
Setting detail: THERAPIES SERIES
Discharge: HOME | End: 2020-11-10
Payer: MEDICARE

## 2020-11-10 PROCEDURE — 97110 THERAPEUTIC EXERCISES: CPT

## 2020-11-10 PROCEDURE — 97112 NEUROMUSCULAR REEDUCATION: CPT

## 2020-11-10 NOTE — PLAN OF CARE
[address] [facility] and [address], [address] [place] [address] [address]  Phone: [phone]   Fax:     [phone]   Physical Therapy Re-Certification Plan of Care    Dear  Dr. Miki Morris,    We had the pleasure of treating the following patient for physical therapy services at [address] [place] [address]. A summary of our findings can be found in the updated assessment below. This includes our plan of care. If you have any questions or concerns regarding these findings, please do not hesitate to contact me at the office phone number checked above. Thank you for the referral.     Physician Signature:________________________________Date:__________________  By signing above (or electronic signature), therapists plan is approved by physician    Date Range Of Visits: 20 - present  Total Visits to Date: 3  Overall Response to Treatment:   []Patient is responding well to treatment and improvement is noted with regards  to goals   [x]Patient should continue to improve in reasonable time if they continue HEP   []Patient has plateaued and is no longer responding to skilled PT intervention    []Patient is getting worse and would benefit from return to referring MD   []Patient unable to adhere to initial POC   [x]Other: 1x a week for 4 weeks.         Physical Therapy Treatment Note/ Progress Report:           Date:  11/10/2020    Patient Name:  Matthias Cardenas    :  1940  MRN: [id number]  Restrictions/Precautions:    Medical/Treatment Diagnosis Information:  · Diagnosis: Lumbar stenosis M48.07  · Treatment Diagnosis: Left hip pain  Insurance/Certification information:  PT Insurance Information: Medicare  Physician Information:  Referring Practitioner: Zina Padron  Has the plan of care been signed (Y/N):        []  Yes  [x]  No     Date of Patient follow up with Physician:     Assessment Summary: Piyush Stein is a 79 y.o. female reporting to OP PT with c/c of left side LBP and intermittent shooting leg pain into ankle which has been occurring since May without known STANTON. Pt is noted to have reduce lumbar ROM and mild reduction in hip ROM. No direction preference noted through eval. Will begin with flexion biased exercises. Is this a Progress Report:     []  Yes  [x]  No        If Yes:  Date Range for reporting period:  Beginning 11/10/20  Ending 12/10/20    Progress report will be due (10 Rx or 30 days whichever is less): 38/40/91       Recertification will be due (POC Duration  / 90 days whichever is less): 12/10/20         Visit # Insurance Allowable Auth Required   3 BOMN []  Yes []  No        Functional Scale:  LEFS 84%   Date assessed:       Latex Allergy:  [x]NO      []YES  Preferred Language for Healthcare:   [x]English       []other:      Pain level:  3/10     SUBJECTIVE:  Pt states her hip is slowly improving.      OBJECTIVE:   /78  Ox 96%  HR 71 bpm    RESTRICTIONS/PRECAUTIONS: A-fib    Exercises/Interventions:   Therapeutic Ex (49198) HEP 9/29/20 10/6/20 10/9/20 11/10/20   Warm-up                        TABLE        SKTC x x15 B -- --    PPT x 10x2 10x2 10x2 ''   Lumbar rotations x 10x2  10x2 x10 B ''   Bridge   10x2 x10 ''   HL adduction    10x3 ''   HL CS Chand TB    45\"x3 3x10                   SEATED        Sit<>stands   10x2  ''                                   STANDING        HS stretch x 30\"x2 B 30\"x2 B --    Lunge hip flexor stretch x 30\"x2 -- --    Hip abduction   10x2 B  ''   Hip extension   10x2 B x15 B                      Manual: Longitudinal hip distraction, HS stretching, piriformis stretching, SKTC    Therapeutic Exercise and NMR EXR  [x] (75251) Provided verbal/tactile cueing for activities related to strengthening, flexibility, endurance, ROM  for improvements in proximal hip and core control with self care, mobility, lifting and ambulation.  [] (83946) Provided verbal/tactile cueing for activities related to improving balance, coordination, kinesthetic sense, posture, motor skill, proprioception  to assist with core control in self care, mobility, lifting, and ambulation. Therapeutic Activities:    [x] (89112 or 33283) Provided verbal/tactile cueing for activities related to improving balance, coordination, kinesthetic sense, posture, motor skill, proprioception and motor activation to allow for proper function  with self care and ADLs  [] (89669) Provided training and instruction to the patient for proper core and proximal hip recruitment and positioning with ambulation re-education     Home Exercise Program:    [x] (71974) Reviewed/Progressed HEP activities related to strengthening, flexibility, endurance, ROM of core, proximal hip and LE for functional self-care, mobility, lifting and ambulation   [] (92849) Reviewed/Progressed HEP activities related to improving balance, coordination, kinesthetic sense, posture, motor skill, proprioception of core, proximal hip and LE for self care, mobility, lifting, and ambulation      Manual Treatments:  PROM / STM / Oscillations-Mobs:  G-I, II, III, IV (PA's, Inf., Post.)  [x] (37995) Provided manual therapy to mobilize proximal hip and LS spine soft tissue/joints for the purpose of modulating pain, promoting relaxation,  increasing ROM, reducing/eliminating soft tissue swelling/inflammation/restriction, improving soft tissue extensibility and allowing for proper ROM for normal function with self care, mobility, lifting and ambulation. Modalities:     [] GAME READY (VASO)- for significant edema, swelling, pain control.      Charges:  Timed Code Treatment Minutes: 38   Total Treatment Minutes: 38      [] EVAL (LOW) 79015 (typically 20 minutes face-to-face)  [] EVAL (MOD) 48739 (typically 30 minutes face-to-face)  [] EVAL (HIGH) 27055 (typically 45 minutes face-to-face)  [] RE-EVAL     [x] DN(75395) 2     [] IONTO  [x] NMR (27758) x 1    [] VASO  [] Manual (55941)        [] Other:  [] TA x      [] [facility] Traction (35715)  [] ES(attended) (70764)      [] ES (un) (85509):       GOALS:     Patient stated goal: No pain    Therapist goals for Patient:   Short Term Goals: To be achieved in: 2 weeks  1. Independent in HEP and progression per patient tolerance, in order to prevent re-injury. [] Progressing: [] Met: [] Not Met: [] Adjusted  2. Patient will have a decrease in pain to facilitate improvement in movement, function, and ADLs as indicated by Functional Deficits. [] Progressing: [] Met: [] Not Met: [] Adjusted    Long Term Goals: To be achieved in: 6 weeks  1. Disability index score of 64% or less for the LEFS to assist with reaching prior level of function. [] Progressing: [] Met: [] Not Met: [] Adjusted  2. Patient will demonstrate increased AROM to WNL, good LS mobility, good hip ROM to allow for proper joint functioning as indicated by patients Functional Deficits. [] Progressing: [] Met: [] Not Met: [] Adjusted  3. Patient will demonstrate an increase in Strength to good proximal hip and core activation to allow for proper functional mobility as indicated by patients Functional Deficits. [] Progressing: [] Met: [] Not Met: [] Adjusted  4. Patient will return to functional activities without increased symptoms or restriction. [] Progressing: [] Met: [] Not Met: [] Adjusted  5. Pt will report less stiffness when getting up in the morning. (patient specific functional goal)    [] Progressing: [] Met: [] Not Met: [] Adjusted        ASSESSMENT:  Pt enrrique session well. Continued increased manual and table exercises. Pt continues to be SOB, vitals were within normal ranges. Significant reduction in pain per reports at end of session. Overall Progression Towards Functional goals/ Treatment Progress Update:  [] Patient is progressing as expected towards functional goals listed. [] Progression is slowed due to complexities/Impairments listed.   [] Progression has been

## 2020-11-12 ENCOUNTER — HOSPITAL ENCOUNTER (OUTPATIENT)
Dept: PHYSICAL THERAPY | Age: 80
Setting detail: THERAPIES SERIES
Discharge: HOME | End: 2020-11-12
Payer: MEDICARE

## 2020-11-12 PROCEDURE — 97110 THERAPEUTIC EXERCISES: CPT

## 2020-11-12 PROCEDURE — 97112 NEUROMUSCULAR REEDUCATION: CPT

## 2020-11-12 NOTE — FLOWSHEET NOTE
[address] [place] [address] and [facility], [address] [place] [address] [address]  Phone: [phone]   Fax:     [phone]        Physical Therapy Treatment Note/ Progress Report:           Date:  2020    Patient Name:  Maria Isabel Yo    :  1940  MRN: [id number]  Restrictions/Precautions:    Medical/Treatment Diagnosis Information:  · Diagnosis: Lumbar stenosis M48.07  · Treatment Diagnosis: Left hip pain  Insurance/Certification information:  PT Insurance Information: Medicare  Physician Information:  Referring Practitioner: Zoya Young  Has the plan of care been signed (Y/N):        [x]  Yes  []  No     Date of Patient follow up with Physician:     Assessment Summary: Sumi Oneal is a 79 y.o. female reporting to OP PT with c/c of left side LBP and intermittent shooting leg pain into ankle which has been occurring since May without known STANTON. Pt is noted to have reduce lumbar ROM and mild reduction in hip ROM. No direction preference noted through eval. Will begin with flexion biased exercises. Is this a Progress Report:     [x]  Yes  []  No        If Yes:  Date Range for reporting period:  Beginning 11/10/20  Ending 12/10/20    Progress report will be due (10 Rx or 30 days whichever is less):        Recertification will be due (POC Duration  / 90 days whichever is less): 12/10/20         Visit # Insurance Allowable Auth Required   4 BOMN []  Yes []  No        Functional Scale:  LEFS 84%   Date assessed:       Latex Allergy:  [x]NO      []YES  Preferred Language for Healthcare:   [x]English       []other:      Pain level:  3/10     SUBJECTIVE:  Pt states her hip is slowly improving.      OBJECTIVE:   /78  Ox 96%  HR 71 bpm    RESTRICTIONS/PRECAUTIONS: A-fib    Exercises/Interventions:   Therapeutic Ex (06750) HEP 9/29/20 10/6/20 10/9/20 11/12/20   Warm-up                        TABLE        SKTC x x15 B -- --    PPT x 10x2 10x2 10x2 ''   Lumbar rotations x 10x2  10x2 x10 B ''   Bridge   10x2 x10 ''   HL adduction    10x3 ''   HL CS Chand TB    45\"x3 3x10   LAQ     3x10 2#   SLR      2x10                                   SEATED        Sit<>stands   10x2  ''                                   STANDING        HS stretch x 30\"x2 B 30\"x2 B --    Lunge hip flexor stretch x 30\"x2 -- --    Hip abduction   10x2 B  ''   Hip extension   10x2 B x15 B    Standing marches     2x10   Step ups 4''     2x10     Manual: Longitudinal hip distraction, HS stretching, piriformis stretching, SKTC    Therapeutic Exercise and NMR EXR  [x] (55031) Provided verbal/tactile cueing for activities related to strengthening, flexibility, endurance, ROM  for improvements in proximal hip and core control with self care, mobility, lifting and ambulation.  [] (95142) Provided verbal/tactile cueing for activities related to improving balance, coordination, kinesthetic sense, posture, motor skill, proprioception  to assist with core control in self care, mobility, lifting, and ambulation.      Therapeutic Activities:    [x] (40349 or 20316) Provided verbal/tactile cueing for activities related to improving balance, coordination, kinesthetic sense, posture, motor skill, proprioception and motor activation to allow for proper function  with self care and ADLs  [] (77844) Provided training and instruction to the patient for proper core and proximal hip recruitment and positioning with ambulation re-education     Home Exercise Program:    [x] (73668) Reviewed/Progressed HEP activities related to strengthening, flexibility, endurance, ROM of core, proximal hip and LE for functional self-care, mobility, lifting and ambulation   [] (01428) Reviewed/Progressed HEP activities related to improving balance, coordination, kinesthetic sense, posture, motor skill, proprioception of core, proximal hip and LE for self care, mobility, lifting, and ambulation      Manual Treatments:  PROM / STM / Oscillations-Mobs:  G-I, II, III, IV (PA's, Inf., Post.)  [x] (33789) Provided manual therapy to mobilize proximal hip and LS spine soft tissue/joints for the purpose of modulating pain, promoting relaxation,  increasing ROM, reducing/eliminating soft tissue swelling/inflammation/restriction, improving soft tissue extensibility and allowing for proper ROM for normal function with self care, mobility, lifting and ambulation. Modalities:     [] GAME READY (VASO)- for significant edema, swelling, pain control. Charges:  Timed Code Treatment Minutes: 38   Total Treatment Minutes: 38      [] EVAL (LOW) 83640 (typically 20 minutes face-to-face)  [] EVAL (MOD) 04446 (typically 30 minutes face-to-face)  [] EVAL (HIGH) 17033 (typically 45 minutes face-to-face)  [] RE-EVAL     [x] MP(84253) 2     [] IONTO  [x] NMR (26962) x 1    [] VASO  [] Manual (81634)        [] Other:  [] TA x      [] Mech Traction (74822)  [] ES(attended) (63131)      [] ES (un) (19536):       GOALS:     Patient stated goal: No pain    Therapist goals for Patient:   Short Term Goals: To be achieved in: 2 weeks  1. Independent in HEP and progression per patient tolerance, in order to prevent re-injury. [] Progressing: [] Met: [] Not Met: [] Adjusted  2. Patient will have a decrease in pain to facilitate improvement in movement, function, and ADLs as indicated by Functional Deficits. [] Progressing: [] Met: [] Not Met: [] Adjusted    Long Term Goals: To be achieved in: 6 weeks  1. Disability index score of 64% or less for the LEFS to assist with reaching prior level of function. [] Progressing: [] Met: [] Not Met: [] Adjusted  2. Patient will demonstrate increased AROM to WNL, good LS mobility, good hip ROM to allow for proper joint functioning as indicated by patients Functional Deficits. [] Progressing: [] Met: [] Not Met: [] Adjusted  3.  Patient will demonstrate an increase in Strength to good proximal hip and core activation to allow for proper functional mobility as indicated by patients Functional Deficits. [] Progressing: [] Met: [] Not Met: [] Adjusted  4. Patient will return to functional activities without increased symptoms or restriction. [] Progressing: [] Met: [] Not Met: [] Adjusted  5. Pt will report less stiffness when getting up in the morning. (patient specific functional goal)    [] Progressing: [] Met: [] Not Met: [] Adjusted        ASSESSMENT:  Pt enrrique session well. Continued increased manual and table exercises. Pt continues to be SOB, vitals were within normal ranges. Significant reduction in pain per reports at end of session. Overall Progression Towards Functional goals/ Treatment Progress Update:  [] Patient is progressing as expected towards functional goals listed. [] Progression is slowed due to complexities/Impairments listed. [] Progression has been slowed due to co-morbidities. [x] Plan just implemented, too soon to assess goals progression <30days   [] Goals require adjustment due to lack of progress  [] Patient is not progressing as expected and requires additional follow up with physician  [] Other    Prognosis for POC: [x] Good [] Fair  [] Poor      Patient requires continued skilled intervention: [x] Yes  [] No    Treatment/Activity Tolerance:  [x] Patient able to complete treatment  [] Patient limited by fatigue  [] Patient limited by pain    [] Patient limited by other medical complications  [] Other:           PLAN: See eval  [x] Continue per plan of care [] Alter current plan (see comments above)  [] Plan of care initiated [] Hold pending MD visit [] Discharge      Electronically signed by:  Terri Phan, PT    Note: If patient does not return for scheduled/ recommended follow up visits, this note will serve as a discharge from care along with most recent update on progress.

## 2020-11-16 ENCOUNTER — CARE COORDINATION (OUTPATIENT)
Dept: CASE MANAGEMENT | Age: 80
End: 2020-11-16

## 2020-11-16 RX ORDER — OMEPRAZOLE 20 MG/1
CAPSULE, DELAYED RELEASE ORAL
Qty: 90 CAPSULE | Refills: 3 | Status: SHIPPED | OUTPATIENT
Start: 2020-11-16 | End: 2020-12-04

## 2020-11-16 RX ORDER — ATORVASTATIN CALCIUM 20 MG/1
TABLET, FILM COATED ORAL
Qty: 90 TABLET | Refills: 3 | Status: SHIPPED | OUTPATIENT
Start: 2020-11-16 | End: 2021-01-01

## 2020-11-17 ENCOUNTER — APPOINTMENT (OUTPATIENT)
Dept: PHYSICAL THERAPY | Age: 80
End: 2020-11-17
Payer: MEDICARE

## 2020-11-17 RX ORDER — OMEPRAZOLE 20 MG/1
CAPSULE, DELAYED RELEASE ORAL
Qty: 90 CAPSULE | Refills: 2 | Status: SHIPPED | OUTPATIENT
Start: 2020-11-17 | End: 2021-01-01

## 2020-11-20 ENCOUNTER — ANTI-COAG VISIT (OUTPATIENT)
Dept: PHARMACY | Age: 80
End: 2020-11-20
Payer: MEDICARE

## 2020-11-20 LAB — INTERNATIONAL NORMALIZATION RATIO, POC: 3.3

## 2020-11-20 PROCEDURE — 85610 PROTHROMBIN TIME: CPT

## 2020-11-20 PROCEDURE — 99211 OFF/OP EST MAY X REQ PHY/QHP: CPT

## 2020-11-20 NOTE — PROGRESS NOTES
Ms. Dawna Abreu is here for management of anticoagulation for Afib. PMH also significant for HTN, Gout, CKD II/III, Hyperlipidemia, and depression. She presents today w/out complaint. Pt verifies dosing regimen as listed above. Pt denies s/sx bleeding/bruising/ CP/HA/swelling/SOB  No missed doses. No changes in Rx/OTC/herbal medications. No major changes in diet. Pt does not drink EtOH or smoke. INR is slightly elevated again, will decrease dosing    INR 3.3 is above the acceptable therapeutic range of 2-3. Recommend to decrease dose to 0.5mg daily except 1mg on Tue. Patient has 1 mg tablets. Will continue to monitor and check INR in 3 weeks. Dosing reminder card given with phone number, appointment date and time.   Return to clinic: 12/11 @ 11:45 am  Referring Provider: Dr. Sepideh Retana PharmD  11/20/2020 at 11:17 AM

## 2020-11-24 ENCOUNTER — APPOINTMENT (OUTPATIENT)
Dept: PHYSICAL THERAPY | Age: 80
End: 2020-11-24
Payer: MEDICARE

## 2020-11-25 ENCOUNTER — OFFICE VISIT (OUTPATIENT)
Dept: CARDIOLOGY CLINIC | Age: 80
End: 2020-11-25
Payer: MEDICARE

## 2020-11-25 VITALS
SYSTOLIC BLOOD PRESSURE: 126 MMHG | HEART RATE: 82 BPM | OXYGEN SATURATION: 97 % | WEIGHT: 281 LBS | BODY MASS INDEX: 45.16 KG/M2 | DIASTOLIC BLOOD PRESSURE: 78 MMHG | HEIGHT: 66 IN

## 2020-11-25 PROBLEM — R05.9 COUGH: Status: RESOLVED | Noted: 2020-10-26 | Resolved: 2020-11-25

## 2020-11-25 PROCEDURE — 4040F PNEUMOC VAC/ADMIN/RCVD: CPT | Performed by: NURSE PRACTITIONER

## 2020-11-25 PROCEDURE — 99214 OFFICE O/P EST MOD 30 MIN: CPT | Performed by: NURSE PRACTITIONER

## 2020-11-25 PROCEDURE — 1123F ACP DISCUSS/DSCN MKR DOCD: CPT | Performed by: NURSE PRACTITIONER

## 2020-11-25 PROCEDURE — G8417 CALC BMI ABV UP PARAM F/U: HCPCS | Performed by: NURSE PRACTITIONER

## 2020-11-25 PROCEDURE — G8427 DOCREV CUR MEDS BY ELIG CLIN: HCPCS | Performed by: NURSE PRACTITIONER

## 2020-11-25 PROCEDURE — 1090F PRES/ABSN URINE INCON ASSESS: CPT | Performed by: NURSE PRACTITIONER

## 2020-11-25 PROCEDURE — G8399 PT W/DXA RESULTS DOCUMENT: HCPCS | Performed by: NURSE PRACTITIONER

## 2020-11-25 PROCEDURE — G8484 FLU IMMUNIZE NO ADMIN: HCPCS | Performed by: NURSE PRACTITIONER

## 2020-11-25 PROCEDURE — 1036F TOBACCO NON-USER: CPT | Performed by: NURSE PRACTITIONER

## 2020-11-25 ASSESSMENT — ENCOUNTER SYMPTOMS
SHORTNESS OF BREATH: 1
GASTROINTESTINAL NEGATIVE: 1

## 2020-11-25 NOTE — PATIENT INSTRUCTIONS
Everything looks great today, good job!   Continue current medications  Continue lasix as needed for weight gain 3 lbs overnight or 5 lbs in 1 week  Salt <2 grams per day and fluids <2L per day  Stay active along with a healthy diet  Follow up in 6 months

## 2020-11-25 NOTE — PROGRESS NOTES
[address]   Cardiology Note              Date:  November 25, 2020  Patientname: Lee Morse  YOB: 1940    Primary Care physician: Osmel Goodman MD    HISTORY OF PRESENT ILLNESS: Lee Morse is a 79 y.o. female with a history of atrial fibrillation, CHF, HTN, HLD, CKD. She was found to be in AF at routine PCP visit in 1/2016. She had a CV in 2/2016 and returned to AF within a week. Rate control strategy pursued. Echo 9/2020 showed EF 50-55%. She was admitted 10/10/2020 for shortness of breath. Treated for CHF. Stress test negative for ischemia. Today she presents for hospital follow up for CHF, AF. She is feeling better though she still has shortness of breath with steps. She has no chest pain, palpitations, dizziness. She takes lasix as needed and has taken once. She does not check BP at home. Weight today 11/25/2020: 281lbs (276 lbs on home scale  Discharge weight: 10/15/2020 273 lbs    Past Medical History:   has a past medical history of Anemia, Arthritis, Atrial fibrillation ([address] Chronic kidney disease (CKD), stage II (mild), Community acquired pneumonia of left lower lobe of lung, Depression, Diverticulosis, Foot pain, GI bleed, Gout, Hemorrhoids, Hyperlipidemia, Hypertension, Hyperuricemia, Iron deficiency anemia, Medical history reviewed with no changes, Menopausal syndrome, Obesity, Pelvic relaxation, PONV (postoperative nausea and vomiting), Stress, Syncope, Unspecified sleep apnea, Vitamin D deficiency, Wears dentures, and Wears glasses. Past Surgical History:   has a past surgical history that includes Cholecystectomy (1974); Hysterectomy (1977); Knee arthroscopy (2005); bladder suspension (1997); Uvulopalatopharygoplasty (3/2002); eye surgery; Colonoscopy; Colonoscopy (11/2014); joint replacement (Right, 2017); Pain management procedure (Left, 8/11/2020); and Pain management procedure (N/A, 8/25/2020).      Home Medications:    Prior to Admission medications    Medication Sig Start Date End Date Taking? Authorizing Provider   omeprazole (PRILOSEC) 20 MG delayed release capsule TAKE ONE CAPSULE BY MOUTH DAILY 11/17/20   Terence Ngo MD   omeprazole (PRILOSEC) 20 MG delayed release capsule TAKE ONE CAPSULE BY MOUTH DAILY 11/16/20   Terence Ngo MD   atorvastatin (LIPITOR) 20 MG tablet TAKE ONE TABLET BY MOUTH ONCE NIGHTLY 11/16/20   Terence Ngo MD   metoprolol tartrate (LOPRESSOR) 25 MG tablet Take 1 tablet by mouth 2 times daily 10/15/20   RADHA Sanchez CNP   furosemide (LASIX) 20 MG tablet Take 1 tablet by mouth daily as needed (weight gain 3 lbs over base weight) 10/15/20   RADHA Sanchez CNP   allopurinol (ZYLOPRIM) 300 MG tablet TAKE ONE TABLET BY MOUTH DAILY 3/16/20   Terence Ngo MD   dilTIAZem (CARTIA XT) 300 MG extended release capsule Take 1 capsule by mouth daily 2/20/20   Chu Knight MD   warfarin (COUMADIN) 1 MG tablet TAKE ONE TABLET BY MOUTH ON SUNDAY, TUESDAY AND THURSDAY. TAKE ONE-HALF TABLET BY MOUTH ON ALL OTHER DAYS OR AS DIRECTED BY CLINIC 2/10/20   RADHA Manzo CNP   sertraline (ZOLOFT) 50 MG tablet TAKE ONE TABLET BY MOUTH DAILY 11/13/19   Terence Ngo MD   Cholecalciferol (VITAMIN D) 2000 UNITS CAPS capsule Take 2,000 Units by mouth daily    Historical Provider, MD   docusate sodium (COLACE) 100 MG capsule Take 100 mg by mouth daily as needed for Constipation     Historical Provider, MD     Allergies:  Diclofenac; Adhesive tape; and Penicillins    Social History:   reports that she has never smoked. She has never used smokeless tobacco. She reports that she does not drink alcohol or use drugs. Family History: family history includes Heart Attack in her father; Heart Disease in her father; Stroke in her brother. Review of Systems   Review of Systems   Constitutional: Negative. Respiratory: Positive for shortness of breath. Cardiovascular: Negative. Gastrointestinal: Negative. Neurological: Negative. OBJECTIVE:    Vital signs:    /78   Pulse 82   Ht 5' 6\" (1.676 m)   Wt 281 lb (127.5 kg)   SpO2 97%   BMI 45.35 kg/m²      Physical Exam:  Constitutional:  Comfortable and alert, NAD, appears stated age, obese  Eyes: PERRL, sclera nonicteric  Neck:  Supple, no masses, no thyroidmegaly, no JVD  Skin:  Warm and dry; no rash or lesions  Heart: Irregular, normal apex, S1 and S2 normal, no M/G/R  Lungs:  Normal respiratory effort; clear; no wheezing/rhonchi/rales  Abdomen: soft, non tender, + bowel sounds  Extremities:  No edema or cyanosis; no clubbing  Neuro: alert and oriented, moves legs and arms equally, normal mood and affect    Data Reviewed:      Stress test 10/12/2020: There is normal isotope uptake at stress and rest. There is no evidence of     myocardial ischemia or scar. Hyperdynamic LV systolic function with DX>15%     with uniform wall motion. Low risk study.       Echo 9/2020:  Left ventricular systolic function is low normal with a visually estimated   ejection fraction of 50-55%.   Normal wall motion   The left atrium appears moderate to severely enlarged.   Mild tricuspid regurgitation.   Systolic pulmonary artery pressure (SPAP) is normal and estimated at 36 mmHg   (right atrial pressure 8 mmHg). Cardiology Labs Reviewed:   CBC: No results for input(s): WBC, HGB, HCT, PLT in the last 72 hours. BMP:No results for input(s): NA, K, CO2, BUN, CREATININE, LABGLOM, GLUCOSE in the last 72 hours. PT/INR: No results for input(s): PROTIME, INR in the last 72 hours. APTT:No results for input(s): APTT in the last 72 hours. FASTING LIPID PANEL:  Lab Results   Component Value Date    HDL 66 10/11/2020    HDL 53 06/04/2010    LDLCALC 56 10/11/2020    TRIG 97 10/11/2020     LIVER PROFILE:No results for input(s): AST, ALT, ALB in the last 72 hours.   BNP:   Lab Results   Component Value Date    PROBNP 1,335 11/06/2020    PROBNP 935 10/13/2020    PROBNP 2,448 10/10/2020 PROBNP 1,910 12/30/2017    PROBNP 4,052 12/28/2017     Reviewed all labs and imaging today    Assessment:   Shortness of breath: significantly improved  Chronic diastolic CHF: appears compensated  Persistent atrial fibrillation: rate controlled              - [id number] score >2 on coumadin (not interested in DOACs due to cost)              - s/p CV 2/2016  Normal stress test 10/2020  HTN: stable  CKD  Likely YUMIKO     Plan:   1. Continue prn lasix 20 mg for edema, shortness of breath, weight gain  2. Restart lisinopril for CHF in follow up pending stabilization of renal function, would also benefit from spironolactone   3. Continue lopressor, diltiazem  4. Discussed YUMIKO evaluation, she declines  5. Check BP and weight at home and call the office if consistently out of goal range  6.  Follow up in 6 months    RADHA [address]  [phone]

## 2020-12-01 NOTE — PROGRESS NOTES
Please schedule her for Covid test: either drive thru or in person   Vaccine Information Sheet, \"Influenza - Inactivated\"  given to Jabier Rued, or parent/legal guardian of  Jabier Ruizabella and verbalized understanding. Patient responses:    Have you ever had a reaction to a flu vaccine? No  Are you able to eat eggs without adverse effects? Yes  Do you have any current illness? No  Have you ever had Guillian Crane Syndrome? No    Flu vaccine given per order. Please see immunization tab.

## 2020-12-03 ENCOUNTER — TELEPHONE (OUTPATIENT)
Dept: INTERNAL MEDICINE CLINIC | Age: 80
End: 2020-12-03

## 2020-12-03 NOTE — TELEPHONE ENCOUNTER
Patient is requesting medication for following medication, the pharmacy sent over med request 11/30 and she still hasn't received a response.  Please advise    sertraline (ZOLOFT) 50 MG tablet        OLIVERIO WILLARD [address] [place] [address] [phone] - F [phone]

## 2020-12-03 NOTE — TELEPHONE ENCOUNTER
I filled it but pt does need to do an audio AWV as with the current surge it is unlikely we will want her to risk coming into the office until after Jan or feb so make her an appt for that -she has been very resistant to this but I really need to do the visit virtually soon

## 2020-12-04 ENCOUNTER — VIRTUAL VISIT (OUTPATIENT)
Dept: INTERNAL MEDICINE CLINIC | Age: 80
End: 2020-12-04
Payer: MEDICARE

## 2020-12-04 VITALS
SYSTOLIC BLOOD PRESSURE: 122 MMHG | HEIGHT: 67 IN | BODY MASS INDEX: 42.53 KG/M2 | WEIGHT: 271 LBS | DIASTOLIC BLOOD PRESSURE: 80 MMHG

## 2020-12-04 PROBLEM — M51.9 LUMBAR DISC DISEASE: Status: ACTIVE | Noted: 2020-12-04

## 2020-12-04 PROCEDURE — 1123F ACP DISCUSS/DSCN MKR DOCD: CPT | Performed by: INTERNAL MEDICINE

## 2020-12-04 PROCEDURE — 4040F PNEUMOC VAC/ADMIN/RCVD: CPT | Performed by: INTERNAL MEDICINE

## 2020-12-04 PROCEDURE — G0439 PPPS, SUBSEQ VISIT: HCPCS | Performed by: INTERNAL MEDICINE

## 2020-12-04 ASSESSMENT — LIFESTYLE VARIABLES: HOW OFTEN DO YOU HAVE A DRINK CONTAINING ALCOHOL: 0

## 2020-12-04 ASSESSMENT — PATIENT HEALTH QUESTIONNAIRE - PHQ9
SUM OF ALL RESPONSES TO PHQ QUESTIONS 1-9: 0
SUM OF ALL RESPONSES TO PHQ QUESTIONS 1-9: 0
2. FEELING DOWN, DEPRESSED OR HOPELESS: 0
SUM OF ALL RESPONSES TO PHQ QUESTIONS 1-9: 0
1. LITTLE INTEREST OR PLEASURE IN DOING THINGS: 0
SUM OF ALL RESPONSES TO PHQ9 QUESTIONS 1 & 2: 0

## 2020-12-04 ASSESSMENT — ENCOUNTER SYMPTOMS
WHEEZING: 0
COLOR CHANGE: 0
CHEST TIGHTNESS: 0
BACK PAIN: 1
ABDOMINAL PAIN: 0

## 2020-12-04 NOTE — PROGRESS NOTES
Medicare Annual Wellness Visit  Are Name: Dominga Shelton Date: 2020   MRN: <[id number]> Sex: Female   Age: 79 y.o. Ethnicity: Non-/Non    : 1940 Race: Josephine Campuzano is here for Medicare AWV    Screenings for behavioral, psychosocial and functional/safety risks, and cognitive dysfunction are all negative except as indicated below. These results, as well as other patient data from the [address] form, are documented in Flowsheets linked to this Encounter. Allergies   Allergen Reactions    Diclofenac Hives     Severe itching    Adhesive Tape Rash    Penicillins Nausea And Vomiting     \"years ago\"       Prior to Visit Medications    Medication Sig Taking? Authorizing Provider   sertraline (ZOLOFT) 50 MG tablet TAKE ONE TABLET BY MOUTH DAILY Yes Camelia Prasad MD   omeprazole (PRILOSEC) 20 MG delayed release capsule TAKE ONE CAPSULE BY MOUTH DAILY Yes Camelia Prasad MD   atorvastatin (LIPITOR) 20 MG tablet TAKE ONE TABLET BY MOUTH ONCE NIGHTLY Yes Camelia Prasad MD   metoprolol tartrate (LOPRESSOR) 25 MG tablet Take 1 tablet by mouth 2 times daily Yes RADHA Villa CNP   dilTIAZem (CARTIA XT) 300 MG extended release capsule Take 1 capsule by mouth daily Yes Edin Lobo MD   warfarin (COUMADIN) 1 MG tablet TAKE ONE TABLET BY MOUTH ON , TUESDAY AND THURSDAY.   TAKE ONE-HALF TABLET BY MOUTH ON ALL OTHER DAYS OR AS DIRECTED BY CLINIC Yes RADHA Luna CNP   Cholecalciferol (VITAMIN D) 2000 UNITS CAPS capsule Take 2,000 Units by mouth daily Yes Historical Provider, MD   omeprazole (PRILOSEC) 20 MG delayed release capsule TAKE ONE CAPSULE BY MOUTH DAILY  Camelia Prasad MD   furosemide (LASIX) 20 MG tablet Take 1 tablet by mouth daily as needed (weight gain 3 lbs over base weight)  Patient not taking: Reported on 2020  RADHA Villa CNP   allopurinol (ZYLOPRIM) 300 MG tablet TAKE ONE TABLET BY MOUTH DAILY  Patient not taking: Reported on 11/25/2020  Kem Andrade MD   docusate sodium (COLACE) 100 MG capsule Take 100 mg by mouth daily as needed for Constipation   Historical Provider, MD       Past Medical History:   Diagnosis Date    Anemia     NOS    Arthritis     knee     Atrial fibrillation ([facility] [address]     on coumadin    Chronic kidney disease (CKD), stage II (mild)     Community acquired pneumonia of left lower lobe of lung 12/26/2017    Depression     Diverticulosis     Foot pain     GI bleed 4/18/2018    Due to esophageal tear     Gout     Hemorrhoids     Hyperlipidemia     Hypertension     Hyperuricemia     Iron deficiency anemia 1/8/2014    Iron deficiency anemia-s/p EGD and colonoscopy 2/11/2014 Esophageal tear likely source     Medical history reviewed with no changes     Menopausal syndrome     Obesity     Pelvic relaxation     PONV (postoperative nausea and vomiting)     Stress 8/2006    NL stress    Syncope     Unspecified sleep apnea 03/2002    uvulectomy -hx of    Vitamin D deficiency     20ng/ml 6/2009    Wears dentures     Wears glasses        Past Surgical History:   Procedure Laterality Date    BLADDER SUSPENSION  1997    CHOLECYSTECTOMY  1974    COLONOSCOPY      2013    COLONOSCOPY  11/2014    10yr    EYE SURGERY      macular tear and cataract right    HYSTERECTOMY  1977    partial    JOINT REPLACEMENT Right 2017    KNEE ARTHROSCOPY  2005    knee surgery    PAIN MANAGEMENT PROCEDURE Left 8/11/2020    LEFT LUMBAR THREE AND LUMBAR FOUR TRANSFORAMINAL EPIDURAL STEROID INJECTION SITE CONFIRMED BY FLUOROSCOPY performed by Rene Strong MD at [address] [facility] N/A 8/25/2020    MIDLINE LUMBAR FIVE SACRAL ONE INTERLAMINAR EPIDURAL STEROID INJECTION SITE CONFIRMED BY FLUOROSCOPY performed by Rene Strong MD at [facility]  3/2002       Family History   Problem Relation Age of Onset    Heart Attack Father    Gloriajesenia Seeds Heart Disease Father     Stroke Brother        [facility] (Including outside providers/suppliers regularly involved in providing care):   Patient Care Team:  Cm Christianson MD as PCP - Darwin Marina MD as PCP - [facility] Empaneled Provider  Sheryl Wong DPM as Consulting Physician ([facility])  April Sales MD (Orthopedic Surgery)  Arcadio Correiar, MD as Consulting Physician (Cardiology)  Mónica Knott MD (Orthopedic Surgery)  Ping Avila as Physician Assistant (Orthopedic Surgery)  serge eduardo (Cardiac Rehabilitation)    Wt Readings from Last 3 Encounters:   12/04/20 271 lb (122.9 kg)   11/25/20 281 lb (127.5 kg)   10/15/20 273 lb 6.4 oz (124 kg)      No flowsheet data found. Body mass index is 43.09 kg/m². Based upon direct observation of the patient, evaluation of cognition reveals recent and remote memory intact. Patient's complete Health Risk Assessment and screening values have been reviewed and are found in Flowsheets. The following problems were reviewed today and where indicated follow up appointments were made and/or referrals ordered. Positive Risk Factor Screenings with Interventions:     Fall Risk:  2 or more falls in past year?: (!) yes  Fall with injury in past year?: no  Fall Risk Interventions:    · Home safety tips provided    General Health and ACP:  General  In general, how would you say your health is?: Good  In the past 7 days, have you experienced any of the following?  New or Increased Pain, New or Increased Fatigue, Loneliness, Social Isolation, Stress or Anger?: (!) New or Increased Pain  Do you get the social and emotional support that you need?: Yes  Do you have a Living Will?: Yes  Advance Directives     Power of  Living Will ACP-Advance Directive ACP-Power of     Not on File Not on File Not on File Not on File      General Health Risk Interventions:  · see note     Health Habits/Nutrition:  Health Habits/Nutrition  Do you exercise for at least 20 minutes 2-3 times per week?: (!) No  Have you lost any weight without trying in the past 3 months?: No  Do you eat fewer than 2 meals per day?: No  Have you seen a dentist within the past year?: Yes  Body mass index: (!) 43.08  Health Habits/Nutrition Interventions:  · Inadequate physical activity:  patient agrees to wear a pedometer and walk at least 10,000 steps/day    Hearing/Vision:  No exam data present  Hearing/Vision  Do you or your family notice any trouble with your hearing?: (!) Yes  Do you have difficulty driving, watching TV, or doing any of your daily activities because of your eyesight?: No  Have you had an eye exam within the past year?: Yes  Hearing/Vision Interventions:  · Hearing concerns:  patient declines any further evaluation/treatment for hearing issues    Personalized Preventive Plan   Current Health Maintenance Status  Immunization History   Administered Date(s) Administered    DTP 02/03/2003    DTaP 02/03/2003, 04/24/2017    Influenza Vaccine, unspecified formulation 11/04/2015    Influenza Whole 10/11/2007    Influenza, High Dose (Fluzone 65 yrs and older) 10/18/2011, 10/17/2012, 10/24/2014, 10/11/2016, 10/17/2017, 10/31/2018, 11/18/2020    Influenza, Triv, inactivated, subunit, adjuvanted, IM (Fluad 65 yrs and older) 11/13/2019    Pneumococcal Conjugate 13-valent ([id number]) 01/11/2017    Pneumococcal Conjugate 7-valent (Prevnar7) 07/13/2006    Pneumococcal Polysaccharide ([id number]) 07/13/2006    Tdap (Boostrix, Adacel) 04/17/2017    Zoster Live (Zostavax) 04/24/2017        Health Maintenance   Topic Date Due    Shingles Vaccine (2 of 3) 06/19/2017    Pneumococcal 65+ years Vaccine (2 of 2 - PPSV23) 01/11/2018    Annual Wellness Visit (AWV)  06/19/2019    Lipid screen  10/11/2021    Potassium monitoring  11/06/2021    Creatinine monitoring  11/06/2021    DTaP/Tdap/Td vaccine (4 - Td) 04/24/2027    Flu vaccine  Completed    Hepatitis A

## 2020-12-04 NOTE — PROGRESS NOTES
2020    TELEHEALTH EVALUATION -- Audio (During SJSHM-64 public health emergency)  Patient unable to access video for visit    HPI:    Subhash Baxter (:  1940) has requested an audio evaluation for the following concern(s):    Has been having severe low back pain and leg pain- seen by Dr. Brad Patel and injection didn't help-did physical therapy but not helping-seeing chiro now and having good results- not a candidate for surgery due to underlying issues- to see pain management next Tuesday- depression fairly well controlled w zoloft-     Review of Systems   Constitutional: Negative for activity change, appetite change and fatigue. HENT: Negative for congestion. Eyes: Negative for visual disturbance. Respiratory: Negative for chest tightness and wheezing. Cardiovascular: Negative for chest pain and palpitations. Gastrointestinal: Negative for abdominal pain. Musculoskeletal: Positive for arthralgias, back pain and myalgias. Skin: Negative for color change and rash. Neurological: Negative for weakness, light-headedness and headaches. Psychiatric/Behavioral: Negative for dysphoric mood and sleep disturbance. The patient is not nervous/anxious. Prior to Visit Medications    Medication Sig Taking? Authorizing Provider   sertraline (ZOLOFT) 50 MG tablet TAKE ONE TABLET BY MOUTH DAILY Yes Varghese Lima MD   omeprazole (PRILOSEC) 20 MG delayed release capsule TAKE ONE CAPSULE BY MOUTH DAILY Yes Varghese Lima MD   atorvastatin (LIPITOR) 20 MG tablet TAKE ONE TABLET BY MOUTH ONCE NIGHTLY Yes Varghese Lima MD   metoprolol tartrate (LOPRESSOR) 25 MG tablet Take 1 tablet by mouth 2 times daily Yes RADHA Jones - CNP   dilTIAZem (CARTIA XT) 300 MG extended release capsule Take 1 capsule by mouth daily Yes Fern Lorenz MD   warfarin (COUMADIN) 1 MG tablet TAKE ONE TABLET BY MOUTH ON , TUESDAY AND THURSDAY.   TAKE ONE-HALF TABLET BY MOUTH ON ALL OTHER DAYS OR AS DIRECTED BY CLINIC Yes RADHA Sosa - CNP   Cholecalciferol (VITAMIN D) 2000 UNITS CAPS capsule Take 2,000 Units by mouth daily Yes Historical Provider, MD   allopurinol (ZYLOPRIM) 300 MG tablet TAKE ONE TABLET BY MOUTH DAILY  Patient not taking: Reported on 11/25/2020  Rachell Severe, MD   docusate sodium (COLACE) 100 MG capsule Take 100 mg by mouth daily as needed for Constipation   Historical Provider, MD       Social History     Tobacco Use    Smoking status: Never Smoker    Smokeless tobacco: Never Used   Substance Use Topics    Alcohol use: No    Drug use: Never        Past Medical History:   Diagnosis Date    Anemia     NOS    Arthritis     knee     Atrial fibrillation (HCC)     on coumadin    Chronic kidney disease (CKD), stage II (mild)     Community acquired pneumonia of left lower lobe of lung 12/26/2017    Depression     Diverticulosis     Foot pain     GI bleed 4/18/2018    Due to esophageal tear     Gout     Hemorrhoids     Hyperlipidemia     Hypertension     Hyperuricemia     Iron deficiency anemia 1/8/2014    Iron deficiency anemia-s/p EGD and colonoscopy 2/11/2014 Esophageal tear likely source     Medical history reviewed with no changes     Menopausal syndrome     Obesity     Pelvic relaxation     PONV (postoperative nausea and vomiting)     Stress 8/2006    NL stress    Syncope     Unspecified sleep apnea 03/2002    uvulectomy -hx of    Vitamin D deficiency     20ng/ml 6/2009    Wears dentures     Wears glasses        PHYSICAL EXAMINATION:    Vitals:    12/04/20 1255   BP: 122/80   Weight: 271 lb (122.9 kg)   Height: 5' 6.5\" (1.689 m)        Patient did not sound to be in distress.  Was alert and oriented x 4  No rapid respirations or difficulty breathing appreciated over audio         Psychiatric:       [x] Normal Affect [x] No Hallucinations        [] Abnormal-         Assessment and Plan:      Lumbar disc disease  To see Dr. Chapis Richmond now for f/u    Gout  Cont allopurinol     New onset of congestive heart failure (HCC)  Controlled w current regimen- continue and monitor closely      Essential hypertension  Controlled w current regimen- continue and monitor closely      Morbid obesity with BMI of 40.0-44.9, adult [facility])  Discussed with patient at length health risks of obesity and need for diet and exercise      Hyperglycemia  Cont to follow diet               Return in about 6 months (around 6/4/2021). Shaan Matamoros is a 79 y.o. female being evaluated by a Virtual Visit (audio visit) encounter to address concerns as mentioned above. A caregiver was present when appropriate. Due to this being a TeleHealth encounter (During RTMFB-50 public health emergency), evaluation of the following organ systems was limited: Vitals/Constitutional/EENT/Resp/CV/GI//MS/Neuro/Skin/Heme-Lymph-Imm. Pursuant to the emergency declaration under the [address] [place] [address], [address] [place] [address] authority and the [employer] and [employer] Act, this Virtual Visit was conducted with patient's (and/or legal guardian's) consent, to reduce the patient's risk of exposure to COVID-19 and provide necessary medical care. The patient (and/or legal guardian) has also been advised to contact this office for worsening conditions or problems, and seek emergency medical treatment and/or call 911 if deemed necessary. Patient identification was verified at the start of the visit: yes    Total time spent on this encounter: 25 mins    Services were provided through a audio synchronous discussion virtually to substitute for in-person clinic visit. Patient and provider were located at their individual homes. --Terence Ngo MD on 12/4/2020 at 1:39 PM    An electronic signature was used to authenticate this note.

## 2020-12-08 ENCOUNTER — TELEPHONE (OUTPATIENT)
Dept: INTERNAL MEDICINE CLINIC | Age: 80
End: 2020-12-08

## 2020-12-08 NOTE — TELEPHONE ENCOUNTER
Cholo with Dr. Petar Cross office called stating they are going to give patient an epidural injection, they need the ok for patient to hold the warfarin, 5 days prior to the injection/  It is not schedule yet, waiting on your approval.    Please call to advise

## 2020-12-09 ENCOUNTER — TELEPHONE (OUTPATIENT)
Dept: CARDIOLOGY CLINIC | Age: 80
End: 2020-12-09

## 2020-12-09 NOTE — TELEPHONE ENCOUNTER
CARDIAC RISK ASSESSMENT     What type of procedure are you having?  epidural injection    WHICH PROVIDER IS DOING procedure? Dr. Katie Meza    When is your procedure scheduled for? NOT SCHEDULED YET     Where are you having this procedure? [address]    Are you taking Blood Thinners? If so what? YES    (Name/dose/frequesncy)  warfarin (COUMADIN) 1 MG tablet      Does the surgeon want you to stop your blood thinner?   If so for how long?  [address]     Phone Number and Contact Name for Physicians office [phone]    Fax number to send information:    [phone]

## 2020-12-09 NOTE — LETTER
[facility] CARDIOLOGY[address]  Dept: [phone]  Dept Fax: [phone]      2020    Maico Zamudio  : 1940  DOS: 2020    To Whom it May Concern:    Em Luna has been evaluated for cardiac clearance. Based on diagnostic testing, Em Luna is considered at a low risk for surgery. There is no further cardiac testing that could be done to lower the risk. Please let my office know if you have any questions or concerns.           Arpit Alarcon, [facility]

## 2020-12-09 NOTE — TELEPHONE ENCOUNTER
Please provide cardiac risk assessment for epidural injection. Dr. Tyree Pina is requesting that warfarin be held 5 days prior to procedure. Last OV on 8/31/20.   Thanks

## 2020-12-10 NOTE — TELEPHONE ENCOUNTER
Last echo normal, except for LA size. She is at low CV risk for non cardiac surgery. Can we bridge with lovenox for 2 days prior to procedure?

## 2020-12-11 ENCOUNTER — ANTI-COAG VISIT (OUTPATIENT)
Dept: PHARMACY | Age: 80
End: 2020-12-11
Payer: MEDICARE

## 2020-12-11 LAB — INR BLD: 2.3

## 2020-12-11 PROCEDURE — 85610 PROTHROMBIN TIME: CPT

## 2020-12-11 PROCEDURE — 99211 OFF/OP EST MAY X REQ PHY/QHP: CPT

## 2020-12-11 NOTE — PROGRESS NOTES
Ms. Dana Luna is here for management of anticoagulation for Afib. PMH also significant for HTN, Gout, CKD II/III, Hyperlipidemia, and depression. She presents today w/out complaint. Pt verifies dosing regimen as listed above. Pt denies s/sx bleeding/bruising/ CP/HA/swelling/SOB  No missed doses. No changes in Rx/OTC/herbal medications. No major changes in diet. Pt does not drink EtOH or smoke. INR 2.3 is within the acceptable therapeutic range of 2-3. Recommend to continue dose of 0.5mg daily except 1mg on Tue. Patient has 1 mg tablets. Will continue to monitor and check INR in 4 weeks. Dosing reminder card given with phone number, appointment date and time.   Return to clinic: 1/81 @ 11:30 am  Referring Provider: Dr. Jose Carlos Becerra

## 2020-12-14 NOTE — TELEPHONE ENCOUNTER
Dr. Promise Alex office called back stating that the patient's procedure is scheduled on 12/22/20. The patient will be stopping her Warfarin on 12/16/20. Please send the Lovenox injection prescription to  [address] [place] [address], [address] [place] [address] [phone] - f [phone]     Also please call the patient at [phone] to advise on the use of the Lovenox injections.

## 2020-12-14 NOTE — TELEPHONE ENCOUNTER
Dr. Saurabh Kimball. office called in regarding pt cardiac clearance that was faxed over Friday 12-11-20. Dr. Saurabh Kimball. Office stated they never received the fax of the cardiac clearance. Cardiac clearance needs to faxed over to [phone].  Thanks

## 2020-12-17 ENCOUNTER — OFFICE VISIT (OUTPATIENT)
Dept: PRIMARY CARE CLINIC | Age: 80
End: 2020-12-17
Payer: MEDICARE

## 2020-12-17 PROCEDURE — 99211 OFF/OP EST MAY X REQ PHY/QHP: CPT | Performed by: NURSE PRACTITIONER

## 2020-12-17 PROCEDURE — G8428 CUR MEDS NOT DOCUMENT: HCPCS | Performed by: NURSE PRACTITIONER

## 2020-12-17 PROCEDURE — G8417 CALC BMI ABV UP PARAM F/U: HCPCS | Performed by: NURSE PRACTITIONER

## 2020-12-17 NOTE — PATIENT INSTRUCTIONS

## 2020-12-17 NOTE — PROGRESS NOTES
Milly Lords received a viral test for COVID-19. They were educated on isolation and quarantine as appropriate. For any symptoms, they were directed to seek care from their PCP, given contact information to establish with a doctor, directed to an urgent care or the emergency room.

## 2020-12-18 LAB — SARS-COV-2, NAA: NOT DETECTED

## 2021-01-01 ENCOUNTER — APPOINTMENT (OUTPATIENT)
Dept: GENERAL RADIOLOGY | Age: 81
End: 2021-01-01
Payer: MEDICARE

## 2021-01-01 ENCOUNTER — APPOINTMENT (OUTPATIENT)
Dept: PHYSICAL THERAPY | Age: 81
End: 2021-01-01
Payer: MEDICARE

## 2021-01-01 ENCOUNTER — APPOINTMENT (OUTPATIENT)
Dept: CT IMAGING | Age: 81
End: 2021-01-01
Payer: MEDICARE

## 2021-01-01 ENCOUNTER — OFFICE VISIT (OUTPATIENT)
Dept: ENT CLINIC | Age: 81
End: 2021-01-01
Payer: MEDICARE

## 2021-01-01 ENCOUNTER — ANTI-COAG VISIT (OUTPATIENT)
Dept: PHARMACY | Age: 81
End: 2021-01-01
Payer: MEDICARE

## 2021-01-01 ENCOUNTER — CARE COORDINATION (OUTPATIENT)
Dept: CARE COORDINATION | Age: 81
End: 2021-01-01

## 2021-01-01 ENCOUNTER — HOSPITAL ENCOUNTER (OUTPATIENT)
Dept: OCCUPATIONAL THERAPY | Age: 81
Setting detail: THERAPIES SERIES
Discharge: HOME | End: 2021-09-16
Payer: MEDICARE

## 2021-01-01 ENCOUNTER — TELEPHONE (OUTPATIENT)
Dept: CARDIOLOGY CLINIC | Age: 81
End: 2021-01-01

## 2021-01-01 ENCOUNTER — HOSPITAL ENCOUNTER (OUTPATIENT)
Dept: OCCUPATIONAL THERAPY | Age: 81
Setting detail: THERAPIES SERIES
Discharge: HOME | End: 2021-09-14
Payer: MEDICARE

## 2021-01-01 ENCOUNTER — TELEPHONE (OUTPATIENT)
Dept: INTERNAL MEDICINE CLINIC | Age: 81
End: 2021-01-01

## 2021-01-01 ENCOUNTER — HOSPITAL ENCOUNTER (OUTPATIENT)
Dept: PHYSICAL THERAPY | Age: 81
Setting detail: THERAPIES SERIES
Discharge: HOME | End: 2021-08-17
Payer: MEDICARE

## 2021-01-01 ENCOUNTER — HOSPITAL ENCOUNTER (OUTPATIENT)
Dept: PHYSICAL THERAPY | Age: 81
Setting detail: THERAPIES SERIES
Discharge: HOME | End: 2021-09-02
Payer: MEDICARE

## 2021-01-01 ENCOUNTER — HOSPITAL ENCOUNTER (OUTPATIENT)
Dept: PHYSICAL THERAPY | Age: 81
Setting detail: THERAPIES SERIES
Discharge: HOME | End: 2021-09-07
Payer: MEDICARE

## 2021-01-01 ENCOUNTER — OFFICE VISIT (OUTPATIENT)
Dept: ORTHOPEDIC SURGERY | Age: 81
End: 2021-01-01
Payer: MEDICARE

## 2021-01-01 ENCOUNTER — HOSPITAL ENCOUNTER (OUTPATIENT)
Dept: PHYSICAL THERAPY | Age: 81
Setting detail: THERAPIES SERIES
Discharge: HOME | End: 2021-08-24
Payer: MEDICARE

## 2021-01-01 ENCOUNTER — HOSPITAL ENCOUNTER (OUTPATIENT)
Dept: OCCUPATIONAL THERAPY | Age: 81
Setting detail: THERAPIES SERIES
Discharge: HOME | End: 2021-08-26
Payer: MEDICARE

## 2021-01-01 ENCOUNTER — OFFICE VISIT (OUTPATIENT)
Dept: INTERNAL MEDICINE CLINIC | Age: 81
End: 2021-01-01
Payer: MEDICARE

## 2021-01-01 ENCOUNTER — HOSPITAL ENCOUNTER (OUTPATIENT)
Dept: PHYSICAL THERAPY | Age: 81
Setting detail: THERAPIES SERIES
Discharge: HOME | End: 2021-08-19
Payer: MEDICARE

## 2021-01-01 ENCOUNTER — HOSPITAL ENCOUNTER (OUTPATIENT)
Dept: OCCUPATIONAL THERAPY | Age: 81
Setting detail: THERAPIES SERIES
Discharge: HOME | End: 2021-09-09
Payer: MEDICARE

## 2021-01-01 ENCOUNTER — HOSPITAL ENCOUNTER (OUTPATIENT)
Dept: OCCUPATIONAL THERAPY | Age: 81
Setting detail: THERAPIES SERIES
Discharge: HOME | End: 2021-09-07
Payer: MEDICARE

## 2021-01-01 ENCOUNTER — HOSPITAL ENCOUNTER (OUTPATIENT)
Dept: OCCUPATIONAL THERAPY | Age: 81
Setting detail: THERAPIES SERIES
Discharge: HOME | End: 2021-08-19
Payer: MEDICARE

## 2021-01-01 ENCOUNTER — APPOINTMENT (OUTPATIENT)
Dept: OCCUPATIONAL THERAPY | Age: 81
End: 2021-01-01
Payer: MEDICARE

## 2021-01-01 ENCOUNTER — OFFICE VISIT (OUTPATIENT)
Dept: CARDIOLOGY CLINIC | Age: 81
End: 2021-01-01
Payer: MEDICARE

## 2021-01-01 ENCOUNTER — HOSPITAL ENCOUNTER (OUTPATIENT)
Dept: OCCUPATIONAL THERAPY | Age: 81
Setting detail: THERAPIES SERIES
Discharge: HOME | End: 2021-09-02
Payer: MEDICARE

## 2021-01-01 ENCOUNTER — PRE-EVALUATION (OUTPATIENT)
Dept: ORTHOPEDIC SURGERY | Age: 81
End: 2021-01-01

## 2021-01-01 ENCOUNTER — HOSPITAL ENCOUNTER (EMERGENCY)
Age: 81
Discharge: HOME | End: 2021-07-31
Attending: EMERGENCY MEDICINE
Payer: MEDICARE

## 2021-01-01 ENCOUNTER — HOSPITAL ENCOUNTER (OUTPATIENT)
Dept: PHYSICAL THERAPY | Age: 81
Setting detail: THERAPIES SERIES
Discharge: HOME | End: 2021-08-26
Payer: MEDICARE

## 2021-01-01 ENCOUNTER — HOSPITAL ENCOUNTER (OUTPATIENT)
Dept: OCCUPATIONAL THERAPY | Age: 81
Setting detail: THERAPIES SERIES
Discharge: HOME | End: 2021-08-31
Payer: MEDICARE

## 2021-01-01 ENCOUNTER — HOSPITAL ENCOUNTER (OUTPATIENT)
Dept: PHYSICAL THERAPY | Age: 81
Setting detail: THERAPIES SERIES
Discharge: HOME | End: 2021-08-12
Payer: MEDICARE

## 2021-01-01 ENCOUNTER — HOSPITAL ENCOUNTER (EMERGENCY)
Age: 81
Discharge: HOME | End: 2021-12-15
Payer: MEDICARE

## 2021-01-01 ENCOUNTER — TELEPHONE (OUTPATIENT)
Dept: PHARMACY | Age: 81
End: 2021-01-01

## 2021-01-01 ENCOUNTER — HOSPITAL ENCOUNTER (OUTPATIENT)
Dept: OCCUPATIONAL THERAPY | Age: 81
Setting detail: THERAPIES SERIES
Discharge: HOME | End: 2021-08-24
Payer: MEDICARE

## 2021-01-01 ENCOUNTER — HOSPITAL ENCOUNTER (EMERGENCY)
Age: 81
Discharge: HOME | End: 2021-07-12
Payer: MEDICARE

## 2021-01-01 VITALS
WEIGHT: 271 LBS | HEART RATE: 68 BPM | BODY MASS INDEX: 42.53 KG/M2 | DIASTOLIC BLOOD PRESSURE: 70 MMHG | OXYGEN SATURATION: 95 % | SYSTOLIC BLOOD PRESSURE: 140 MMHG | HEIGHT: 67 IN

## 2021-01-01 VITALS
OXYGEN SATURATION: 96 % | HEIGHT: 66 IN | RESPIRATION RATE: 18 BRPM | WEIGHT: 271 LBS | BODY MASS INDEX: 43.55 KG/M2 | DIASTOLIC BLOOD PRESSURE: 82 MMHG | TEMPERATURE: 98.4 F | SYSTOLIC BLOOD PRESSURE: 155 MMHG | HEART RATE: 77 BPM

## 2021-01-01 VITALS
OXYGEN SATURATION: 97 % | HEART RATE: 76 BPM | SYSTOLIC BLOOD PRESSURE: 140 MMHG | BODY MASS INDEX: 42.53 KG/M2 | WEIGHT: 271 LBS | TEMPERATURE: 97.8 F | HEIGHT: 67 IN | RESPIRATION RATE: 19 BRPM | DIASTOLIC BLOOD PRESSURE: 82 MMHG

## 2021-01-01 VITALS
HEART RATE: 63 BPM | DIASTOLIC BLOOD PRESSURE: 82 MMHG | TEMPERATURE: 97.2 F | HEIGHT: 67 IN | BODY MASS INDEX: 42.53 KG/M2 | WEIGHT: 271 LBS | SYSTOLIC BLOOD PRESSURE: 128 MMHG

## 2021-01-01 VITALS
WEIGHT: 278 LBS | DIASTOLIC BLOOD PRESSURE: 72 MMHG | SYSTOLIC BLOOD PRESSURE: 138 MMHG | HEIGHT: 66 IN | BODY MASS INDEX: 44.68 KG/M2 | TEMPERATURE: 97.2 F

## 2021-01-01 VITALS
HEART RATE: 79 BPM | HEIGHT: 67 IN | WEIGHT: 274 LBS | OXYGEN SATURATION: 94 % | SYSTOLIC BLOOD PRESSURE: 130 MMHG | BODY MASS INDEX: 43 KG/M2 | DIASTOLIC BLOOD PRESSURE: 72 MMHG

## 2021-01-01 VITALS
DIASTOLIC BLOOD PRESSURE: 78 MMHG | OXYGEN SATURATION: 93 % | HEIGHT: 65 IN | BODY MASS INDEX: 46.65 KG/M2 | SYSTOLIC BLOOD PRESSURE: 126 MMHG | WEIGHT: 280 LBS | HEART RATE: 64 BPM

## 2021-01-01 VITALS — BODY MASS INDEX: 44.68 KG/M2 | WEIGHT: 278 LBS | HEIGHT: 66 IN

## 2021-01-01 VITALS
RESPIRATION RATE: 16 BRPM | BODY MASS INDEX: 41.78 KG/M2 | HEIGHT: 66 IN | DIASTOLIC BLOOD PRESSURE: 91 MMHG | HEART RATE: 95 BPM | WEIGHT: 260 LBS | TEMPERATURE: 97.7 F | OXYGEN SATURATION: 98 % | SYSTOLIC BLOOD PRESSURE: 133 MMHG

## 2021-01-01 DIAGNOSIS — I50.9 NEW ONSET OF CONGESTIVE HEART FAILURE (HCC): ICD-10-CM

## 2021-01-01 DIAGNOSIS — I50.32 CHRONIC DIASTOLIC HEART FAILURE (HCC): ICD-10-CM

## 2021-01-01 DIAGNOSIS — M51.9 LUMBAR DISC DISEASE: ICD-10-CM

## 2021-01-01 DIAGNOSIS — Z91.81 AT HIGH RISK FOR FALLS: ICD-10-CM

## 2021-01-01 DIAGNOSIS — M48.062 SPINAL STENOSIS OF LUMBAR REGION WITH NEUROGENIC CLAUDICATION: Primary | ICD-10-CM

## 2021-01-01 DIAGNOSIS — R73.9 HYPERGLYCEMIA: ICD-10-CM

## 2021-01-01 DIAGNOSIS — I10 ESSENTIAL HYPERTENSION: ICD-10-CM

## 2021-01-01 DIAGNOSIS — I48.91 ATRIAL FIBRILLATION, UNSPECIFIED TYPE (HCC): ICD-10-CM

## 2021-01-01 DIAGNOSIS — M25.511 ACUTE PAIN OF RIGHT SHOULDER: ICD-10-CM

## 2021-01-01 DIAGNOSIS — E79.0 HYPERURICEMIA: ICD-10-CM

## 2021-01-01 DIAGNOSIS — I15.9 SECONDARY HYPERTENSION: ICD-10-CM

## 2021-01-01 DIAGNOSIS — M48.062 LUMBAR STENOSIS WITH NEUROGENIC CLAUDICATION: ICD-10-CM

## 2021-01-01 DIAGNOSIS — E66.01 MORBID OBESITY WITH BMI OF 40.0-44.9, ADULT (HCC): ICD-10-CM

## 2021-01-01 DIAGNOSIS — H93.8X1 SENSATION OF FULLNESS IN RIGHT EAR: Primary | ICD-10-CM

## 2021-01-01 DIAGNOSIS — I48.20 CHRONIC ATRIAL FIBRILLATION (HCC): Primary | ICD-10-CM

## 2021-01-01 DIAGNOSIS — E78.00 PURE HYPERCHOLESTEROLEMIA: Primary | ICD-10-CM

## 2021-01-01 DIAGNOSIS — I48.91 ATRIAL FIBRILLATION, UNSPECIFIED TYPE (HCC): Primary | ICD-10-CM

## 2021-01-01 DIAGNOSIS — N18.31 STAGE 3A CHRONIC KIDNEY DISEASE (HCC): ICD-10-CM

## 2021-01-01 DIAGNOSIS — I50.32 CHRONIC DIASTOLIC CHF (CONGESTIVE HEART FAILURE) (HCC): ICD-10-CM

## 2021-01-01 DIAGNOSIS — I48.20 CHRONIC ATRIAL FIBRILLATION (HCC): ICD-10-CM

## 2021-01-01 DIAGNOSIS — S09.90XA INJURY OF HEAD, INITIAL ENCOUNTER: ICD-10-CM

## 2021-01-01 DIAGNOSIS — M51.9 LUMBAR DISC DISEASE: Primary | ICD-10-CM

## 2021-01-01 DIAGNOSIS — I48.21 PERMANENT ATRIAL FIBRILLATION (HCC): ICD-10-CM

## 2021-01-01 DIAGNOSIS — I50.32 CHRONIC DIASTOLIC HEART FAILURE (HCC): Primary | ICD-10-CM

## 2021-01-01 DIAGNOSIS — S49.91XA INJURY OF RIGHT SHOULDER, INITIAL ENCOUNTER: Primary | ICD-10-CM

## 2021-01-01 DIAGNOSIS — R07.9 CHEST PAIN, UNSPECIFIED TYPE: ICD-10-CM

## 2021-01-01 DIAGNOSIS — M10.9 GOUT, UNSPECIFIED CAUSE, UNSPECIFIED CHRONICITY, UNSPECIFIED SITE: ICD-10-CM

## 2021-01-01 DIAGNOSIS — S16.1XXA STRAIN OF NECK MUSCLE, INITIAL ENCOUNTER: ICD-10-CM

## 2021-01-01 DIAGNOSIS — Z00.00 WELL ADULT EXAM: ICD-10-CM

## 2021-01-01 DIAGNOSIS — R29.6 FREQUENT FALLS: ICD-10-CM

## 2021-01-01 DIAGNOSIS — E78.5 HYPERLIPIDEMIA, UNSPECIFIED HYPERLIPIDEMIA TYPE: ICD-10-CM

## 2021-01-01 DIAGNOSIS — Z79.01 ON ANTICOAGULANT THERAPY: ICD-10-CM

## 2021-01-01 DIAGNOSIS — R79.1 SUPRATHERAPEUTIC INR: ICD-10-CM

## 2021-01-01 DIAGNOSIS — Z86.59 HISTORY OF DEPRESSION: ICD-10-CM

## 2021-01-01 DIAGNOSIS — I50.32 CHRONIC DIASTOLIC (CONGESTIVE) HEART FAILURE (HCC): Primary | ICD-10-CM
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
## 2021-01-01 DIAGNOSIS — I50.32 CHRONIC DIASTOLIC CHF (CONGESTIVE HEART FAILURE) (HCC): Primary | ICD-10-CM

## 2021-01-01 DIAGNOSIS — M54.32 SCIATICA OF LEFT SIDE: Primary | ICD-10-CM

## 2021-01-01 DIAGNOSIS — E03.9 HYPOTHYROIDISM, UNSPECIFIED TYPE: ICD-10-CM

## 2021-01-01 DIAGNOSIS — M51.36 DEGENERATIVE DISC DISEASE, LUMBAR: ICD-10-CM

## 2021-01-01 DIAGNOSIS — R26.9 GAIT DISTURBANCE: ICD-10-CM

## 2021-01-01 DIAGNOSIS — N30.00 ACUTE CYSTITIS WITHOUT HEMATURIA: ICD-10-CM

## 2021-01-01 DIAGNOSIS — I48.19 PERSISTENT ATRIAL FIBRILLATION (HCC): ICD-10-CM

## 2021-01-01 LAB
A/G RATIO: 1.1 (ref 1.1–2.2)
ALBUMIN SERPL-MCNC: 3.7 G/DL (ref 3.4–5)
ALP BLD-CCNC: 91 U/L (ref 40–129)
ALT SERPL-CCNC: 14 U/L (ref 10–40)
ANION GAP SERPL CALCULATED.3IONS-SCNC: 13 MMOL/L (ref 3–16)
ANION GAP SERPL CALCULATED.3IONS-SCNC: 14 MMOL/L (ref 3–16)
ANION GAP SERPL CALCULATED.3IONS-SCNC: 9 MMOL/L (ref 3–16)
AST SERPL-CCNC: 16 U/L (ref 15–37)
BACTERIA: ABNORMAL /HPF
BASOPHILS ABSOLUTE: 0 K/UL (ref 0–0.2)
BASOPHILS ABSOLUTE: 0.1 K/UL (ref 0–0.2)
BASOPHILS RELATIVE PERCENT: 0.5 %
BASOPHILS RELATIVE PERCENT: 0.7 %
BILIRUB SERPL-MCNC: 0.7 MG/DL (ref 0–1)
BILIRUBIN URINE: NEGATIVE
BLOOD, URINE: NEGATIVE
BUN BLDV-MCNC: 24 MG/DL (ref 7–20)
BUN BLDV-MCNC: 25 MG/DL (ref 7–20)
BUN BLDV-MCNC: 33 MG/DL (ref 7–20)
CALCIUM SERPL-MCNC: 9.3 MG/DL (ref 8.3–10.6)
CALCIUM SERPL-MCNC: 9.5 MG/DL (ref 8.3–10.6)
CALCIUM SERPL-MCNC: 9.6 MG/DL (ref 8.3–10.6)
CHLORIDE BLD-SCNC: 103 MMOL/L (ref 99–110)
CHLORIDE BLD-SCNC: 106 MMOL/L (ref 99–110)
CHLORIDE BLD-SCNC: 99 MMOL/L (ref 99–110)
CLARITY: CLEAR
CO2: 25 MMOL/L (ref 21–32)
CO2: 27 MMOL/L (ref 21–32)
CO2: 28 MMOL/L (ref 21–32)
COLOR: YELLOW
CREAT SERPL-MCNC: 1.5 MG/DL (ref 0.6–1.2)
CREAT SERPL-MCNC: 1.5 MG/DL (ref 0.6–1.2)
CREAT SERPL-MCNC: 1.6 MG/DL (ref 0.6–1.2)
EOSINOPHILS ABSOLUTE: 0.1 K/UL (ref 0–0.6)
EOSINOPHILS ABSOLUTE: 0.2 K/UL (ref 0–0.6)
EOSINOPHILS RELATIVE PERCENT: 0.7 %
EOSINOPHILS RELATIVE PERCENT: 2.4 %
GFR AFRICAN AMERICAN: 37
GFR AFRICAN AMERICAN: 40
GFR AFRICAN AMERICAN: 40
GFR NON-AFRICAN AMERICAN: 31
GFR NON-AFRICAN AMERICAN: 33
GFR NON-AFRICAN AMERICAN: 33
GLUCOSE BLD-MCNC: 110 MG/DL (ref 70–99)
GLUCOSE BLD-MCNC: 70 MG/DL (ref 70–99)
GLUCOSE BLD-MCNC: 80 MG/DL (ref 70–99)
GLUCOSE URINE: NEGATIVE MG/DL
HCT VFR BLD CALC: 43.6 % (ref 36–48)
HCT VFR BLD CALC: 44.5 % (ref 36–48)
HEMOGLOBIN: 14.6 G/DL (ref 12–16)
HEMOGLOBIN: 14.8 G/DL (ref 12–16)
INR BLD: 1.6
INR BLD: 1.7
INR BLD: 1.9
INR BLD: 2.2
INR BLD: 2.32 (ref 0.88–1.12)
INR BLD: 2.6
INR BLD: 2.7
INR BLD: 2.8
INR BLD: 3.45 (ref 0.88–1.12)
INTERNATIONAL NORMALIZATION RATIO, POC: 1.9
INTERNATIONAL NORMALIZATION RATIO, POC: 1.9
INTERNATIONAL NORMALIZATION RATIO, POC: 2.2
INTERNATIONAL NORMALIZATION RATIO, POC: 2.9
KETONES, URINE: NEGATIVE MG/DL
LEUKOCYTE ESTERASE, URINE: ABNORMAL
LYMPHOCYTES ABSOLUTE: 1.3 K/UL (ref 1–5.1)
LYMPHOCYTES ABSOLUTE: 1.5 K/UL (ref 1–5.1)
LYMPHOCYTES RELATIVE PERCENT: 12.8 %
LYMPHOCYTES RELATIVE PERCENT: 17.4 %
MCH RBC QN AUTO: 33.4 PG (ref 26–34)
MCH RBC QN AUTO: 34.6 PG (ref 26–34)
MCHC RBC AUTO-ENTMCNC: 33.2 G/DL (ref 31–36)
MCHC RBC AUTO-ENTMCNC: 33.5 G/DL (ref 31–36)
MCV RBC AUTO: 104.4 FL (ref 80–100)
MCV RBC AUTO: 99.6 FL (ref 80–100)
MICROSCOPIC EXAMINATION: YES
MONOCYTES ABSOLUTE: 0.7 K/UL (ref 0–1.3)
MONOCYTES ABSOLUTE: 0.8 K/UL (ref 0–1.3)
MONOCYTES RELATIVE PERCENT: 7.1 %
MONOCYTES RELATIVE PERCENT: 9 %
MUCUS: ABNORMAL /LPF
NEUTROPHILS ABSOLUTE: 6 K/UL (ref 1.7–7.7)
NEUTROPHILS ABSOLUTE: 8.1 K/UL (ref 1.7–7.7)
NEUTROPHILS RELATIVE PERCENT: 70.7 %
NEUTROPHILS RELATIVE PERCENT: 78.7 %
NITRITE, URINE: POSITIVE
ORGANISM: ABNORMAL
PDW BLD-RTO: 15.8 % (ref 12.4–15.4)
PDW BLD-RTO: 16.4 % (ref 12.4–15.4)
PH UA: 5.5 (ref 5–8)
PLATELET # BLD: 235 K/UL (ref 135–450)
PLATELET # BLD: 306 K/UL (ref 135–450)
PMV BLD AUTO: 7.3 FL (ref 5–10.5)
PMV BLD AUTO: 8.6 FL (ref 5–10.5)
POTASSIUM REFLEX MAGNESIUM: 5 MMOL/L (ref 3.5–5.1)
POTASSIUM SERPL-SCNC: 4.4 MMOL/L (ref 3.5–5.1)
POTASSIUM SERPL-SCNC: 4.5 MMOL/L (ref 3.5–5.1)
PRO-BNP: 1136 PG/ML (ref 0–449)
PRO-BNP: 1535 PG/ML (ref 0–449)
PROTEIN UA: 30 MG/DL
PROTHROMBIN TIME: 27.1 SEC (ref 9.9–12.7)
PROTHROMBIN TIME: 41 SEC (ref 9.9–12.7)
RBC # BLD: 4.27 M/UL (ref 4–5.2)
RBC # BLD: 4.38 M/UL (ref 4–5.2)
RBC UA: ABNORMAL /HPF (ref 0–4)
SODIUM BLD-SCNC: 141 MMOL/L (ref 136–145)
SODIUM BLD-SCNC: 141 MMOL/L (ref 136–145)
SODIUM BLD-SCNC: 142 MMOL/L (ref 136–145)
SPECIFIC GRAVITY UA: >=1.03 (ref 1–1.03)
TOTAL PROTEIN: 7.1 G/DL (ref 6.4–8.2)
URINE CULTURE, ROUTINE: ABNORMAL
URINE CULTURE, ROUTINE: ABNORMAL
URINE REFLEX TO CULTURE: YES
URINE TYPE: ABNORMAL
UROBILINOGEN, URINE: 0.2 E.U./DL
WBC # BLD: 10.3 K/UL (ref 4–11)
WBC # BLD: 8.4 K/UL (ref 4–11)
WBC UA: ABNORMAL /HPF (ref 0–5)

## 2021-01-01 PROCEDURE — G8399 PT W/DXA RESULTS DOCUMENT: HCPCS | Performed by: OTOLARYNGOLOGY

## 2021-01-01 PROCEDURE — 4040F PNEUMOC VAC/ADMIN/RCVD: CPT | Performed by: ORTHOPAEDIC SURGERY

## 2021-01-01 PROCEDURE — 1123F ACP DISCUSS/DSCN MKR DOCD: CPT | Performed by: NURSE PRACTITIONER

## 2021-01-01 PROCEDURE — 97110 THERAPEUTIC EXERCISES: CPT

## 2021-01-01 PROCEDURE — G8427 DOCREV CUR MEDS BY ELIG CLIN: HCPCS | Performed by: NURSE PRACTITIONER

## 2021-01-01 PROCEDURE — 85610 PROTHROMBIN TIME: CPT

## 2021-01-01 PROCEDURE — 72125 CT NECK SPINE W/O DYE: CPT

## 2021-01-01 PROCEDURE — 97116 GAIT TRAINING THERAPY: CPT

## 2021-01-01 PROCEDURE — 4040F PNEUMOC VAC/ADMIN/RCVD: CPT | Performed by: OTOLARYNGOLOGY

## 2021-01-01 PROCEDURE — 97110 THERAPEUTIC EXERCISES: CPT | Performed by: OCCUPATIONAL THERAPIST

## 2021-01-01 PROCEDURE — 6370000000 HC RX 637 (ALT 250 FOR IP): Performed by: EMERGENCY MEDICINE

## 2021-01-01 PROCEDURE — 1036F TOBACCO NON-USER: CPT | Performed by: OTOLARYNGOLOGY

## 2021-01-01 PROCEDURE — 1036F TOBACCO NON-USER: CPT | Performed by: NURSE PRACTITIONER

## 2021-01-01 PROCEDURE — 87186 SC STD MICRODIL/AGAR DIL: CPT

## 2021-01-01 PROCEDURE — 1123F ACP DISCUSS/DSCN MKR DOCD: CPT | Performed by: OTOLARYNGOLOGY

## 2021-01-01 PROCEDURE — 99211 OFF/OP EST MAY X REQ PHY/QHP: CPT | Performed by: FAMILY MEDICINE

## 2021-01-01 PROCEDURE — 99203 OFFICE O/P NEW LOW 30 MIN: CPT | Performed by: OTOLARYNGOLOGY

## 2021-01-01 PROCEDURE — 99211 OFF/OP EST MAY X REQ PHY/QHP: CPT

## 2021-01-01 PROCEDURE — 80053 COMPREHEN METABOLIC PANEL: CPT

## 2021-01-01 PROCEDURE — G8427 DOCREV CUR MEDS BY ELIG CLIN: HCPCS | Performed by: INTERNAL MEDICINE

## 2021-01-01 PROCEDURE — 73030 X-RAY EXAM OF SHOULDER: CPT

## 2021-01-01 PROCEDURE — 99214 OFFICE O/P EST MOD 30 MIN: CPT | Performed by: NURSE PRACTITIONER

## 2021-01-01 PROCEDURE — 87086 URINE CULTURE/COLONY COUNT: CPT

## 2021-01-01 PROCEDURE — 1036F TOBACCO NON-USER: CPT | Performed by: INTERNAL MEDICINE

## 2021-01-01 PROCEDURE — 1123F ACP DISCUSS/DSCN MKR DOCD: CPT | Performed by: ORTHOPAEDIC SURGERY

## 2021-01-01 PROCEDURE — G8399 PT W/DXA RESULTS DOCUMENT: HCPCS | Performed by: ORTHOPAEDIC SURGERY

## 2021-01-01 PROCEDURE — 97161 PT EVAL LOW COMPLEX 20 MIN: CPT

## 2021-01-01 PROCEDURE — 1090F PRES/ABSN URINE INCON ASSESS: CPT | Performed by: OTOLARYNGOLOGY

## 2021-01-01 PROCEDURE — 73080 X-RAY EXAM OF ELBOW: CPT

## 2021-01-01 PROCEDURE — 1090F PRES/ABSN URINE INCON ASSESS: CPT | Performed by: INTERNAL MEDICINE

## 2021-01-01 PROCEDURE — 1123F ACP DISCUSS/DSCN MKR DOCD: CPT | Performed by: INTERNAL MEDICINE

## 2021-01-01 PROCEDURE — G8417 CALC BMI ABV UP PARAM F/U: HCPCS | Performed by: INTERNAL MEDICINE

## 2021-01-01 PROCEDURE — G8417 CALC BMI ABV UP PARAM F/U: HCPCS | Performed by: ORTHOPAEDIC SURGERY

## 2021-01-01 PROCEDURE — 4040F PNEUMOC VAC/ADMIN/RCVD: CPT | Performed by: NURSE PRACTITIONER

## 2021-01-01 PROCEDURE — 6370000000 HC RX 637 (ALT 250 FOR IP): Performed by: PHYSICIAN ASSISTANT

## 2021-01-01 PROCEDURE — 1090F PRES/ABSN URINE INCON ASSESS: CPT | Performed by: ORTHOPAEDIC SURGERY

## 2021-01-01 PROCEDURE — G8417 CALC BMI ABV UP PARAM F/U: HCPCS | Performed by: OTOLARYNGOLOGY

## 2021-01-01 PROCEDURE — 81001 URINALYSIS AUTO W/SCOPE: CPT

## 2021-01-01 PROCEDURE — G8417 CALC BMI ABV UP PARAM F/U: HCPCS | Performed by: NURSE PRACTITIONER

## 2021-01-01 PROCEDURE — 99211 OFF/OP EST MAY X REQ PHY/QHP: CPT | Performed by: INTERNAL MEDICINE

## 2021-01-01 PROCEDURE — G8399 PT W/DXA RESULTS DOCUMENT: HCPCS | Performed by: NURSE PRACTITIONER

## 2021-01-01 PROCEDURE — APPNB30 APP NON BILLABLE TIME 0-30 MINS: Performed by: PHYSICIAN ASSISTANT

## 2021-01-01 PROCEDURE — 85025 COMPLETE CBC W/AUTO DIFF WBC: CPT

## 2021-01-01 PROCEDURE — 97165 OT EVAL LOW COMPLEX 30 MIN: CPT | Performed by: OCCUPATIONAL THERAPIST

## 2021-01-01 PROCEDURE — G8427 DOCREV CUR MEDS BY ELIG CLIN: HCPCS | Performed by: ORTHOPAEDIC SURGERY

## 2021-01-01 PROCEDURE — 99284 EMERGENCY DEPT VISIT MOD MDM: CPT

## 2021-01-01 PROCEDURE — 1036F TOBACCO NON-USER: CPT | Performed by: ORTHOPAEDIC SURGERY

## 2021-01-01 PROCEDURE — 97112 NEUROMUSCULAR REEDUCATION: CPT

## 2021-01-01 PROCEDURE — G8399 PT W/DXA RESULTS DOCUMENT: HCPCS | Performed by: INTERNAL MEDICINE

## 2021-01-01 PROCEDURE — 1090F PRES/ABSN URINE INCON ASSESS: CPT | Performed by: NURSE PRACTITIONER

## 2021-01-01 PROCEDURE — 72131 CT LUMBAR SPINE W/O DYE: CPT

## 2021-01-01 PROCEDURE — 4040F PNEUMOC VAC/ADMIN/RCVD: CPT | Performed by: INTERNAL MEDICINE

## 2021-01-01 PROCEDURE — 99285 EMERGENCY DEPT VISIT HI MDM: CPT

## 2021-01-01 PROCEDURE — 70450 CT HEAD/BRAIN W/O DYE: CPT

## 2021-01-01 PROCEDURE — 87088 URINE BACTERIA CULTURE: CPT

## 2021-01-01 PROCEDURE — 85610 PROTHROMBIN TIME: CPT | Performed by: INTERNAL MEDICINE

## 2021-01-01 PROCEDURE — 99213 OFFICE O/P EST LOW 20 MIN: CPT | Performed by: ORTHOPAEDIC SURGERY

## 2021-01-01 PROCEDURE — 99214 OFFICE O/P EST MOD 30 MIN: CPT | Performed by: INTERNAL MEDICINE

## 2021-01-01 PROCEDURE — 73502 X-RAY EXAM HIP UNI 2-3 VIEWS: CPT

## 2021-01-01 PROCEDURE — 85610 PROTHROMBIN TIME: CPT | Performed by: FAMILY MEDICINE

## 2021-01-01 PROCEDURE — 73110 X-RAY EXAM OF WRIST: CPT

## 2021-01-01 PROCEDURE — 72128 CT CHEST SPINE W/O DYE: CPT

## 2021-01-01 PROCEDURE — G8427 DOCREV CUR MEDS BY ELIG CLIN: HCPCS | Performed by: OTOLARYNGOLOGY

## 2021-01-01 RX ORDER — ACETAMINOPHEN 325 MG/1
650 TABLET ORAL ONCE
Status: COMPLETED | OUTPATIENT
Start: 2021-01-01 | End: 2021-01-01

## 2021-01-01 RX ORDER — DULOXETINE 40 MG/1
20 CAPSULE, DELAYED RELEASE ORAL DAILY
Qty: 30 CAPSULE | Refills: 5 | Status: SHIPPED | OUTPATIENT
Start: 2021-01-01 | End: 2022-01-01

## 2021-01-01 RX ORDER — BACITRACIN, NEOMYCIN, POLYMYXIN B 400; 3.5; 5 [USP'U]/G; MG/G; [USP'U]/G
OINTMENT TOPICAL ONCE
Status: COMPLETED | OUTPATIENT
Start: 2021-01-01 | End: 2021-01-01

## 2021-01-01 RX ORDER — HYDROCODONE BITARTRATE AND ACETAMINOPHEN 5; 325 MG/1; MG/1
1 TABLET ORAL 2 TIMES DAILY PRN
Qty: 30 TABLET | Refills: 0 | Status: CANCELLED | OUTPATIENT
Start: 2021-01-01 | End: 2021-01-01

## 2021-01-01 RX ORDER — LIDOCAINE 50 MG/G
1 PATCH TOPICAL DAILY
Qty: 30 PATCH | Refills: 0 | Status: SHIPPED | OUTPATIENT
Start: 2021-01-01

## 2021-01-01 RX ORDER — OXYCODONE AND ACETAMINOPHEN 7.5; 325 MG/1; MG/1
1 TABLET ORAL ONCE
Status: COMPLETED | OUTPATIENT
Start: 2021-01-01 | End: 2021-01-01

## 2021-01-01 RX ORDER — HYDROCODONE BITARTRATE AND ACETAMINOPHEN 5; 325 MG/1; MG/1
1 TABLET ORAL 2 TIMES DAILY PRN
Qty: 30 TABLET | Refills: 0 | Status: SHIPPED | OUTPATIENT
Start: 2021-01-01 | End: 2021-01-01 | Stop reason: SDUPTHER

## 2021-01-01 RX ORDER — LIDOCAINE 50 MG/G
1 PATCH TOPICAL DAILY
Qty: 10 PATCH | Refills: 0 | Status: SHIPPED | OUTPATIENT
Start: 2021-01-01 | End: 2021-01-01

## 2021-01-01 RX ORDER — PREDNISONE 20 MG/1
40 TABLET ORAL ONCE
Status: COMPLETED | OUTPATIENT
Start: 2021-01-01 | End: 2021-01-01

## 2021-01-01 RX ORDER — GABAPENTIN 100 MG/1
100 CAPSULE ORAL 2 TIMES DAILY
Qty: 180 CAPSULE | Refills: 1 | Status: SHIPPED | OUTPATIENT
Start: 2021-01-01 | End: 2021-01-01

## 2021-01-01 RX ORDER — PREDNISONE 10 MG/1
60 TABLET ORAL DAILY
Qty: 30 TABLET | Refills: 0 | Status: SHIPPED | OUTPATIENT
Start: 2021-01-01 | End: 2021-01-01

## 2021-01-01 RX ORDER — DILTIAZEM HYDROCHLORIDE 300 MG/1
300 CAPSULE, EXTENDED RELEASE ORAL DAILY
Qty: 90 CAPSULE | Refills: 3 | Status: CANCELLED | OUTPATIENT
Start: 2021-01-01

## 2021-01-01 RX ORDER — LIDOCAINE 4 G/G
1 PATCH TOPICAL ONCE
Status: DISCONTINUED | OUTPATIENT
Start: 2021-01-01 | End: 2021-01-01 | Stop reason: HOSPADM

## 2021-01-01 RX ORDER — CEFUROXIME AXETIL 250 MG/1
250 TABLET ORAL 2 TIMES DAILY
Qty: 14 TABLET | Refills: 0 | Status: SHIPPED | OUTPATIENT
Start: 2021-01-01 | End: 2021-01-01

## 2021-01-01 RX ORDER — HYDROCODONE BITARTRATE AND ACETAMINOPHEN 5; 325 MG/1; MG/1
1 TABLET ORAL 2 TIMES DAILY PRN
Qty: 30 TABLET | Refills: 0 | Status: SHIPPED | OUTPATIENT
Start: 2021-01-01 | End: 2021-01-01

## 2021-01-01 RX ORDER — DULOXETIN HYDROCHLORIDE 20 MG/1
20 CAPSULE, DELAYED RELEASE ORAL DAILY
Qty: 30 CAPSULE | Refills: 3 | Status: SHIPPED | OUTPATIENT
Start: 2021-01-01 | End: 2021-01-01 | Stop reason: SDUPTHER

## 2021-01-01 RX ORDER — CLINDAMYCIN HYDROCHLORIDE 300 MG/1
CAPSULE ORAL
Qty: 2 CAPSULE | Refills: 5 | Status: ON HOLD
Start: 2021-01-01 | End: 2022-01-01 | Stop reason: HOSPADM

## 2021-01-01 RX ORDER — DILTIAZEM HYDROCHLORIDE 300 MG/1
300 CAPSULE, COATED, EXTENDED RELEASE ORAL DAILY
Qty: 30 CAPSULE | Refills: 3 | Status: SHIPPED
Start: 2021-01-01 | End: 2021-01-01 | Stop reason: SDUPTHER

## 2021-01-01 RX ORDER — OMEPRAZOLE 20 MG/1
CAPSULE, DELAYED RELEASE ORAL
Qty: 90 CAPSULE | Refills: 2 | Status: SHIPPED | OUTPATIENT
Start: 2021-01-01

## 2021-01-01 RX ORDER — ALLOPURINOL 300 MG/1
TABLET ORAL
Qty: 90 TABLET | Refills: 3 | Status: SHIPPED | OUTPATIENT
Start: 2021-01-01

## 2021-01-01 RX ORDER — ATORVASTATIN CALCIUM 20 MG/1
TABLET, FILM COATED ORAL
Qty: 90 TABLET | Refills: 3 | Status: SHIPPED | OUTPATIENT
Start: 2021-01-01

## 2021-01-01 RX ORDER — ACETAMINOPHEN 500 MG
500 TABLET ORAL 4 TIMES DAILY PRN
Qty: 120 TABLET | Refills: 0 | Status: SHIPPED | OUTPATIENT
Start: 2021-01-01

## 2021-01-01 RX ORDER — HYDROCODONE BITARTRATE AND ACETAMINOPHEN 5; 325 MG/1; MG/1
1 TABLET ORAL EVERY 4 HOURS PRN
Qty: 30 TABLET | Refills: 0 | Status: SHIPPED | OUTPATIENT
Start: 2021-01-01 | End: 2021-01-01 | Stop reason: SDUPTHER

## 2021-01-01 RX ORDER — FUROSEMIDE 20 MG/1
20 TABLET ORAL DAILY PRN
Qty: 30 TABLET | Refills: 5
Start: 2021-01-01

## 2021-01-01 RX ORDER — WARFARIN SODIUM 1 MG/1
TABLET ORAL
Qty: 65 TABLET | Refills: 5 | Status: ON HOLD
Start: 2021-01-01 | End: 2022-01-01 | Stop reason: HOSPADM

## 2021-01-01 RX ORDER — DILTIAZEM HYDROCHLORIDE 300 MG/1
CAPSULE, EXTENDED RELEASE ORAL
Qty: 90 CAPSULE | Refills: 2 | Status: SHIPPED | OUTPATIENT
Start: 2021-01-01

## 2021-01-01 RX ORDER — CEFUROXIME AXETIL 250 MG/1
250 TABLET ORAL ONCE
Status: COMPLETED | OUTPATIENT
Start: 2021-01-01 | End: 2021-01-01

## 2021-01-01 RX ADMIN — CEFUROXIME AXETIL 250 MG: 250 TABLET ORAL at 17:03

## 2021-01-01 RX ADMIN — ACETAMINOPHEN 650 MG: 325 TABLET ORAL at 14:12

## 2021-01-01 RX ADMIN — PREDNISONE 40 MG: 20 TABLET ORAL at 14:12

## 2021-01-01 RX ADMIN — OXYCODONE HYDROCHLORIDE AND ACETAMINOPHEN 1 TABLET: 7.5; 325 TABLET ORAL at 19:16

## 2021-01-01 RX ADMIN — ACETAMINOPHEN 650 MG: 325 TABLET ORAL at 20:00

## 2021-01-01 RX ADMIN — POLYMYXIN B SULFATE, BACITRACIN ZINC, NEOMYCIN SULFATE: 5000; 3.5; 4 OINTMENT TOPICAL at 20:00

## 2021-01-01 SDOH — ECONOMIC STABILITY: FOOD INSECURITY: WITHIN THE PAST 12 MONTHS, YOU WORRIED THAT YOUR FOOD WOULD RUN OUT BEFORE YOU GOT MONEY TO BUY MORE.: NEVER TRUE

## 2021-01-01 SDOH — ECONOMIC STABILITY: FOOD INSECURITY: WITHIN THE PAST 12 MONTHS, THE FOOD YOU BOUGHT JUST DIDN'T LAST AND YOU DIDN'T HAVE MONEY TO GET MORE.: NEVER TRUE

## 2021-01-01 ASSESSMENT — ENCOUNTER SYMPTOMS
WHEEZING: 0
FACIAL SWELLING: 0
GASTROINTESTINAL NEGATIVE: 1
BACK PAIN: 1
ABDOMINAL PAIN: 0
COLOR CHANGE: 0
WHEEZING: 0
BACK PAIN: 1
ABDOMINAL PAIN: 0
RESPIRATORY NEGATIVE: 1
VOICE CHANGE: 0
EYE ITCHING: 0
COLOR CHANGE: 0
NAUSEA: 0
SHORTNESS OF BREATH: 0
BACK PAIN: 1
SORE THROAT: 0
SHORTNESS OF BREATH: 1
ABDOMINAL PAIN: 0
BACK PAIN: 1
VOMITING: 0
COUGH: 0
SHORTNESS OF BREATH: 1
FACIAL SWELLING: 0
SHORTNESS OF BREATH: 0
GASTROINTESTINAL NEGATIVE: 1
APNEA: 0
CHEST TIGHTNESS: 0
GASTROINTESTINAL NEGATIVE: 1
CHEST TIGHTNESS: 0
GASTROINTESTINAL NEGATIVE: 1
COUGH: 0
SINUS PRESSURE: 0
TROUBLE SWALLOWING: 0

## 2021-01-01 ASSESSMENT — PAIN DESCRIPTION - ORIENTATION
ORIENTATION: MID
ORIENTATION: LEFT;LOWER

## 2021-01-01 ASSESSMENT — PAIN DESCRIPTION - ONSET
ONSET: ON-GOING
ONSET: SUDDEN

## 2021-01-01 ASSESSMENT — PAIN SCALES - GENERAL
PAINLEVEL_OUTOF10: 3
PAINLEVEL_OUTOF10: 10
PAINLEVEL_OUTOF10: 5
PAINLEVEL_OUTOF10: 7

## 2021-01-01 ASSESSMENT — PAIN DESCRIPTION - LOCATION
LOCATION: BACK;HIP
LOCATION: HEAD;SHOULDER

## 2021-01-01 ASSESSMENT — SOCIAL DETERMINANTS OF HEALTH (SDOH): HOW HARD IS IT FOR YOU TO PAY FOR THE VERY BASICS LIKE FOOD, HOUSING, MEDICAL CARE, AND HEATING?: NOT HARD AT ALL

## 2021-01-01 ASSESSMENT — PAIN DESCRIPTION - PAIN TYPE
TYPE: ACUTE PAIN
TYPE: ACUTE PAIN

## 2021-01-01 ASSESSMENT — PATIENT HEALTH QUESTIONNAIRE - PHQ9
SUM OF ALL RESPONSES TO PHQ9 QUESTIONS 1 & 2: 1
SUM OF ALL RESPONSES TO PHQ QUESTIONS 1-9: 1
2. FEELING DOWN, DEPRESSED OR HOPELESS: 0
1. LITTLE INTEREST OR PLEASURE IN DOING THINGS: 1

## 2021-01-01 ASSESSMENT — PAIN DESCRIPTION - FREQUENCY
FREQUENCY: CONTINUOUS
FREQUENCY: CONTINUOUS

## 2021-01-01 ASSESSMENT — PAIN DESCRIPTION - PROGRESSION
CLINICAL_PROGRESSION: GRADUALLY WORSENING
CLINICAL_PROGRESSION: NOT CHANGED

## 2021-01-01 ASSESSMENT — PAIN DESCRIPTION - DESCRIPTORS
DESCRIPTORS: ACHING;DULL
DESCRIPTORS: SHOOTING

## 2021-01-15 ENCOUNTER — ANTI-COAG VISIT (OUTPATIENT)
Dept: PHARMACY | Age: 81
End: 2021-01-15
Payer: MEDICARE

## 2021-01-15 DIAGNOSIS — I48.20 CHRONIC ATRIAL FIBRILLATION (HCC): ICD-10-CM

## 2021-01-15 LAB — INR BLD: 1.8

## 2021-01-15 PROCEDURE — 85610 PROTHROMBIN TIME: CPT

## 2021-01-15 PROCEDURE — 99211 OFF/OP EST MAY X REQ PHY/QHP: CPT

## 2021-01-15 NOTE — PROGRESS NOTES
Ms. Claudeen Emms is here for management of anticoagulation for Afib. PMH also significant for HTN, Gout, CKD II/III, Hyperlipidemia, and depression. She presents today w/out complaint. Pt verifies dosing regimen as listed above. Pt denies s/sx bleeding/bruising/ CP/HA/swelling/SOB  No missed doses. No changes in Rx/OTC/herbal medications. No major changes in diet. Pt does not drink EtOH or smoke. INR 1.8 is within the acceptable therapeutic range of 2-3. Recommend to continue dose of 0.5mg daily except 1mg on Tue. Patient has 1 mg tablets. Will continue to monitor and check INR in 4  Weeks. Dosing reminder card given with phone number, appointment date and time.   Return to clinic: 2/11 @ 11:30 am  Referring Provider: Dr. June Olivares

## 2021-01-18 ENCOUNTER — TELEPHONE (OUTPATIENT)
Dept: CARDIOLOGY CLINIC | Age: 81
End: 2021-01-18

## 2021-01-18 NOTE — TELEPHONE ENCOUNTER
Jason Romel from Dr. Wang Whatley office called to see if patient can hold Coumadin for 5 days for epidural steroid injection. Please call her about this at [phone]. Her fax number is [phone].

## 2021-01-20 NOTE — TELEPHONE ENCOUNTER
INESSA on Sheila GALVAN of below message. INESSA at [facility] coumadin clinic number at [phone] to call Dr. Nan Queen office regarding bridging patient.   Pt informed too

## 2021-02-23 NOTE — PROGRESS NOTES
Ms. Becky Jon is here for management of anticoagulation for Afib. PMH also significant for HTN, Gout, CKD II/III, Hyperlipidemia, and depression. She presents today w/out complaint. Pt verifies dosing regimen as listed above. Pt denies s/sx bleeding/bruising/ CP/HA/swelling/SOB  No missed doses. No changes in Rx/OTC/herbal medications. No major changes in diet. Pt does not drink EtOH or smoke. Pt was bridged by MD for a procedure on 2/2 (INR 1.2) and restarted usual warfarin dose 2/7. INR 1.6 is below the acceptable therapeutic range of 2-3. Recommend to take 1 mg tomorrow and to continue 1 mg Tuesdays and 0.5mg all other days. Patient has 1 mg tablets. Will continue to monitor and check INR in 1 Week. Dosing reminder card given with phone number, appointment date and time. Return to clinic: 3/2 @ 1:45 pm  Referring Provider: Dr. Vivienne Goyal PharmD Candidate 2021    I have seen the patient and reviewed the progress note written by the PharmD Candidate. I agree with this assesment and plan.    Fab Gaming, PharmD 2/23/21 2:59 PM

## 2021-03-02 NOTE — PROGRESS NOTES
Ms. Shanique Parrish is here for management of anticoagulation for Afib. PMH also significant for HTN, Gout, CKD II/III, Hyperlipidemia, and depression. She presents today w/out complaint. Pt verifies dosing regimen as listed above. Pt denies s/sx bleeding/bruising/ CP/HA/swelling/SOB  No missed doses. No changes in Rx/OTC/herbal medications. No major changes in diet. Pt does not drink EtOH or smoke. No changes in diet or medications, has not missed a dose. Pt had a procedure done in Feb and restarted warfarin 2/7. Will increase dose slightly      INR 1.7 is below the acceptable therapeutic range of 2-3. Recommend to increase dose to 1 mg on Tuesdays and Fridays and 0.5 mg all other days. Patient has 1 mg tablets. Will continue to monitor and check INR in 2 Weeks. Dosing reminder card given with phone number, appointment date and time.   Return to clinic: 3/16 @ 1:45 pm   Referring Provider: Dr. Michaela Hernandez   PharmD Candidate   3/2/2021 1:56 PM     I have seen the patient and I agree with the assessment and plan created by the PharmD Candidate  Francisco Plascencia PharmD 3/2/2021 2:01 PM

## 2021-03-16 NOTE — PROGRESS NOTES
Ms. Cheli Tes is here for management of anticoagulation for Afib. PMH also significant for HTN, Gout, CKD II/III, Hyperlipidemia, and depression. She presents today w/out complaint. Pt verifies dosing regimen as listed above. Pt denies s/sx bleeding/bruising/ CP/HA/swelling/SOB  No missed doses. No changes in Rx/OTC/herbal medications. No major changes in diet. Pt does not drink EtOH or smoke. INR 1.9 is slightly below the acceptable therapeutic range of 2-3. Recommend to continue dose of 1 mg on Tuesdays and Fridays and 0.5 mg all other days. Patient has 1 mg tablets. Will continue to monitor and check INR in 3 Weeks. Dosing reminder card given with phone number, appointment date and time.   Return to clinic: 4/6 @ 1:30 pm   Referring Provider: Dr. Edgar Chow PharmD  3/16/2021 at 2:28 PM

## 2021-03-24 NOTE — TELEPHONE ENCOUNTER
Medication Refill    Medication needing refilled: warfarin    Dosage of the medication:1mg    How are you taking this medication (QD, BID, TID, QID, PRN): tues & fri whole tab, other days 1/2 tablet    30 or 90 day supply called in: 90    When will you run out of your medication: out    Which Pharmacy are we sending the medication to?:      [address] [place] [address], [address] [place], [place] [phone] - F [phone]   [address] [place] [address], [address] [address]

## 2021-04-06 NOTE — PROGRESS NOTES
Ms. Rossi Sees is here for management of anticoagulation for Afib. PMH also significant for HTN, Gout, CKD II/III, Hyperlipidemia, and depression. She presents today w/out complaint. Pt verifies dosing regimen as listed above. Pt denies s/sx bleeding/bruising/ CP/HA/swelling/SOB  No missed doses. No changes in Rx/OTC/herbal medications. No major changes in diet. Pt does not drink EtOH or smoke. INR 2.2 is within the acceptable therapeutic range of 2-3. Recommend to continue dose of 1 mg on Tuesdays and Fridays and 0.5 mg all other days. Patient has 1 mg tablets. Will continue to monitor and check INR in 4 Weeks. Dosing reminder card given with phone number, appointment date and time.   Return to clinic: 5/4 @ 1:15 pm   Referring Provider: Dr. Alicja Granados PharmD  4/6/2021 at 1:36 PM

## 2021-04-20 NOTE — LETTER
which is especially dangerous for patients with lung disease. Overdose or dangerous interactions with alcohol and other medications may occur, leading to death. Hyperalgesia may develop, which means patients receiving opioids for the treatment of pain may become more sensitive to certain painful stimuli, and in some cases, experience pain from ordinarily non-painful stimuli. Women between the ages of 14-53 who could become pregnant should carefully weigh the risks and benefits of opioids with their physicians, as these medications increase the risk of pregnancy complications, including miscarriage,  delivery and stillbirth. It is also possible for babies to be born addicted to opioids. Opioid dependence withdrawal symptoms may include; feelings of uneasiness, increased pain, irritability, belly pain, diarrhea, sweats and goose-flesh. Benzodiazepines and non-benzodiazepine sleep medications: These medications can lead to problems such as addiction/dependence, sedation, fatigue, lightheadedness, dizziness, incoordination, falls, depression, hallucinations, and impaired judgment, memory and concentration. The ability to drive and operate machinery may also be affected. Abnormal sleep-related behaviors have been reported, including sleepwalking, driving, making telephone calls, eating, or having sex while not fully awake. These medications can suppress breathing and worsen sleep apnea, particularly when combined with alcohol or other sedating medications, potentially leading to death. Dependence withdrawal symptoms may include tremors, anxiety, hallucinations and seizures.   Stimulants:  Common adverse effects include addiction/dependence, increased blood  pressure and heart rate, decreased appetite, nausea, involuntary weight loss, insomnia,                                                                                                                     Initials:_______   irritability, and headaches. These risks may increase when these medications are combined with other stimulants, such as caffeine pills or energy drinks, certain weight loss supplements and oral decongestants. Dependence withdrawal symptoms may include depressed mood, loss of interest, suicidal thoughts, anxiety, fatigue, appetite changes and agitation. Testosterone replacement therapy:  Potential side effects include increased risk of stroke and heart attack, blood clots, increased blood pressure, increased cholesterol, enlarged prostate, sleep apnea, irritability/aggression and other mood disorders, and decreased fertility. I agree and understand that I and my prescriber have the following rights and responsibilities regarding my treatment plan:     1. MY RIGHTS:  To be informed of my treatment and medication plan. To be an active participant in my health and wellbeing. 2. MY RESPONSIBILITY AND UNDERSTANDING FOR USE OF MEDICATIONS   I will take medications at the dose and frequency as directed. For my safety, I will not increase or change how I take my medications without the recommendation of my healthcare provider.  I will actively participate in any program recommended by my provider which may improve function, including social, physical, psychological programs.  I will not take my medications with alcohol or other drugs not prescribed to me. I understand that drinking alcohol with my medications increases the chances of side effects, including reduced breathing rate and could lead to personal injury when operating machinery.  I understand that if I have a history of substance use disorders, including alcohol or other illicit drugs, that I may be at increased risk of addiction to my medications.  I agree to notify my provider immediately if I should become pregnant so that my treatment plan can be adjusted.    I agree and understand that I shall only receive controlled substance medications from the prescriber that signed this agreement unless there is written agreement among other prescribers of controlled substances outlining the responsibility of the medications being prescribed.  I understand that the if the controlled medication is not helping to achieve goals, the dosage may be tapered and no longer prescribed. 3. MY RESPONSIBILITY FOR COMMUNICATION / PRESCRIPTION RENEWALS   I agree that all controlled substance medications that I take will be prescribed only by my provider. If another healthcare provider prescribes me medication in an emergency, I will notify my provider within seventy-two (72) hours.  I will arrange for refills at the prescribed interval ONLY during regular office hours. I will not ask for refills earlier than agreed, after-hours, on holidays or weekends. Refills may take up to 72 hours for processing and prescriptions to reach the pharmacy.  I will inform my other health care providers that I am taking these medications and of the existence of this [id number]. In the event of an emergency, I will provide the same information to the emergency department prescribers.  I will keep my provider updated on the pharmacy I am using for controlled medication prescription filling. Initials:_______  4. MY RESPONSIBILITY FOR PROTECTING MEDICATIONS   I will protect my prescriptions and medications. I understand that lost or misplaced prescriptions will not be replaced.  I will keep medications only for my own use and will not share them with others. I will keep all medications away from children.  I agree that if my medications are adjusted or discontinued, I will properly dispose of any remaining medications. I understand that I will be required to dispose of any remaining controlled medications as, directed by my prescriber, prior to being provided with any prescriptions for other controlled medications.   Medication drop box locations can be found at: [email or web address]    5. MY RESPONSIBILITY WITH ILLEGAL DRUGS    I will not use illegal or street drugs or another person's prescription medications not prescribed to me.  If there are identified addiction type symptoms, then referral to a program may be provided by my provider and I agree to follow through with this recommendation. 6. MY RESPONSIBILITY FOR COOPERATION WITH INVESTIGATIONS   I understand that my provider will comply with any applicable law and may discuss my use and/or possible misuse/abuse of controlled substances and alcohol, as appropriate, with any health care provider involved in my care, pharmacist, or legal authority.  I authorize my provider and pharmacy to cooperate fully with law enforcement agencies (as permitted by law) in the investigation of any possible misuse, sale, or other diversion of my controlled substances.  I agree to waive any applicable privilege or right of privacy or confidentiality with respect to these authorizations. 7. PROVIDERS RIGHT TO MONITOR FOR SAFETY: PRESCRIPTION MONITORING / DRUG TESTING   I consent to drug/toxicology screening and will submit to a drug screen upon my providers request to assure I am only taking the prescribed drugs for my safety monitoring. I understand that a drug screen is a laboratory test in which a sample of my urine, blood or saliva is checked to see what drugs I have been taking. This may entail an observed urine specimen, which means that a nurse or other health care provider may watch me provide urine, and I will cooperate if I am asked to provide an observed specimen.  I understand that my provider will check a copy of my State Prescription Monitoring Program () Report in order to safely prescribe medications.  Pill Counts: I consent to pill counts when requested.   I may be asked to bring all my prescribed controlled substance medications, in their original bottles, to all of my scheduled appointments. In addition, my provider may ask me to come to the practice at any time for a random pill count. 8. TERMINATION OF THIS AGREEMENT  For my safety, my prescriber has the right to stop prescribing controlled substance medications and may end this agreement. Initials:_______   Conditions that may result in termination of this agreement:  a. I do not show any improvement in pain, or my activity has not improved. b. I develop rapid tolerance or loss of improvement, as described in my treatment plan.  c. I develop significant side effects from the medication. d. My behavior is not consistent with the responsibilities outlined above, thereby causing safety concerns to continue prescribing controlled substance medications. e. I fail to follow the terms of this agreement. f. Other:____________________________       UNDERSTANDING THIS MEDICATION AGREEMENT:    I have read the above and have had all my questions answered. For chronic disease management, I know that my symptoms can be managed with many types of treatments. A chronic medication trial may be part of my treatment, but I must be an active participant in my care. Medication therapy is only one part of my symptom management plan. In some cases, there may be limited scientific evidence to support the chronic use of certain medications to improve symptoms and daily function. Furthermore, in certain circumstances, there may be scientific information that suggests that the use of chronic controlled substances may worsen my symptoms and increase my risk of unintentional death directly related to this medication therapy. I know that if my provider feels my risk from controlled medications is greater than my benefit, I will have my controlled substance medication(s) compassionately lowered or removed altogether.      I further agree to allow this office to contact my HIPAA contact if there are concerns about my safety and use of the controlled medications. I have agreed to use the prescribed controlled substance medications to me as instructed by my provider and as stated in this Medication Agreement. My initial on each page and my signature below shows that I have read each page and I have had the opportunity to ask questions with answers provided by my provider.     Patient Name (Printed): _____________________________________  Patient Signature:  ______________________   Date: _____________    Prescriber Name (Printed): ___________________________________  Prescriber Signature: _____________________  Date: _____________

## 2021-04-20 NOTE — PATIENT INSTRUCTIONS
Try the gabapentin just 100 mg up to 3 times daily -if it doesn't help but doesn't make you nauseous, try 2 or 3 during the day at a time  rx dose is up to 1200 mg 3 times   After you are on or off that, start cymbalta once nightly - if not helping in 10-14 days increase to 2 nightly    See Dr. Glenny Haas

## 2021-04-21 NOTE — PROGRESS NOTES
Subjective:      Patient ID: Nolan Del Real is a 79 y.o. female. Chief Complaint   Patient presents with    Leg Pain     chronic pain in left leg / back pain also ,need pain med. Pain in back has been getting progressively worse- did fall recently and worse since then- no help after seeing Adarsh Ortega, Dr. Elvin Ahmadi and 2 other ortho/neurosurgeons-they say nothing can be done but pt in bed most of the time due to pain and unable to walk without severe pain-denies leg weakness except from inactivity-no numbness but pain is intolerable- bp's have been ok on meds- afib controlled and continues on her coumadin    Review of Systems   Constitutional: Positive for fatigue. Negative for activity change and appetite change. HENT: Negative for congestion. Eyes: Negative for visual disturbance. Respiratory: Negative for chest tightness and wheezing. Cardiovascular: Negative for chest pain and palpitations. Gastrointestinal: Negative for abdominal pain. Musculoskeletal: Positive for arthralgias, back pain, gait problem and myalgias. Skin: Negative for color change and rash. Neurological: Negative for weakness, light-headedness and headaches. Psychiatric/Behavioral: Positive for sleep disturbance. Family History   Problem Relation Age of Onset    Heart Attack Father     Heart Disease Father     Stroke Brother      Social History     Socioeconomic History    Marital status:      Spouse name: Not on file    Number of children: Not on file    Years of education: Not on file    Highest education level: Not on file   Occupational History    Not on file   Social Needs    Financial resource strain: Not on file    Food insecurity     Worry: Not on file     Inability: Not on file   [employer] needs     Medical: Not on file     Non-medical: Not on file   Tobacco Use    Smoking status: Never Smoker    Smokeless tobacco: Never Used   Substance and Sexual Activity    Alcohol use:  No -trial of pain meds and gabapentin    Essential hypertension   Controlled w current regimen- continue and monitor closely      Morbid obesity with BMI of 40.0-44.9, adult ([facility] [address]   Discussed with patient at length health risks of obesity and need for diet and exercise      Atrial fibrillation ([facility] [address]   Rate controlled- f/u w cardiology     Stage 3 chronic kidney disease   Stable -Need to control all risk factors- monitor blood pressure, blood sugars , and lipids carefully and treat aggressively to avoid progression of renal disease           Encounter Diagnoses   Name Primary?     Lumbar disc disease Yes    New onset of congestive heart failure (HCC)     Persistent atrial fibrillation (HCC)     Morbid obesity with BMI of 40.0-44.9, adult (HCC)     At high risk for falls     Essential hypertension     Stage 3a chronic kidney disease        Orders Placed This Encounter   Procedures   Baron Eller MD, Orthopedic Surgery, [facility]     Referral Priority:   Routine     Referral Type:   Eval and Treat     Referral Reason:   Specialty Services Required     Referred to Provider:   Deuce Calderón MD     Requested Specialty:   Orthopedic Surgery     Number of Visits Requested:   1

## 2021-04-22 NOTE — TELEPHONE ENCOUNTER
Marvin De La Cruz as thought the gabapentin made her loopy- no injury- will d/c and use just the cymbalta and hydrocodone -to see ortho on the 5th

## 2021-04-22 NOTE — TELEPHONE ENCOUNTER
Danita Fabry called in to inform Dr. Annie Warner that the patient had fell today and they had to call the paramedics to get her back in bed. Would like to talk to someone about the new medication that the patient is taking. Vitals were fine, blood sugar was fine and patient did not have to go to the hospital.     Pls call and advise.

## 2021-05-06 NOTE — PROGRESS NOTES
syndrome     Obesity     Pelvic relaxation     PONV (postoperative nausea and vomiting)     Stress 8/2006    NL stress    Syncope     Unspecified sleep apnea 03/2002    uvulectomy -hx of    Vitamin D deficiency     20ng/ml 6/2009    Wears dentures     Wears glasses         Past Surgical History:     Past Surgical History:   Procedure Laterality Date    BLADDER SUSPENSION  1997    CHOLECYSTECTOMY  1974    COLONOSCOPY      2013    COLONOSCOPY  11/2014    10yr    EYE SURGERY      macular tear and cataract right    HYSTERECTOMY  1977    partial    JOINT REPLACEMENT Right 2017    KNEE ARTHROSCOPY  2005    knee surgery    PAIN MANAGEMENT PROCEDURE Left 8/11/2020    LEFT LUMBAR THREE AND LUMBAR FOUR TRANSFORAMINAL EPIDURAL STEROID INJECTION SITE CONFIRMED BY FLUOROSCOPY performed by Norah Carvalho MD at [facility] N/A 8/25/2020    MIDLINE LUMBAR FIVE SACRAL ONE INTERLAMINAR EPIDURAL STEROID INJECTION SITE CONFIRMED BY FLUOROSCOPY performed by Norah Carvalho MD at [facility]  3/2002       Current Medications:     Current Outpatient Medications:     DULoxetine (CYMBALTA) 20 MG extended release capsule, Take 1 capsule by mouth daily, Disp: 30 capsule, Rfl: 3    warfarin (COUMADIN) 1 MG tablet, TAKE ONE TABLET BY MOUTH ON SUNDAY, TUESDAY AND THURSDAY.   TAKE ONE-HALF TABLET BY MOUTH ON ALL OTHER DAYS OR AS DIRECTED BY CLINIC, Disp: 65 tablet, Rfl: 5    sertraline (ZOLOFT) 50 MG tablet, TAKE ONE TABLET BY MOUTH DAILY, Disp: 90 tablet, Rfl: 1    dilTIAZem (CARDIZEM CD) 300 MG extended release capsule, Take 1 capsule by mouth daily, Disp: 30 capsule, Rfl: 3    allopurinol (ZYLOPRIM) 300 MG tablet, TAKE ONE TABLET BY MOUTH DAILY, Disp: 90 tablet, Rfl: 3    omeprazole (PRILOSEC) 20 MG delayed release capsule, TAKE ONE CAPSULE BY MOUTH DAILY, Disp: 90 capsule, Rfl: 2    atorvastatin (LIPITOR) 20 MG tablet, TAKE ONE TABLET BY MOUTH ONCE NIGHTLY, Disp: 90 tablet, Rfl: 3    metoprolol tartrate (LOPRESSOR) 25 MG tablet, Take 1 tablet by mouth 2 times daily, Disp: 180 tablet, Rfl: 3    dilTIAZem (CARTIA XT) 300 MG extended release capsule, Take 1 capsule by mouth daily, Disp: 90 capsule, Rfl: 3    Cholecalciferol (VITAMIN D) 2000 UNITS CAPS capsule, Take 2,000 Units by mouth daily, Disp: , Rfl:     docusate sodium (COLACE) 100 MG capsule, Take 100 mg by mouth daily as needed for Constipation , Disp: , Rfl:     Allergies:  Diclofenac, Adhesive tape, and Penicillins    Social History:    reports that she has never smoked. She has never used smokeless tobacco. She reports that she does not drink alcohol or use drugs. Family History:   Family History   Problem Relation Age of Onset    Heart Attack Father     Heart Disease Father     Stroke Brother        REVIEW OF SYSTEMS: Full ROS noted & scanned   CONSTITUTIONAL: Denies unexplained weight loss, fevers, chills or fatigue  NEUROLOGICAL: Denies unsteady gait or progressive weakness  MUSCULOSKELETAL: Denies joint swelling or redness  PSYCHOLOGICAL: Denies anxiety, depression   SKIN: Denies skin changes, delayed healing, rash, itching   HEMATOLOGIC: Denies easy bleeding or bruising  ENDOCRINE: Denies excessive thirst, urination, heat/cold  RESPIRATORY: Denies current dyspnea, cough  GI: Denies nausea, vomiting, diarrhea   : Denies bowel or bladder issues      PHYSICAL EXAM:    Vitals: Height 5' 5.98\" (1.676 m), weight 278 lb (126.1 kg), not currently breastfeeding. GENERAL EXAM:  · General Apparence: Patient is adequately groomed with no evidence of malnutrition. · Orientation: The patient is oriented to time, place and person. · Mood & Affect:The patient's mood and affect are appropriate. · Vascular: Examination reveals no swelling tenderness in upper or lower extremities. Good capillary refill.   · Lymphatic: The lymphatic examination bilaterally reveals all areas to be without enlargement

## 2021-05-11 NOTE — PROGRESS NOTES
Ms. Devora Nath is here for management of anticoagulation for Afib. PMH also significant for HTN, Gout, CKD II/III, Hyperlipidemia, and depression. She presents today w/out complaint. Pt verifies dosing regimen as listed above. Pt denies s/sx bleeding/bruising/ CP/HA/swelling/SOB  No missed doses. No changes in Rx/OTC/herbal medications. No major changes in diet. Pt does not drink EtOH or smoke. Started on Tylenol with Codeine for knee pain    INR 1.9 is slightly below the acceptable therapeutic range of 2-3. Recommend to continue dose of 1 mg on Tuesdays and Fridays and 0.5 mg all other days. Patient has 1 mg tablets. Will continue to monitor and check INR in 4 Weeks. Dosing reminder card given with phone number, appointment date and time.   Return to clinic: 6/8 @ 1:00 pm   Referring Provider: Dr. Jonny Bosch PharmD  5/11/2021 at 12:51 PM

## 2021-05-14 NOTE — PATIENT INSTRUCTIONS
Blood work  Start lasix 20 mg daily until weight <275 lbs  Continue current medications  Follow up in 2 months

## 2021-05-14 NOTE — PROGRESS NOTES
[address]   Cardiology Note              Date:  May 14, 2021  Patientname: Katerina Moran  YOB: 1940    Primary Care physician: Ginna Marquez MD    HISTORY OF PRESENT ILLNESS: Katerina Moran is a 79 y.o. female with a history of atrial fibrillation, CHF, HTN, HLD, CKD. She was found to be in AF at routine PCP visit in 1/2016. She had a CV in 2/2016 and returned to AF within a week. Rate control strategy pursued. Echo 9/2020 showed EF 50-55%. She was admitted 10/10/2020 for shortness of breath. Treated for CHF. Stress test negative for ischemia. Today she presents for follow up for CHF, AF. She has shortness of breath with steps that she states is about the same. She has palpitations every few months. She has no chest pain. Her main complaint is back pain. She has gained about 7 lbs. She takes lasix prn, has not taken recently. Weight today 5/14/2021: 280 lbs   Discharge weight 10/15/2020: 273 lbs    Past Medical History:   has a past medical history of Anemia, Arthritis, Atrial fibrillation ([facility] [address] Chronic kidney disease (CKD), stage II (mild), Community acquired pneumonia of left lower lobe of lung, Depression, Diverticulosis, Foot pain, GI bleed, Gout, Hemorrhoids, Hyperlipidemia, Hypertension, Hyperuricemia, Iron deficiency anemia, Medical history reviewed with no changes, Menopausal syndrome, Obesity, Pelvic relaxation, PONV (postoperative nausea and vomiting), Stress, Syncope, Unspecified sleep apnea, Vitamin D deficiency, Wears dentures, and Wears glasses. Past Surgical History:   has a past surgical history that includes Cholecystectomy (1974); Hysterectomy (1977); Knee arthroscopy (2005); bladder suspension (1997); Uvulopalatopharygoplasty (3/2002); eye surgery; Colonoscopy; Colonoscopy (11/2014); joint replacement (Right, 2017); Pain management procedure (Left, 8/11/2020); and Pain management procedure (N/A, 8/25/2020).      Home Medications:    Prior to Admission medications    Medication Sig Start Date End Date Taking? Authorizing Provider   DULoxetine (CYMBALTA) 20 MG extended release capsule Take 1 capsule by mouth daily 4/20/21   Julissa Mayfield MD   warfarin (COUMADIN) 1 MG tablet TAKE ONE TABLET BY MOUTH ON SUNDAY, TUESDAY AND THURSDAY. TAKE ONE-HALF TABLET BY MOUTH ON ALL OTHER DAYS OR AS DIRECTED BY CLINIC 3/24/21   Mary Jane Macdonald MD   sertraline (ZOLOFT) 50 MG tablet TAKE ONE TABLET BY MOUTH DAILY 3/9/21   Julissa Mayfield MD   dilTIAZem (CARDIZEM CD) 300 MG extended release capsule Take 1 capsule by mouth daily 2/24/21   Mary Jane Macdonald MD   allopurinol (ZYLOPRIM) 300 MG tablet TAKE ONE TABLET BY MOUTH DAILY 12/9/20   Julissa Mayfield MD   omeprazole (PRILOSEC) 20 MG delayed release capsule TAKE ONE CAPSULE BY MOUTH DAILY 11/17/20   Julissa Mayfield MD   atorvastatin (LIPITOR) 20 MG tablet TAKE ONE TABLET BY MOUTH ONCE NIGHTLY 11/16/20   Julissa Mayfield MD   metoprolol tartrate (LOPRESSOR) 25 MG tablet Take 1 tablet by mouth 2 times daily 10/15/20   RADHA Harris CNP   dilTIAZem (CARTIA XT) 300 MG extended release capsule Take 1 capsule by mouth daily 2/20/20   Mary Jane Macdonald MD   Cholecalciferol (VITAMIN D) 2000 UNITS CAPS capsule Take 2,000 Units by mouth daily    Historical Provider, MD   docusate sodium (COLACE) 100 MG capsule Take 100 mg by mouth daily as needed for Constipation     Historical Provider, MD     Allergies:  Diclofenac, Adhesive tape, and Penicillins    Social History:   reports that she has never smoked. She has never used smokeless tobacco. She reports that she does not drink alcohol or use drugs. Family History: family history includes Heart Attack in her father; Heart Disease in her father; Stroke in her brother. Review of Systems   Review of Systems   Constitutional: Negative. Respiratory: Positive for shortness of breath. Cardiovascular: Negative. Gastrointestinal: Negative. Neurological: Negative. OBJECTIVE:    Vital signs:    /78   Pulse 64   Ht 5' 5\" (1.651 m)   Wt 280 lb (127 kg)   SpO2 93%   BMI 46.59 kg/m²      Physical Exam:  Constitutional:  Comfortable and alert, NAD, appears stated age, obese  Eyes: PERRL, sclera nonicteric  Neck:  Supple, no masses, no thyroidmegaly, no JVD  Skin:  Warm and dry; no rash or lesions  Heart: Irregular, normal apex, S1 and S2 normal, no M/G/R  Lungs:  Normal respiratory effort; clear; no wheezing/rhonchi/rales  Abdomen: soft, non tender, + bowel sounds  Extremities: Trace BLE edema  Neuro: alert and oriented, moves legs and arms equally, normal mood and affect    Data Reviewed:      Stress test 10/12/2020: There is normal isotope uptake at stress and rest. There is no evidence of     myocardial ischemia or scar. Hyperdynamic LV systolic function with WH>21%     with uniform wall motion. Low risk study.       Echo 9/2020:  Left ventricular systolic function is low normal with a visually estimated   ejection fraction of 50-55%.   Normal wall motion   The left atrium appears moderate to severely enlarged.   Mild tricuspid regurgitation.   Systolic pulmonary artery pressure (SPAP) is normal and estimated at 36 mmHg   (right atrial pressure 8 mmHg). Cardiology Labs Reviewed:   CBC: No results for input(s): WBC, HGB, HCT, PLT in the last 72 hours. BMP:No results for input(s): NA, K, CO2, BUN, CREATININE, LABGLOM, GLUCOSE in the last 72 hours. PT/INR:   Recent Labs     05/11/21  1254   INR 1.9     APTT:No results for input(s): APTT in the last 72 hours. FASTING LIPID PANEL:  Lab Results   Component Value Date    HDL 66 10/11/2020    HDL 53 06/04/2010    LDLCALC 56 10/11/2020    TRIG 97 10/11/2020     LIVER PROFILE:No results for input(s): AST, ALT, ALB in the last 72 hours.   BNP:   Lab Results   Component Value Date    PROBNP 1,335 11/06/2020    PROBNP 935 10/13/2020    PROBNP 2,448 10/10/2020    PROBNP 1,910 12/30/2017    PROBNP 4,052 12/28/2017 Reviewed all labs and imaging today    Assessment:   Shortness of breath: ongoing  Chronic diastolic CHF: likely mildly volume up, +weight gain  Persistent atrial fibrillation: stable              - [id number] score >2 on coumadin (not interested in DOACs due to cost)              - s/p CV 2/2016  Normal stress test 10/2020  HTN: stable  CKD  Likely YUMIKO     Plan:   1. Start lasix 20 mg daily until weight <275 lbs, then resume daily prn dosing  2. Check BMP and BNP  3. Plan to restart lisinopril for CHF pending renal function, would also benefit from spironolactone   4. Continue lopressor, diltiazem  5. Discussed YUMIKO evaluation, she declines  6. Check BP and weight at home and call the office if consistently out of goal range  7.  Follow up in 2 months    RADHA Adams-PHILIP [employer]  [phone]

## 2021-05-18 NOTE — Clinical Note
[facility] [address]  [address] [address]  Phone: [phone]  Fax: [phone]    Malik Nassar MD         May 19, 2021     Patient: Kristyn Car   YOB: 1940   Date of Visit: 5/18/2021       To Whom It May Concern: It is my medical opinion that Autumn Whitfield requires a disability parking placard for the following reasons:  {reasons:88999}  Duration of need: {time:95595}    If you have any questions or concerns, please don't hesitate to call.     Sincerely,        Malik Nassar MD

## 2021-05-19 NOTE — TELEPHONE ENCOUNTER
Spoke with patient relayed message from [facility]. Patient verbalized understanding. Order placed for BNP and BMP in the system.

## 2021-05-27 NOTE — TELEPHONE ENCOUNTER
----- Message from RADHA Mancia CNP sent at 5/27/2021  8:08 AM EDT -----  Kidney function slightly worse than previous.  Please get an update on weights/symptoms

## 2021-05-27 NOTE — TELEPHONE ENCOUNTER
Great, weight is down. She can resume lasix 20 mg daily prn for weight gain, shortness of breath, edema. Thanks!

## 2021-05-27 NOTE — TELEPHONE ENCOUNTER
Created telephone encounter. Spoke with Carson Burns relayed message per [facility] regarding labs. weight 5/26/19  270 lb  She says she has no SOB. No edema. No CP. She says she feels good.

## 2021-05-27 NOTE — TELEPHONE ENCOUNTER
Patient informed and VU. She also is stating that when she takes Lasix, she feels like her head is being \"drained down\" and makes her feel bad. This happens every time she takes it. Is there anything she can take with it when she has to take it so her head doesn't feel this way?

## 2021-06-08 NOTE — PROGRESS NOTES
Ms. Devora Nath is here for management of anticoagulation for Afib. PMH also significant for HTN, Gout, CKD II/III, Hyperlipidemia, and depression. She presents today w/out complaint. Pt verifies dosing regimen as listed above. Pt denies s/sx bleeding/bruising/ CP/HA/swelling/SOB  No missed doses. No changes in Rx/OTC/herbal medications. No major changes in diet. Pt does not drink EtOH or smoke. Started on Tylenol with Codeine for knee pain    INR 1.9 is within acceptable therapeutic range of 2-3. Recommend to continue dose of 1 mg on Tuesdays and Fridays and 0.5 mg all other days. Patient has 1 mg tablets. Will continue to monitor and check INR in 4 Weeks. Dosing reminder card given with phone number, appointment date and time.   Return to clinic: 7/6 @ 1:00 pm   Referring Provider: Dr. Funmi Cuevas

## 2021-07-06 NOTE — PROGRESS NOTES
Ms. Barbara Landon is here for management of anticoagulation for Afib. PMH also significant for HTN, Gout, CKD II/III, Hyperlipidemia, and depression. She presents today w/out complaint. Pt verifies dosing regimen as listed above. Pt denies s/sx bleeding/bruising/ CP/HA/swelling/SOB  No missed doses. No changes in Rx/OTC/herbal medications. No major changes in diet. Pt does not drink EtOH or smoke. INR 2.9 is within acceptable therapeutic range of 2-3. Recommend to continue dose of 1 mg on Tuesdays and Fridays and 0.5 mg all other days. Patient has 1 mg tablets. Will continue to monitor and check INR in 5 Weeks (normally 4 weeks but patient may be out of town at that time). Dosing reminder card given with phone number, appointment date and time.   Return to clinic: 8/10 @ 1:00 pm   Referring Provider: Dr. Wendi Story, [facility] PharmD  7/6/2021 at 1:00 PM

## 2021-07-12 NOTE — ED NOTES
Bed: 14  Expected date:   Expected time:   Means of arrival:   Comments:  [facility] [address], RN  07/12/21 1355

## 2021-07-13 NOTE — ED NOTES
Ambulated pt approximately 50 feet with the assistance of a cane and without oxygen. Pt denied feeling SOB, dizzy, or feeling light headed. Pt's gait was steady. Pt back to bed, bed locked and in lowest position, call light in reach, side rails up x2. ED RN Vesta Mehta and ED KAREN Chapa notified of results and completion of ambulation.      Justo Arndt  07/12/21 2051

## 2021-07-13 NOTE — CARE COORDINATION
Ambulatory Care Coordination  ED Follow up Call    Hx: Heart Failure, Pure Hypercholesterolemia, Diverticulosis, Gout, HTN, Obesity, A-Fib, Osteoarthritis, CKD, GERD, Gait Disturbance, Lumbar Disc Disease, Fall    Reason for ED visit:  Fall with head injury    Hematoma on back of head. Denied headache or neck pain. Sore all over. Pt stated she lost her footing on uneven pavement and fell backwards. Status:     improved    Did you call your PCP prior to going to the ED? Yes      Did you receive a discharge instructions from the Emergency Room? Yes  Review of Instructions:     Understands what to report/when to return?:  Yes   Understands discharge instructions?:  Yes   Following discharge instructions?:  Yes       Are there any new complaints of pain? No  New Pain Meds? No    Constipation prophylaxis needed? N/A    If you have a wound is the dressing clean, dry, and intact? N/A  Understands wound care regimen? N/A    Are there any other complaints/concerns that you wish to tell your provider? Pt stated hematoma about the size of an egg. Pt stated she did not take her Coumadin yesterday and has a call into the [facility] for instructions. The RN, HENRIK reviewed symptoms that would warrant going back to the ED; severe headache, nausea, vomiting dizziness and change in mental status. Pt lives with a roommate. Pt was instructed to read her discharge instructions. FU appts/Provider:  Pt was instructed to call for an appointment  Future Appointments   Date Time Provider Juan Jose Ramirez   7/20/2021  1:30 PM Curry President, RADHA SUAREZ   8/10/2021  1:00 PM [address] HOD     New Medications?:   No      Medication Reconciliation by phone - Yes  Understands Medications? Yes  Taking Medications? Yes  Can you swallow your pills? Yes    Any further needs in the home i.e. Equipment?   No    Link to services in community?:  N/A   Which services:  NA

## 2021-07-13 NOTE — TELEPHONE ENCOUNTER
Saul Damon called to report that she fell yesterday and hit her head. She was taken to the ER and checked out but also missed her warfarin dose yesterday. I instructed her to go ahead and take 1.5mg today to make up for yesterday's missed dose. Also discussed bleeding risk and to look out for any issues there and to call us.  Shanae Araujo, PharmD 07/13/21  11:36 AM

## 2021-07-13 NOTE — ED PROVIDER NOTES
[address] [facility]  ED  EMERGENCY DEPARTMENT ENCOUNTER        Pt Name: Meenakshi Silva  MRN: [id number]  Jadengffifi 1940  Date of evaluation: 7/12/2021  Provider: Arabella Hunt PA-C  PCP: Kathrene Hodgkin, MD  ED Attending:  Per Mesa MD      This patient was not seen by the attending provider      History provided by the patient    CHIEF COMPLAINT:     Chief Complaint   Patient presents with   Unk [place] Fall     Mechanical Fall, no LOC. Hit head, on blood thinners       HISTORY OF PRESENT ILLNESS:      Meenakshi Silva is a 79 y.o. female who arrives to the ED by [facility]. Patient is to be evaluated for injuries status post fall. Patient suffered a mechanical fall just prior to arrival.  She states she had just gotten out of the passenger side of her car and was walking forward against her car in a parking lot. She states there was a divot in the parking lot, for drainage of Jese. Despite having her left hand resting on the car she stepped down, lost her footing and ultimately fell. She fell on her back and hit the back of her head. Patient is anticoagulated on Coumadin secondary to atrial fibrillation. 6 days ago she reports her INR was 2.9. Patient did not lose consciousness and was able to be helped up. She complains of pain to the back of her head, neck and upper back. She moves bilateral upper and lower extremities. She denies chest pain or shortness of breath. Due to the pain to her head and upper back she is here for evaluation and treatment. Nursing Notes were reviewed     REVIEW OF SYSTEMS:     Review of Systems   Constitutional: Negative for chills and fever. HENT: Negative for facial swelling. Eyes: Negative for visual disturbance. Respiratory: Negative for shortness of breath. Cardiovascular: Negative for chest pain. Gastrointestinal: Negative for abdominal pain, nausea and vomiting. Genitourinary: Negative. Musculoskeletal: Positive for back pain. Negative for gait problem and joint swelling. Skin: Positive for wound (Abrasion posterior scalp.). Neurological: Positive for headaches. Negative for dizziness and weakness. All other systems reviewed and are negative. Except as noted above in the ROS, all other systems were reviewed and negative.          PAST MEDICAL HISTORY:     Past Medical History:   Diagnosis Date    Anemia     NOS    Arthritis     knee     Atrial fibrillation (HCC)     on coumadin    Chronic kidney disease (CKD), stage II (mild)     Community acquired pneumonia of left lower lobe of lung 12/26/2017    Depression     Diverticulosis     Foot pain     GI bleed 4/18/2018    Due to esophageal tear     Gout     Hemorrhoids     Hyperlipidemia     Hypertension     Hyperuricemia     Iron deficiency anemia 1/8/2014    Iron deficiency anemia-s/p EGD and colonoscopy 2/11/2014 Esophageal tear likely source     Medical history reviewed with no changes     Menopausal syndrome     Obesity     Pelvic relaxation     PONV (postoperative nausea and vomiting)     Stress 8/2006    NL stress    Syncope     Unspecified sleep apnea 03/2002    uvulectomy -hx of    Vitamin D deficiency     20ng/ml 6/2009    Wears dentures     Wears glasses          SURGICAL HISTORY:      Past Surgical History:   Procedure Laterality Date    BLADDER SUSPENSION  1997    CHOLECYSTECTOMY  1974    COLONOSCOPY      2013    COLONOSCOPY  11/2014    10yr    EYE SURGERY      macular tear and cataract right    HYSTERECTOMY  1977    partial    JOINT REPLACEMENT Right 2017    KNEE ARTHROSCOPY  2005    knee surgery    PAIN MANAGEMENT PROCEDURE Left 8/11/2020    LEFT LUMBAR THREE AND LUMBAR FOUR TRANSFORAMINAL EPIDURAL STEROID INJECTION SITE CONFIRMED BY FLUOROSCOPY performed by Jabier Greene MD at [address] 8/25/2020    MIDLINE LUMBAR FIVE SACRAL ONE INTERLAMINAR EPIDURAL STEROID INJECTION SITE CONFIRMED BY FLUOROSCOPY performed by Enedina Azar MD at [address] [facility]  3/2002         CURRENT MEDICATIONS:       Discharge Medication List as of 7/12/2021  8:43 PM      CONTINUE these medications which have NOT CHANGED    Details   CARTIA  MG extended release capsule TAKE ONE CAPSULE BY MOUTH DAILY, Disp-90 capsule, R-2Normal      furosemide (LASIX) 20 MG tablet Take 1 tablet by mouth daily as needed (SOB, edema, weight gain), Disp-30 tablet, R-5Adjust Sig      DULoxetine (CYMBALTA) 20 MG extended release capsule Take 1 capsule by mouth daily, Disp-30 capsule, R-3Normal      warfarin (COUMADIN) 1 MG tablet TAKE ONE TABLET BY MOUTH ON SUNDAY, TUESDAY AND THURSDAY.   TAKE ONE-HALF TABLET BY MOUTH ON ALL OTHER DAYS OR AS DIRECTED BY CLINIC, Disp-65 tablet, R-5Normal      sertraline (ZOLOFT) 50 MG tablet TAKE ONE TABLET BY MOUTH DAILY, Disp-90 tablet, R-1Normal      allopurinol (ZYLOPRIM) 300 MG tablet TAKE ONE TABLET BY MOUTH DAILY, Disp-90 tablet,R-3Normal      omeprazole (PRILOSEC) 20 MG delayed release capsule TAKE ONE CAPSULE BY MOUTH DAILY, Disp-90 capsule,R-2Normal      atorvastatin (LIPITOR) 20 MG tablet TAKE ONE TABLET BY MOUTH ONCE NIGHTLY, Disp-90 tablet,R-3Normal      metoprolol tartrate (LOPRESSOR) 25 MG tablet Take 1 tablet by mouth 2 times daily, Disp-180 tablet,R-3Normal      Cholecalciferol (VITAMIN D) 2000 UNITS CAPS capsule Take 2,000 Units by mouth daily      docusate sodium (COLACE) 100 MG capsule Take 100 mg by mouth daily as needed for Constipation Historical Med               ALLERGIES:    Diclofenac, Adhesive tape, and Penicillins    FAMILY HISTORY:       Family History   Problem Relation Age of Onset    Heart Attack Father     Heart Disease Father     Stroke Brother           SOCIAL HISTORY:       Social History     Socioeconomic History    Marital status:      Spouse name: None    Number of children: None    Years of education: None    Highest education level: None   Occupational History    None   Tobacco Use    Smoking status: Never Smoker    Smokeless tobacco: Never Used   Vaping Use    Vaping Use: Never used   Substance and Sexual Activity    Alcohol use: No    Drug use: Never    Sexual activity: None   Other Topics Concern    None   Social History Narrative    None     Social Determinants of Health     Financial Resource Strain:     Difficulty of Paying Living Expenses:    Food Insecurity:     Worried About Running Out of Food in the Last Year:     Ran Out of Food in the Last Year:    Transportation Needs:     Lack of Transportation (Medical):  Lack of Transportation (Non-Medical):    Physical Activity:     Days of Exercise per Week:     Minutes of Exercise per Session:    Stress:     Feeling of Stress :    Social Connections:     Frequency of Communication with Friends and Family:     Frequency of Social Gatherings with Friends and Family:     Attends Synagogue Services:     Active Member of Clubs or Organizations:     Attends Club or Organization Meetings:     Marital Status:    Intimate Partner Violence:     Fear of Current or Ex-Partner:     Emotionally Abused:     Physically Abused:     Sexually Abused:        SCREENINGS:    [place] Coma Scale  Eye Opening: Spontaneous  Best Verbal Response: Oriented  Best Motor Response: Obeys commands  Mark Coma Scale Score: 15        PHYSICAL EXAM:       ED Triage Vitals [07/12/21 1846]   BP Temp Temp Source Pulse Resp SpO2 Height Weight   (!) 156/84 98.4 °F (36.9 °C) Oral 107 19 96 % 5' 6\" (1.676 m) 271 lb (122.9 kg)       Physical Exam    CONSTITUTIONAL: Awake and alert. Cooperative. Well-developed. Well-nourished. Non-toxic. No acute distress. HENT: Normocephalic. Hematoma and abrasion posterior scalp. No bony step-off. No crepitus. External ears normal, without discharge. Hearing aids in place. No nasal discharge. Oropharynx clear.  Mucous membranes moist.  EYES: Conjunctiva non-injected. No scleral icterus. PERRL. EOM's grossly intact. NECK: Supple. Normal ROM. Mild tenderness to the lower C-spine. No step-off or crepitus. CARDIOVASCULAR: RRR. No Murmer. Intact distal pulses. PULMONARY/CHEST WALL: Effort normal. No tachypnea. Lungs clear to ausculation. ABDOMEN: Normal BS. Soft. Nondistended. No tenderness to palpate. No guarding. /ANORECTAL: Not assessed  MUSKULOSKELETAL: Back: Tenderness across the upper back including upper T-spine. No bony step-off or crepitus. No overlying bruising or sign of trauma. Normal ROM. No acute deformities. No edema. SKIN: Warm and dry. No rash. NEUROLOGICAL: Alert and oriented x 3. GCS 15. CN II-XII grossly intact. Strength is 5/5 in all extremities and sensation is intact. Normal gait. PSYCHIATRIC: Normal affect        DIAGNOSTICRESULTS:         RADIOLOGY:  All x-ray studies areviewed/reviewed by me. Formal interpretations per the radiologist are as follows:      CT HEAD WO CONTRAST    Result Date: 7/12/2021  EXAMINATION: CT OF THE HEAD WITHOUT CONTRAST  7/12/2021 7:17 pm TECHNIQUE: CT of the head was performed without the administration of intravenous contrast. Dose modulation, iterative reconstruction, and/or weight based adjustment of the mA/kV was utilized to reduce the radiation dose to as low as reasonably achievable. COMPARISON: None. HISTORY: ORDERING SYSTEM PROVIDED HISTORY: fall, head injury, anticoagulatedon coumadin TECHNOLOGIST PROVIDED HISTORY: Reason for exam:->fall, head injury, anticoagulatedon coumadin Has a \"code stroke\" or \"stroke alert\" been called? ->No Decision Support Exception - unselect if not a suspected or confirmed emergency medical condition->Emergency Medical Condition (MA) Reason for Exam: Fall; hit head on blacktop, pt on blood thinner Acuity: Acute Type of Exam: Initial FINDINGS: BRAIN/VENTRICLES: Ventricles are mildly enlarged with diffuse mild prominence of the cortical sulci.   No intracranial hemorrhage or edema is seen. There is no extra-axial fluid collection or mass identified. ORBITS: The visualized portion of the orbits demonstrate no acute abnormality. SINUSES: The visualized paranasal sinuses and mastoid air cells demonstrate no acute abnormality. SOFT TISSUES/SKULL:  No acute abnormality of the visualized skull or soft tissues. There is mild scalp swelling along the occipital region extending inferiorly with no fracture in the area. Mild atrophy and mild chronic microischemic changes scattered in the deep white matter with no acute abnormality seen. Small scalp hematoma along the occipital region with no fracture. CT CERVICAL SPINE WO CONTRAST    Result Date: 7/12/2021  EXAMINATION: CT OF THE CERVICAL SPINE WITHOUT CONTRAST 7/12/2021 7:17 pm TECHNIQUE: CT of the cervical spine was performed without the administration of intravenous contrast. Multiplanar reformatted images are provided for review. Dose modulation, iterative reconstruction, and/or weight based adjustment of the mA/kV was utilized to reduce the radiation dose to as low as reasonably achievable. COMPARISON: None. HISTORY: ORDERING SYSTEM PROVIDED HISTORY: neck pain s/p fall TECHNOLOGIST PROVIDED HISTORY: Reason for exam:->neck pain s/p fall Decision Support Exception - unselect if not a suspected or confirmed emergency medical condition->Emergency Medical Condition (MA) Reason for Exam: Fall; hit head on blacktop, midline neck pain Acuity: Acute Type of Exam: Initial FINDINGS: BONES/ALIGNMENT: The cervical vertebra are well aligned. There is moderate disc space narrowing throughout the lower cervical spine with prominent osteophytes which is most severe at C6-7. There is minimal subluxation of C4 on C5 and C5 on C6. No obvious fracture is seen. The atlantoaxial joint is intact. DEGENERATIVE CHANGES: There are moderate sclerotic and hypertrophic changes of the facets throughout which appear intact.   There is a moderate disc osteophyte complex at C6-7 causing moderate dural sac effacement. SOFT TISSUES: There is no prevertebral soft tissue swelling. The lung apices are clear. Moderate degenerative disc changes throughout the lower cervical spine with minimal subluxation of C4 on C5 and C5 on C6 which appears degenerative in etiology with no fracture seen. Moderate disc osteophyte complex at C6-7. Moderate osteoarthritic changes of the facets throughout which appear intact. CT THORACIC SPINE WO CONTRAST    Result Date: 7/12/2021  EXAMINATION: CT OF THE THORACIC SPINE WITHOUT CONTRAST  7/12/2021 7:17 pm: TECHNIQUE: CT of the thoracic spine was performed without the administration of intravenous contrast. Multiplanar reformatted images are provided for review. Dose modulation, iterative reconstruction, and/or weight based adjustment of the mA/kV was utilized to reduce the radiation dose to as low as reasonably achievable. COMPARISON: None. HISTORY: ORDERING SYSTEM PROVIDED HISTORY: upper back pain s/p fall TECHNOLOGIST PROVIDED HISTORY: Reason for exam:->upper back pain s/p fall Reason for Exam: Fall; hit head on blacktop; middle/upper back pain Acuity: Acute Type of Exam: Initial FINDINGS: BONES/ALIGNMENT: There is moderate disc space narrowing throughout the mid to lower thoracic spine with prominent osteophytes throughout. No fracture or subluxation is seen. The posterior elements are intact. DEGENERATIVE CHANGES: No gross spinal canal stenosis or bony neural foraminal narrowing of the thoracic spine. There is a moderate hiatal hernia. No obvious large disc bulge or herniation can be seen. There is mild scoliosis. SOFT TISSUES: No paraspinal mass is seen. There are some hazy ground-glass opacities scattered in the visualized portions of both lungs.      Moderate degenerative disc changes throughout the mid to lower thoracic region with no acute abnormality seen No significant disc bulge or herniation Hazy ground-glass opacities scattered in both lungs. Recommend follow-up with serial chest x-rays. PROCEDURES:   N/A    CRITICAL CARE TIME:       None    CONSULTS:  None      EMERGENCY DEPARTMENT COURSE and DIFFERENTIAL DIAGNOSIS/MDM:   Vitals:    Vitals:    07/12/21 1846 07/12/21 2005 07/12/21 2059   BP: (!) 156/84  (!) 155/82   Pulse: 107 75 77   Resp: 19  18   Temp: 98.4 °F (36.9 °C)     TempSrc: Oral     SpO2: 96%  96%   Weight: 271 lb (122.9 kg)     Height: 5' 6\" (1.676 m)         Patient was given the following medications:  Medications   oxyCODONE-acetaminophen (PERCOCET) 7.5-325 MG per tablet 1 tablet (1 tablet Oral Given 7/12/21 1916)       I have evaluated this patient in the ED. Old records were reviewed. Patient suffered a mechanical fall prior to coming into the ED. She lost her footing due to uneven pavement and fell backwards. She hit her head. She is anticoagulated on Coumadin for A. fib. INR checked less than 1 week ago and in therapeutic range. She also reported some neck and upper back pain. Based on mechanism of injury and complaints of pain I CT her head, C-spine and T-spine. CT head showed scalp hematoma along the occipital region though no fracture. No acute abnormality. CT C and T-spine shows degenerative changes without acute fracture. Patient received Percocet 7.5/325 orally in the ED for her pain following the fall. Pain which was initially rated 10/10 came down to 3/10. She was able to get up and ambulate safely prior to being discharged home. As this was a mechanical fall there was no indication for further work-up. Patient comfortable with plan of care and will be driven home by a friend. I estimate there is LOW risk for SKULL/FACIAL/VERTEBRAL FX, SUBARACHNOID HEMORRHAGE, INTRACRANIAL HEMORRHAGE, SUBDURAL HEMATOMA, OR STROKE, thus I consider the discharge disposition reasonable.  Johnna Spicer and I have discussed the diagnosis and risks, and we agree with discharging home to follow-up with their primary doctor. We also discussed returning to the Emergency Department immediately if new or worsening symptoms occur. We have discussed the symptoms which are most concerning (e.g., changing or worsening pain, weakness, vomiting, fever) that necessitate immediate return. FINAL IMPRESSION:      1. Fall, initial encounter    2. Injury of head, initial encounter    3.  Strain of neck muscle, initial encounter          DISPOSITION/PLAN:   DISPOSITION Decision To Discharge      PATIENT REFERRED TO:  Erin Moore MD  [address] [place] [address] [place] [address]  [phone]    Schedule an appointment as soon as possible for a visit         DISCHARGE MEDICATIONS:  Discharge Medication List as of 7/12/2021  8:43 PM                     (Please note thatportions of this note were completed with a voice recognition program.  Efforts were made to edit the dictations, but occasionally words are mis-transcribed.)    Hyla Cushing, PA-C (electronicallysigned)              [place], [place]  07/12/21 9713

## 2021-07-13 NOTE — ED NOTES
Patient provided with discharge instructions and any prescriptions. --Instructions, dosing, and follow-up appointments reviewed with patient/family. No further questions or needs at this time. --Vital signs and patient stable upon discharge. --Pt ambulated to private vehicle without difficulty.        Rayvon Leyden, RN  07/12/21 6558

## 2021-07-15 NOTE — TELEPHONE ENCOUNTER
----- Message from Jacky Calhoun MA sent at 7/15/2021 10:44 AM EDT -----  Subject: Appointment Request    Reason for Call: Urgent (Patient Request) ED Follow Up Visit    QUESTIONS  Type of Appointment? Established Patient  Reason for appointment request? Available appointments did not meet   patient need  Additional Information for Provider? Patient was seen at [facility] ED   7/12/21 for fall, head injury, still having some dizziness, request   appointment any time tomorrow she will have transportation.   ---------------------------------------------------------------------------  --------------  [employer]  What is the best way for the office to contact you? Do not leave any   message, patient will call back for answer  Preferred Call Back Phone Number? [phone]  ---------------------------------------------------------------------------  --------------  SCRIPT ANSWERS  Relationship to Patient? Self  Appointment reason? Well Care/Follow Ups  Select a Well Care/Follow Ups appointment reason? Adult ED Follow Up   [Emergency Room, Emergency Department]  (Patient requests to see provider urgently. )? Yes  Do you have any questions for your primary care provider that need to be   answered prior to your appointment? No  Have you been diagnosed with, awaiting test results for, or told that you   are suspected of having COVID-19 (Coronavirus)? (If patient has tested   negative or was tested as a requirement for work, school, or travel and   not based on symptoms, answer no)? No  Do you currently have flu-like symptoms including fever or chills, cough,   shortness of breath, difficulty breathing, or new loss of taste or smell? No  Have you had close contact with someone with COVID-19 in the last 14 days? No  (Service Expert  click yes below to proceed with etouches As Usual   Scheduling)?  Yes

## 2021-07-15 NOTE — TELEPHONE ENCOUNTER
I looked for an open appointment for this patient, but I could not find anything. Any suggestions? I called patient, she is feeling a little better, and if it get any worse, she will us.      Please call to advise

## 2021-07-15 NOTE — TELEPHONE ENCOUNTER
I sent in rx's for July already ! Fill after 7/14- I resent just in case-also sent in  aug then she needs to see me either virtually for a f/u- check to be sure she has a med contract-she should!

## 2021-07-20 NOTE — PROGRESS NOTES
[facility]   Cardiology Note              Date:  July 20, 2021  Patientname: Luiz Thapa  YOB: 1940    Primary Care physician: Nohelia Ferguson MD    HISTORY OF PRESENT ILLNESS: Luiz Thapa is a 79 y.o. female with a history of atrial fibrillation, CHF, HTN, HLD, CKD. She was found to be in AF at routine PCP visit in 1/2016. She had a CV in 2/2016 and returned to AF within a week. Rate control strategy pursued. Echo 9/2020 showed EF 50-55%. She was admitted 10/10/2020 for shortness of breath. Treated for CHF. Stress test negative for ischemia. Today she presents for follow up for CHF, AF. She has lost weight and feels better. She does have some shortness of breath with exertion. She has no chest pain or palpitations. She had a mechanical fall last week and has felt dizzy since then. She takes lasix prn. Weight today 7/20/2021: 274 lbs  Office weight 5/14/2021: 280 lbs   Discharge weight 10/15/2020: 273 lbs    Past Medical History:   has a past medical history of Anemia, Arthritis, Atrial fibrillation ([address] Chronic kidney disease (CKD), stage II (mild), Community acquired pneumonia of left lower lobe of lung, Depression, Diverticulosis, Foot pain, GI bleed, Gout, Hemorrhoids, Hyperlipidemia, Hypertension, Hyperuricemia, Iron deficiency anemia, Medical history reviewed with no changes, Menopausal syndrome, Obesity, Pelvic relaxation, PONV (postoperative nausea and vomiting), Stress, Syncope, Unspecified sleep apnea, Vitamin D deficiency, Wears dentures, and Wears glasses. Past Surgical History:   has a past surgical history that includes Cholecystectomy (1974); Hysterectomy (1977); Knee arthroscopy (2005); bladder suspension (1997); Uvulopalatopharygoplasty (3/2002); eye surgery; Colonoscopy; Colonoscopy (11/2014); joint replacement (Right, 2017); Pain management procedure (Left, 8/11/2020); and Pain management procedure (N/A, 8/25/2020).      Home Medications:    Prior to that she does not drink alcohol and does not use drugs. Family History: family history includes Heart Attack in her father; Heart Disease in her father; Stroke in her brother. Review of Systems   Review of Systems   Constitutional: Negative. Respiratory: Positive for shortness of breath. Cardiovascular: Negative. Gastrointestinal: Negative. Neurological: Negative. OBJECTIVE:    Vital signs:    /72   Pulse 79   Ht 5' 6.5\" (1.689 m)   Wt 274 lb (124.3 kg)   SpO2 94%   BMI 43.56 kg/m²      Physical Exam:  Constitutional:  Comfortable and alert, NAD, appears stated age, obese  Eyes: PERRL, sclera nonicteric  Neck:  Supple, no masses, no thyroidmegaly, no JVD  Skin:  Warm and dry; no rash or lesions  Heart: Irregular, normal apex, S1 and S2 normal, no M/G/R  Lungs:  Normal respiratory effort; clear; no wheezing/rhonchi/rales  Abdomen: soft, non tender, + bowel sounds  Extremities: Trace BLE edema  Neuro: alert and oriented, moves legs and arms equally, normal mood and affect    Data Reviewed:      Stress test 10/12/2020: There is normal isotope uptake at stress and rest. There is no evidence of     myocardial ischemia or scar. Hyperdynamic LV systolic function with UO>15%     with uniform wall motion. Low risk study.       Echo 9/2020:  Left ventricular systolic function is low normal with a visually estimated   ejection fraction of 50-55%.   Normal wall motion   The left atrium appears moderate to severely enlarged.   Mild tricuspid regurgitation.   Systolic pulmonary artery pressure (SPAP) is normal and estimated at 36 mmHg   (right atrial pressure 8 mmHg). Cardiology Labs Reviewed:   CBC: No results for input(s): WBC, HGB, HCT, PLT in the last 72 hours. BMP:No results for input(s): NA, K, CO2, BUN, CREATININE, LABGLOM, GLUCOSE in the last 72 hours. PT/INR:   No results for input(s): PROTIME, INR in the last 72 hours. APTT:No results for input(s):  APTT in the last 72 hours.  FASTING LIPID PANEL:  Lab Results   Component Value Date    HDL 66 10/11/2020    HDL 53 06/04/2010    LDLCALC 56 10/11/2020    TRIG 97 10/11/2020     LIVER PROFILE:No results for input(s): AST, ALT, ALB in the last 72 hours. BNP:   Lab Results   Component Value Date    PROBNP 1,535 05/26/2021    PROBNP 1,136 05/17/2021    PROBNP 1,335 11/06/2020    PROBNP 935 10/13/2020    PROBNP 2,448 10/10/2020     Reviewed all labs and imaging today    Assessment:   Shortness of breath: improved  Chronic diastolic CHF: appears compensated  Permanent atrial fibrillation: stable              - [id number] score >2 on coumadin (not interested in DOACs due to cost)              - s/p CV 2/2016  HTN: stable  CKD  Normal stress test 10/2020  Likely YUMIKO     Plan:   1. Continue lasix 20 mg daily prn  2. Would like to restart lisinopril and/or spironolactone but have been limited by renal function  3. Continue lopressor, diltiazem  4. Discussed YUMIKO evaluation, she declines  5. Check BP and weight at home and call the office if consistently out of goal range  6.  Follow up with Dr. Anton Amaya for annual MD visit (patient preference)    Corinne Blind, APRN-CNP  [facility]  [phone]

## 2021-07-20 NOTE — PATIENT INSTRUCTIONS
Everything looks great today, good job!   Continue current medications  Stay active along with a healthy diet  Weight yourself daily  Follow up in 11/2021

## 2021-07-23 NOTE — CARE COORDINATION
Ambulatory Care Coordination Note  7/23/2021  CM Risk Score: 2  Charlson 10 Year Mortality Risk Score: 100%     ACC: Fadi Fuller RN [facility]    Hx: Heart Failure, Pure Hypercholesterolemia, Diverticulosis, Gout, HTN, Obesity, A-Fib, Osteoarthritis, CKD, GERD, Gait Disturbance, Lumbar Disc Disease, Fall    Summary Note: The pt stated she still gets slightly lightheadedness from lying down to standing. The pt stated she does have some intermittent slight headaches. The pt denied any chest pain, SOB, weakness or fatigue. The pt denied any swelling. The pt stated she follows a low salt diet and when she goes out to eat she asks the  to have them cook her food without salt. The RN, ACM instructed the pt to sit on the side of the bed for a few minutes before standing to avoid lightheadedness. The RN, ACM encouraged the pt to weigh herself daily and report a sudden weight gain of 3 lbs over 1 day or 5 lbs over 1 week. Plan:  Continue on low salt diet. Continue on all current medications. The RN, ACM assisted the pt in making an appointment with the PCP for 8/3/21 at 11:45 PM.    Ambulatory Care Coordination Assessment    Care Coordination Protocol  Program Enrollment: Complex Care  Referral from Primary Care Provider: No  Week 1 - Initial Assessment     Do you have all of your prescriptions and are they filled?: Yes  Are you able to afford your medications?: Yes  How often do you have trouble taking your medications the way you have been told to take them?: I always take them as prescribed. Do you have Home O2 Therapy?: No      Ability to seek help/take action for Emergent Urgent situations i.e. fire, crime, inclement weather or health crisis. : Independent  Ability to ambulate to restroom: Independent  Ability handle personal hygeine needs (bathing/dressing/grooming): Independent  Ability to manage Medications:  Independent  Ability to prepare Food Preparation: Independent  Ability to maintain home (clean home, laundry): Independent  Ability to drive and/or has transportation: Independent  Ability to do shopping: Independent  Ability to manage finances: Independent  Is patient able to live independently?: Yes     Current Housing: Private Residence        Per the Fall Risk Screening, did the patient have 2 or more falls or 1 fall with injury in the past year?: Yes  How often do you think you are about to fall and you do NOT fall? For example, you grab something to stabilize yourself or hold onto a wall/furniture?: Rarely  Use of a Mobility Aid: Yes (Comment: Sometimes uses a cane)  Difficulty walking/impaired gait: Yes  Issues with feet or shoes like numbness, edema, shoes not fitting: No  Changes in vision, poor vision or poor lighting in environment: No  Dizziness: No  Other Fall Risk: No     Do you use rails/bars?: Yes     Are you experiencing loss of meaning?: No  Are you experiencing loss of hope and peace?: No     Suggested Interventions and Community Resources  Fall Risk Prevention: In Process Other Services or Interventions: Discussed low sodium diet. Zone Management Tools: In Process         Set up/Review Goals, Set up/Review an Education Plan, Schedule an appointment with the patient's PCP              Prior to Admission medications    Medication Sig Start Date End Date Taking? Authorizing Provider   HYDROcodone-acetaminophen (NORCO) 5-325 MG per tablet Take 1 tablet by mouth 2 times daily as needed for Pain for up to 30 days. Fill after 7/14/2021 7/15/21 8/14/21  Cande Hunter MD   HYDROcodone-acetaminophen (NORCO) 5-325 MG per tablet Take 1 tablet by mouth 2 times daily as needed for Pain for up to 30 days. Fill after 8/15/2021  Patient taking differently: Take 1 tablet by mouth 2 times daily as needed for Pain.  Fill after 8/15/2021 taking 1/2 tab BID 7/15/21 8/14/21  Cande Hunter MD   CARTIA  MG extended release capsule TAKE ONE CAPSULE BY MOUTH DAILY 6/16/21   Steven Pichardo, APRN - CNP Assessment    Do you have any symptoms that are causing concern?: Yes  Progression since Onset: Gradually Improving  Reported Symptoms: Other (Comment: Lightheadedness from lying to standing.  Intermittent headaches)

## 2021-07-31 NOTE — ED TRIAGE NOTES
Chief Complaint   Patient presents with    Fall     Pt brought in by EMS for fall. Pt reports that she tripped over party decorations and fell on her back. Pt c/o right arm and shoulder pain. Pt states that she did hit her head. Pt denies any LOC, but does take warfarin.

## 2021-07-31 NOTE — ED PROVIDER NOTES
[address] ED  EMERGENCY DEPARTMENT ENCOUNTER      Pt Name: Edin Cedeno  MRN: [id number] 2Birthdate 1940  Date of evaluation: 7/31/2021  Provider: Ranjeet Do MD    CHIEF COMPLAINT     I was getting ready for a birthday party and they had decorated the door with streamers and the streamers were blowing and when I went to push them aside I slipped and I went down. I went down backwards and hit my right side. I did hit my head but not full force. HISTORY OF PRESENT ILLNESS  (Location/Symptom, Timing/Onset,Context/Setting, Quality, Duration, Modifying Factors, Severity). Note limiting factors. Chief Complaint   Patient presents with    Fall     Pt brought in by EMS for fall. Pt reports that she tripped over party decorations and fell on her back. Pt c/o right arm and shoulder pain. Pt states that she did hit her head. Pt denies any LOC, but does take warfarin. Edin Cedeno is a 79 y.o. female who presents to the emergency department secondary to concern for pain status post fall. She initially had pain in her right wrist, elbow, and shoulder and now it is mostly just in the shoulder. States she has been feeling dizzy since a fall about three weeks ago (intermittent). States when she gets dizzy is when she gets up too fast and getting up slowly significantly helps. No fevers, chills, nausea, vomiting, has been eating and drinking well. Has been vaccinated for covid (since March). Past medical history noted below, significant for a.fib (on coumadin), anemia, arthritis, CKD, depression, GI bleed, gout, hemorrhoids, HLD, HTN, NICOLETTE, obesity, stress, syncope. Denies smoking. Aside from what is stated above denies any other symptoms or modifying factors. Nursing Notes reviewed. REVIEW OF SYSTEMS  (2-9 systems for level 4, 10 or more for level 5)   Review of Systems   Constitutional: Negative for activity change. HENT: Negative for congestion.     Respiratory: Negative for cough. Cardiovascular: Negative for leg swelling. Gastrointestinal: Negative. Genitourinary: Negative for difficulty urinating.        PAST MEDICAL HISTORY     Past Medical History:   Diagnosis Date    Anemia     NOS    Arthritis     knee     Atrial fibrillation (HCC)     on coumadin    Chronic kidney disease (CKD), stage II (mild)     Community acquired pneumonia of left lower lobe of lung 12/26/2017    Depression     Diverticulosis     Foot pain     GI bleed 4/18/2018    Due to esophageal tear     Gout     Hemorrhoids     Hyperlipidemia     Hypertension     Hyperuricemia     Iron deficiency anemia 1/8/2014    Iron deficiency anemia-s/p EGD and colonoscopy 2/11/2014 Esophageal tear likely source     Medical history reviewed with no changes     Menopausal syndrome     Obesity     Pelvic relaxation     PONV (postoperative nausea and vomiting)     Stress 8/2006    NL stress    Syncope     Unspecified sleep apnea 03/2002    uvulectomy -hx of    Vitamin D deficiency     20ng/ml 6/2009    Wears dentures     Wears glasses        SURGICALHISTORY       Past Surgical History:   Procedure Laterality Date    BLADDER SUSPENSION  1997    CHOLECYSTECTOMY  1974    COLONOSCOPY      2013    COLONOSCOPY  11/2014    10yr    EYE SURGERY      macular tear and cataract right    HYSTERECTOMY  1977    partial    JOINT REPLACEMENT Right 2017    KNEE ARTHROSCOPY  2005    knee surgery    PAIN MANAGEMENT PROCEDURE Left 8/11/2020    LEFT LUMBAR THREE AND LUMBAR FOUR TRANSFORAMINAL EPIDURAL STEROID INJECTION SITE CONFIRMED BY FLUOROSCOPY performed by Mora Darnell MD at [address] [facility] N/A 8/25/2020    MIDLINE LUMBAR FIVE SACRAL ONE INTERLAMINAR EPIDURAL STEROID INJECTION SITE CONFIRMED BY FLUOROSCOPY performed by Mora Darnell MD at [facility]  3/2002     CURRENT MEDICATIONS       Previous Medications    ALLOPURINOL (ZYLOPRIM) 300 MG TABLET    TAKE ONE TABLET BY MOUTH DAILY    ATORVASTATIN (LIPITOR) 20 MG TABLET    TAKE ONE TABLET BY MOUTH ONCE NIGHTLY    CARTIA  MG EXTENDED RELEASE CAPSULE    TAKE ONE CAPSULE BY MOUTH DAILY    CHOLECALCIFEROL (VITAMIN D) 2000 UNITS CAPS CAPSULE    Take 2,000 Units by mouth daily    DOCUSATE SODIUM (COLACE) 100 MG CAPSULE    Take 100 mg by mouth daily as needed for Constipation     DULOXETINE (CYMBALTA) 20 MG EXTENDED RELEASE CAPSULE    Take 1 capsule by mouth daily    FUROSEMIDE (LASIX) 20 MG TABLET    Take 1 tablet by mouth daily as needed (SOB, edema, weight gain)    HYDROCODONE-ACETAMINOPHEN (NORCO) 5-325 MG PER TABLET    Take 1 tablet by mouth 2 times daily as needed for Pain for up to 30 days. Fill after 7/14/2021    HYDROCODONE-ACETAMINOPHEN (NORCO) 5-325 MG PER TABLET    Take 1 tablet by mouth 2 times daily as needed for Pain for up to 30 days. Fill after 8/15/2021    METOPROLOL TARTRATE (LOPRESSOR) 25 MG TABLET    Take 1 tablet by mouth 2 times daily    OMEPRAZOLE (PRILOSEC) 20 MG DELAYED RELEASE CAPSULE    TAKE ONE CAPSULE BY MOUTH DAILY    SERTRALINE (ZOLOFT) 50 MG TABLET    TAKE ONE TABLET BY MOUTH DAILY    WARFARIN (COUMADIN) 1 MG TABLET    TAKE ONE TABLET BY MOUTH ON SUNDAY, TUESDAY AND THURSDAY. TAKE ONE-HALF TABLET BY MOUTH ON ALL OTHER DAYS OR AS DIRECTED BY CLINIC      ALLERGIES     Diclofenac, Adhesive tape, and Penicillins  FAMILY HISTORY       Family History   Problem Relation Age of Onset    Heart Attack Father     Heart Disease Father     Stroke Brother      SOCIAL HISTORY       Social History     Socioeconomic History    Marital status:      Spouse name: None    Number of children: None    Years of education: None    Highest education level: None   Occupational History    None   Tobacco Use    Smoking status: Never Smoker    Smokeless tobacco: Never Used   Vaping Use    Vaping Use: Never used   Substance and Sexual Activity    Alcohol use:  No  Drug use: Never    Sexual activity: None   Other Topics Concern    None   Social History Narrative    None     Social Determinants of Health     Financial Resource Strain:     Difficulty of Paying Living Expenses:    Food Insecurity:     Worried About Running Out of Food in the Last Year:     [address] in the Last Year:    Transportation Needs:     Lack of Transportation (Medical):  Lack of Transportation (Non-Medical):    Physical Activity:     Days of Exercise per Week:     Minutes of Exercise per Session:    Stress:     Feeling of Stress :    Social Connections:     Frequency of Communication with Friends and Family:     Frequency of Social Gatherings with Friends and Family:     Attends [employer] Services:     Active Member of Clubs or Organizations:     Attends Club or Organization Meetings:     Marital Status:    Intimate Partner Violence:     Fear of Current or Ex-Partner:     Emotionally Abused:     Physically Abused:     Sexually Abused:      SCREENINGS         PHYSICAL EXAM  (up to 7 for level 4, 8 or more for level 5)   INITIAL VITALS: BP: (!) 153/91, Temp: 97.8 °F (36.6 °C), Pulse: 92, Resp: 18, SpO2: 98 %   Physical Exam  Vitals reviewed. Constitutional:       General: She is not in acute distress. Appearance: She is obese. She is not toxic-appearing. HENT:      Head: Normocephalic and atraumatic. Right Ear: External ear normal.      Left Ear: External ear normal.      Nose: Nose normal.   Eyes:      General:         Right eye: No discharge. Extraocular Movements: Extraocular movements intact. Conjunctiva/sclera: Conjunctivae normal.      Pupils: Pupils are equal, round, and reactive to light. Neck:      Trachea: No tracheal deviation. Cardiovascular:      Rate and Rhythm: Normal rate. Pulses: Normal pulses. Pulmonary:      Effort: Pulmonary effort is normal. No respiratory distress. Abdominal:      General: There is no distension. Tenderness: There is no abdominal tenderness. There is no guarding or rebound. Musculoskeletal:      Cervical back: No rigidity or tenderness. Right lower leg: No edema. Left lower leg: No edema. Lymphadenopathy:      Cervical: No cervical adenopathy. Skin:     General: Skin is warm and dry. Capillary Refill: Capillary refill takes less than 2 seconds. Comments: Right elbow skin abrasion   Neurological:      General: No focal deficit present. Mental Status: She is alert and oriented to person, place, and time. Psychiatric:         Mood and Affect: Mood normal.         Behavior: Behavior normal.       DIAGNOSTIC RESULTS     RADIOLOGY:   Interpretation per Radiologist below, if available at the time of this note:  CT CERVICAL SPINE WO CONTRAST   Final Result   No acute hemorrhage or midline shift. CT CERVICAL SPINE FINDINGS:   BONES/ALIGNMENT: Straightening of the cervical lordosis. Grade 1   anterolisthesis of C3 on C4, C4 on C5, C5 on C6, C7 on T1, and T2 on T3. Vertebral body heights appear maintained. Mild and moderate loss of disc   height. Posterior elements appear intact. DEGENERATIVE CHANGES: Scattered degenerative changes noted in the visualized   spine with spondylolistheses. SOFT TISSUES: There is no prevertebral soft tissue swelling. Retropharyngeal   course of the carotid vasculature. IMPRESSION:   1. No acute fracture. Degenerative changes with grade 1 spondylolistheses. 2. Other findings as described. CT HEAD WO CONTRAST   Final Result   No acute hemorrhage or midline shift. CT CERVICAL SPINE FINDINGS:   BONES/ALIGNMENT: Straightening of the cervical lordosis. Grade 1   anterolisthesis of C3 on C4, C4 on C5, C5 on C6, C7 on T1, and T2 on T3. Vertebral body heights appear maintained. Mild and moderate loss of disc   height. Posterior elements appear intact.       DEGENERATIVE CHANGES: Scattered degenerative changes noted in the visualized   spine with spondylolistheses. SOFT TISSUES: There is no prevertebral soft tissue swelling. Retropharyngeal   course of the carotid vasculature. IMPRESSION:   1. No acute fracture. Degenerative changes with grade 1 spondylolistheses. 2. Other findings as described. XR SHOULDER RIGHT (MIN 2 VIEWS)   Final Result   No acute fracture or dislocation. SHOULDER FINDINGS:   No acute fracture. Suboptimal evaluation of the greater tuberosity due to   positioning. No dislocation. IMPRESSION:   No acute fracture or dislocation. ELBOW FINDINGS:   No acute fracture. No dislocation. Questionable trace effusion. Moderate   soft tissue edema in the dorsal proximal forearm. IMPRESSION:   No acute fracture or dislocation. Questionable trace effusion. Follow-up   radiograph recommended in 7-10 days. XR ELBOW RIGHT (MIN 3 VIEWS)   Final Result   No acute fracture or dislocation. SHOULDER FINDINGS:   No acute fracture. Suboptimal evaluation of the greater tuberosity due to   positioning. No dislocation. IMPRESSION:   No acute fracture or dislocation. ELBOW FINDINGS:   No acute fracture. No dislocation. Questionable trace effusion. Moderate   soft tissue edema in the dorsal proximal forearm. IMPRESSION:   No acute fracture or dislocation. Questionable trace effusion. Follow-up   radiograph recommended in 7-10 days. XR WRIST RIGHT (MIN 3 VIEWS)   Final Result   No acute fracture or dislocation. SHOULDER FINDINGS:   No acute fracture. Suboptimal evaluation of the greater tuberosity due to   positioning. No dislocation. IMPRESSION:   No acute fracture or dislocation. ELBOW FINDINGS:   No acute fracture. No dislocation. Questionable trace effusion. Moderate   soft tissue edema in the dorsal proximal forearm. IMPRESSION:   No acute fracture or dislocation. Questionable trace effusion.   Follow-up   radiograph recommended in 7-10 days. LABS:  Labs Reviewed   PROTIME-INR - Abnormal; Notable for the following components:       Result Value    Protime 27.1 (*)     INR 2.32 (*)     All other components within normal limits    Narrative:     Performed at:  [facility]  [address],  [place], [place]   Phone [phone]       EMERGENCY DEPARTMENT COURSE and DIFFERENTIAL DIAGNOSIS/MDM:   Patient was given the following medications:  Orders Placed This Encounter   Medications    acetaminophen (TYLENOL) tablet 650 mg    neomycin-bacitracin-polymyxin (NEOSPORIN) ointment    lidocaine (LIDODERM) 5 %     Sig: Place 1 patch onto the skin daily 12 hours on, 12 hours off. Dispense:  30 patch     Refill:  0    lidocaine 4 % external patch 1 patch     CONSULTS:  None    INITIAL VITALS: BP: (!) 153/91, Temp: 97.8 °F (36.6 °C), Pulse: 92, Resp: 18, SpO2: 98 %   RECENT VITALS:  BP: (!) 140/82,Temp: 97.8 °F (36.6 °C), Pulse: 76, Resp: 19, SpO2: 97 %     Andree Iyer is a 79 y.o. female who presents to the emergency department secondary to concern for fall on coumadin. On arrival she is awake, alert, oriented with vitals that are HDS. Her right elbow has a small abrasion. Otherwise there is no obvious traumatic deformities. She does have some pain with elevation of her shoulder though past 45 degrees she is able to raise it and hold it up. Last tetanus was 2017, reviewed in medical record, not updated today. Ordered CT head, cervical sine, xray imaging of right shoulder elbow and wrist.  Also ordered a dose of Tylenol and Neosporin. Since she is on Coumadin she also had an INR ordered. Labs with INR within normal limits. On reassessment we discussed results of her imaging and INR. Vitals remained hemodynamically stable. She had no new symptoms. She had her home dose coumadin with her and we recommended she take this.   We also placed a lidocaine patch to her right shoulder. We discussed following up with primary care in order to talk about physical/occupational therapy, symptomatic treatment at home, as well as return precautions which she and family and friend at bedside expressed understanding of prior to discharge. FINAL IMPRESSION      1. Fall, initial encounter    2. Acute pain of right shoulder    3. On anticoagulant therapy        DISPOSITION/PLAN   DISPOSITION        PATIENT REFERRED TO:  Shania Dorman MD  [address] [facility] [address]    Schedule an appointment as soon as possible for a visit   For follow up appointment to talk about setting up physical/occupational therapy      DISCHARGE MEDICATIONS:  New Prescriptions    LIDOCAINE (LIDODERM) 5 %    Place 1 patch onto the skin daily 12 hours on, 12 hours off.             (Please note that portions of this note were completed with a voice recognition program. Efforts were made to edit the dictations but occasionally words are mis-transcribed.)    Ranjeet Do MD (electronically signed)  Attending Emergency Physician      Ranjeet Do MD  07/31/21 [id number]

## 2021-08-02 NOTE — CARE COORDINATION
Ambulatory Care Coordination  ED Follow up Call    Reason for ED visit:  Fall   Right shoulder and right elbow pain. Right elbow has an abrasion. Status:     improved    Did you call your PCP prior to going to the ED? Not Applicable      Did you receive a discharge instructions from the Emergency Room? Yes  Review of Instructions:     Understands what to report/when to return?:  Yes   Understands discharge instructions?:  Yes   Following discharge instructions?:  Yes       Are there any new complaints of pain? Yes, see above  New Pain Meds? No    Constipation prophylaxis needed? N/A    If you have a wound is the dressing clean, dry, and intact? Yes  Understands wound care regimen? Yes    Are there any other complaints/concerns that you wish to tell your provider? Still has dizziness but not as bad. Dizziness occurs when getting up from bed. No dizziness before the fall. FU appts/Provider:  Already has scheduled appointment for 8/3/21. Future Appointments   Date Time Provider Juan Jose Ramirez   8/3/2021 11:45 AM [facility] [address] [place] [facility]   8/10/2021  1:00 PM [address] [facility]   11/16/2021  1:00 PM Christine Maher MD [facility]           New Medications?:   No      Medication Reconciliation by phone - Yes  Understands Medications? Yes  Taking Medications? Yes  Can you swallow your pills? Yes    Any further needs in the home i.e. Equipment?   Yes, a walker    Link to services in community?:  N/A   Which services:  NA

## 2021-08-03 PROBLEM — R29.6 FREQUENT FALLS: Status: ACTIVE | Noted: 2021-01-01

## 2021-08-03 NOTE — PROGRESS NOTES
History Narrative    Not on file     Social Determinants of Health     Financial Resource Strain: Low Risk     Difficulty of Paying Living Expenses: Not hard at all   Food Insecurity: No Food Insecurity    Worried About Running Out of Food in the Last Year: Never true    [address] in the Last Year: Never true   Transportation Needs:     Lack of Transportation (Medical):  Lack of Transportation (Non-Medical):    Physical Activity:     Days of Exercise per Week:     Minutes of Exercise per Session:    Stress:     Feeling of Stress :    Social Connections:     Frequency of Communication with Friends and Family:     Frequency of Social Gatherings with Friends and Family:     Attends [employer] Services:     Active Member of Clubs or Organizations:     Attends Club or Organization Meetings:     Marital Status:    Intimate Partner Violence:     Fear of Current or Ex-Partner:     Emotionally Abused:     Physically Abused:     Sexually Abused:          Objective:   Physical Exam  Constitutional:       Appearance: She is well-developed. She is obese. HENT:      Head: Normocephalic and atraumatic. Eyes:      Conjunctiva/sclera: Conjunctivae normal.      Pupils: Pupils are equal, round, and reactive to light. Cardiovascular:      Rate and Rhythm: Normal rate and regular rhythm. Heart sounds: Normal heart sounds. Pulmonary:      Effort: Pulmonary effort is normal. No respiratory distress. Breath sounds: Normal breath sounds. Abdominal:      General: There is no distension. Tenderness: There is no abdominal tenderness. Musculoskeletal:         General: Swelling and tenderness present. Cervical back: Normal range of motion and neck supple. Comments: Severe bruising right arm with decreased rom    Lymphadenopathy:      Cervical: No cervical adenopathy. Skin:     General: Skin is warm and dry.    Neurological:      Mental Status: She is alert and oriented to person, place, and time. Psychiatric:         Mood and Affect: Mood normal.         Behavior: Behavior normal.         Thought Content: Thought content normal.         Judgment: Judgment normal.           Assessment and Plan:      Frequent falls   See ptx     Gait disturbance   Due to weakness- to do ptx     Essential hypertension   Controlled w current regimen- continue and monitor closely      Morbid obesity with BMI of 40.0-44.9, adult [facility])   Discussed with patient at length health risks of obesity and need for diet and exercise      Hyperglycemia   Cont to work on wt loss and follow     Lumbar disc disease   Physical therapy will help        Encounter Diagnoses   Name Primary?     Injury of right shoulder, initial encounter Yes    Lumbar disc disease     Frequent falls     Gait disturbance     Essential hypertension     Morbid obesity with BMI of 40.0-44.9, adult (HCC)     Hyperglycemia        Orders Placed This Encounter   Procedures    [facility]     Referral Priority:   Routine     Referral Type:   Eval and Treat     Referral Reason:   Specialty Services Required     Requested Specialty:   Physical Therapy     Number of Visits Requested:   1    External Referral To Orthopedic Surgery     Referral Priority:   Routine     Referral Type:   Eval and Treat     Referral Reason:   Specialty Services Required     Requested Specialty:   Orthopedic Surgery     Number of Visits Requested:   1

## 2021-08-10 NOTE — PROGRESS NOTES
Ms. Candi Fregoso is here for management of anticoagulation for Afib. PMH also significant for HTN, Gout, CKD II/III, Hyperlipidemia, and depression. She presents today w/out complaint. Pt verifies dosing regimen as listed above. Pt denies s/sx bleeding/bruising/ CP/HA/swelling/SOB  No missed doses. No changes in Rx/OTC/herbal medications. No major changes in diet. Pt does not drink EtOH or smoke. Pt fell on 7/12 and on 7/31. Significant bruising on right forearm that was checked by [facility] (7/12) and [facility] (7/31) ERs. INR was 2.3 at hospital. Bruising and arm mobility is being monitored by doctor. INR 2.7 is within acceptable therapeutic range of 2-3. Recommend to continue dose of 1 mg on Tuesdays and Fridays and 0.5 mg all other days. Patient has 1 mg tablets. Will continue to monitor and check INR in 4 weeks  Dosing reminder card given with phone number, appointment date and time.   Return to clinic: 9/14 @ 1:00 pm   Referring Provider: Dr. Tatiana Jalloh  PharmD Student [id number]  8/10/2021 1:02 PM

## 2021-08-10 NOTE — CARE COORDINATION
RN ACM Follow Up Note:  The pt followed up with the PCP on 8/3/21 for recent fall and right shoulder pain. The pt stated the PCP recommended the pt see and orthopedic physician and to start PT. The pt stated she did see Dr. Anthony Locke at [facility] in [facility]. The pt stated she has a right shoulder contusion. The pt stated her shoulder is still bruised but is getting better. The pt stated she will be starting OT and PT at the [facility] in [facility] on 8/16/21. The pt stated the PCP did change her medications and she has been taking a 1/2 of Zoloft with 20mg Cymbalta for 1 week then she will stop the Zoloft and increase the Cymbalta to 40 mg. The pt stated she has one more day of the Zoloft. The pt denied any current needs. Plan:  Start OT and PT. Follow up up with the orthopedic physician as needed. Follow up with the PCP. The RN, ACM will follow up with the pt in 1 month.

## 2021-08-12 NOTE — PROGRESS NOTES
The following patient has been evaluated for physical therapy services. In order for therapy to continue,  physician review of the treatment plan is needed. Please review the attached evaluation and/or summary of the patient's plan of care, and verify that you agree by signing below and sending it back to our office. Thank you for this referral.    Physician signature_______________________ Date________________    Fax to:   [facility] [phone]        NEUROLOGICAL / GENERAL MOBILITY PHYSICAL THERAPY EVALUATION      Evaluation Date: 8/12/2021       Patient Name: Em Luna     YOB: 1940      Medical Diagnosis/ ICD 10:  Lumbar disc disease M51.9, frequent falls R29.6; gait disturbance R26.9  Treatment Diagnosis: difficulty with ambulation      Onset Date: 7/12/2021  Referral Date: 8/3/2021    Referring Physician: Kvng Norwood MD      Insurance/Certification Information/Allowed visits:  Medicare      Restrictions/Precautions:   Latex Allergy:   []Yes   [x]No      Health History reviewed with pt:     [x]Yes   []No      A Fib, CHF, tylenol 2 times per day 650 mg and then Norco 1/2 tablet 2 times per day regularly; coumadin; Osteopenia; Stage II chronic kidney disease; TKR 2017  Preferred Language for HealthCare:   [x]English   []Other:     SUBJECTIVE FINDINGS  History of Present Illness:  Pt has had recent falls one on 7/12/2021 and then one on 7/31/2021. Pt had has issues with LB with 3 injections with no noted relief. Pt went to pain management MD and neurosurgeon. Pt states unable to help her. Pain     [x]Yes   []No   Patient reports pain is  6/10 pain at present and  6/10 pain at its worst shoulder. Current Functional Limitations:   [x]Yes   []No   Functional Complaints:  Walking; pt is walking with rollator; pt has been using cane 2018 sometimes in home.         PLOF:   []No functional limitations   []Pre-existing limitations: Ankle Inversion      Hand    Ankle Eversion      Not able to test hip secondary to pain in right shoulder secondary to recent fall, no fracture    Functional Mobility    Transitional Movement Assistance Level   Rolling to left side Not Tested   Rolling to right side Not Tested   Scooting in bed Not Tested   Supine to sit Modified independent   Sit to supine Modified Independent   Sit to stand Supervision   Stand to sit Supervision   Bed to chair  Supervision   Toilet transfer  Not Tested   Tub transfer  Not Tested   Car transfer Not Tested   Not able to test rolling secondary to pain in right shoulder secondary to recent fall, no fracture    Balance    Static Sitting:        []Normal   [x]Good   []Fair   []Poor  Dynamic Sitting:   []Normal   [x]Good   []Fair   []Poor  Static Stance:       []Normal   []Good   [x]Fair   []Poor  Dynamic Stance:  []Normal   []Good   [x]Fair   []Poor    Tinetti Total Score:       Balance:        Gait:      Disability Index:      Risk of Falls:   []Low (> 24)   []Mod (19-23)   []High (< 18)    MCNEAL Score:      Dynamic Gait Index Score:     Turning right more difficult then left    Gait Testing:     Level of Assistance needed:   Independent    Gait Deviations (firm surface/linoleum):   Decreased heel/toe progression, decreased swing through bilaterally. Comment:    Assistive Device Used:  Rollator     Steps (4 steps):     [x]NT pt reports difficulty  []Reciprocal   []One rail   []Two rails     []Step to pattern, ascending with []right [] left; descending with []right [] left  With []One rail   []Two rails     Functional Outcome Measure:  []NA  Date Assessed: 8/12/2021  Measure Used: LEFI  Score: 27/80      ASSESSMENT   Pt is a 79 Y. O   female, presenting with c/o frequent falls and decreased strength. Assessment reveals deficits in bilateral strength and ROM . Pt will benefit from cont PT to address these deficits and promote return to highest level of functional independence. Problems        [x]Decreased ROM  [x]Decreased strength  []Decreased joint mobility  [x]Increased pain  [x]Decreased flexibility  [x]Abnormality of gait  [x]Decreased balance  []Poor posture/alignment  [x]Decreased functional status   []Decreased coordination/motor control    Rehabilitation Potential:  Good for goals listed below. Strengths for achieving goals include:   [x]Pt motivated   []PLOF   []Family support   Limitations for achieving goals include: []None   []Pain   [x]Severity of condition   []Comorbidities  []Cognitive   []Lack of family support   []Lack of resources     []Other:         Prognosis:   [x]Good   []Fair   []Poor    GOALS    Short Term Goals:    3 weeks MET Not  Met Long Term Goals:    6 weeks MET Not Met   Establish HEP [] [] Pt independent with HEP [] []   Increase identified strength deficits 1/3 grade  [] [] Increase identified strength deficits 2/3 grade or better [] []   Bed mobility/transfers independent  [] [] Gait pt able to able 20 feet without AD with no noted difficulty and minimal to no antalgic gait. [] []   Increase FOM score on 40/80  [] [] Steps pt able to ascend and descend on flight of stairs with one hand on railing with minimal to no noted difficulty.  [] []    [] [] Increase FOM score on 50/80 [] []    [] []  [] []     PLAN OF CARE     To see patient  2 x/week for  6 weeks for the following treatment interventions:  [x]   Therapeutic Exercise  []   Modalities of Choice: []Heat []Cold []US []Estim []Ionto []Vaso-pneumatic device  []   Mechanical Traction   [x]   Manual Therapy  [x]   Neuromuscular Re-Education  [x]   Gait Training   [x]   Therapeutic Activity  []   Other       DAILY TREATMENT NOTE FOR DAY OF EVALUATION:    Treatment Performed this visit :   20 minutes     Pt education provided regarding anatomy and physiology associated with dx, etiology of Symptoms and plan of care. All questions answered to pt satisfaction.      Exercise: glut sets, quad sets 1x10 hold 5 secs, abduction 1x5 bilaterally; DF with red theraband 1x10 sitting    Pt response to Tx: Pt verbalized and demonstrated understanding exercises, condition and treatment. Plan for Next Visit:     Gait and balance activity as well as LE strengthening. Thank you for the referral of this patient.       Timed Code Treatment Minutes:   20  minutes   Total Treatment Time:  60  minutes    Plan of care sent to provider:      [x]Faxed  []Co-signature    (attempts: 1[x] 2 []3[])         Medicare Cap total YTD:    [x]N/A  Workers Comp Time Stamp  [x]N/A   Time In:   Time Out:    Lady Age, [address] [facility] #[id number]

## 2021-08-17 NOTE — FLOWSHEET NOTE
Physical Therapy Daily Treatment Note    [x]Daily Tx Note    []Progress Note    [] Discharge Summary         Date:  2021    Patient Name:  Brodie Martines    :  1940  MRN: [id number]      Medical/Treatment Diagnosis Information:  ·  Lumbar disc disease M51.9, frequent falls R29.6; gait disturbance R26.9        ·  difficulty with ambulation       Insurance/Certification information:   Medicare    Physician Information:   Ben Amaro MD     Plan of care signed :  []  Yes  [x] No  [x]  Cosign []  Fax    Date of Patient follow up with Physician:     Is this a Progress Report:     []  Yes  [x]  No      If Yes:  Date Range for reporting period:  Beginning 2021  Ending    Progress report will be due (10 Rx or 30 days whichever is less):     1016  Recertification will be due (POC Duration  / 90 days whichever is less):       Visit # Insurance Allowable Auth Required      []  Yes [x]  No        Functional Scale:       Date Assessed: 2021  Measure Used: LEFI  Score:      Latex Allergy:  [x]NO      []YES  Preferred Language for Healthcare:   [x]English       []other:    RESTRICTIONS/PRECAUTIONS:      SUBJECTIVE:  Pt had no new complains. Pain level:Patient reports pain is  6/10 pain at present and  6/10 pain at its worst shoulder. Plan Moving Forward/ For next visit:    Balance activity and gait    Exercise stretching and strengtheining    OBJECTIVE: See eval    Exercises/Interventions:     Exercises in bold performed in department today. Items not bolded are carried forward from prior visits for continuity of the record.   Exercise/Equipment Resistance/Repetitions HEP Other comments   DF and eversion with theraband red   1x10 []    Abduction standing    Left 1x10, right 2x5 with lumbar discomfort []    Glut sets and quad sets   1x10 hold 5 secs [] 6/10 in low back   Heel/toe 1x10 []    Seated flexion    3 reps hold 10 seconds []        []        []        []        [] []     NR    []     step ups   1x5 reps B []     standing on foam  EO apart, EO and EC feet together each one minute  []        []        []        []        []        []      Therapeutic Exercise/Home Exercise Program:   39 minutes  See above  Monitored O2 sat from 90 to 97% with diaphragmatic breathing; instructed pt to follow up with MD on O 2 sat decreasing to 90%    Therapeutic Activity:  0 minutes     Gait: 0 minutes    Neuromuscular Re-Education:  0 minutes      Canalith Repositioning Procedure:      Manual Therapy:  0 minutes    Modalities: 0 minutes    ASSESSMENT:  Pt had difficulty with maintaining O2 sat. Improved with diaphragmatic breathing  GOALS    Short Term Goals:    3 weeks MET Not  Met Long Term Goals:    6 weeks MET Not Met   Establish HEP []?  []?  Pt independent with HEP []?  []?    Increase identified strength deficits 1/3 grade  []?  []?  Increase identified strength deficits 2/3 grade or better []?  []?    Bed mobility/transfers independent  []?  []?  Gait pt able to able 20 feet without AD with no noted difficulty and minimal to no antalgic gait. []?  []?    Increase FOM score on 40/80  []?  []?  Steps pt able to ascend and descend on flight of stairs with one hand on railing with minimal to no noted difficulty. []?  []?      []?  []?  Increase FOM score on 50/80 []?  []?      []?  []?         Overall Progression Towards Functional goals/ Treatment Progress Update:  [] Patient is progressing as expected towards functional goals listed. [] Progression is slowed due to complexities/Impairments listed. [] Progression has been slowed due to co-morbidities.   [x] Plan just implemented, too soon to assess goals progression <30days   [] Goals require adjustment due to lack of progress  [] Patient is not progressing as expected and requires additional follow up with physician  [] Other    Prognosis for POC: [x] Good [] Fair  [] Poor    Patient requires continued skilled intervention: [x] Yes  [] No    Treatment/Activity Tolerance:  [x] Patient able to complete treatment  [] Patient limited by fatigue  [] Patient limited by pain    [] Patient limited by other medical complications  [] Other:         PLAN: See eval  [x] Continue per plan of care [] Alter current plan (see comments above)  [] Plan of care initiated [] Hold pending MD visit [] Discharge    Therapeutic Exercise and NMR EXR  [x] (29436) Provided verbal/tactile cueing for activities related to strengthening, flexibility, endurance, ROM  for improvements in proximal strength and core control with self care, mobility, lifting and ambulation. [x] (47862) Provided verbal/tactile cueing for activities related to improving balance, coordination, kinesthetic sense, posture, motor skill, proprioception  to assist with core control in self care, mobility, lifting, and ambulation.      Therapeutic Activities and Gait:    [] (37484 or 48906) Provided verbal/tactile cueing for activities related to improving balance, coordination, kinesthetic sense, posture, motor skill, proprioception and motor activation to allow for proper function  with self care and ADLs  [] (32902) Provided training and instruction to the patient for proper core and proximal hip recruitment and positioning with ambulation re-education     Home Exercise Program:    [x] (49825) Reviewed/Progressed HEP activities related to strengthening, flexibility, endurance, ROM of core, proximal hip and LE for functional self-care, mobility, lifting and ambulation   [] (20059) Reviewed/Progressed HEP activities related to improving balance, coordination, kinesthetic sense, posture, motor skill, proprioception of core, proximal hip and LE for self care, mobility, lifting, and ambulation      Manual Treatments:  PROM / STM / Oscillations-Mobs:  G-I, II, III, IV (PA's, Inf., Post.)  [] (20227) Provided manual therapy to mobilize proximal hip and LS spine soft tissue/joints for the purpose of modulating pain, promoting relaxation,  increasing ROM, reducing/eliminating soft tissue swelling/inflammation/restriction, improving soft tissue extensibility and allowing for proper ROM for normal function with self care, mobility, lifting and ambulation. []CRP:  Canalith Repositioning procedure for the assessment, treatment and education of BPPV    Modalities:       Charges:  Timed Code Treatment Minutes: 39   Total Treatment Minutes: 39     Medicare Cap total YTD:  18      []N/A  Workers Comp Time Stamp  (Per CPT and Total Treatment) [x]N/A   Time In:   Time Out:       [] EVAL    [] Dry Needling  [x] COLLINS(06653)   x  3   [] EStim Unattended 88617  [] NMR (55310)  x     [] Estim Attended  89610  [] Manual (03548)  x      [] Mechanical [place]xn 54185  [] TA    x     [] Ultrasound  [] Gait   x  [] Vaso  [] CRP    [] Ionto           [] Other:        Electronically signed by: ANSHU Pitts,[id number]      Note: If patient does not return for scheduled/ recommended follow up visits, this note will serve as a discharge from care along with most recent update on progress.

## 2021-08-19 NOTE — PLAN OF CARE
[address] [facility], [place] [address], [address] [facility] [address]  Phone: [phone]        Fax [phone]    Occupational Therapy/Hand Therapy Certification  Dear Referring Practitioner: Dr. Albania Dennis    We had the pleasure of evaluating the following patient for occupational therapy services at [address] [place] [address]. A summary of our findings can be found in the initial assessment below. This includes our plan of care. If you have any questions or concerns regarding these findings, please do not hesitate to contact me at the office phone number checked above. Thank you for the referral.     Physician Signature:_______________________________Date:__________________  By signing above (or electronic signature), therapists plan is approved by physician      Patient: Milly Real   : 1940   MRN: [id number]  Referring Physician: Referring Practitioner: Dr. Albania Dennis      Evaluation Date: 2021      Medical Diagnosis Information:  Diagnosis: S49.91XA (ICD-10-CM) - Injury of right shoulder, initial encounter   Treatment Diagnosis: M25. 511 right shoulder pain                                         Insurance information:      Date of Injury: 21  Date of Surgery: none    Date of Patient follow up with Physician: as needed    RESTRICTIONS/PRECAUTIONS: none    Latex Allergy:  []No      []Yes  Pacemaker:  [] No       [] Yes     Preferred Language for Healthcare:   [x]English       []other:     Functional Scale: 41/80 UEFI   Date assessed:  2021    C-SSRS Triggered by Intake questionnaire (Past 2 wk assessment):    No, Questionnaire did not trigger screening. SUBJECTIVE: Can assist arm over head but cannot lift it on its own. Patient reported deficits/history of current problem: Earlyne Jennifer at home onto right side and injured her right shoulder. No fracture was identified.  Saw ortho on 21 who referred her to therapy for shoulder contusion. Seeing PT currently also for back pain, frequent falls and gait disturbance. Pain Scale: 6/10  [x]Constant      []Intermittent    []other:  Pain Location:  Points to just below shoulder on medial deltoid  Easing factors: not moving shoulder  Provocative factors: shoulder movement     [x] Patient reported history, allergies, and medications reviewed - see intake form.        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  []Osteoarthritis(M19.91)                               []Osteoporosis  []None                                                           [x]Morbid Obesity  [x]Other: CKD, see medical history in EPIC                                                        []COPD      OBJECTIVE:   Date:  Hand Dominance:     [x]  Right    [] Left 8/19/2021     Objective Measures:    PAIN 6/10   UEFI 41/80   Digits tip to DPFC in cm right hand WNL   Thumb ROM WNL   Wrist ROM Ext/Flex WNL   Forearm ROM  Sup/pron WNL   Elbow ROM Ext/flex WNL   Shoulder Flex  Shoulder Abd  Shoulder IR/ER 58   55  No active ER available/ IR WNL, full PROM            strength in lbs R: NT  L:   Pinch Strengthin lbs: lat  R:NT  L:   Pinch Strength in lbs:  3 point R:NT  L:     MMT:  ER on right 1/5  Left 3/5  IR 4/5 on right 5/5 on left     Observations:  (including splints, bandages, incisions, scars):    Occupational Profile:  Home Enviroment: lives with  [] spouse,  [] family,  [] alone,  [] significant other,   [x] other: friend lives with patient    Occupation/School: retired    Recreational Activities/Meaningful Interests: nothing specific    Prior Level of Function: [] Independent with ADLs/IADLs     [] Assistance needed (describe):    Patient-Identified Primary Performance Deficits (to be addressed in POC):   [] bathing    [x] household tasks (cooking/cleaning) - lifting things higher than waist level, cleaning higher objects, opening food packages, pushing self up into standing. [] dressing    [] self feeding   [] grooming    [] work/education   [] functional mobility   [] sleeping/rest   [] toileting/hygiene   [] recreational activities   [] driving    [] community/social participation   [] other:   Sensation:  [] No reported deficits  [] Intact to light touch    [] Stuttgart Raza test completed, findings as noted:  [] Other:    Palpation: Tender to palpation to infraspinatus miscle    Functional Mobility/Transfers/Gait:  [] Independent - no significant gait deviations  [x] Assistance needed - using rolling walker for ambulation  [] Assistive device used: Falls Risk Assessment (30 days):   [] Falls Risk assessed and no intervention required. [x] Falls Risk assessed and Patient requires intervention due to being higher risk - currently seeing PT to work on balance and LE strengthening   TUG score (>12s at risk):     [] Falls education provided, including      Review Of Systems (ROS): [x]Performed Review of systems (Integumentary, CardioPulmonary, Neurological) by intake and observation. Intake form has been scanned into medical record. Patient has been instructed to contact their primary care physician regarding ROS issues if not already being addressed at this time. ASSESSMENT:   This patient presents with signs and symptoms consistent with the medical diagnosis provided by the referring physician.      Impairments (physical, cognitive and/or psychosocial):  [x] Decreased mobility   [x] Weakness    [] Hypersensitivity   [x] Pain/tenderness   [] Edema/swelling   [] Decreased coordination (fine/gross motor)   [] Impaired body mechanics  [] Sensory loss  [] Loss of balance   [] Other:      Performance Deficits (to be addressed in plan of care):   [] Bathing    [x] Household Tasks (cooking/cleaning)   [] Dressing    [] Self Feeding   [] Grooming    [] Work/Education   [] Functional Mobility   [] Sleeping/Rest   [] Toileting/Hygiene   [] Recreational Activities   [] Driving    [] Community/Social Participation   [] Other:     Rehab Potential:   [] Excellent [x] Good [] Fair  [] Poor     Barriers affecting rehab potential:  []Age    []Lack of Motivation   []Co-Morbidities  []Cognitive Function  []Environmental/home/work barriers  []Other: Tolerance of evaluation/treatment:    [] Excellent [x] Good [] Fair  [] Poor    PLAN OF CARE:  Interventions:   [x] Therapeutic Exercise [x] Therapeutic Activity    [x] Activities of Daily Living [x] Neuromuscular Re-education      [x] Patient Education  [x] Manual Therapy      [x] Modalities as needed, and not otherwise contraindicated, including: ultrasound,paraffin,moist heat/cold pack, electrical stimulation, contrast bath, iontophoresis  [] Splinting    Frequency/Duration:  2 days per week 4 weeks      GOALS:  Patient stated goal: use right arm better. [] Progressing: [] Met: [] Not Met: [] Adjusted    Therapist goals for Patient:   Short Term Goals: To be achieved in: 2 weeks  1. Independent in HEP and progression per patient tolerance, in order to prevent re-injury. [] Progressing: [] Met: [] Not Met: [] Adjusted   2. Patient will have a decrease in pain to facilitate improvement in movement, function, and ADLs as indicated by Functional Deficits. [] Progressing: [] Met: [] Not Met: [] Adjusted    Long Term Goals to be achieved in 4 weeks (through 9/16/21), including patient directed goals to address patient identified performance deficits:  1) Pt to be independent in graded HEP progression with a good level of effort and compliance.   [] Progressing: [] Met: [] Not Met: [] Adjusted   2) Pt to report a score of </= 50/80 on UEFI disability questionnaire for increased performance with carrying, moving, and handling objects. [] Progressing: [] Met: [] Not Met: [] Adjusted   3) Pt will demonstrate increased ROM to right shoulder flexion to 90 for improved independence with reaching higher objects. .  [] Progressing: [] Met: [] Not Met: [] Adjusted   4) Pt will demonstrate increased strength to right flexion to 2/5 against gravity for improved independence with lifting higher objects to shoulder level. [] Progressing: [] Met: [] Not Met: [] Adjusted   5) Pt will have a decrease in pain to 2/10 to facilitate use of right arm. [] Progressing: [] Met: [] Not Met: [] Adjusted        OCCUPATIONAL THERAPY EVALUATION COMPLEXITY JUSTIFICATION:    [] An occupational profile and medical/therapy history, which includes:   [x] a brief history including medical and/or therapy records relating to the     presenting problem   [] an expanded review of medical and/or therapy records and additional review     of physical, cognitive or psychosocial history related to current functional    performance   [] an extensive additional review of review of medical and/or therapy records   and physical, cognitive, or psychosocial history related to current    functional performance    [] An assessment that identifies performance deficits (relating to physical, cognitive, or psychosocial skills) that result in activity limitations and/or participation restrictions:   [x] 1-3 performance deficits   [] 3-5 performance deficits   [] 5 or more performance deficits    [] Clinical decision making of:   [x] low complexity, including analysis of occupational profile, data analysis from problem focused assessment, and consideration of a limited number of treatment options. No comorbidities affect occupational performance. No task modifications or assistance needed to complete evaluation.    [] moderate complexity, including analysis of occupational profile, data analysis from detailed assessment and consideration of several treatment options. Comorbidities that affect occupational performance may be present. Minimal to moderate task modifications or assistance needed to complete assessment. [] high complexity, including analysis of occupational profile, analysis of data from comprehensive assessment and consideration of multiple treatment options. Multiple comorbidities present that affect occupational performance. Significant task modifications or assistance needed to complete assessment.     Evaluation Code:  [x] Low Complexity EVAL 66869 (typically 30 minutes face to face)  [] Mod Complexity EVAL 50175 (typically 45 minutes face to face)  [] High Complexity EVAL 37578 (typically 60 minutes face to face)    Electronically signed by:  Janae Gold  , [address], [address] [facility]

## 2021-08-19 NOTE — FLOWSHEET NOTE
[address] [place], [address] [place] [address] [address]  Phone: [phone] Fax: [phone]   Occupational Therapy Treatment Note/ Progress Report:     Is this a Progress Report:     []  Yes  [x]  No      If Yes:  Date Range for reporting period:  Beginning 21  Ending 2021  Progress report will be due (10 Rx or 30 days whichever is less):     Recertification will be due (POC Duration  / 90 days whichever is less): 21   Date:  2021  Patient Name:  Kelleen Soulier    :  1940  MRN: [id number]  Medical/Treatment Diagnosis Information:  · Diagnosis: S49.91XA (ICD-10-CM) - Injury of right shoulder, initial encounter     Diagnosis: S49.91XA (ICD-10-CM) - Injury of right shoulder, initial encounter          Treatment Diagnosis: M25. 511 right shoulder pain                                            Insurance/Certification information:     Physician Information:  Referring Practitioner: Dr. Mike Barnes  Has the plan of care been signed (Y/N):        []  Yes  [x]  No     Visit # Insurance Allowable Auth Required   1  []  Yes []  No    From 21  to 2021    Comorbidities Affecting Functional Performance:             [x]? Anxiety (F41.9)/Depression (F32.9)           [x]? Hypertension  [x]? Diabetes Type 1(E10.65) or 2 (E11.65)     []? CVA              []?Rheumatoid Arthritis (M05.9)                     []?Aherisclerosis  []? Fibromyalgia (M79.7)                                 []?Angina Pectoris  []? 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 Osteoarthritis(M19.91)                               []?Osteoporosis  []? None                                                                      [x]? Morbid Obesity  [x]? Other: CKD, see medical history in EPIC                                                        []?COPD    Date of Injury: 21  Date of Surgery: 10 reps  Shoulder External Rotation and Scapular Retraction - 1 x daily - 7 x weekly - 3 sets - 10 reps        Access Code: [email or web address]  URL: [email or web address]  Date: 08/19/2021  Prepared by: Yamileth Coy    Exercises  Supine Shoulder Flexion PROM - 3 x daily - 7 x weekly - 3 sets - 10 reps             Splinting      Lcode:      Orthotic Mgmt, Subsequent Enc (86316)      Orthotic Mgmt & Training (52735)            Other:        Therapeutic Exercise & NMR:  [x] (05779) Provided verbal/tactile cueing for activities related to strengthening, flexibility, endurance, ROM  for improvements in scapular, scapulothoracic and UE control with self care, reaching, carrying, lifting, house/yardwork, driving/computer work.    [] (42788) Provided verbal/tactile cueing for activities related to improving balance, coordination, kinesthetic sense, posture, motor skill, proprioception  to assist with  scapular, scapulothoracic and UE control with self care, reaching, carrying, lifting, house/yardwork, driving/computer work.     Therapeutic Activities & NMR:    [] (06104 or 56081) Provided verbal/tactile cueing for activities related to improving balance, coordination, kinesthetic sense, posture, motor skill, proprioception and motor activation to allow for proper function of scapular, scapulothoracic and UE control with self care, carrying, lifting, driving/computer work    Home Exercise Program:    [] (34839) Reviewed/Progressed HEP activities related to strengthening, flexibility, endurance, ROM of scapular, scapulothoracic and UE control with self care, reaching, carrying, lifting, house/yardwork, driving/computer work  [] (98679) Reviewed/Progressed HEP activities related to improving balance, coordination, kinesthetic sense, posture, motor skill, proprioception of scapular, scapulothoracic and UE control with self care, reaching, carrying, lifting, house/yardwork, driving/computer work      Manual Treatments:  PROM / STM / Oscillations-Mobs:  G-I, II, III, IV (PA's, Inf., Post.)  [] (28040) Provided manual therapy to mobilize soft tissue/joints of cervical/CT, scapular GHJ and UE for the purpose of modulating pain, promoting relaxation,  increasing ROM, reducing/eliminating soft tissue swelling/inflammation/restriction, improving soft tissue extensibility and allowing for proper ROM for normal function with self care, reaching, carrying, lifting, house/yardwork, driving/computer work    ADL Training:  [] (49543) Provided self-care/home management training related to activities of daily living and compensatory training, and/or use of adaptive equipment      Charges  Timed Code Treatment Minutes: 25   Total Treatment Minutes: 50     Medicare total (add KX at $2000)  200     [x] EVAL (LOW) 03344 (typically 20 minutes face-to-face)    [] EVAL (MOD) 85056 (typically 30 minutes face-to-face)  [] EVAL (HIGH) 12515 (typically 45 minutes face-to-face)  [] OT Re-eval (34805)       [x] Macario [phone]) x      [] YOVWK(91165)  [] NMR (64608) x      [] Estim (attended) (91443)   [] Manual ([phone]) x      [] US (82441)  [] TA () x      [] Paraffin (39351)  [] ADL  ([phone]) x     [] Splint/L code:    [] Estim (unattended) [phone])  [] Fluidotherapy (72418)  [] DN 1-2 (90583)   [] DN 3+ ([phone])  [] Orthotic Mgmt, Subsequent Enc (44246)  [] Orthotic Mgmt & Training (93840)  [] Other:    ASSESSMENT:      GOALS: Patient stated goal: use right arm better. []? Progressing: []? Met: []? Not Met: []? Adjusted     Therapist goals for Patient:   Short Term Goals: To be achieved in: 2 weeks  1. Independent in HEP and progression per patient tolerance, in order to prevent re-injury. []? Progressing: []? Met: []? Not Met: []? Adjusted   2. Patient will have a decrease in pain to facilitate improvement in movement, function, and ADLs as indicated by Functional Deficits. []? Progressing: []? Met: []? Not Met: []?  Adjusted     Long Term Goals to be achieved in 4 weeks (through 9/16/21), including patient directed goals to address patient identified performance deficits:  1) Pt to be independent in graded HEP progression with a good level of effort and compliance. []? Progressing: []? Met: []? Not Met: []? Adjusted   2) Pt to report a score of </= 50/80 on UEFI disability questionnaire for increased performance with carrying, moving, and handling objects. []? Progressing: []? Met: []? Not Met: []? Adjusted   3) Pt will demonstrate increased ROM to right shoulder flexion to 90 for improved independence with reaching higher objects. .  []? Progressing: []? Met: []? Not Met: []? Adjusted   4) Pt will demonstrate increased strength to right flexion to 2/5 against gravity for improved independence with lifting higher objects to shoulder level. []? Progressing: []? Met: []? Not Met: []? Adjusted   5) Pt will have a decrease in pain to 2/10 to facilitate use of right arm.  []? Progressing: []? Met: []? Not Met: []? Adjusted      Overall Progression Towards Functional Goals/Treatment Progress Update:  [] Patient is progressing as expected towards functional goals listed. [] Progression is slowed due to complexities/impairments listed. [] Progression has been slowed due to co-morbidities.   [x] Plan just implemented, too soon to assess goals progression <30 days  [] Goals require adjustment due to lack of progress  [] Patient is not progressing as expected and requires additional follow up with physician  [] All goals are met  [] Other:     Prognosis for POC: [x] Good [] Fair  [] Poor    Patient requires continued skilled intervention: [x] Yes  [] No    Treatment/Activity Tolerance:  [x] Patient able to complete treatment  [] Patient limited by fatigue  [] Patient limited by pain    [] Patient limited by other medical complications  [] Other:                  PLAN: See eval  [] Continue per plan of care [] Alter current plan (see comments above)  [x] Plan of care initiated [] Hold pending MD visit [] Discharge    Electronically signed by:  Sudha Guzman, OT, OTR\L, T - [id number]       Note: If patient does not return for scheduled/ recommended follow up visits, this note will serve as a discharge from care along with most recent update on progress.

## 2021-08-19 NOTE — FLOWSHEET NOTE
Physical Therapy Daily Treatment Note    [x]Daily Tx Note    []Progress Note    [] Discharge Summary         Date:  2021    Patient Name:  Shiela Pino    :  1940  MRN: [id number]      Medical/Treatment Diagnosis Information:  ·  Lumbar disc disease M51.9, frequent falls R29.6; gait disturbance R26.9        ·  difficulty with ambulation       Insurance/Certification information:   Medicare    Physician Information:   Wandy Houston MD     Plan of care signed :  []  Yes  [x] No  [x]  [employer] []  Fax    Date of Patient follow up with Physician:     Is this a Progress Report:     []  Yes  [x]  No      If Yes:  Date Range for reporting period:  Beginning 2021  Ending    Progress report will be due (10 Rx or 30 days whichever is less):     7919  Recertification will be due (POC Duration  / 90 days whichever is less):       Visit # Insurance Allowable Auth Required   3/12   []  Yes [x]  No        Functional Scale:       Date Assessed: 2021  Measure Used: LEFI  Score: 80     Latex Allergy:  [x]NO      []YES  Preferred Language for Healthcare:   [x]English       []other:    RESTRICTIONS/PRECAUTIONS:      SUBJECTIVE: Pt reports difficulty with stairs at home. Pain level:Patient reports pain is  0/10 pain at present; no pain with walking with rollator. Plan Moving Forward/ For next visit:    Balance activity and gait    Exercise stretching and strengtheining    OBJECTIVE: See eval    Exercises/Interventions:     Exercises in bold performed in department today. Items not bolded are carried forward from prior visits for continuity of the record.   Exercise/Equipment Resistance/Repetitions HEP Other comments   DF and eversion with theraband red   1x10 []    Abduction standing    2x10 []    Glut sets and quad sets   1x10 hold 5 secs [] 6/10 in low back   Standing Heel/toe 1x10 []    Seated flexion    3 reps hold 10 seconds []     SAQ  2x10 []     SLR  1x10 []        []        [] []     NR    []     step ups  And lateral foam  1x10 reps B []     standing on foam  EO apart, EO and EC feet together each one minute  [] With eyes closed required one rest period       []        []        []        []        []      Therapeutic Exercise/Home Exercise Program: 28   minutes  See above  Monitored O2 sat from supine 97% P 67; Sitting 97% P 74; standing 95%, P 80. Pt going to cardiologist next week. Therapeutic Activity:  0 minutes     Gait: 5 minutes  Amb without AD with CGAx1 50 feet x2    Neuromuscular Re-Education:  10 minutes  see above     Canalith Repositioning Procedure:      Manual Therapy:  0 minutes    Modalities: 0 minutes    ASSESSMENT:  Pt had difficulty with maintaining O2 sat. Improved with diaphragmatic breathing  GOALS    Short Term Goals:    3 weeks MET Not  Met Long Term Goals:    6 weeks MET Not Met   Establish HEP []?  []?  Pt independent with HEP []?  []?    Increase identified strength deficits 1/3 grade  []?  []?  Increase identified strength deficits 2/3 grade or better []?  []?    Bed mobility/transfers independent  []?  []?  Gait pt able to able 20 feet without AD with no noted difficulty and minimal to no antalgic gait. []?  []?    Increase FOM score on 40/80  []?  []?  Steps pt able to ascend and descend on flight of stairs with one hand on railing with minimal to no noted difficulty. []?  []?      []?  []?  Increase FOM score on 50/80 []?  []?      []?  []?         Overall Progression Towards Functional goals/ Treatment Progress Update:  [] Patient is progressing as expected towards functional goals listed. [] Progression is slowed due to complexities/Impairments listed. [] Progression has been slowed due to co-morbidities.   [x] Plan just implemented, too soon to assess goals progression <30days   [] Goals require adjustment due to lack of progress  [] Patient is not progressing as expected and requires additional follow up with physician  [] Other    Prognosis for POC: [x] Good [] Fair  [] Poor    Patient requires continued skilled intervention: [x] Yes  [] No    Treatment/Activity Tolerance:  [x] Patient able to complete treatment  [] Patient limited by fatigue  [] Patient limited by pain    [] Patient limited by other medical complications  [] Other:         PLAN: See eval  [x] Continue per plan of care [] Alter current plan (see comments above)  [] Plan of care initiated [] Hold pending MD visit [] Discharge    Therapeutic Exercise and NMR EXR  [x] (77003) Provided verbal/tactile cueing for activities related to strengthening, flexibility, endurance, ROM  for improvements in proximal strength and core control with self care, mobility, lifting and ambulation. [x] (51011) Provided verbal/tactile cueing for activities related to improving balance, coordination, kinesthetic sense, posture, motor skill, proprioception  to assist with core control in self care, mobility, lifting, and ambulation.      Therapeutic Activities and Gait:    [] (44300 or 06358) Provided verbal/tactile cueing for activities related to improving balance, coordination, kinesthetic sense, posture, motor skill, proprioception and motor activation to allow for proper function  with self care and ADLs  [] (38452) Provided training and instruction to the patient for proper core and proximal hip recruitment and positioning with ambulation re-education     Home Exercise Program:    [x] (75039) Reviewed/Progressed HEP activities related to strengthening, flexibility, endurance, ROM of core, proximal hip and LE for functional self-care, mobility, lifting and ambulation   [] (27192) Reviewed/Progressed HEP activities related to improving balance, coordination, kinesthetic sense, posture, motor skill, proprioception of core, proximal hip and LE for self care, mobility, lifting, and ambulation      Manual Treatments:  PROM / STM / Oscillations-Mobs:  G-I, II, III, IV (PA's, Inf., Post.)  [] (77288) Provided manual therapy to mobilize proximal hip and LS spine soft tissue/joints for the purpose of modulating pain, promoting relaxation,  increasing ROM, reducing/eliminating soft tissue swelling/inflammation/restriction, improving soft tissue extensibility and allowing for proper ROM for normal function with self care, mobility, lifting and ambulation. []CRP:  Canalith Repositioning procedure for the assessment, treatment and education of BPPV    Modalities:       Charges:  Timed Code Treatment Minutes: 43   Total Treatment Minutes: 43     Medicare Cap total YTD:  18      []N/A  Workers Comp Time Stamp  (Per CPT and Total Treatment) [x]N/A   Time In:   Time Out:       [] EVAL    [] Dry Needling  [x] HZ(85852)   x  2   [] EStim Unattended 20931  [] NMR (12718)  x     [] Estim Attended  32870  [x] Manual (67847)  x  1    [] Mechanical Txn 51326  [] TA    x     [] Ultrasound  [] Gait   x  [] Vaso  [] CRP    [] Ionto           [] Other:        Electronically signed by: CHRIS Gross,[id number]      Note: If patient does not return for scheduled/ recommended follow up visits, this note will serve as a discharge from care along with most recent update on progress.

## 2021-08-24 NOTE — FLOWSHEET NOTE
Physician: next week  RESTRICTIONS/PRECAUTIONS: none    Latex Allergy:  [x]No      []Yes  Pacemaker:  [x] No       [] Yes   Preferred Language for Healthcare:   [x]English       []other:   Functional Scale: 41/80 UEFI             Date assessed:  8/19/2021  Pain Scale: 2/10, end of treatment  SUBJECTIVE: see eval    OBJECTIVE:   Date:  Hand Dominance:     [x]? Right    []? Left 8/19/2021 8/24/21   Objective Measures:      PAIN 6/10    UEFI 41/80    Digits tip to DPFC in cm right hand WNL    Thumb ROM WNL    Wrist ROM Ext/Flex WNL    Forearm ROM  Sup/pron WNL    Elbow ROM Ext/flex WNL    Shoulder Flex  Shoulder Abd  Shoulder IR/ER 58   55  No active ER available/ IR WNL, full PROM     can externally rotate with shoulder at 90/90 supine from full IR                    strength in lbs R: NT  L:    Pinch Strengthin lbs: lat  R:NT  L:    Pinch Strength in lbs:  3 point R:NT  L:       MMT:  ER on right 1/5  Left 3/5  IR 4/5 on right 5/5 on left    ER 2/5   Observations:  (including splints, bandages, incisions, scars): MODALITIES: 8/19/21 8/24/21    Fluidotherapy (90232)      Estim (86191/18800)      Paraffin (97869)      US (59748)      Iontophoresis (60246)      Hot Pack      Cold Pack 10\" end of treatment 10\" end of treatment          INTERVENTIONS:      Therapeutic Exercise (48572) See below Isometric shoulder ER/IR 10 x 2. Shoulder shrugs 10 x 2. Scapular squeezes bilaterally in sitting 10 x 2. Arm slides forward on table in sitting 10 x 2. Place and hold shoulder flexion to 45 x 10. Supine IR/ER shoulder shoulder at 90/90 10 x 2. Supine IR/ER shoulder at 90/0 10 x 2. Shoulder flexion supine to overhead 10 x 2.       PROM shoulder flex., ER, IR           Therapeutic Activity (32746)                  Manual Therapy (69073)      (IASTM, Dry Needling, manual mobilization)            Neuromuscular Reeducation (79594)                  ADL Training (50870) HEP Training/Review Access Code: [email or web address]  URL: [email or web address]  Date: 08/19/2021  Prepared by: Sudha Guzman    Exercises  Seated Shoulder Flexion Towel Slide at Table Top - 3 x daily - 7 x weekly - 3 sets - 10 reps  Shoulder External Rotation and Scapular Retraction - 1 x daily - 7 x weekly - 3 sets - 10 reps  Supine Shoulder External Rotation Stretch - 1 x daily - 7 x weekly - 3 sets - 10 reps  Supine Shoulder Internal Rotation - 1 x daily - 7 x weekly - 3 sets - 10 reps  Shoulder External Rotation and Scapular Retraction - 1 x daily - 7 x weekly - 3 sets - 10 reps        Access Code: [email or web address]  URL: [email or web address]  Date: 08/19/2021  Prepared by: Sudha Thomas    Exercises  Supine Shoulder Flexion PROM - 3 x daily - 7 x weekly - 3 sets - 10 reps             Splinting      Lcode:      Orthotic Mgmt, Subsequent Enc (23357)      Orthotic Mgmt & Training (29605)            Other:        Therapeutic Exercise & NMR:  [x] (19913) Provided verbal/tactile cueing for activities related to strengthening, flexibility, endurance, ROM  for improvements in scapular, scapulothoracic and UE control with self care, reaching, carrying, lifting, house/yardwork, driving/computer work.    [] (03764) Provided verbal/tactile cueing for activities related to improving balance, coordination, kinesthetic sense, posture, motor skill, proprioception  to assist with  scapular, scapulothoracic and UE control with self care, reaching, carrying, lifting, house/yardwork, driving/computer work.     Therapeutic Activities & NMR:    [] (98424 or 37871) Provided verbal/tactile cueing for activities related to improving balance, coordination, kinesthetic sense, posture, motor skill, proprioception and motor activation to allow for proper function of scapular, scapulothoracic and UE control with self care, carrying, lifting, driving/computer work    Home Exercise Program:    [] (48554) Reviewed/Progressed HEP activities related to strengthening, flexibility, endurance, ROM of scapular, scapulothoracic and UE control with self care, reaching, carrying, lifting, house/yardwork, driving/computer work  [] (75071) Reviewed/Progressed HEP activities related to improving balance, coordination, kinesthetic sense, posture, motor skill, proprioception of scapular, scapulothoracic and UE control with self care, reaching, carrying, lifting, house/yardwork, driving/computer work      Manual Treatments:  PROM / STM / Oscillations-Mobs:  G-I, II, III, IV (PA's, Inf., Post.)  [] ([id number]) Provided manual therapy to mobilize soft tissue/joints of cervical/CT, scapular GHJ and UE for the purpose of modulating pain, promoting relaxation,  increasing ROM, reducing/eliminating soft tissue swelling/inflammation/restriction, improving soft tissue extensibility and allowing for proper ROM for normal function with self care, reaching, carrying, lifting, house/yardwork, driving/computer work    ADL Training:  [] (67584) Provided self-care/home management training related to activities of daily living and compensatory training, and/or use of adaptive equipment      Charges  Timed Code Treatment Minutes: 45   Total Treatment Minutes: 45     Medicare total (add KX at $2000)  300     [] EVAL (LOW) 87997 (typically 20 minutes face-to-face)    [] EVAL (MOD) 88299 (typically 30 minutes face-to-face)  [] EVAL (HIGH) 13493 (typically 45 minutes face-to-face)  [] OT Re-eval (89329)       [x] Macario ([id number]) x  3    [] LRFWU(17913)  [] NMR (53174) x      [] Estim (attended) (97943)   [] Manual ([id number]) x      [] US (97949)  [] TA (90995) x      [] Paraffin (13433)  [] ADL  (25213) x     [] Splint/L code:    [] Estim (unattended) ([phone])  [] Fluidotherapy (30215)  [] DN 1-2 (09007)   [] DN 3+ (41261)  [] Orthotic Mgmt, Subsequent Enc (32025)  [] Orthotic Mgmt & Training (29782)  [] Other:    ASSESSMENT:  Better ER, table slides and flexion supine.     GOALS: Patient stated goal: use right arm better. []? Progressing: []? Met: []? Not Met: []? Adjusted     Therapist goals for Patient:   Short Term Goals: To be achieved in: 2 weeks  1. Independent in HEP and progression per patient tolerance, in order to prevent re-injury. []? Progressing: []? Met: []? Not Met: []? Adjusted   2. Patient will have a decrease in pain to facilitate improvement in movement, function, and ADLs as indicated by Functional Deficits. []? Progressing: []? Met: []? Not Met: []? Adjusted     Long Term Goals to be achieved in 4 weeks (through 9/16/21), including patient directed goals to address patient identified performance deficits:  1) Pt to be independent in graded HEP progression with a good level of effort and compliance. []? Progressing: []? Met: []? Not Met: []? Adjusted   2) Pt to report a score of </= 50/80 on UEFI disability questionnaire for increased performance with carrying, moving, and handling objects. []? Progressing: []? Met: []? Not Met: []? Adjusted   3) Pt will demonstrate increased ROM to right shoulder flexion to 90 for improved independence with reaching higher objects. .  []? Progressing: []? Met: []? Not Met: []? Adjusted   4) Pt will demonstrate increased strength to right flexion to 2/5 against gravity for improved independence with lifting higher objects to shoulder level. []? Progressing: []? Met: []? Not Met: []? Adjusted   5) Pt will have a decrease in pain to 2/10 to facilitate use of right arm.  []? Progressing: []? Met: []? Not Met: []? Adjusted      Overall Progression Towards Functional Goals/Treatment Progress Update:  [x] Patient is progressing as expected towards functional goals listed. [] Progression is slowed due to complexities/impairments listed. [] Progression has been slowed due to co-morbidities.   [] Plan just implemented, too soon to assess goals progression <30 days  [] Goals require adjustment due to lack of progress  [] Patient is not progressing as expected and requires additional follow up with physician  [] All goals are met  [] Other:     Prognosis for POC: [x] Good [] Fair  [] Poor    Patient requires continued skilled intervention: [x] Yes  [] No    Treatment/Activity Tolerance:  [x] Patient able to complete treatment  [] Patient limited by fatigue  [] Patient limited by pain    [] Patient limited by other medical complications  [] Other:                  PLAN: See eval  [x] Continue per plan of care [] Alter current plan (see comments above)  [] Plan of care initiated [] Hold pending MD visit [] Discharge    Electronically signed by:  Jacinto Haynes OT, OTR\L, [address] [place] [address]       Note: If patient does not return for scheduled/ recommended follow up visits, this note will serve as a discharge from care along with most recent update on progress.

## 2021-08-24 NOTE — FLOWSHEET NOTE
Physical Therapy Daily Treatment Note    [x]Daily Tx Note    []Progress Note    [] Discharge Summary         Date:  2021    Patient Name:  Lee Morse    :  1940  MRN: [id number]      Medical/Treatment Diagnosis Information:  ·  Lumbar disc disease M51.9, frequent falls R29.6; gait disturbance R26.9        ·  difficulty with ambulation       Insurance/Certification information:   Medicare    Physician Information:   Osmel Goodman MD     Plan of care signed :  []  Yes  [x] No  [x]  Cosign []  Fax    Date of Patient follow up with Physician:     Is this a Progress Report:     []  Yes  [x]  No      If Yes:  Date Range for reporting period:  Beginning 2021  Ending    Progress report will be due (10 Rx or 30 days whichever is less):     43  Recertification will be due (POC Duration  / 90 days whichever is less):       Visit # Insurance Allowable Auth Required      []  Yes [x]  No        Functional Scale:       Date Assessed: 2021  Measure Used: LEFI  Score: 80     Latex Allergy:  [x]NO      []YES  Preferred Language for Healthcare:   [x]English       []other:    RESTRICTIONS/PRECAUTIONS:      SUBJECTIVE: Pt reports that she feels that she is walking better. Pain level:Patient reports pain is  0/10 pain at present; no pain with walking with rollator. Plan Moving Forward/ For next visit:    Balance activity and gait    Exercise stretching and strengtheining    OBJECTIVE: See eval    Exercises/Interventions:     Exercises in bold performed in department today. Items not bolded are carried forward from prior visits for continuity of the record.   Exercise/Equipment Resistance/Repetitions HEP Other comments   DF and eversion with theraband red   1x10 []    Abduction standing    2x10 []    Glut sets and quad sets   1x10 hold 5 secs [] 6/10 in low back   Standing Heel/toe 1x10 []    Seated flexion    3 reps hold 10 seconds []     SAQ  2x10 []     SLR  1x10 B [] Bridges with adduction   1x10 []    hooklying with abdominal tightening and knee extension   1x10 []          []     NR    []     step ups  And lateral foam  1x10 reps B []     standing on foam  EO apart, EO and EC feet together each one minute  [] With eyes closed required one rest period       []        []        []        []        []      Therapeutic Exercise/Home Exercise Program: 15  minutes  See above  Monitored O2 sat from supine 97% P 67; Sitting 97% P 74; standing 95%, P 80. Pt going to cardiologist next week. Therapeutic Activity:  0 minutes     Gait:  16   minutes  Amb without AD with CGAx1 50 feet x2  In bars: Side stepping 6 laps 97%, 93 pulse; backward    91 with breathing exercise up to 97%; pulse 84-73     Neuromuscular Re-Education:  5 minutes  see above     Canalith Repositioning Procedure:      Manual Therapy:  0 minutes    Modalities: 0 minutes    ASSESSMENT:  Pt had difficulty with maintaining O2 sat. Improved with diaphragmatic breathing  GOALS    Short Term Goals:    3 weeks MET Not  Met Long Term Goals:    6 weeks MET Not Met   Establish HEP []?  []?  Pt independent with HEP []?  []?    Increase identified strength deficits 1/3 grade  []?  []?  Increase identified strength deficits 2/3 grade or better []?  []?    Bed mobility/transfers independent  []?  []?  Gait pt able to able 20 feet without AD with no noted difficulty and minimal to no antalgic gait. []?  []?    Increase FOM score on 40/80  []?  []?  Steps pt able to ascend and descend on flight of stairs with one hand on railing with minimal to no noted difficulty. []?  []?      []?  []?  Increase FOM score on 50/80 []?  []?      []?  []?         Overall Progression Towards Functional goals/ Treatment Progress Update:  [] Patient is progressing as expected towards functional goals listed. [] Progression is slowed due to complexities/Impairments listed. [] Progression has been slowed due to co-morbidities.   [x] Plan just implemented, too soon to assess goals progression <30days   [] Goals require adjustment due to lack of progress  [] Patient is not progressing as expected and requires additional follow up with physician  [] Other    Prognosis for POC: [x] Good [] Fair  [] Poor    Patient requires continued skilled intervention: [x] Yes  [] No    Treatment/Activity Tolerance:  [x] Patient able to complete treatment  [] Patient limited by fatigue  [] Patient limited by pain    [] Patient limited by other medical complications  [] Other:         PLAN: See eval  [x] Continue per plan of care [] Alter current plan (see comments above)  [] Plan of care initiated [] Hold pending MD visit [] Discharge    Therapeutic Exercise and NMR EXR  [x] (45334) Provided verbal/tactile cueing for activities related to strengthening, flexibility, endurance, ROM  for improvements in proximal strength and core control with self care, mobility, lifting and ambulation. [x] (09718) Provided verbal/tactile cueing for activities related to improving balance, coordination, kinesthetic sense, posture, motor skill, proprioception  to assist with core control in self care, mobility, lifting, and ambulation.      Therapeutic Activities and Gait:    [] (74529 or 35907) Provided verbal/tactile cueing for activities related to improving balance, coordination, kinesthetic sense, posture, motor skill, proprioception and motor activation to allow for proper function  with self care and ADLs  [] (76662) Provided training and instruction to the patient for proper core and proximal hip recruitment and positioning with ambulation re-education     Home Exercise Program:    [x] (29875) Reviewed/Progressed HEP activities related to strengthening, flexibility, endurance, ROM of core, proximal hip and LE for functional self-care, mobility, lifting and ambulation   [] (65404) Reviewed/Progressed HEP activities related to improving balance, coordination, kinesthetic sense, posture, motor skill, proprioception of core, proximal hip and LE for self care, mobility, lifting, and ambulation      Manual Treatments:  PROM / STM / Oscillations-Mobs:  G-I, II, III, IV (PA's, Inf., Post.)  [] (60430) Provided manual therapy to mobilize proximal hip and LS spine soft tissue/joints for the purpose of modulating pain, promoting relaxation,  increasing ROM, reducing/eliminating soft tissue swelling/inflammation/restriction, improving soft tissue extensibility and allowing for proper ROM for normal function with self care, mobility, lifting and ambulation. []CRP:  Canalith Repositioning procedure for the assessment, treatment and education of BPPV    Modalities:       Charges:  Timed Code Treatment Minutes: 36   Total Treatment Minutes: 36     Medicare Cap total YTD:  12      []N/A  Workers Comp Time Stamp  (Per CPT and Total Treatment) [x]N/A   Time In:   Time Out:       [] EVAL    [] Dry Needling  [x] SX(84810)   x  1   [] EStim Unattended 05464  [] NMR (72777)  x     [] Estim Attended  80605  [] Manual (49463)  x  (was incorrect on last visit should have been NR was charged correctly.)  [] Mechanical Txn 73251  [] TA    x     [] Ultrasound  [x] Gait   x1  [] Vaso  [] CRP    [] Ionto           [] Other:        Electronically signed by: PATEL Frazier,[id number]      Note: If patient does not return for scheduled/ recommended follow up visits, this note will serve as a discharge from care along with most recent update on progress.

## 2021-08-26 NOTE — FLOWSHEET NOTE
[address] [place], [address] [place] [address] [address]  Phone: [phone] Fax: [phone]   Occupational Therapy Treatment Note/ Progress Report:     Is this a Progress Report:     []  Yes  [x]  No      If Yes:  Date Range for reporting period:  Beginning 21  Ending 2021  Progress report will be due (10 Rx or 30 days whichever is less):     Recertification will be due (POC Duration  / 90 days whichever is less): 21   Date:  2021  Patient Name:  Ilda Doan    :  1940  MRN: [id number]  Medical/Treatment Diagnosis Information:  · Diagnosis: S49.91XA (ICD-10-CM) - Injury of right shoulder, initial encounter     Diagnosis: S49.91XA (ICD-10-CM) - Injury of right shoulder, initial encounter          Treatment Diagnosis: M25. 511 right shoulder pain                                            Insurance/Certification information:     Physician Information:     Has the plan of care been signed (Y/N):        []  Yes  [x]  No     Visit # Insurance Allowable Auth Required   3  []  Yes []  No    From 21  to 2021    Comorbidities Affecting Functional Performance:             [x]? Anxiety (F41.9)/Depression (F32.9)           [x]? Hypertension  [x]? Diabetes Type 1(E10.65) or 2 (E11.65)     []? CVA              []?Rheumatoid Arthritis (M05.9)                     []?Aherisclerosis  []? Fibromyalgia (M79.7)                                 []?Angina Pectoris  []? 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 Osteoarthritis(M19.91)                               []?Osteoporosis  []? None                                                                      [x]? Morbid Obesity  [x]? Other: CKD, see medical history in EPIC                                                        []?COPD    Date of Injury: 21  Date of Surgery: none     Date of Patient follow up with Physician: next week  RESTRICTIONS/PRECAUTIONS: none    Latex Allergy:  [x]No      []Yes  Pacemaker:  [x] No       [] Yes   Preferred Language for Healthcare:   [x]English       []other:   Functional Scale: 41/80 UEFI             Date assessed:  8/19/2021  Pain Scale: 2/00 with certain movements, at times pain free when not moving shoulder  SUBJECTIVE: can pull car door shut with right arm now. OBJECTIVE:   Date:  Hand Dominance:     [x]? Right    []? Left 8/19/2021 8/24/21   Objective Measures:      PAIN 6/10    UEFI 41/80    Digits tip to DPFC in cm right hand WNL    Thumb ROM WNL    Wrist ROM Ext/Flex WNL    Forearm ROM  Sup/pron WNL    Elbow ROM Ext/flex WNL    Shoulder Flex  Shoulder Abd  Shoulder IR/ER 58   55  No active ER available/ IR WNL, full PROM     can externally rotate with shoulder at 90/90 supine from full IR                    strength in lbs R: NT  L:    Pinch Strengthin lbs: lat  R:NT  L:    Pinch Strength in lbs:  3 point R:NT  L:       MMT:  ER on right 1/5  Left 3/5  IR 4/5 on right 5/5 on left    ER 2/5   Observations:  (including splints, bandages, incisions, scars): MODALITIES: 8/19/21 8/24/21 8/26/21   Fluidotherapy (71172)      Estim (75934/62831)      Paraffin (54967)      US (17710)      Iontophoresis (24097)      Hot Pack      Cold Pack 10\" end of treatment 10\" end of treatment          INTERVENTIONS:      Therapeutic Exercise (73940) See below Isometric shoulder ER/IR 10 x 2. Shoulder shrugs 10 x 2. Shoulder shrugs 10 x 2. Scapular squeezes bilaterally in sitting 10 x 2. Scapular squeezes bilaterally in sitting 10 x 2. Arm slides forward on table in sitting 10 x 2. Arm slides forward on table in sitting 10 x 2 with lifting arm off table a few inches and slowly lowering     Place and hold shoulder flexion to 45 x 10. Supine IR/ER shoulder shoulder at 90/90 10 x 2. Supine IR/ER shoulder shoulder at 90/90 10 x 2.      Supine IR/ER shoulder at 90/0 10 x 2. Supine IR/ER shoulder at 90/0 10 x 2. Shoulder flexion supine to overhead 10 x 2. Shoulder flexion supine to overhead 10 x 2. PROM shoulder flex., ER, IR  PROM shoulder flex., ER, IR          Therapeutic Activity (39246)                  Manual Therapy (55580)      (IASTM, Dry Needling, manual mobilization)            Neuromuscular Reeducation (31713)                  ADL Training (62439)                  HEP Training/Review Access Code: [email or web address]  URL: PHRQL/  Date: 08/19/2021  Prepared by: Lenz Embs    Exercises  Seated Shoulder Flexion Towel Slide at Table Top - 3 x daily - 7 x weekly - 3 sets - 10 reps  Shoulder External Rotation and Scapular Retraction - 1 x daily - 7 x weekly - 3 sets - 10 reps  Supine Shoulder External Rotation Stretch - 1 x daily - 7 x weekly - 3 sets - 10 reps  Supine Shoulder Internal Rotation - 1 x daily - 7 x weekly - 3 sets - 10 reps  Shoulder External Rotation and Scapular Retraction - 1 x daily - 7 x weekly - 3 sets - 10 reps        Access Code: [email or web address]  URL: PHRQL/  Date: 08/19/2021  Prepared by: Lenz Embs    Exercises  Supine Shoulder Flexion PROM - 3 x daily - 7 x weekly - 3 sets - 10 reps             Splinting      Lcode:      Orthotic Mgmt, Subsequent Enc (78999)      Orthotic Mgmt & Training (39277)            Other:        Therapeutic Exercise & NMR:  [x] (54586) Provided verbal/tactile cueing for activities related to strengthening, flexibility, endurance, ROM  for improvements in scapular, scapulothoracic and UE control with self care, reaching, carrying, lifting, house/yardwork, driving/computer work.    [] (34546) Provided verbal/tactile cueing for activities related to improving balance, coordination, kinesthetic sense, posture, motor skill, proprioception  to assist with  scapular, scapulothoracic and UE control with self care, reaching, carrying, lifting, house/yardwork, driving/computer work.    Therapeutic Activities & NMR:    [] (44081 or 21952) Provided verbal/tactile cueing for activities related to improving balance, coordination, kinesthetic sense, posture, motor skill, proprioception and motor activation to allow for proper function of scapular, scapulothoracic and UE control with self care, carrying, lifting, driving/computer work    Home Exercise Program:    [] (28745) Reviewed/Progressed HEP activities related to strengthening, flexibility, endurance, ROM of scapular, scapulothoracic and UE control with self care, reaching, carrying, lifting, house/yardwork, driving/computer work  [] (61245) Reviewed/Progressed HEP activities related to improving balance, coordination, kinesthetic sense, posture, motor skill, proprioception of scapular, scapulothoracic and UE control with self care, reaching, carrying, lifting, house/yardwork, driving/computer work      Manual Treatments:  PROM / STM / Oscillations-Mobs:  G-I, II, III, IV (PA's, Inf., Post.)  [] (94031) Provided manual therapy to mobilize soft tissue/joints of cervical/CT, scapular GHJ and UE for the purpose of modulating pain, promoting relaxation,  increasing ROM, reducing/eliminating soft tissue swelling/inflammation/restriction, improving soft tissue extensibility and allowing for proper ROM for normal function with self care, reaching, carrying, lifting, house/yardwork, driving/computer work    ADL Training:  [] (19081) Provided self-care/home management training related to activities of daily living and compensatory training, and/or use of adaptive equipment      Charges  Timed Code Treatment Minutes: 45   Total Treatment Minutes: 45     Medicare total (add KX at $2000)  400     [] EVAL (LOW) 63187 (typically 20 minutes face-to-face)    [] EVAL (MOD) 58977 (typically 30 minutes face-to-face)  [] EVAL (HIGH) [phone] (typically 45 minutes face-to-face)  [] OT Re-eval (76092)       [x] Macario ([id number]) x  3    [] KMGKV(28771)  [] NMR (54287) x      [] Estim (attended) (32282)   [] Manual ([id number]) x      [] US (74391)  [] TA () x      [] Paraffin (52814)  [] ADL  ([facility]) x     [] Splint/L code:    [] Estim (unattended) (91165)  [] Fluidotherapy (37767)  [] DN 1-2 (01405)   [] DN 3+ (15248)  [] Orthotic Mgmt, Subsequent Enc (53694)  [] Orthotic Mgmt & Training (39003)  [] Other:    ASSESSMENT:  Better ER, table slides and flexion supine. GOALS: Patient stated goal: use right arm better. []? Progressing: []? Met: []? Not Met: []? Adjusted     Therapist goals for Patient:   Short Term Goals: To be achieved in: 2 weeks  1. Independent in HEP and progression per patient tolerance, in order to prevent re-injury. []? Progressing: []? Met: []? Not Met: []? Adjusted   2. Patient will have a decrease in pain to facilitate improvement in movement, function, and ADLs as indicated by Functional Deficits. []? Progressing: []? Met: []? Not Met: []? Adjusted     Long Term Goals to be achieved in 4 weeks (through 9/16/21), including patient directed goals to address patient identified performance deficits:  1) Pt to be independent in graded HEP progression with a good level of effort and compliance. []? Progressing: []? Met: []? Not Met: []? Adjusted   2) Pt to report a score of </= 50/80 on UEFI disability questionnaire for increased performance with carrying, moving, and handling objects. []? Progressing: []? Met: []? Not Met: []? Adjusted   3) Pt will demonstrate increased ROM to right shoulder flexion to 90 for improved independence with reaching higher objects. .  []? Progressing: []? Met: []? Not Met: []? Adjusted   4) Pt will demonstrate increased strength to right flexion to 2/5 against gravity for improved independence with lifting higher objects to shoulder level. []? Progressing: []? Met: []? Not Met: []? Adjusted   5) Pt will have a decrease in pain to 2/10 to facilitate use of right arm.  []? Progressing: []? Met: []? Not Met: []?  Adjusted

## 2021-08-26 NOTE — FLOWSHEET NOTE
Physical Therapy Daily Treatment Note    [x]Daily Tx Note    []Progress Note    [] Discharge Summary         Date:  2021    Patient Name:  Bimal Brady    :  1940  MRN: [id number]      Medical/Treatment Diagnosis Information:  ·  Lumbar disc disease M51.9, frequent falls R29.6; gait disturbance R26.9        ·  difficulty with ambulation       Insurance/Certification information:   Medicare    Physician Information:   Dipika Leal MD     Plan of care signed :  []  Yes  [x] No  [x]  Cosign []  Fax    Date of Patient follow up with Physician:     Is this a Progress Report:     []  Yes  [x]  No      If Yes:  Date Range for reporting period:  Beginning 2021  Ending    Progress report will be due (10 Rx or 30 days whichever is less):     7/3/5704  Recertification will be due (POC Duration  / 90 days whichever is less):       Visit # Insurance Allowable Auth Required      []  Yes [x]  No        Functional Scale:       Date Assessed: 2021  Measure Used: LEFI  Score:      Latex Allergy:  [x]NO      []YES  Preferred Language for Healthcare:   [x]English       []other:    RESTRICTIONS/PRECAUTIONS:      SUBJECTIVE: Pt reports that she feels that she is walking better. Pt reports pain in morning 10/10 in low back. Pt reports that she performed seated flexion exercise with decreased pain. Pain level:Patient reports pain is  0/10 pain at present; no pain with walking with rollator. Plan Moving Forward/ For next visit:    Balance activity and gait    Exercise stretching and strengtheining    OBJECTIVE: See eval    Exercises/Interventions:     Exercises in bold performed in department today. Items not bolded are carried forward from prior visits for continuity of the record.   Exercise/Equipment Resistance/Repetitions HEP Other comments   DF and eversion with theraband red   1x10 []    Abduction standing    2x10 []    Glut sets and quad sets   1x10 hold 5 secs [] 6/10 in low back   Standing Heel/toe 1x10 []    Seated flexion    3 reps hold 10 seconds []     SAQ  2x10 2 lbs []     SLR  1x10 B []    Bridges with adduction   1x10 []    hooklying with abdominal tightening and knee extension   1x10 []          []     NR    []     step ups  And lateral foam  1x10 reps B [] Pain in right shoulder with     standing on foam  EO and EC feet together each one minute; rotation to right then to left.   [] With eyes closed required one rest period       []        []        []        []        []      Therapeutic Exercise/Home Exercise Program: 35  Minutes with gait below  See above  Standing 97% O2 sat, pulse 83; during standing activity with talking 90 with increase to 95% right away after breathing technique; seated after standing exercise 95% O2 sat and 88 pulse. Therapeutic Activity:  0 minutes     Gait:4 minutes  Amb without AD with CGAx1 25 feet x2    Neuromuscular Re-Education:  5 minutes  see above     Canalith Repositioning Procedure:      Manual Therapy:  0 minutes    Modalities: 0 minutes    ASSESSMENT:  Pt had difficulty with maintaining O2 sat. Improved with diaphragmatic breathing  GOALS    Short Term Goals:    3 weeks MET Not  Met Long Term Goals:    6 weeks MET Not Met   Establish HEP []?  []?  Pt independent with HEP []?  []?    Increase identified strength deficits 1/3 grade  []?  []?  Increase identified strength deficits 2/3 grade or better []?  []?    Bed mobility/transfers independent  []?  []?  Gait pt able to able 20 feet without AD with no noted difficulty and minimal to no antalgic gait. []?  []?    Increase FOM score on 40/80  []?  []?  Steps pt able to ascend and descend on flight of stairs with one hand on railing with minimal to no noted difficulty.   []?  []?      []?  []?  Increase FOM score on 50/80 []?  []?      []?  []?         Overall Progression Towards Functional goals/ Treatment Progress Update:  [] Patient is progressing as expected towards functional goals listed. [] Progression is slowed due to complexities/Impairments listed. [] Progression has been slowed due to co-morbidities. [x] Plan just implemented, too soon to assess goals progression <30days   [] Goals require adjustment due to lack of progress  [] Patient is not progressing as expected and requires additional follow up with physician  [] Other    Prognosis for POC: [x] Good [] Fair  [] Poor    Patient requires continued skilled intervention: [x] Yes  [] No    Treatment/Activity Tolerance:  [x] Patient able to complete treatment  [] Patient limited by fatigue  [] Patient limited by pain    [] Patient limited by other medical complications  [] Other:         PLAN: See eval  [x] Continue per plan of care [] Alter current plan (see comments above)  [] Plan of care initiated [] Hold pending MD visit [] Discharge    Therapeutic Exercise and NMR EXR  [x] (00190) Provided verbal/tactile cueing for activities related to strengthening, flexibility, endurance, ROM  for improvements in proximal strength and core control with self care, mobility, lifting and ambulation. [x] (25450) Provided verbal/tactile cueing for activities related to improving balance, coordination, kinesthetic sense, posture, motor skill, proprioception  to assist with core control in self care, mobility, lifting, and ambulation.      Therapeutic Activities and Gait:    [] (46106 or 87063) Provided verbal/tactile cueing for activities related to improving balance, coordination, kinesthetic sense, posture, motor skill, proprioception and motor activation to allow for proper function  with self care and ADLs  [] (48878) Provided training and instruction to the patient for proper core and proximal hip recruitment and positioning with ambulation re-education     Home Exercise Program:    [x] (52510) Reviewed/Progressed HEP activities related to strengthening, flexibility, endurance, ROM of core, proximal hip and LE for functional self-care,

## 2021-08-27 NOTE — PROGRESS NOTES
Right 2017    KNEE ARTHROSCOPY  2005    knee surgery    PAIN MANAGEMENT PROCEDURE Left 8/11/2020    LEFT LUMBAR THREE AND LUMBAR FOUR TRANSFORAMINAL EPIDURAL STEROID INJECTION SITE CONFIRMED BY FLUOROSCOPY performed by Harshil Rizo MD at [address] [facility] N/A 8/25/2020    MIDLINE LUMBAR FIVE SACRAL ONE INTERLAMINAR EPIDURAL STEROID INJECTION SITE CONFIRMED BY FLUOROSCOPY performed by Harshil Rizo MD at [address] [facility]  3/2002       Family History     Family History   Problem Relation Age of Onset    Heart Attack Father     Heart Disease Father     Stroke Brother        Social History     Social History     Tobacco Use    Smoking status: Never Smoker    Smokeless tobacco: Never Used   Vaping Use    Vaping Use: Never used   Substance Use Topics    Alcohol use: No    Drug use: Never        Allergies     Allergies   Allergen Reactions    Diclofenac Hives     Severe itching    Adhesive Tape Rash    Penicillins Nausea And Vomiting     \"years ago\"       Medications     Current Outpatient Medications   Medication Sig Dispense Refill    DULoxetine 40 MG CPEP Take 20 mg by mouth daily 30 capsule 5    HYDROcodone-acetaminophen (NORCO) 5-325 MG per tablet Take 1 tablet by mouth 2 times daily as needed for Pain for up to 30 days. Fill after 9/12/2021 30 tablet 0    lidocaine (LIDODERM) 5 % Place 1 patch onto the skin daily 12 hours on, 12 hours off. 30 patch 0    CARTIA  MG extended release capsule TAKE ONE CAPSULE BY MOUTH DAILY 90 capsule 2    furosemide (LASIX) 20 MG tablet Take 1 tablet by mouth daily as needed (SOB, edema, weight gain) 30 tablet 5    warfarin (COUMADIN) 1 MG tablet TAKE ONE TABLET BY MOUTH ON SUNDAY, TUESDAY AND THURSDAY.   TAKE ONE-HALF TABLET BY MOUTH ON ALL OTHER DAYS OR AS DIRECTED BY CLINIC 65 tablet 5    sertraline (ZOLOFT) 50 MG tablet TAKE ONE TABLET BY MOUTH DAILY 90 tablet 1    allopurinol (ZYLOPRIM) 300 MG tablet TAKE ONE TABLET BY MOUTH DAILY 90 tablet 3    omeprazole (PRILOSEC) 20 MG delayed release capsule TAKE ONE CAPSULE BY MOUTH DAILY 90 capsule 2    atorvastatin (LIPITOR) 20 MG tablet TAKE ONE TABLET BY MOUTH ONCE NIGHTLY 90 tablet 3    metoprolol tartrate (LOPRESSOR) 25 MG tablet Take 1 tablet by mouth 2 times daily 180 tablet 3    Cholecalciferol (VITAMIN D) 2000 UNITS CAPS capsule Take 2,000 Units by mouth daily      docusate sodium (COLACE) 100 MG capsule Take 100 mg by mouth daily as needed for Constipation        No current facility-administered medications for this visit. Review of Systems     Review of Systems   Constitutional: Negative for appetite change, chills, fatigue, fever and unexpected weight change. HENT: Negative for congestion, ear discharge, ear pain, facial swelling, hearing loss, nosebleeds, postnasal drip, sinus pressure, sneezing, sore throat, tinnitus, trouble swallowing and voice change. Eyes: Negative for itching. Respiratory: Negative for apnea, cough and shortness of breath. Gastrointestinal:        Negative for dysphasia   Endocrine: Negative for cold intolerance and heat intolerance. Musculoskeletal: Negative for myalgias and neck pain. Skin: Negative for rash. Allergic/Immunologic: Negative for environmental allergies. Neurological: Negative for dizziness and headaches. Psychiatric/Behavioral: Negative for confusion, decreased concentration and sleep disturbance. PhysicalExam     Vitals:    08/27/21 1433   BP: 128/82   Site: Left Upper Arm   Position: Sitting   Cuff Size: Large Adult   Pulse: 63   Temp: 97.2 °F (36.2 °C)   Weight: 271 lb (122.9 kg)   Height: 5' 6.5\" (1.689 m)       Physical Exam  Constitutional:       General: She is not in acute distress. Appearance: She is well-developed. HENT:      Head: Normocephalic and atraumatic. Right Ear: Tympanic membrane, ear canal and external ear normal. No drainage.  No middle ear effusion. Tympanic membrane is not bulging. Tympanic membrane has normal mobility. Left Ear: Tympanic membrane, ear canal and external ear normal. No drainage. No middle ear effusion. Tympanic membrane is not bulging. Tympanic membrane has normal mobility. Nose: No septal deviation, mucosal edema or rhinorrhea. Mouth/Throat:      Lips: Pink. Mouth: Mucous membranes are moist.      Tongue: No lesions. Palate: No mass. Pharynx: Uvula midline. Eyes:      Pupils: Pupils are equal, round, and reactive to light. Neck:      Thyroid: No thyroid mass or thyromegaly. Trachea: Trachea and phonation normal.   Cardiovascular:      Pulses: Normal pulses. Pulmonary:      Effort: Pulmonary effort is normal. No accessory muscle usage or respiratory distress. Breath sounds: No stridor. Musculoskeletal:      Cervical back: Full passive range of motion without pain. Lymphadenopathy:      Head:      Right side of head: No submental or submandibular adenopathy. Left side of head: No submental or submandibular adenopathy. Cervical: No cervical adenopathy. Right cervical: No superficial, deep or posterior cervical adenopathy. Left cervical: No superficial, deep or posterior cervical adenopathy. Skin:     General: Skin is warm and dry. Neurological:      Mental Status: She is alert and oriented to person, place, and time. Cranial Nerves: No cranial nerve deficit. Coordination: Coordination normal.      Gait: Gait normal.   Psychiatric:         Thought Content: Thought content normal.             Assessment and Plan     1. Sensation of fullness in right ear  -Provided patient reassurance that there is nothing present in her right ear canal.  Discussed use of hydrocortisone cream in the ear twice daily to see if this will help with the irritated/foreign body sensation.     Return as needed    Dai Clement DO  8/27/21      Portions of this note were dictated

## 2021-08-31 NOTE — FLOWSHEET NOTE
[address] [place], [address] [place] [address] [address]  Phone: [phone] Fax: [phone]   Occupational Therapy Treatment Note/ Progress Report:     Is this a Progress Report:     []  Yes  [x]  No      If Yes:  Date Range for reporting period:  Beginning 21  Ending 2021  Progress report will be due (10 Rx or 30 days whichever is less):     Recertification will be due (POC Duration  / 90 days whichever is less): 21   Date:  2021  Patient Name:  Donald Sandy    :  1940  MRN: [id number]  Medical/Treatment Diagnosis Information:  · Diagnosis: S49.91XA (ICD-10-CM) - Injury of right shoulder, initial encounter     Diagnosis: S49.91XA (ICD-10-CM) - Injury of right shoulder, initial encounter          Treatment Diagnosis: M25. 511 right shoulder pain                                            Insurance/Certification information:     Physician Information:     Has the plan of care been signed (Y/N):        []  Yes  [x]  No     Visit # Insurance Allowable Auth Required   4  []  Yes []  No    From 21  to 2021    Comorbidities Affecting Functional Performance:             [x]? Anxiety (F41.9)/Depression (F32.9)           [x]? Hypertension  [x]? Diabetes Type 1(E10.65) or 2 (E11.65)     []? CVA              []?Rheumatoid Arthritis (M05.9)                     []?Aherisclerosis  []? Fibromyalgia (M79.7)                                 []?Angina Pectoris  []? 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 Osteoarthritis(M19.91)                               []?Osteoporosis  []? None                                                                      [x]? Morbid Obesity  [x]? Other: CKD, see medical history in EPIC                                                        []?COPD    Date of Injury: 21  Date of Surgery: none     Date of Patient follow up with Physician: next week  RESTRICTIONS/PRECAUTIONS: none    Latex Allergy:  [x]No      []Yes  Pacemaker:  [x] No       [] Yes   Preferred Language for Healthcare:   [x]English       []other:   Functional Scale: 41/80 UEFI             Date assessed:  8/19/2021  Pain Scale: 0/10 at beginning of treatment, 2/10 at its worse  SUBJECTIVE: Using arm a little more around the house, can pull car door shut with right arm now, can put earrings in, can turn light switches on, able to use duster in right hand on the furniture. OBJECTIVE:   Date:  Hand Dominance:     [x]? Right    []? Left 8/19/2021 8/24/21   Objective Measures:      PAIN 6/10    UEFI 41/80    Digits tip to DPFC in cm right hand WNL    Thumb ROM WNL    Wrist ROM Ext/Flex WNL    Forearm ROM  Sup/pron WNL    Elbow ROM Ext/flex WNL    Shoulder Flex  Shoulder Abd  Shoulder IR/ER 58   55  No active ER available/ IR WNL, full PROM same    can externally rotate with shoulder at 90/90 supine from full IR                    strength in lbs R: NT  L:    Pinch Strengthin lbs: lat  R:NT  L:    Pinch Strength in lbs:  3 point R:NT  L:       MMT:  ER on right 1/5  Left 3/5  IR 4/5 on right 5/5 on left    ER 2/5   Observations:  (including splints, bandages, incisions, scars): MODALITIES: 8/19/21 8/24/21 8/26/21 8/31/21   Fluidotherapy (15123)       Estim (54319/08278)       Paraffin (46454)       US (32990)       Iontophoresis (21330)       Hot Pack       Cold Pack 10\" end of treatment 10\" end of treatment  10\" end of treatment          INTERVENTIONS:       Therapeutic Exercise (10221) See below Isometric shoulder ER/IR 10 x 2. Shoulder shrugs 10 x 2. Shoulder shrugs 10 x 2. same     Scapular squeezes bilaterally in sitting 10 x 2. Scapular squeezes bilaterally in sitting 10 x 2. same       Horizontal adduction right arm to left shoulder and axilla area 10 x 2. Arm slides forward on table in sitting 10 x 2.  Arm slides forward on table in sitting 10 x 2 with lifting arm off table a few inches and slowly lowering      Place and hold shoulder flexion to 45 x 10. Supine bilateral cane exercises press to ceiling 10 x 2 and full shoulder flexion 10 x 2. Supine IR/ER shoulder shoulder at 90/90 10 x 2. Supine IR/ER shoulder shoulder at 90/90 10 x 2. Supine IR/ER shoulder shoulder at 90/90 10 x 2. Supine IR/ER shoulder at 90/0 10 x 2. Supine IR/ER shoulder at 90/0 10 x 2. Shoulder flexion supine to overhead 10 x 2. Shoulder flexion supine to overhead 10 x 2. Difficult to do today. PROM shoulder flex., ER, IR  PROM shoulder flex., ER, IR  PROM shoulder flex., ER, IR           Therapeutic Activity (62709)                     Manual Therapy (53413)       (IASTM, Dry Needling, manual mobilization)              Neuromuscular Reeducation (94051)                     ADL Training (89895)                     HEP Training/Review Access Code: [email or web address]  URL: [email or web address]  Date: 08/19/2021  Prepared by: Delfin Embs    Exercises  Seated Shoulder Flexion Towel Slide at Table Top - 3 x daily - 7 x weekly - 3 sets - 10 reps  Shoulder External Rotation and Scapular Retraction - 1 x daily - 7 x weekly - 3 sets - 10 reps  Supine Shoulder External Rotation Stretch - 1 x daily - 7 x weekly - 3 sets - 10 reps  Supine Shoulder Internal Rotation - 1 x daily - 7 x weekly - 3 sets - 10 reps  Shoulder External Rotation and Scapular Retraction - 1 x daily - 7 x weekly - 3 sets - 10 reps     Added supine bilateral cane exercises for home. Access Code: [email or web address]  URL: [email or web address]  Date: 08/19/2021  Prepared by: Delfin Embs    Exercises  Supine Shoulder Flexion PROM - 3 x daily - 7 x weekly - 3 sets - 10 reps               Splinting       Lcode:       Orthotic Mgmt, Subsequent Enc (86609)       Orthotic Mgmt & Training (54629)              Other:         Therapeutic Exercise & NMR:  [x] (81202) Provided verbal/tactile cueing for activities related to strengthening, flexibility, endurance, ROM  for improvements in scapular, scapulothoracic and UE control with self care, reaching, carrying, lifting, house/yardwork, driving/computer work.    [] (55816) Provided verbal/tactile cueing for activities related to improving balance, coordination, kinesthetic sense, posture, motor skill, proprioception  to assist with  scapular, scapulothoracic and UE control with self care, reaching, carrying, lifting, house/yardwork, driving/computer work.     Therapeutic Activities & NMR:    [] (36225 or 86461) Provided verbal/tactile cueing for activities related to improving balance, coordination, kinesthetic sense, posture, motor skill, proprioception and motor activation to allow for proper function of scapular, scapulothoracic and UE control with self care, carrying, lifting, driving/computer work    Home Exercise Program:    [] (91393) Reviewed/Progressed HEP activities related to strengthening, flexibility, endurance, ROM of scapular, scapulothoracic and UE control with self care, reaching, carrying, lifting, house/yardwork, driving/computer work  [] (36152) Reviewed/Progressed HEP activities related to improving balance, coordination, kinesthetic sense, posture, motor skill, proprioception of scapular, scapulothoracic and UE control with self care, reaching, carrying, lifting, house/yardwork, driving/computer work      Manual Treatments:  PROM / STM / Oscillations-Mobs:  G-I, II, III, IV (PA's, Inf., Post.)  [] (86793) Provided manual therapy to mobilize soft tissue/joints of cervical/CT, scapular GHJ and UE for the purpose of modulating pain, promoting relaxation,  increasing ROM, reducing/eliminating soft tissue swelling/inflammation/restriction, improving soft tissue extensibility and allowing for proper ROM for normal function with self care, reaching, carrying, lifting, house/yardwork, driving/computer work    ADL Training:  [] (78294) Provided self-care/home management training related to activities of daily living and compensatory training, and/or use of adaptive equipment      Charges  Timed Code Treatment Minutes: 45   Total Treatment Minutes: 45     Medicare total (add KX at $2000)  500     [] EVAL (LOW) 97952 (typically 20 minutes face-to-face)    [] EVAL (MOD) 70667 (typically 30 minutes face-to-face)  [] EVAL (HIGH) 30382 (typically 45 minutes face-to-face)  [] OT Re-eval (77411)       [x] Macario ([id number]) x  3    [] MZIGL(89704)  [] NMR (86681) x      [] Estim (attended) (66783)   [] Manual (H414162) x      [] US (19643)  [] TA ([facility]) x      [] Paraffin (36576)  [] ADL  ([phone]) x     [] Splint/L code:    [] Estim (unattended) (94108)  [] Fluidotherapy (79071)  [] DN 1-2 (45994)   [] DN 3+ (71949)  [] Orthotic Mgmt, Subsequent Enc (93888)  [] Orthotic Mgmt & Training (94245)  [] Other:    ASSESSMENT:  Better ER, table slides and flexion supine. GOALS: Patient stated goal: use right arm better. []? Progressing: []? Met: []? Not Met: []? Adjusted     Therapist goals for Patient:   Short Term Goals: To be achieved in: 2 weeks  1. Independent in HEP and progression per patient tolerance, in order to prevent re-injury. []? Progressing: []? Met: []? Not Met: []? Adjusted   2. Patient will have a decrease in pain to facilitate improvement in movement, function, and ADLs as indicated by Functional Deficits. []? Progressing: []? Met: []? Not Met: []? Adjusted     Long Term Goals to be achieved in 4 weeks (through 9/16/21), including patient directed goals to address patient identified performance deficits:  1) Pt to be independent in graded HEP progression with a good level of effort and compliance. []? Progressing: []? Met: []? Not Met: []? Adjusted   2) Pt to report a score of </= 50/80 on UEFI disability questionnaire for increased performance with carrying, moving, and handling objects. []? Progressing: []? Met: []? Not Met: []?  Adjusted   3) Pt will

## 2021-09-02 NOTE — FLOWSHEET NOTE
[address] [place], [address] [place] [address] [address]  Phone: [phone] Fax: [phone]   Occupational Therapy Treatment Note/ Progress Report:     Is this a Progress Report:     []  Yes  [x]  No      If Yes:  Date Range for reporting period:  Beginning 21  Ending 2021  Progress report will be due (10 Rx or 30 days whichever is less):     Recertification will be due (POC Duration  / 90 days whichever is less): 21   Date:  2021  Patient Name:  Robb Huang    :  1940  MRN: [id number]  Medical/Treatment Diagnosis Information:  · Diagnosis: S49.91XA (ICD-10-CM) - Injury of right shoulder, initial encounter     Diagnosis: S49.91XA (ICD-10-CM) - Injury of right shoulder, initial encounter          Treatment Diagnosis: M25. 511 right shoulder pain                                            Insurance/Certification information:     Physician Information:     Has the plan of care been signed (Y/N):        []  Yes  [x]  No     Visit # Insurance Allowable Auth Required   5  []  Yes []  No    From 21  to 2021    Comorbidities Affecting Functional Performance:             [x]? Anxiety (F41.9)/Depression (F32.9)           [x]? Hypertension  [x]? Diabetes Type 1(E10.65) or 2 (E11.65)     []? CVA              []?Rheumatoid Arthritis (M05.9)                     []?Aherisclerosis  []? Fibromyalgia (M79.7)                                 []?Angina Pectoris  []? 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 Osteoarthritis(M19.91)                               []?Osteoporosis  []? None                                                                      [x]? Morbid Obesity  [x]? Other: CKD, see medical history in EPIC                                                        []?COPD    Date of Injury: 21  Date of Surgery: none     Date of Patient follow up with Physician: slowly lowering       Place and hold shoulder flexion to 45 x 10. Supine bilateral cane exercises press to ceiling 10 x 2 and full shoulder flexion 10 x 2. same     Supine IR/ER shoulder shoulder at 90/90 10 x 2. Supine IR/ER shoulder shoulder at 90/90 10 x 2. Supine IR/ER shoulder shoulder at 90/90 10 x 2. same     Supine IR/ER shoulder at 90/0 10 x 2. Supine IR/ER shoulder at 90/0 10 x 2. Shoulder flexion supine to overhead 10 x 2. Shoulder flexion supine to overhead 10 x 2. Difficult to do today. Shoulder flexion supine to overhead 10 x 2. Much easier to perform today     PROM shoulder flex., ER, IR  PROM shoulder flex., ER, IR  PROM shoulder flex., ER, IR  PROM shoulder flex., ER, IR         Reaching forward in sitting with elbow extension x 10. Therapeutic Activity (15612)                        Manual Therapy (83149)        (IASTM, Dry Needling, manual mobilization)                Neuromuscular Reeducation (88558)                        ADL Training (60455)                        HEP Training/Review Access Code: [email or web address]  URL: [email or web address]  Date: 08/19/2021  Prepared by: Ginny Ballesteros    Exercises  Seated Shoulder Flexion Towel Slide at Table Top - 3 x daily - 7 x weekly - 3 sets - 10 reps  Shoulder External Rotation and Scapular Retraction - 1 x daily - 7 x weekly - 3 sets - 10 reps  Supine Shoulder External Rotation Stretch - 1 x daily - 7 x weekly - 3 sets - 10 reps  Supine Shoulder Internal Rotation - 1 x daily - 7 x weekly - 3 sets - 10 reps  Shoulder External Rotation and Scapular Retraction - 1 x daily - 7 x weekly - 3 sets - 10 reps     Added supine bilateral cane exercises for home. Continue at home    Access Code: [facility]  URL: [email or web address]  Date: 08/19/2021  Prepared by: Ginny Ballesteros    Exercises  Supine Shoulder Flexion PROM - 3 x daily - 7 x weekly - 3 sets - 10 reps                 Splinting        Lcode:        Orthotic Mgmt, Subsequent Enc (66104) Orthotic Mgmt & Training (10839)                Other:          Therapeutic Exercise & NMR:  [x] (15538) Provided verbal/tactile cueing for activities related to strengthening, flexibility, endurance, ROM  for improvements in scapular, scapulothoracic and UE control with self care, reaching, carrying, lifting, house/yardwork, driving/computer work.    [] (31569) Provided verbal/tactile cueing for activities related to improving balance, coordination, kinesthetic sense, posture, motor skill, proprioception  to assist with  scapular, scapulothoracic and UE control with self care, reaching, carrying, lifting, house/yardwork, driving/computer work.     Therapeutic Activities & NMR:    [] (30829 or 37755) Provided verbal/tactile cueing for activities related to improving balance, coordination, kinesthetic sense, posture, motor skill, proprioception and motor activation to allow for proper function of scapular, scapulothoracic and UE control with self care, carrying, lifting, driving/computer work    Home Exercise Program:    [] (64004) Reviewed/Progressed HEP activities related to strengthening, flexibility, endurance, ROM of scapular, scapulothoracic and UE control with self care, reaching, carrying, lifting, house/yardwork, driving/computer work  [] (92416) Reviewed/Progressed HEP activities related to improving balance, coordination, kinesthetic sense, posture, motor skill, proprioception of scapular, scapulothoracic and UE control with self care, reaching, carrying, lifting, house/yardwork, driving/computer work      Manual Treatments:  PROM / STM / Oscillations-Mobs:  G-I, II, III, IV (PA's, Inf., Post.)  [] (57594) Provided manual therapy to mobilize soft tissue/joints of cervical/CT, scapular GHJ and UE for the purpose of modulating pain, promoting relaxation,  increasing ROM, reducing/eliminating soft tissue swelling/inflammation/restriction, improving soft tissue extensibility and allowing for proper ROM for normal function with self care, reaching, carrying, lifting, house/yardwork, driving/computer work    ADL Training:  [] (17338) Provided self-care/home management training related to activities of daily living and compensatory training, and/or use of adaptive equipment      Charges  Timed Code Treatment Minutes: 45   Total Treatment Minutes: 45     Medicare total (add KX at $2000)  600     [] EVAL (LOW) 73385 (typically 20 minutes face-to-face)    [] EVAL (MOD) 02205 (typically 30 minutes face-to-face)  [] EVAL (HIGH) 57865 (typically 45 minutes face-to-face)  [] OT Re-eval (15027)       [x] Macario [phone]) x  3    [] LESSY(75381)  [] NMR (93457) x      [] Estim (attended) (45999)   [] Manual ([id number]) x      [] US (13307)  [] TA () x      [] Paraffin (11487)  [] ADL  ([phone]) x     [] Splint/L code:    [] Estim (unattended) [phone])  [] Fluidotherapy (42524)  [] DN 1-2 (20616)   [] DN 3+ ([phone])  [] Orthotic Mgmt, Subsequent Enc (14714)  [] Orthotic Mgmt & Training (69725)  [] Other:    ASSESSMENT:  Better ability to lift arm supine     GOALS: Patient stated goal: use right arm better. []? Progressing: []? Met: []? Not Met: []? Adjusted     Therapist goals for Patient:   Short Term Goals: To be achieved in: 2 weeks  1. Independent in HEP and progression per patient tolerance, in order to prevent re-injury. []? Progressing: []? Met: []? Not Met: []? Adjusted   2. Patient will have a decrease in pain to facilitate improvement in movement, function, and ADLs as indicated by Functional Deficits. []? Progressing: []? Met: []? Not Met: []? Adjusted     Long Term Goals to be achieved in 4 weeks (through 9/16/21), including patient directed goals to address patient identified performance deficits:  1) Pt to be independent in graded HEP progression with a good level of effort and compliance. []? Progressing: []? Met: []? Not Met: []?  Adjusted   2) Pt to report a score of </= 50/80 on UEFI disability questionnaire for increased performance with carrying, moving, and handling objects. []? Progressing: []? Met: []? Not Met: []? Adjusted   3) Pt will demonstrate increased ROM to right shoulder flexion to 90 for improved independence with reaching higher objects. .  []? Progressing: []? Met: []? Not Met: []? Adjusted   4) Pt will demonstrate increased strength to right flexion to 2/5 against gravity for improved independence with lifting higher objects to shoulder level. []? Progressing: []? Met: []? Not Met: []? Adjusted   5) Pt will have a decrease in pain to 2/10 to facilitate use of right arm.  []? Progressing: []? Met: []? Not Met: []? Adjusted      Overall Progression Towards Functional Goals/Treatment Progress Update:  [x] Patient is progressing as expected towards functional goals listed. [] Progression is slowed due to complexities/impairments listed. [] Progression has been slowed due to co-morbidities. [] Plan just implemented, too soon to assess goals progression <30 days  [] Goals require adjustment due to lack of progress  [] Patient is not progressing as expected and requires additional follow up with physician  [] All goals are met  [] Other:     Prognosis for POC: [x] Good [] Fair  [] Poor    Patient requires continued skilled intervention: [x] Yes  [] No    Treatment/Activity Tolerance:  [x] Patient able to complete treatment  [] Patient limited by fatigue  [] Patient limited by pain    [] Patient limited by other medical complications  [] Other:                  PLAN: See eval  [x] Continue per plan of care [] Alter current plan (see comments above)  [] Plan of care initiated [] Hold pending MD visit [] Discharge    Electronically signed by:  Sravani Salas, OT, OTR\L, T - [id number]       Note: If patient does not return for scheduled/ recommended follow up visits, this note will serve as a discharge from care along with most recent update on progress.

## 2021-09-02 NOTE — PROGRESS NOTES
Physical Therapy Daily Treatment Note    [x]Daily Tx Note    [x]Progress Note    [] Discharge Summary         Date:  2021    Patient Name:  Donald Sandy    :  1940  MRN: [id number]      Medical/Treatment Diagnosis Information:  ·  Lumbar disc disease M51.9, frequent falls R29.6; gait disturbance R26.9        ·  difficulty with ambulation       Insurance/Certification information:   Medicare    Physician Information:   Amy Bird MD     Plan of care signed :  []  Yes  [x] No  [x]  Cosign []  Fax    Date of Patient follow up with Physician:     Is this a Progress Report:     [x]  Yes  []  No      If Yes:  Date Range for reporting period:  Beginning 2021  Ending 2021    Progress report will be due (10 Rx or 30 days whichever is less):      Recertification will be due (POC Duration  / 90 days whichever is less):       Visit # Insurance Allowable Auth Required      []  Yes [x]  No        Functional Scale:       Date Assessed: 2021  Measure Used: LEFI  Score: 36/80     Latex Allergy:  [x]NO      []YES  Preferred Language for Healthcare:   [x]English       []other:    RESTRICTIONS/PRECAUTIONS:      SUBJECTIVE: Pt reports that she takes her cane with her through house but tends to forget it, so she feels she is walking better. Pt performing exercise 2-3 times per day. Pain level: Patient reports pain is  0/10 pain at present; no pain with walking with rollator. Plan Moving Forward/ For next visit:    Balance activity and gait    Exercise stretching and strengtheining    OBJECTIVE: See eval    Exercises/Interventions:     Exercises in bold performed in department today. Items not bolded are carried forward from prior visits for continuity of the record.   Exercise/Equipment Resistance/Repetitions HEP Other comments   DF and eversion with theraband red   1x10 []    Abduction standing    2x10 []    Glut sets and quad sets   1x10 hold 5 secs [] 6/10 in low back Standing Heel/toe 1x10 []    Seated flexion    3 reps hold 10 seconds []     SAQ  2x10 3 lbs []     SLR  1x10 B []    Bridges   1x10 []    Small bridge with knee extension   1x10 []          []     NR    []     step ups  And lateral foam  1x10 reps B []     standing on foam  EO and EC feet together each one minute; rotation to right then to left.   []        []        []        []        []        []      Therapeutic Exercise/Home Exercise Program: 35  Minutes with gait below  See above  O2 sat 96%, Pulse 83  After bed exercise 95%  Sitting up after bed exercise 98%, pulse 89  Strength: 4+/5 overall except right hip extension 4/5; B quads 4/5  Completed progress note    Therapeutic Activity:  0 minutes     Gait: 4 minutes  Amb without AD with CGAx1 25 feet x2    Neuromuscular Re-Education:  5 minutes  see above     Canalith Repositioning Procedure:      Manual Therapy:  0 minutes    Modalities: 0 minutes    ASSESSMENT:  Pt had difficulty with maintaining O2 sat. Improved with diaphragmatic breathing  GOALS    Short Term Goals:    3 weeks MET Not  Met Long Term Goals:    6 weeks MET Not Met   Establish HEP [x]?  []?  Pt independent with HEP []?  []?    Increase identified strength deficits 1/3 grade  [x]?  []?  Increase identified strength deficits 2/3 grade or better []?  []?    Bed mobility/transfers independent  [x]?  []?  Gait pt able to able 20 feet without AD with no noted difficulty and minimal to no antalgic gait. []?  []?    Increase FOM score on 40/80  []?  []?  Steps pt able to ascend and descend on flight of stairs with one hand on railing with minimal to no noted difficulty. []?  []?      []?  []?  Increase FOM score on 50/80 []?  []?      []?  []?         Overall Progression Towards Functional goals/ Treatment Progress Update:  [] Patient is progressing as expected towards functional goals listed. [] Progression is slowed due to complexities/Impairments listed.   [] Progression has been slowed due to co-morbidities. [x] Plan just implemented, too soon to assess goals progression <30days   [] Goals require adjustment due to lack of progress  [] Patient is not progressing as expected and requires additional follow up with physician  [] Other    Prognosis for POC: [x] Good [] Fair  [] Poor    Patient requires continued skilled intervention: [x] Yes  [] No    Treatment/Activity Tolerance:  [x] Patient able to complete treatment  [] Patient limited by fatigue  [] Patient limited by pain    [] Patient limited by other medical complications  [] Other:         PLAN: See eval  [x] Continue per plan of care [] Alter current plan (see comments above)  [] Plan of care initiated [] Hold pending MD visit [] Discharge  Will continue 2 times per week for 3 more weeks for balance and strengthening. Therapeutic Exercise and NMR EXR  [x] (49363) Provided verbal/tactile cueing for activities related to strengthening, flexibility, endurance, ROM  for improvements in proximal strength and core control with self care, mobility, lifting and ambulation. [x] (24887) Provided verbal/tactile cueing for activities related to improving balance, coordination, kinesthetic sense, posture, motor skill, proprioception  to assist with core control in self care, mobility, lifting, and ambulation.      Therapeutic Activities and Gait:    [] (45093 or 13248) Provided verbal/tactile cueing for activities related to improving balance, coordination, kinesthetic sense, posture, motor skill, proprioception and motor activation to allow for proper function  with self care and ADLs  [] (83732) Provided training and instruction to the patient for proper core and proximal hip recruitment and positioning with ambulation re-education     Home Exercise Program:    [x] (41741) Reviewed/Progressed HEP activities related to strengthening, flexibility, endurance, ROM of core, proximal hip and LE for functional self-care, mobility, lifting and ambulation   [] (74564) Reviewed/Progressed HEP activities related to improving balance, coordination, kinesthetic sense, posture, motor skill, proprioception of core, proximal hip and LE for self care, mobility, lifting, and ambulation      Manual Treatments:  PROM / STM / Oscillations-Mobs:  G-I, II, III, IV (PA's, Inf., Post.)  [] (71094) Provided manual therapy to mobilize proximal hip and LS spine soft tissue/joints for the purpose of modulating pain, promoting relaxation,  increasing ROM, reducing/eliminating soft tissue swelling/inflammation/restriction, improving soft tissue extensibility and allowing for proper ROM for normal function with self care, mobility, lifting and ambulation. []CRP:  Canalith Repositioning procedure for the assessment, treatment and education of BPPV    Modalities:       Charges:  Timed Code Treatment Minutes: 43   Total Treatment Minutes: 43     Medicare Cap total YTD:  074      []N/A  Workers Comp Time Stamp  (Per CPT and Total Treatment) [x]N/A   Time In:   Time Out:       [] EVAL    [] Dry Needling  [x] QB(48382)   x 3   [] EStim Unattended 00440  [] NMR (88870)  x     [] Estim Attended  23919  [] Manual (05211)  x  (was incorrect on last visit should have been NR was charged correctly.)  [] Mechanical Txn 03593  [] TA    x     [] Ultrasound  [] Gait   x  [] Vaso  [] CRP    [] Ionto           [] Other:        Electronically signed by: Laurann Osler, DQ,[id number]      Note: If patient does not return for scheduled/ recommended follow up visits, this note will serve as a discharge from care along with most recent update on progress.

## 2021-09-07 NOTE — FLOWSHEET NOTE
next week  RESTRICTIONS/PRECAUTIONS: none    Latex Allergy:  [x]No      []Yes  Pacemaker:  [x] No       [] Yes   Preferred Language for Healthcare:   [x]English       []other:   Functional Scale: 41/80 UEFI             Date assessed:  8/19/2021  Pain Scale: 0/10 at beginning of treatment, 2/10 at its worse  SUBJECTIVE: Can reach up to get bowls off of low shelf, turning light switches on, able to use both hands to wash hair. OBJECTIVE:   Date:  Hand Dominance:     [x]? Right    []? Left 8/19/2021 8/24/21 9/7/21   Objective Measures:       PAIN 6/10     UEFI 41/80     Digits tip to DPFC in cm right hand WNL     Thumb ROM WNL     Wrist ROM Ext/Flex WNL     Forearm ROM  Sup/pron WNL     Elbow ROM Ext/flex WNL     Shoulder Flex  Shoulder Abd  Shoulder IR/ER 58   55  No active ER available/ IR WNL, full PROM same    can externally rotate with shoulder at 90/90 supine from full IR   68  75                    strength in lbs R: NT  L:     Pinch Strengthin lbs: lat  R:NT  L:     Pinch Strength in lbs:  3 point R:NT  L:        MMT:  ER on right 1/5  Left 3/5  IR 4/5 on right 5/5 on left    ER 2/5    Observations:  (including splints, bandages, incisions, scars): MODALITIES: 8/19/21 8/24/21 8/26/21 8/31/21 9/2/21 9/7/21   Fluidotherapy (72804)         Estim (24816/01639)         Paraffin (65255)         US (45222)         Iontophoresis (96013)         Hot Pack         Cold Pack 10\" end of treatment 10\" end of treatment  10\" end of treatment same same            INTERVENTIONS:         Therapeutic Exercise (06336) See below Isometric shoulder ER/IR 10 x 2. Shoulder shrugs 10 x 2. Shoulder shrugs 10 x 2. same same same     Scapular squeezes bilaterally in sitting 10 x 2. Scapular squeezes bilaterally in sitting 10 x 2. same same same       Horizontal adduction right arm to left shoulder and axilla area 10 x 2. Supine Horizontal adduction right arm to left shoulder and axilla area 10 x 2.  Supine Horizontal adduction right arm to left shoulder and axilla area 10 x 2. Arm slides forward on table in sitting 10 x 2. Arm slides forward on table in sitting 10 x 2 with lifting arm off table a few inches and slowly lowering        Place and hold shoulder flexion to 45 x 10. Supine bilateral cane exercises press to ceiling 10 x 2 and full shoulder flexion 10 x 2. same same     Supine IR/ER shoulder shoulder at 90/90 10 x 2. Supine IR/ER shoulder shoulder at 90/90 10 x 2. Supine IR/ER shoulder shoulder at 90/90 10 x 2. same same     Supine IR/ER shoulder at 90/0 10 x 2. Supine IR/ER shoulder at 90/0 10 x 2. Shoulder flexion supine to overhead 10 x 2. Shoulder flexion supine to overhead 10 x 2. Difficult to do today. Shoulder flexion supine to overhead 10 x 2. Much easier to perform today Shoulder flexion supine to overhead 10 x 2. PROM shoulder flex., ER, IR  PROM shoulder flex., ER, IR  PROM shoulder flex., ER, IR  PROM shoulder flex., ER, IR  PROM shoulder flex., ER, IR         Reaching forward in sitting with elbow extension x 10. Bilateral cane flexion in sitting today 10 x 2. Attempted wall slides but could not complete   Therapeutic Activity (54929)                           Manual Therapy (27617)         (IASTM, Dry Needling, manual mobilization)                  Neuromuscular Reeducation (86093)                           ADL Training (21242)                           [facility] Training/Review Access Code: [email or web address]  URL: [email or web address]  Date: 08/19/2021  Prepared by: Brittani Mckeon    Exercises  Seated Shoulder Flexion Towel Slide at Table Top - 3 x daily - 7 x weekly - 3 sets - 10 reps  Shoulder External Rotation and Scapular Retraction - 1 x daily - 7 x weekly - 3 sets - 10 reps  Supine Shoulder External Rotation Stretch - 1 x daily - 7 x weekly - 3 sets - 10 reps  Supine Shoulder Internal Rotation - 1 x daily - 7 x weekly - 3 sets - 10 reps  Shoulder External Rotation and Scapular Retraction - 1 x daily - 7 x weekly - 3 sets - 10 reps     Added supine bilateral cane exercises for home. Continue at home     Access Code: [facility]  URL: [email or web address]  Date: 08/19/2021  Prepared by: Ziyad Richardson    Exercises  Supine Shoulder Flexion PROM - 3 x daily - 7 x weekly - 3 sets - 10 reps                   Splinting         Lcode:         Orthotic Mgmt, Subsequent Enc (39914)         Orthotic Mgmt & Training (21517)                  Other:           Therapeutic Exercise & NMR:  [x] (00267) Provided verbal/tactile cueing for activities related to strengthening, flexibility, endurance, ROM  for improvements in scapular, scapulothoracic and UE control with self care, reaching, carrying, lifting, house/yardwork, driving/computer work.    [] (31999) Provided verbal/tactile cueing for activities related to improving balance, coordination, kinesthetic sense, posture, motor skill, proprioception  to assist with  scapular, scapulothoracic and UE control with self care, reaching, carrying, lifting, house/yardwork, driving/computer work.     Therapeutic Activities & NMR:    [] (10474 or 99670) Provided verbal/tactile cueing for activities related to improving balance, coordination, kinesthetic sense, posture, motor skill, proprioception and motor activation to allow for proper function of scapular, scapulothoracic and UE control with self care, carrying, lifting, driving/computer work    Home Exercise Program:    [] (77437) Reviewed/Progressed HEP activities related to strengthening, flexibility, endurance, ROM of scapular, scapulothoracic and UE control with self care, reaching, carrying, lifting, house/yardwork, driving/computer work  [] (12413) Reviewed/Progressed HEP activities related to improving balance, coordination, kinesthetic sense, posture, motor skill, proprioception of scapular, scapulothoracic and UE control with self care, reaching, carrying, lifting, house/yardwork, driving/computer work      Manual Treatments:  PROM / STM / Oscillations-Mobs:  G-I, II, III, IV (PA's, Inf., Post.)  [] (68068) Provided manual therapy to mobilize soft tissue/joints of cervical/CT, scapular GHJ and UE for the purpose of modulating pain, promoting relaxation,  increasing ROM, reducing/eliminating soft tissue swelling/inflammation/restriction, improving soft tissue extensibility and allowing for proper ROM for normal function with self care, reaching, carrying, lifting, house/yardwork, driving/computer work    ADL Training:  [] (32533) Provided self-care/home management training related to activities of daily living and compensatory training, and/or use of adaptive equipment      Charges  Timed Code Treatment Minutes: 45   Total Treatment Minutes: 45     Medicare total (add KX at $2000)  700     [] EVAL (LOW) 94939 (typically 20 minutes face-to-face)    [] EVAL (MOD) 37965 (typically 30 minutes face-to-face)  [] EVAL (HIGH) [phone] (typically 45 minutes face-to-face)  [] OT Re-eval (76923)       [x] Macario [phone]) x  3    [] XFGTU(04150)  [] NMR (77618) x      [] Estim (attended) (66871)   [] Manual ([address] [facility]) x      [] US (73591)  [] TA () x      [] Paraffin (42924)  [] ADL  (39 [phone]) x     [] Splint/L code:    [] Estim (unattended) 33 93 31)  [] Fluidotherapy (03020)  [] DN 1-2 (06561)   [] DN 3+ ([phone])  [] Orthotic Mgmt, Subsequent Enc (76772)  [] Orthotic Mgmt & Training (65838)  [] Other:    ASSESSMENT:  ROM is slowly improving in right arm    GOALS: Patient stated goal: use right arm better. []? Progressing: []? Met: []? Not Met: []? Adjusted     Therapist goals for Patient:   Short Term Goals: To be achieved in: 2 weeks  1. Independent in HEP and progression per patient tolerance, in order to prevent re-injury. []? Progressing: []? Met: []? Not Met: []? Adjusted   2. Patient will have a decrease in pain to facilitate improvement in movement, function, and ADLs as indicated by Functional Deficits.     []? Progressing: []? Met: []? Not Met: []? Adjusted     Long Term Goals to be achieved in 4 weeks (through 9/16/21), including patient directed goals to address patient identified performance deficits:  1) Pt to be independent in graded HEP progression with a good level of effort and compliance. []? Progressing: []? Met: []? Not Met: []? Adjusted   2) Pt to report a score of </= 50/80 on UEFI disability questionnaire for increased performance with carrying, moving, and handling objects. []? Progressing: []? Met: []? Not Met: []? Adjusted   3) Pt will demonstrate increased ROM to right shoulder flexion to 90 for improved independence with reaching higher objects. .  []? Progressing: []? Met: []? Not Met: []? Adjusted   4) Pt will demonstrate increased strength to right flexion to 2/5 against gravity for improved independence with lifting higher objects to shoulder level. []? Progressing: []? Met: []? Not Met: []? Adjusted   5) Pt will have a decrease in pain to 2/10 to facilitate use of right arm.  []? Progressing: []? Met: []? Not Met: []? Adjusted      Overall Progression Towards Functional Goals/Treatment Progress Update:  [x] Patient is progressing as expected towards functional goals listed. [] Progression is slowed due to complexities/impairments listed. [] Progression has been slowed due to co-morbidities.   [] Plan just implemented, too soon to assess goals progression <30 days  [] Goals require adjustment due to lack of progress  [] Patient is not progressing as expected and requires additional follow up with physician  [] All goals are met  [] Other:     Prognosis for POC: [x] Good [] Fair  [] Poor    Patient requires continued skilled intervention: [x] Yes  [] No    Treatment/Activity Tolerance:  [x] Patient able to complete treatment  [] Patient limited by fatigue  [] Patient limited by pain    [] Patient limited by other medical complications  [] Other:                  PLAN: See eval  [x] Continue per plan of care [] Alter current plan (see comments above)  [] Plan of care initiated [] Hold pending MD visit [] Discharge    Electronically signed by:  Kevin Solorzano OT, OTR\L, T - [id number]       Note: If patient does not return for scheduled/ recommended follow up visits, this note will serve as a discharge from care along with most recent update on progress.

## 2021-09-07 NOTE — PROGRESS NOTES
Physical Therapy Daily Treatment Note    [x]Daily Tx Note    []Progress Note    [x] Discharge Summary         Date:  2021    Patient Name:  Milly Real    :  1940  MRN: [id number]      Medical/Treatment Diagnosis Information:  ·  Lumbar disc disease M51.9, frequent falls R29.6; gait disturbance R26.9        ·  difficulty with ambulation       Insurance/Certification information:   Medicare    Physician Information:   Umberto Darnell MD     Plan of care signed :  []  Yes  [x] No  [x]  Cosign []  Fax    Date of Patient follow up with Physician:     Is this a Progress Report:     [x]  Yes  []  No      If Yes:  Date Range for reporting period:  Beginning 2021  Ending 2021    Progress report will be due (10 Rx or 30 days whichever is less):  discharged   Recertification will be due (POC Duration  / 90 days whichever is less): discharged      Visit # Insurance Allowable Auth Required      []  Yes [x]  No      Functional Scale:       Date Assessed: 2021  Measure Used: LEFI  Score: 36/80     Latex Allergy:  [x]NO      []YES  Preferred Language for Healthcare:   [x]English       []other:    RESTRICTIONS/PRECAUTIONS:      SUBJECTIVE:  Pt performing exercise 2-3 times per day. Pt reports 75-76% improvement since PT services. Pt reports that she would like to discharge at this time secondary to feeling good about how she is doing. Pt's friend states that she is able to walk from front door to back door with minimal to no noted difficulty without AD. Pain level: Patient reports pain is  0/10 pain at present; no pain with walking with rollator. Plan Moving Forward/ For next visit:    Discharge    OBJECTIVE: See eval    Exercises/Interventions:     Exercises in bold performed in department today. Items not bolded are carried forward from prior visits for continuity of the record.   Exercise/Equipment Resistance/Repetitions HEP Other comments   DF and eversion with theraband red LE for functional self-care, mobility, lifting and ambulation   [] (49343) Reviewed/Progressed HEP activities related to improving balance, coordination, kinesthetic sense, posture, motor skill, proprioception of core, proximal hip and LE for self care, mobility, lifting, and ambulation      Manual Treatments:  PROM / STM / Oscillations-Mobs:  G-I, II, III, IV (PA's, Inf., Post.)  [] (83541) Provided manual therapy to mobilize proximal hip and LS spine soft tissue/joints for the purpose of modulating pain, promoting relaxation,  increasing ROM, reducing/eliminating soft tissue swelling/inflammation/restriction, improving soft tissue extensibility and allowing for proper ROM for normal function with self care, mobility, lifting and ambulation. []CRP:  Canalith Repositioning procedure for the assessment, treatment and education of BPPV    Modalities:       Charges:  Timed Code Treatment Minutes: 25   Total Treatment Minutes: 25     Medicare Cap total YTD:  46      []N/A  Workers Comp Time Stamp  (Per CPT and Total Treatment) [x]N/A   Time In:   Time Out:       [] EVAL    [] Dry Needling  [] UX(71678)   x   [] EStim Unattended 54382  [x] NMR (48166)  x 1    [] Estim Attended  59459  [] Manual (18004)  x  (was incorrect on last visit should have been NR was charged correctly.)  [] Mechanical Txn 30180  [] TA    x     [] Ultrasound  [x] Gait   x1  [] Vaso  [] CRP    [] Ionto           [] Other:        Electronically signed by: JIM Osei,[id number]      Note: If patient does not return for scheduled/ recommended follow up visits, this note will serve as a discharge from care along with most recent update on progress.

## 2021-09-09 NOTE — FLOWSHEET NOTE
[address] [place], [address] [place] [address] [address]  Phone: [phone] Fax: [phone]   Occupational Therapy Treatment Note/ Progress Report:     Is this a Progress Report:     []  Yes  [x]  No      If Yes:  Date Range for reporting period:  Beginning 21  Ending 2021  Progress report will be due (10 Rx or 30 days whichever is less): 88    Recertification will be due (POC Duration  / 90 days whichever is less): 21   Date:  2021  Patient Name:  Carson Wallace    :  1940  MRN: [id number]  Medical/Treatment Diagnosis Information:  · Diagnosis: S49.91XA (ICD-10-CM) - Injury of right shoulder, initial encounter     Diagnosis: S49.91XA (ICD-10-CM) - Injury of right shoulder, initial encounter          Treatment Diagnosis: M25. 511 right shoulder pain                                            Insurance/Certification information:     Physician Information:     Has the plan of care been signed (Y/N):        []  Yes  [x]  No     Visit # Insurance Allowable Auth Required   7  []  Yes []  No    From 21  to 2021    Comorbidities Affecting Functional Performance:             [x]? Anxiety (F41.9)/Depression (F32.9)           [x]? Hypertension  [x]? Diabetes Type 1(E10.65) or 2 (E11.65)     []? CVA              []?Rheumatoid Arthritis (M05.9)                     []?Aherisclerosis  []? Fibromyalgia (M79.7)                                 []?Angina Pectoris  []? 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 Osteoarthritis(M19.91)                               []?Osteoporosis  []? None                                                                      [x]? Morbid Obesity  [x]? Other: CKD, see medical history in EPIC                                                        []?COPD    Date of Injury: 21  Date of Surgery: none     Date of Patient follow up with Physician: next week  RESTRICTIONS/PRECAUTIONS: none    Latex Allergy:  [x]No      []Yes  Pacemaker:  [x] No       [] Yes   Preferred Language for Healthcare:   [x]English       []other:   Functional Scale: 41/80 UEFI             Date assessed:  8/19/2021  Pain Scale: 0/10 at beginning of treatment, 2/10 at its worse  SUBJECTIVE: Was able to lift arm over head this morning. OBJECTIVE:   Date:  Hand Dominance:     [x]? Right    []? Left 8/19/2021 8/24/21 9/7/21 9/9/21   Objective Measures:        PAIN 6/10      UEFI 41/80      Digits tip to DPFC in cm right hand WNL      Thumb ROM WNL      Wrist ROM Ext/Flex WNL      Forearm ROM  Sup/pron WNL      Elbow ROM Ext/flex WNL      Shoulder Flex  Shoulder Abd  Shoulder IR/ER 58   55  No active ER available/ IR WNL, full PROM same    can externally rotate with shoulder at 90/90 supine from full IR   68  75 130(patient could only do this 2 times then could not do it again in this session)                      strength in lbs R: NT  L:      Pinch Strengthin lbs: lat  R:NT  L:      Pinch Strength in lbs:  3 point R:NT  L:         MMT:  ER on right 1/5  Left 3/5  IR 4/5 on right 5/5 on left    ER 2/5     Observations:  (including splints, bandages, incisions, scars): MODALITIES: 8/19/21 8/24/21 8/26/21 8/31/21 9/2/21 9/7/21 9/9/21    Fluidotherapy (37341)           Estim (51068/16589)           Paraffin (46494)           US (24663)           Iontophoresis (36085)           Hot Pack           Cold Pack 10\" end of treatment 10\" end of treatment  10\" end of treatment same same same               INTERVENTIONS:           Therapeutic Exercise (34328) See below Isometric shoulder ER/IR 10 x 2. Shoulder shrugs 10 x 2. Shoulder shrugs 10 x 2. same same same same           Shoulder circles, bilateral 10 x 2      Scapular squeezes bilaterally in sitting 10 x 2.  Scapular squeezes bilaterally in sitting 10 x 2. same same same same        Horizontal adduction right arm to driving/computer work  [] (06616) Reviewed/Progressed HEP activities related to improving balance, coordination, kinesthetic sense, posture, motor skill, proprioception of scapular, scapulothoracic and UE control with self care, reaching, carrying, lifting, house/yardwork, driving/computer work      Manual Treatments:  PROM / STM / Oscillations-Mobs:  G-I, II, III, IV (PA's, Inf., Post.)  [] ([id number]) Provided manual therapy to mobilize soft tissue/joints of cervical/CT, scapular GHJ and UE for the purpose of modulating pain, promoting relaxation,  increasing ROM, reducing/eliminating soft tissue swelling/inflammation/restriction, improving soft tissue extensibility and allowing for proper ROM for normal function with self care, reaching, carrying, lifting, house/yardwork, driving/computer work    ADL Training:  [] (81155) Provided self-care/home management training related to activities of daily living and compensatory training, and/or use of adaptive equipment      Charges  Timed Code Treatment Minutes: 45   Total Treatment Minutes: 45     Medicare total (add KX at $2000)  700     [] EVAL (LOW) 34664 (typically 20 minutes face-to-face)    [] EVAL (MOD) 24507 (typically 30 minutes face-to-face)  [] EVAL (HIGH) 0496 97 06 31 (typically 45 minutes face-to-face)  [] OT Re-eval (69699)       [x] Macario [phone]) x  3    [] BZIVQ(85716)  [] NMR (56039) x      [] Estim (attended) (71594)   [] Manual ([id number]) x      [] US (46234)  [] TA () x      [] Paraffin (54926)  [] ADL  (57 [phone] 60) x     [] Splint/L code:    [] Estim (unattended) 33 93 31)  [] Fluidotherapy (67074)  [] DN 1-2 (55923)   [] DN 3+ (20085)  [] Orthotic Mgmt, Subsequent Enc (13422)  [] Orthotic Mgmt & Training (88276)  [] Other:    ASSESSMENT:  ROM is slowly improving in right arm    GOALS: Patient stated goal: use right arm better. []? Progressing: []? Met: []? Not Met: []? Adjusted     Therapist goals for Patient:   Short Term Goals: To be achieved in: 2 weeks  1. Independent in HEP and progression per patient tolerance, in order to prevent re-injury. []? Progressing: []? Met: []? Not Met: []? Adjusted   2. Patient will have a decrease in pain to facilitate improvement in movement, function, and ADLs as indicated by Functional Deficits. []? Progressing: []? Met: []? Not Met: []? Adjusted     Long Term Goals to be achieved in 4 weeks (through 9/16/21), including patient directed goals to address patient identified performance deficits:  1) Pt to be independent in graded HEP progression with a good level of effort and compliance. []? Progressing: []? Met: []? Not Met: []? Adjusted   2) Pt to report a score of </= 50/80 on UEFI disability questionnaire for increased performance with carrying, moving, and handling objects. []? Progressing: []? Met: []? Not Met: []? Adjusted   3) Pt will demonstrate increased ROM to right shoulder flexion to 90 for improved independence with reaching higher objects. .  []? Progressing: []? Met: []? Not Met: []? Adjusted   4) Pt will demonstrate increased strength to right flexion to 2/5 against gravity for improved independence with lifting higher objects to shoulder level. []? Progressing: []? Met: []? Not Met: []? Adjusted   5) Pt will have a decrease in pain to 2/10 to facilitate use of right arm.  []? Progressing: []? Met: []? Not Met: []? Adjusted      Overall Progression Towards Functional Goals/Treatment Progress Update:  [x] Patient is progressing as expected towards functional goals listed. [] Progression is slowed due to complexities/impairments listed. [] Progression has been slowed due to co-morbidities.   [] Plan just implemented, too soon to assess goals progression <30 days  [] Goals require adjustment due to lack of progress  [] Patient is not progressing as expected and requires additional follow up with physician  [] All goals are met  [] Other:     Prognosis for POC: [x] Good [] Fair  [] Poor    Patient requires continued skilled intervention: [x] Yes  [] No    Treatment/Activity Tolerance:  [x] Patient able to complete treatment  [] Patient limited by fatigue  [] Patient limited by pain    [] Patient limited by other medical complications  [] Other:                  PLAN: See eval  [x] Continue per plan of care [] Alter current plan (see comments above)  [] Plan of care initiated [] Hold pending MD visit [] Discharge    Electronically signed by:  Sheila Paez, OT, OTR\L, [facility]       Note: If patient does not return for scheduled/ recommended follow up visits, this note will serve as a discharge from care along with most recent update on progress.

## 2021-09-14 NOTE — FLOWSHEET NOTE
[address] [place], [address] [place] [address] [address]  Phone: [phone] Fax: [phone]   Occupational Therapy Treatment Note/ Progress Report:     Is this a Progress Report:     []  Yes  [x]  No      If Yes:  Date Range for reporting period:  Beginning 21  Ending 2021  Progress report will be due (10 Rx or 30 days whichever is less): 29    Recertification will be due (POC Duration  / 90 days whichever is less): 21   Date:  2021  Patient Name:  Farzad Reynaga    :  1940  MRN: [id number]  Medical/Treatment Diagnosis Information:  · Diagnosis: S49.91XA (ICD-10-CM) - Injury of right shoulder, initial encounter     Diagnosis: S49.91XA (ICD-10-CM) - Injury of right shoulder, initial encounter          Treatment Diagnosis: M25. 511 right shoulder pain                                            Insurance/Certification information:     Physician Information:     Has the plan of care been signed (Y/N):        []  Yes  [x]  No     Visit # Insurance Allowable Auth Required   8  []  Yes []  No    From 21  to 2021    Comorbidities Affecting Functional Performance:             [x]? Anxiety (F41.9)/Depression (F32.9)           [x]? Hypertension  [x]? Diabetes Type 1(E10.65) or 2 (E11.65)     []? CVA              []?Rheumatoid Arthritis (M05.9)                     []?Aherisclerosis  []? Fibromyalgia (M79.7)                                 []?Angina Pectoris  []? 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 Osteoarthritis(M19.91)                               []?Osteoporosis  []? None                                                                      [x]? Morbid Obesity  [x]? Other: CKD, see medical history in EPIC                                                        []?COPD    Date of Injury: 21  Date of Surgery: none     Date of Patient follow up with Physician: next week  RESTRICTIONS/PRECAUTIONS: none    Latex Allergy:  [x]No      []Yes  Pacemaker:  [x] No       [] Yes   Preferred Language for Healthcare:   [x]English       []other:   Functional Scale: 41/80 UEFI             Date assessed:  8/19/2021  Pain Scale: 0/10 at beginning of treatment, 2/10 at its worse  SUBJECTIVE:  Had increased pain on Friday and Saturday. Thinks she might have picked up something too heavy. OBJECTIVE:   Date:  Hand Dominance:     [x]? Right    []? Left 8/19/2021 8/24/21 9/7/21 9/9/21 9/14/21   Objective Measures:         PAIN 6/10       UEFI 41/80       Digits tip to DPFC in cm right hand WNL       Thumb ROM WNL       Wrist ROM Ext/Flex WNL       Forearm ROM  Sup/pron WNL       Elbow ROM Ext/flex WNL       Shoulder Flex  Shoulder Abd  Shoulder IR/ER 58   55  No active ER available/ IR WNL, full PROM same    can externally rotate with shoulder at 90/90 supine from full IR   68  75 130(patient could only do this 2 times then could not do it again in this session) Flex 105 with scapular protraction prior to flexion. Abd to 80                        strength in lbs R: NT  L:       Pinch Strengthin lbs: lat  R:NT  L:       Pinch Strength in lbs:  3 point R:NT  L:          MMT:  ER on right 1/5  Left 3/5  IR 4/5 on right 5/5 on left    ER 2/5   ER 2/5    Sup on right 3/5   Observations:  (including splints, bandages, incisions, scars): MODALITIES: 8/19/21 8/24/21 8/26/21 8/31/21 9/2/21 9/7/21 9/9/21 9/14/21   Fluidotherapy (00422)           Estim ([id number]/74176)           Paraffin (51526)           US (60815)           Iontophoresis (99205)           Hot Pack           Cold Pack 10\" end of treatment 10\" end of treatment  10\" end of treatment same same same same              INTERVENTIONS:           Therapeutic Exercise (99858) See below Isometric shoulder ER/IR 10 x 2. Shoulder shrugs 10 x 2.  Shoulder shrugs 10 x 2. same same same same same Shoulder circles, bilateral 10 x 2 same     Scapular squeezes bilaterally in sitting 10 x 2. Scapular squeezes bilaterally in sitting 10 x 2. same same same same        Horizontal adduction right arm to left shoulder and axilla area 10 x 2. Supine Horizontal adduction right arm to left shoulder and axilla area 10 x 2. Supine Horizontal adduction right arm to left shoulder and axilla area 10 x 2. same same     Arm slides forward on table in sitting 10 x 2. Arm slides forward on table in sitting 10 x 2 with lifting arm off table a few inches and slowly lowering          Place and hold shoulder flexion to 45 x 10. Supine bilateral cane exercises press to ceiling 10 x 2 and full shoulder flexion 10 x 2. same same same same     Supine IR/ER shoulder shoulder at 90/90 10 x 2. Supine IR/ER shoulder shoulder at 90/90 10 x 2. Supine IR/ER shoulder shoulder at 90/90 10 x 2. same same same same     Supine IR/ER shoulder at 90/0 10 x 2. Supine IR/ER shoulder at 90/0 10 x 2. Shoulder flexion supine to overhead 10 x 2. Shoulder flexion supine to overhead 10 x 2. Difficult to do today. Shoulder flexion supine to overhead 10 x 2. Much easier to perform today Shoulder flexion supine to overhead 10 x 2. same same     PROM shoulder flex., ER, IR  PROM shoulder flex., ER, IR  PROM shoulder flex., ER, IR  PROM shoulder flex., ER, IR  PROM shoulder flex., ER, IR  ssame same        Reaching forward in sitting with elbow extension x 10. Bilateral cane flexion in sitting today 10 x 2. Attempted wall slides but could not complete Same      Able to walk up the wall one time but then could not do it again. Therapeutic Activity (58152)        Active shoulder flexion in sitting with with scapular protraction x 5. Catch point of pain in anterior shoulder with relaxation of flexion and eccentric extension.                          Manual Therapy (34054)           (IASTM, Dry Needling, manual mobilization) Neuromuscular Reeducation (64808)                                 ADL Training (09149)                                 HEP Training/Review Access Code: [email or web address]  URL: [email or web address]  Date: 08/19/2021  Prepared by: Carlos Monet    Exercises  Seated Shoulder Flexion Towel Slide at Table Top - 3 x daily - 7 x weekly - 3 sets - 10 reps  Shoulder External Rotation and Scapular Retraction - 1 x daily - 7 x weekly - 3 sets - 10 reps  Supine Shoulder External Rotation Stretch - 1 x daily - 7 x weekly - 3 sets - 10 reps  Supine Shoulder Internal Rotation - 1 x daily - 7 x weekly - 3 sets - 10 reps  Shoulder External Rotation and Scapular Retraction - 1 x daily - 7 x weekly - 3 sets - 10 reps     Added supine bilateral cane exercises for home. Continue at home       Access Code: [facility]  URL: [email or web address]  Date: 08/19/2021  Prepared by: Carlos Monet    Exercises  Supine Shoulder Flexion PROM - 3 x daily - 7 x weekly - 3 sets - 10 reps                       Splinting           Lcode:           Orthotic Mgmt, Subsequent Enc (81389)           Orthotic Mgmt & Training (81714)                      Other:             Therapeutic Exercise & NMR:  [x] (83137) Provided verbal/tactile cueing for activities related to strengthening, flexibility, endurance, ROM  for improvements in scapular, scapulothoracic and UE control with self care, reaching, carrying, lifting, house/yardwork, driving/computer work.    [] (05612) Provided verbal/tactile cueing for activities related to improving balance, coordination, kinesthetic sense, posture, motor skill, proprioception  to assist with  scapular, scapulothoracic and UE control with self care, reaching, carrying, lifting, house/yardwork, driving/computer work.     Therapeutic Activities & NMR:    [] (86244 or 76104) Provided verbal/tactile cueing for activities related to improving balance, coordination, kinesthetic sense, posture, motor skill, proprioception and motor activation to allow for proper function of scapular, scapulothoracic and UE control with self care, carrying, lifting, driving/computer work    Home Exercise Program:    [] (21080) Reviewed/Progressed HEP activities related to strengthening, flexibility, endurance, ROM of scapular, scapulothoracic and UE control with self care, reaching, carrying, lifting, house/yardwork, driving/computer work  [] (30275) Reviewed/Progressed HEP activities related to improving balance, coordination, kinesthetic sense, posture, motor skill, proprioception of scapular, scapulothoracic and UE control with self care, reaching, carrying, lifting, house/yardwork, driving/computer work      Manual Treatments:  PROM / STM / Oscillations-Mobs:  G-I, II, III, IV (PA's, Inf., Post.)  [] ([id number]) Provided manual therapy to mobilize soft tissue/joints of cervical/CT, scapular GHJ and UE for the purpose of modulating pain, promoting relaxation,  increasing ROM, reducing/eliminating soft tissue swelling/inflammation/restriction, improving soft tissue extensibility and allowing for proper ROM for normal function with self care, reaching, carrying, lifting, house/yardwork, driving/computer work    ADL Training:  [] (91408) Provided self-care/home management training related to activities of daily living and compensatory training, and/or use of adaptive equipment      Charges  Timed Code Treatment Minutes: 45   Total Treatment Minutes: 45     Medicare total (add KX at $2000)  700     [] EVAL (LOW) 24856 (typically 20 minutes face-to-face)    [] EVAL (MOD) 67146 (typically 30 minutes face-to-face)  [] EVAL (HIGH) [phone] (typically 45 minutes face-to-face)  [] OT Re-eval (25243)       [x] Macario [phone]) x  3    [] HBPOS(00569)  [] NMR (73215) x      [] Estim (attended) (86924)   [] Manual ([id number]) x      [] US (79584)  [] TA (97202) x      [] Paraffin (36830)  [] ADL  ([phone]) x     [] Splint/L code:    [] Estim (unattended) [phone])  [] Fluidotherapy (29936)  [] DN 1-2 (48723)   [] DN 3+ (50737)  [] Orthotic Mgmt, Subsequent Enc (84352)  [] Orthotic Mgmt & Training (35460)  [] Other:    ASSESSMENT:  ROM is slowly improving in right arm but continues with pain in certain motions, particularly with relaxing arm into eccentric extension. GOALS: Patient stated goal: use right arm better. []? Progressing: []? Met: []? Not Met: []? Adjusted     Therapist goals for Patient:   Short Term Goals: To be achieved in: 2 weeks  1. Independent in HEP and progression per patient tolerance, in order to prevent re-injury. []? Progressing: []? Met: []? Not Met: []? Adjusted   2. Patient will have a decrease in pain to facilitate improvement in movement, function, and ADLs as indicated by Functional Deficits. []? Progressing: []? Met: []? Not Met: []? Adjusted     Long Term Goals to be achieved in 4 weeks (through 9/16/21), including patient directed goals to address patient identified performance deficits:  1) Pt to be independent in graded HEP progression with a good level of effort and compliance. []? Progressing: []? Met: []? Not Met: []? Adjusted   2) Pt to report a score of </= 50/80 on UEFI disability questionnaire for increased performance with carrying, moving, and handling objects. []? Progressing: []? Met: []? Not Met: []? Adjusted   3) Pt will demonstrate increased ROM to right shoulder flexion to 90 for improved independence with reaching higher objects. .  []? Progressing: []? Met: []? Not Met: []? Adjusted   4) Pt will demonstrate increased strength to right flexion to 2/5 against gravity for improved independence with lifting higher objects to shoulder level. []? Progressing: []? Met: []? Not Met: []? Adjusted   5) Pt will have a decrease in pain to 2/10 to facilitate use of right arm.  []? Progressing: []? Met: []? Not Met: []?  Adjusted      Overall Progression Towards Functional Goals/Treatment Progress Update:  [x] Patient is progressing as expected towards functional goals listed. [] Progression is slowed due to complexities/impairments listed. [] Progression has been slowed due to co-morbidities. [] Plan just implemented, too soon to assess goals progression <30 days  [] Goals require adjustment due to lack of progress  [] Patient is not progressing as expected and requires additional follow up with physician  [] All goals are met  [] Other:     Prognosis for POC: [x] Good [] Fair  [] Poor    Patient requires continued skilled intervention: [x] Yes  [] No    Treatment/Activity Tolerance:  [x] Patient able to complete treatment  [] Patient limited by fatigue  [] Patient limited by pain    [] Patient limited by other medical complications  [] Other:                  PLAN: See eval  [x] Continue per plan of care [] Alter current plan (see comments above)  [] Plan of care initiated [] Hold pending MD visit [] Discharge    Electronically signed by:  José Manuel Dillon, OT, OTR\L, CHT - [id number]       Note: If patient does not return for scheduled/ recommended follow up visits, this note will serve as a discharge from care along with most recent update on progress.

## 2021-09-14 NOTE — PROGRESS NOTES
Ms. Anitha Carter is here for management of anticoagulation for Afib. PMH also significant for HTN, Gout, CKD II/III, Hyperlipidemia, and depression. She presents today w/out complaint. Pt verifies dosing regimen as listed above. Pt denies s/sx bleeding/bruising/ CP/HA/swelling/SOB  No missed doses. No changes in Rx/OTC/herbal medications. No major changes in diet. Pt does not drink EtOH or smoke. INR 2.8 is within acceptable therapeutic range of 2-3. Recommend to continue dose of 1 mg on Tuesdays and Fridays and 0.5 mg all other days. Patient has 1 mg tablets. Will continue to monitor and check INR in 4 weeks  Dosing reminder card given with phone number, appointment date and time. Return to clinic: 10/19 @ 1:00 pm   Referring Provider: Dr. Luis A Mullen  PharmD Candidate, 2022 9/14/2021 12:56 PM    I have seen the patient and reviewed the progress note written by the PharmD Candidate. I agree with this assesment and plan.    Yash Wahl, [facility], PharmD 9/14/21 1:12 PM

## 2021-09-16 NOTE — FLOWSHEET NOTE
[address] [place], [address] [place] [address] [address]  Phone: [phone] Fax: [phone]   Occupational Therapy Treatment Note/ Progress Report:     Is this a Progress Report:     []  Yes  [x]  No      If Yes:  Date Range for reporting period:  Beginning 21  Ending 2021  Progress report will be due (10 Rx or 30 days whichever is less): 3/66/04    Recertification will be due (POC Duration  / 90 days whichever is less): 21   Date:  2021  Patient Name:  Farzad Reynaga    :  1940  MRN: [id number]  Medical/Treatment Diagnosis Information:  · Diagnosis: S49.91XA (ICD-10-CM) - Injury of right shoulder, initial encounter     Diagnosis: S49.91XA (ICD-10-CM) - Injury of right shoulder, initial encounter          Treatment Diagnosis: M25. 511 right shoulder pain                                            Insurance/Certification information:     Physician Information:     Has the plan of care been signed (Y/N):        []  Yes  [x]  No     Visit # Insurance Allowable Auth Required   9  []  Yes []  No    From 21  to 2021    Comorbidities Affecting Functional Performance:             [x]? Anxiety (F41.9)/Depression (F32.9)           [x]? Hypertension  [x]? Diabetes Type 1(E10.65) or 2 (E11.65)     []? CVA              []?Rheumatoid Arthritis (M05.9)                     []?Aherisclerosis  []? Fibromyalgia (M79.7)                                 []?Angina Pectoris  []? 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 Osteoarthritis(M19.91)                               []?Osteoporosis  []? None                                                                      [x]? Morbid Obesity  [x]? Other: CKD, see medical history in EPIC                                                        []?COPD    Date of Injury: 21  Date of Surgery: none     Date of Patient follow up with Physician: 9/24/21  RESTRICTIONS/PRECAUTIONS: none    Latex Allergy:  [x]No      []Yes  Pacemaker:  [x] No       [] Yes   Preferred Language for Healthcare:   [x]English       []other:   Functional Scale: 41/80 UEFI             Date assessed:  8/19/2021  Pain Scale: 0/10 at beginning of treatment, 4/10 at its worse  SUBJECTIVE:  Had increased pain on Friday and Saturday. Thinks she might have picked up something too heavy. OBJECTIVE:   Date:  Hand Dominance:     [x]? Right    []? Left 8/19/2021 8/24/21 9/7/21 9/9/21 9/14/21 9/16/21   Objective Measures:          PAIN 6/10        UEFI 41/80        Digits tip to DPFC in cm right hand WNL        Thumb ROM WNL        Wrist ROM Ext/Flex WNL        Forearm ROM  Sup/pron WNL        Elbow ROM Ext/flex WNL        Shoulder Flex  Shoulder Abd  Shoulder IR/ER 58   55  No active ER available/ IR WNL, full PROM same    can externally rotate with shoulder at 90/90 supine from full IR   68  75 130(patient could only do this 2 times then could not do it again in this session) Flex 105 with scapular protraction prior to flexion. Abd to 80 120 with assistance getting motion started    80    Near full PROM available to shoulder in supine                          strength in lbs R: NT  L:        Pinch Strengthin lbs: lat  R:NT  L:        Pinch Strength in lbs:  3 point R:NT  L:           MMT:  ER on right 1/5  Left 3/5  IR 4/5 on right 5/5 on left    ER 2/5   ER 2/5    Sup on right 3/5 ER 2/5  Sup 3/5  IR 4/5   Observations:  (including splints, bandages, incisions, scars):      Cannot initiate shoulder flexion but can complete it after assisting it into partial flexion.  Positive shoulder hiking sign when attempting to initiate shoulder flexion     MODALITIES: 8/19/21 8/24/21 8/26/21 8/31/21 9/2/21 9/7/21 9/9/21 9/14/21 9/16/21   Fluidotherapy (48478)            Estim ([id number])            Paraffin (94107)            US (39415)            Iontophoresis (65890) Hot Pack            Cold Pack 10\" end of treatment 10\" end of treatment  10\" end of treatment same same same same                INTERVENTIONS:            Therapeutic Exercise (68840) See below Isometric shoulder ER/IR 10 x 2. Shoulder shrugs 10 x 2. Shoulder shrugs 10 x 2. same same same same same           Shoulder circles, bilateral 10 x 2 same      Scapular squeezes bilaterally in sitting 10 x 2. Scapular squeezes bilaterally in sitting 10 x 2. same same same same         Horizontal adduction right arm to left shoulder and axilla area 10 x 2. Supine Horizontal adduction right arm to left shoulder and axilla area 10 x 2. Supine Horizontal adduction right arm to left shoulder and axilla area 10 x 2. same same      Arm slides forward on table in sitting 10 x 2. Arm slides forward on table in sitting 10 x 2 with lifting arm off table a few inches and slowly lowering           Place and hold shoulder flexion to 45 x 10. Supine bilateral cane exercises press to ceiling 10 x 2 and full shoulder flexion 10 x 2. same same same same      Supine IR/ER shoulder shoulder at 90/90 10 x 2. Supine IR/ER shoulder shoulder at 90/90 10 x 2. Supine IR/ER shoulder shoulder at 90/90 10 x 2. same same same same      Supine IR/ER shoulder at 90/0 10 x 2. Supine IR/ER shoulder at 90/0 10 x 2. Shoulder flexion supine to overhead 10 x 2. Shoulder flexion supine to overhead 10 x 2. Difficult to do today. Shoulder flexion supine to overhead 10 x 2. Much easier to perform today Shoulder flexion supine to overhead 10 x 2. same same      PROM shoulder flex., ER, IR  PROM shoulder flex., ER, IR  PROM shoulder flex., ER, IR  PROM shoulder flex., ER, IR  PROM shoulder flex., ER, IR  ssame same         Reaching forward in sitting with elbow extension x 10. Bilateral cane flexion in sitting today 10 x 2.     Attempted wall slides but could not complete Same      Able to walk up the wall one time but then could not do it again.     Therapeutic Activity (97529)        Active shoulder flexion in sitting with with scapular protraction x 5. Catch point of pain in anterior shoulder with relaxation of flexion and eccentric extension. Manual Therapy (05362)            (IASTM, Dry Needling, manual mobilization)                        Neuromuscular Reeducation (15620)                                    ADL Training (20733)                                    HEP Training/Review Access Code: [email or web address]  URL: [email or web address]  Date: 08/19/2021  Prepared by: Jacinto Haynes    Exercises  Seated Shoulder Flexion Towel Slide at Table Top - 3 x daily - 7 x weekly - 3 sets - 10 reps  Shoulder External Rotation and Scapular Retraction - 1 x daily - 7 x weekly - 3 sets - 10 reps  Supine Shoulder External Rotation Stretch - 1 x daily - 7 x weekly - 3 sets - 10 reps  Supine Shoulder Internal Rotation - 1 x daily - 7 x weekly - 3 sets - 10 reps  Shoulder External Rotation and Scapular Retraction - 1 x daily - 7 x weekly - 3 sets - 10 reps     Added supine bilateral cane exercises for home. Continue at home    Continue same home exercises as supine bilateral cane    Access Code: [email or web address]  URL: [email or web address]  Date: 08/19/2021  Prepared by: Gamida Cell    Exercises  Supine Shoulder Flexion PROM - 3 x daily - 7 x weekly - 3 sets - 10 reps                         Splinting            Lcode:            Orthotic Mgmt, Subsequent Enc (35304)            Orthotic Mgmt & Training (10211)                        Other:              Therapeutic Exercise & NMR:  [x] (02165) Provided verbal/tactile cueing for activities related to strengthening, flexibility, endurance, ROM  for improvements in scapular, scapulothoracic and UE control with self care, reaching, carrying, lifting, house/yardwork, driving/computer work.    [] (10440) Provided verbal/tactile cueing for activities related to improving balance, coordination, kinesthetic sense, posture, motor skill, proprioception  to assist with  scapular, scapulothoracic and UE control with self care, reaching, carrying, lifting, house/yardwork, driving/computer work.     Therapeutic Activities & NMR:    [] (43175 or 55948) Provided verbal/tactile cueing for activities related to improving balance, coordination, kinesthetic sense, posture, motor skill, proprioception and motor activation to allow for proper function of scapular, scapulothoracic and UE control with self care, carrying, lifting, driving/computer work    Home Exercise Program:    [] (71212) Reviewed/Progressed HEP activities related to strengthening, flexibility, endurance, ROM of scapular, scapulothoracic and UE control with self care, reaching, carrying, lifting, house/yardwork, driving/computer work  [] (36454) Reviewed/Progressed HEP activities related to improving balance, coordination, kinesthetic sense, posture, motor skill, proprioception of scapular, scapulothoracic and UE control with self care, reaching, carrying, lifting, house/yardwork, driving/computer work      Manual Treatments:  PROM / STM / Oscillations-Mobs:  G-I, II, III, IV (PA's, Inf., Post.)  [] (86094) Provided manual therapy to mobilize soft tissue/joints of cervical/CT, scapular GHJ and UE for the purpose of modulating pain, promoting relaxation,  increasing ROM, reducing/eliminating soft tissue swelling/inflammation/restriction, improving soft tissue extensibility and allowing for proper ROM for normal function with self care, reaching, carrying, lifting, house/yardwork, driving/computer work    ADL Training:  [] (17344) Provided self-care/home management training related to activities of daily living and compensatory training, and/or use of adaptive equipment      Charges  Timed Code Treatment Minutes: 45   Total Treatment Minutes: 45     Medicare total (add KX at $2000)  700     [] EVAL (LOW) 94260 (typically 20 minutes face-to-face)    [] JAMIE (MOD) 79860 (typically 30 minutes face-to-face)  [] EVAL (HIGH) 89425 (typically 45 minutes face-to-face)  [] OT Re-eval (78417)       [x] Macario [phone]) x  3    [] TDKKE(86215)  [] NMR (10847) x      [] Estim (attended) (20026)   [] Manual ([id number]) x      [] US (06699)  [] TA (92239) x      [] Paraffin (39976)  [] ADL  (79683) x     [] Splint/L code:    [] Estim (unattended) (25590)  [] Fluidotherapy (20632)  [] DN 1-2 (85937)   [] DN 3+ (29569)  [] Orthotic Mgmt, Subsequent Enc (83113)  [] Orthotic Mgmt & Training (33170)  [] Other:    ASSESSMENT: Cannot initiate shoulder flexion against gravity but can complete it after assistance to ~ 45 degrees, Weak ER and supination. Suspect at least partail rotator cuff tear with possible proximal bicep tendon involvement  GOALS: Patient stated goal: use right arm better. []? Progressing: []? Met: []? Not Met: []? Adjusted     Therapist goals for Patient:   Short Term Goals: To be achieved in: 2 weeks  1. Independent in HEP and progression per patient tolerance, in order to prevent re-injury. [x]? Progressing: []? Met: []? Not Met: []? Adjusted   2. Patient will have a decrease in pain to facilitate improvement in movement, function, and ADLs as indicated by Functional Deficits. [x]? Progressing: []? Met: []? Not Met: []? Adjusted     Long Term Goals to be achieved in 4 weeks (through 9/16/21), including patient directed goals to address patient identified performance deficits:  1) Pt to be independent in graded HEP progression with a good level of effort and compliance. [x]? Progressing: []? Met: []? Not Met: []? Adjusted   2) Pt to report a score of </= 50/80 on UEFI disability questionnaire for increased performance with carrying, moving, and handling objects. [x]? Progressing: []? Met: []? Not Met: []? Adjusted   3) Pt will demonstrate increased ROM to right shoulder flexion to 90 for improved independence with reaching higher objects. .  [x]?  Progressing: []? Met: []? Not Met: []? Adjusted   4) Pt will demonstrate increased strength to right flexion to 2/5 against gravity for improved independence with lifting higher objects to shoulder level. [x]? Progressing: []? Met: []? Not Met: []? Adjusted   5) Pt will have a decrease in pain to 2/10 to facilitate use of right arm. [x]? Progressing: []? Met: []? Not Met: []? Adjusted      Overall Progression Towards Functional Goals/Treatment Progress Update:  [x] Patient is progressing as expected towards functional goals listed. [] Progression is slowed due to complexities/impairments listed. [] Progression has been slowed due to co-morbidities. [] Plan just implemented, too soon to assess goals progression <30 days  [] Goals require adjustment due to lack of progress  [] Patient is not progressing as expected and requires additional follow up with physician  [] All goals are met  [] Other:     Prognosis for POC: [x] Good [] Fair  [] Poor    Patient requires continued skilled intervention: [x] Yes  [] No    Treatment/Activity Tolerance:  [x] Patient able to complete treatment  [] Patient limited by fatigue  [] Patient limited by pain    [] Patient limited by other medical complications  [] Other:                  PLAN: See eval  [] Continue per plan of care [] Alter current plan (see comments above)  [] Plan of care initiated [x] Hold pending MD visit [] Discharge    Electronically signed by:  Yamileth Coy OT, OTR\L, T - [id number]       Note: If patient does not return for scheduled/ recommended follow up visits, this note will serve as a discharge from care along with most recent update on progress.

## 2021-09-24 NOTE — CARE COORDINATION
Ambulatory Care Coordination Note  9/24/2021  CM Risk Score: 6  Charlson 10 Year Mortality Risk Score: 100%     ACC: Suha Loco RN [facility]    Hx: Heart Failure, Pure Hypercholesterolemia, Diverticulosis, Gout, HTN, Obesity, A-Fib, Osteoarthritis, CKD, GERD, Gait Disturbance, Lumbar Disc Disease, Fall      Summary Note: The pt stated she has been doing well but her right shoulder is still sore from when she fell. The pt stated she has been doing PT and she just saw the orthopedic surgeon. The pt stated the PT has not helped and the orthopedic surgeon ordered an MRI of Right Shoulder for 9/28/21. The pt denied any needs at this time. Plan:  The pt will have the MRI of her shoulder on 9/28/21. The pt agreed to the nurse doing a follow up call. Handout mailed:  Preventing Falls        Care Coordination Interventions    Program Enrollment: Complex Care  Referral from Primary Care Provider: No  Suggested Interventions and Community Resources  Fall Risk Prevention: In Process  Zone Management Tools: In Process  Other Services or Interventions: Discussed low sodium diet. Goals Addressed                    This Visit's Progress      Patient Stated (pt-stated)   On track      To remain safely in her home    Barriers: impairment:  physical: gait disturbance and lack of education  Plan for overcoming my barriers: Work with RN, HENRIK  Confidence: 8/10  Anticipated Goal Completion Date: 10/13/21            Prior to Admission medications    Medication Sig Start Date End Date Taking?  Authorizing Provider   clindamycin (CLEOCIN) 300 MG capsule TAKE 2 CAPSULES BY MOUTH 1 HOUR BEFORE PROCEDURE 9/15/21   Payton Cameron MD   DULoxetine 40 MG CPEP Take 20 mg by mouth daily 8/3/21   Payton Cameron MD   lidocaine (LIDODERM) 5 % Place 1 patch onto the skin daily 12 hours on, 12 hours off. 7/31/21   Herminia Callaway MD   CARTIA  MG extended release capsule TAKE ONE CAPSULE BY MOUTH DAILY 6/16/21   RADHA Mendes - CNP furosemide (LASIX) 20 MG tablet Take 1 tablet by mouth daily as needed (SOB, edema, weight gain) 5/14/21   RADHA Galan CNP   warfarin (COUMADIN) 1 MG tablet TAKE ONE TABLET BY MOUTH ON SUNDAY, TUESDAY AND THURSDAY.   TAKE ONE-HALF TABLET BY MOUTH ON ALL OTHER DAYS OR AS DIRECTED BY CLINIC 3/24/21   Vel Drew MD   sertraline (ZOLOFT) 50 MG tablet TAKE ONE TABLET BY MOUTH DAILY 3/9/21   Osmel Goodman MD   allopurinol (ZYLOPRIM) 300 MG tablet TAKE ONE TABLET BY MOUTH DAILY 12/9/20   Osmel Goodman MD   omeprazole (PRILOSEC) 20 MG delayed release capsule TAKE ONE CAPSULE BY MOUTH DAILY 11/17/20   Osmel Goodman MD   atorvastatin (LIPITOR) 20 MG tablet TAKE ONE TABLET BY MOUTH ONCE NIGHTLY 11/16/20   Osmel Goodman MD   metoprolol tartrate (LOPRESSOR) 25 MG tablet Take 1 tablet by mouth 2 times daily 10/15/20   May RADHA Osborne CNP   Cholecalciferol (VITAMIN D) 2000 UNITS CAPS capsule Take 2,000 Units by mouth daily    Historical Provider, MD   docusate sodium (COLACE) 100 MG capsule Take 100 mg by mouth daily as needed for Constipation     Historical Provider, MD       Future Appointments   Date Time Provider Juan Jose Ramirez   10/19/2021  1:00 PM [address] [facility]   11/16/2021  1:00 PM MD Ceferino Chowdhury     ,   Congestive Heart Failure Assessment    Are you currently restricting fluids?: No Restriction  Do you understand a low sodium diet?: Yes  Do you understand how to read food labels?: Yes  How many restaurant meals do you eat per week?: 3-4  Do you salt your food before tasting it?: No     No patient-reported symptoms      Symptoms:  None: Yes      Symptom course: stable  Weight trend: stable  Salt intake watch compared to last visit: stable      and   General Assessment    Do you have any symptoms that are causing concern?: No

## 2021-10-12 NOTE — CARE COORDINATION
Ambulatory Care Coordination Note  10/12/2021  CM Risk Score: 6  Charlson 10 Year Mortality Risk Score: 100%     ACC: Portia Patton, KRISTEN  [facility]    Hx: Heart Failure, Pure Hypercholesterolemia, Diverticulosis, Gout, HTN, Obesity, A-Fib, Osteoarthritis, CKD, GERD, Gait Disturbance, Lumbar Disc Disease, Falls    Summary Note: The pt denied any SOB, fatigue, chest pain or swelling in her legs. The pt stated she does have a right shoulder rotator cuff tear. The pt stated her orthopedic surgeon, Dr. Kelly Flores did an MRI 2 weeks ago and he is treating the rotator cuff tear with injections. The pt stated she had her first injection and it has helped with the shoulder pain. The pt stated she still has falls around the house but stated they are minor compared to the one she had in July. The pt stated she has a tendency to drag her feet and sometimes turns too quickly. The pt stated she fell last Monday and Thursday, the pt stated her granddaughter was there one of the days and helped her roommate get her up but the other day her roommate had to call the EMS to get her up. The pt stated she uses a cane in the house because the house is small and the walker does not fit everywhere. The pt feels safer with her walker and uses it when she goes out. The pt stated she is no longer doing OT and PT. The pt stated Dr. Kelly Flores did not feel it was really helping. The pt denied any current needs except for a new prescription for her Norco. The pt stated she is due for a refill by 10/15/21. Plan:  The [facility] will consult with the PCP in r/t the pt's prescription and frequent falls. The pt will follow up with her orthopedic surgeon. Care Coordination Interventions    Program Enrollment: Complex Care  Referral from Primary Care Provider: No  Suggested Interventions and Community Resources  Fall Risk Prevention: In Process  Zone Management Tools: In Process  Other Services or Interventions: Discussed low sodium diet. Goals Addressed                    This Visit's Progress      Patient Stated (pt-stated)   On track      To remain safely in her home    Barriers: impairment:  physical: gait disturbance and lack of education  Plan for overcoming my barriers: Work with [facility]  Confidence: 8/10  Anticipated Goal Completion Date: 10/13/21            Prior to Admission medications    Medication Sig Start Date End Date Taking? Authorizing Provider   clindamycin (CLEOCIN) 300 MG capsule TAKE 2 CAPSULES BY MOUTH 1 HOUR BEFORE PROCEDURE 9/15/21   Nicolle Rodriguez MD   DULoxetine 40 MG CPEP Take 20 mg by mouth daily 8/3/21   Nicolle Rodriguez MD   lidocaine (LIDODERM) 5 % Place 1 patch onto the skin daily 12 hours on, 12 hours off. 7/31/21   Citlali Kruse MD   CARTIA  MG extended release capsule TAKE ONE CAPSULE BY MOUTH DAILY 6/16/21   RADHA Mena CNP   furosemide (LASIX) 20 MG tablet Take 1 tablet by mouth daily as needed (SOB, edema, weight gain) 5/14/21   RADHA Mena CNP   warfarin (COUMADIN) 1 MG tablet TAKE ONE TABLET BY MOUTH ON SUNDAY, TUESDAY AND THURSDAY.   TAKE ONE-HALF TABLET BY MOUTH ON ALL OTHER DAYS OR AS DIRECTED BY CLINIC 3/24/21   Oren Murillo MD   sertraline (ZOLOFT) 50 MG tablet TAKE ONE TABLET BY MOUTH DAILY 3/9/21   Nicolle Rodriguez MD   allopurinol (ZYLOPRIM) 300 MG tablet TAKE ONE TABLET BY MOUTH DAILY 12/9/20   Nicolle Rodriguez MD   omeprazole (PRILOSEC) 20 MG delayed release capsule TAKE ONE CAPSULE BY MOUTH DAILY 11/17/20   Nicolle Rodriguez MD   atorvastatin (LIPITOR) 20 MG tablet TAKE ONE TABLET BY MOUTH ONCE NIGHTLY 11/16/20   Nicolle Rodriguez MD   metoprolol tartrate (LOPRESSOR) 25 MG tablet Take 1 tablet by mouth 2 times daily 10/15/20   RADHA Gan CNP   Cholecalciferol (VITAMIN D) 2000 UNITS CAPS capsule Take 2,000 Units by mouth daily    Historical Provider, MD   docusate sodium (COLACE) 100 MG capsule Take 100 mg by mouth daily as needed for Constipation     Historical Provider, MD       Future Appointments   Date Time Provider Juan Jose Dickensi   10/18/2021  1:15 PM [address] [facility]   11/16/2021  1:00 PM MD Brandt Zelaya [facility]     ,   Congestive Heart Failure Assessment    Are you currently restricting fluids?: No Restriction  Do you understand a low sodium diet?: Yes  Do you understand how to read food labels?: Yes  How many restaurant meals do you eat per week?: 3-4  Do you salt your food before tasting it?: No     No patient-reported symptoms      Symptoms:  None: Yes      Symptom course: stable  Weight trend: stable  Salt intake watch compared to last visit: stable      and   General Assessment    Do you have any symptoms that are causing concern?: Yes  Progression since Onset: Unchanged  Reported Symptoms: Pain (Comment: back and shoulder)

## 2021-10-13 NOTE — TELEPHONE ENCOUNTER
Not sure where the note to ferny went but pt needs ptx and an AWV in Jan-I did refills for oct,nov and dec for her norco - see if she will do ptx

## 2021-10-13 NOTE — CARE COORDINATION
I sent her rx's for 3 mos but have her see me in jamal for her AWV- I will fill rx's up until then but no more until she is seen -shouldn't she do ptx??? Can you talk her into that Angelito? ??

## 2021-10-26 NOTE — CARE COORDINATION
RN, ACM Follow Up Call: The RN, ACM assisted the pt in making an AWV for 1/5/2022 at 1:00 PM. The pt requested the PCP mail her the lab orders so she can have her blood work drawn before her appointment.

## 2021-11-23 NOTE — CARE COORDINATION
I tell patients if you get covid you will need experimental meds and could have long term illness-the booster is [facility] safer than this!!!

## 2021-11-23 NOTE — CARE COORDINATION
Ambulatory Care Coordination Note  11/23/2021  CM Risk Score: 6  Charlson 10 Year Mortality Risk Score: 100%     ACC: Ubaldo Portillo, KRISTEN [facility]    Hx: Heart Failure, Pure Hypercholesterolemia, Diverticulosis, Gout, HTN, Obesity, A-Fib, Osteoarthritis, CKD, GERD, Gait Disturbance, Lumbar Disc Disease, Falls    Summary Note: The pt denied any CHF symptoms. See assessment below. The RN, ACM informed the pt that she would be receiving information either by My Chart or the mail on the CHF zones, CHF food swaps for the holidays and reminding the pt to take all her medications as directed especially her diuretic. The Rn, ACM discussed the COVID-19 booster with the pt and she is unsure if she will get it. The RN, ACM reassured the pt that the vaccine is safe. The pt did get her Flu vaccine. The RN, ACM reviewed CHF zones with the pt. Plan:  The pt agreed to review the CHF handouts. The RN, ACM instructed the pt to call the nurse with any questions or concerns. Care Coordination Interventions    Program Enrollment: Complex Care  Referral from Primary Care Provider: No  Suggested Interventions and Community Resources  Fall Risk Prevention: In Process  Zone Management Tools: In Process  Other Services or Interventions: Discussed low sodium diet. Goals Addressed                    This Visit's Progress      COMPLETED: Patient Stated (pt-stated)   On track      To remain safely in her home    Barriers: impairment:  physical: gait disturbance and lack of education  Plan for overcoming my barriers: Work with RN, ACM  Confidence: 8/10  Anticipated Goal Completion Date: 10/13/21            Prior to Admission medications    Medication Sig Start Date End Date Taking?  Authorizing Provider   atorvastatin (LIPITOR) 20 MG tablet TAKE ONE TABLET BY MOUTH ONCE NIGHTLY 11/12/21   Kim Bray MD   omeprazole (PRILOSEC) 20 MG delayed release capsule TAKE ONE CAPSULE BY MOUTH DAILY 11/12/21   Kim Bray MD   metoprolol tartrate (LOPRESSOR) 25 MG tablet TAKE ONE TABLET BY MOUTH TWICE A DAY 11/9/21   RADHA Merchant CNP   clindamycin (CLEOCIN) 300 MG capsule TAKE 2 CAPSULES BY MOUTH 1 HOUR BEFORE PROCEDURE 9/15/21   Mike Lee MD   DULoxetine 40 MG CPEP Take 20 mg by mouth daily 8/3/21   Mike Lee MD   lidocaine (LIDODERM) 5 % Place 1 patch onto the skin daily 12 hours on, 12 hours off. 7/31/21   Edvin Winn MD   CARTIA  MG extended release capsule TAKE ONE CAPSULE BY MOUTH DAILY 6/16/21   RADHA Macedo CNP   furosemide (LASIX) 20 MG tablet Take 1 tablet by mouth daily as needed (SOB, edema, weight gain) 5/14/21   RADHA Macedo CNP   warfarin (COUMADIN) 1 MG tablet TAKE ONE TABLET BY MOUTH ON SUNDAY, TUESDAY AND THURSDAY.   TAKE ONE-HALF TABLET BY MOUTH ON ALL OTHER DAYS OR AS DIRECTED BY CLINIC 3/24/21   Kemar Campos MD   sertraline (ZOLOFT) 50 MG tablet TAKE ONE TABLET BY MOUTH DAILY 3/9/21   Mike Lee MD   allopurinol (ZYLOPRIM) 300 MG tablet TAKE ONE TABLET BY MOUTH DAILY 12/9/20   Mike Lee MD   Cholecalciferol (VITAMIN D) 2000 UNITS CAPS capsule Take 2,000 Units by mouth daily    Historical Provider, MD   docusate sodium (COLACE) 100 MG capsule Take 100 mg by mouth daily as needed for Constipation     Historical Provider, MD       Future Appointments   Date Time Provider Juan Jose Ramirez   11/29/2021  1:00  [place] [place]   1/5/2022  1:00 PM Mike Lee MD [facility] [place] - [facility]   1/11/2022  1:00 PM MD Jonny Guerrier [facility]     ,   Congestive Heart Failure Assessment    Are you currently restricting fluids?: No Restriction  Do you understand a low sodium diet?: Yes  Do you understand how to read food labels?: Yes  How many restaurant meals do you eat per week?: 3-4  Do you salt your food before tasting it?: No     No patient-reported symptoms      Symptoms:  None: Yes      Symptom course: stable  Weight trend: stable  Salt intake watch compared to last visit: stable      and   General Assessment    Do you have any symptoms that are causing concern?: No

## 2021-11-29 NOTE — PROGRESS NOTES
Ms. Khoa Garner is here for management of anticoagulation for Afib. PMH also significant for HTN, Gout, CKD II/III, Hyperlipidemia, and depression. She presents today w/out complaint. Pt verifies dosing regimen as listed above. Pt denies s/sx bleeding/bruising/ CP/HA/swelling/SOB  No missed doses. No changes in Rx/OTC/herbal medications. No major changes in diet. Pt does not drink EtOH or smoke. No changes per pt. INR 2.2 is within acceptable therapeutic range of 2-3. Recommend to continue dose of 1 mg on Tuesdays and Fridays and 0.5 mg all other days. Patient has 1 mg tablets. Will continue to monitor and check INR in 4 weeks  Dosing reminder card given with phone number, appointment date and time.   Return to clinic: 12/20 @ 1:00 pm   Referring Provider: Dr. Susan Cleaning

## 2021-11-29 NOTE — TELEPHONE ENCOUNTER
Pt called into the office to request a refill for the following medication. Pt states that she was supposed to have the medication called in 10 days ago. oxyCODONE-acetaminophen (PERCOCET) 7.5-325 MG per tablet 1 tablet         OLIVERIO WILLARD [address] [place] [address] [phone] - F [phone]    Please advise.

## 2021-11-29 NOTE — TELEPHONE ENCOUNTER
She should have rx's for them that I sent to the pharmacy! ! I sent 3 last mo!!! Tell her I will resend again but I usually do 3 mos worth but it is norco not percocet!!! Tell her to tell her pharmacy that they were sent and now I am resending w updated dates so they need to NOT LOOSE THEM!!  Read her the dates for refills so she will know

## 2021-12-01 NOTE — TELEPHONE ENCOUNTER
----- Message from Raghu Maikelreyes sent at 11/26/2021  4:32 PM EST -----  Subject: Refill Request    QUESTIONS  Name of Medication? HYDROcodone-acetaminophen (NORCO) 5-325 MG per tablet  Patient-reported dosage and instructions? twice a day  How many days do you have left? 0  Preferred Pharmacy? [address]  phone number (if available)? [phone]  Additional Information for Provider? 30 day supply pt is completely out of   medication  ---------------------------------------------------------------------------  --------------  CALL BACK INFO  What is the best way for the office to contact you? OK to leave message on   voicemail  Preferred Call Back Phone Number?  [phone]

## 2021-12-15 NOTE — ED PROVIDER NOTES
[address] ED  EMERGENCY DEPARTMENT ENCOUNTER        Pt Name: Holland Ferrell  MRN: [id number]  [place] 1940  Date of evaluation: 12/15/2021  Provider: David Zarco PA-C  PCP: Lazarus Evangelist, MD  Note Started: 12:12 PM EST       SUNG. I have evaluated this patient. My supervising physician was available for consultation. CHIEF COMPLAINT       Chief Complaint   Patient presents with    Back Pain     Pt arrived via EMS c/o low back and left hip pain that \"shoots\" down her left leg since injuring herself 5 days pta.  Hip Pain       HISTORY OF PRESENT ILLNESS   (Location, Timing/Onset, Context/Setting, Quality, Duration, Modifying Factors, Severity, Associated Signs and Symptoms)  Note limiting factors. Chief Complaint: left sided back pain; left hip pain     Holland Ferrell is a 79 y.o. female with a past medical history of A. fib on Coumadin chronic kidney disease depression arthritis anemia sleep apnea hyperlipidemia hypertension brought in today by EMS with complaints of left-sided low back pain and left hip pain. She states that this past Friday she was making her bed and she feels as though she tweaked her lower back when she was making her bed. She states since then she has had pain to the left side of her back and left hip. She states the pain radiates down from her back down to her leg. Onset of symptoms over the past 3 to 4 days. Duration of symptoms have been persistent since onset. Context includes left-sided low back pain and left hip pain. Denies numbness or tingling or bowel or bladder incontinence. Denies urinary complaints. Denies n/v/d. She has been taking Tylenol for pain. Rates her current pain a 7 out of 10 no radiation of pain. No aggravating complaints. No alleviating complaints. She otherwise denies any other symptoms. Nothing seems to make symptoms better or worse. Denies chest pain or shortness of breath.   Denies nausea vomiting diarrhea or abdominal pain. Denies weakness. Nursing Notes were all reviewed and agreed with or any disagreements were addressed in the HPI. REVIEW OF SYSTEMS    (2-9 systems for level 4, 10 or more for level 5)     Review of Systems   Constitutional: Negative. Respiratory: Negative. Cardiovascular: Negative. Gastrointestinal: Negative. Genitourinary: Negative. Musculoskeletal: Positive for arthralgias and back pain. Skin: Negative. Neurological: Negative. Positives and Pertinent negatives as per HPI. Except as noted above in the ROS, all other systems were reviewed and negative.        PAST MEDICAL HISTORY     Past Medical History:   Diagnosis Date    Anemia     NOS    Arthritis     knee     Atrial fibrillation (HCC)     on coumadin    Chronic kidney disease (CKD), stage II (mild)     Community acquired pneumonia of left lower lobe of lung 12/26/2017    Depression     Diverticulosis     Foot pain     GI bleed 4/18/2018    Due to esophageal tear     Gout     Hemorrhoids     Hyperlipidemia     Hypertension     Hyperuricemia     Iron deficiency anemia 1/8/2014    Iron deficiency anemia-s/p EGD and colonoscopy 2/11/2014 Esophageal tear likely source     Medical history reviewed with no changes     Menopausal syndrome     Obesity     Pelvic relaxation     PONV (postoperative nausea and vomiting)     Stress 8/2006    NL stress    Syncope     Unspecified sleep apnea 03/2002    uvulectomy -hx of    Vitamin D deficiency     20ng/ml 6/2009    Wears dentures     Wears glasses          SURGICAL HISTORY     Past Surgical History:   Procedure Laterality Date    BLADDER SUSPENSION  1997    CHOLECYSTECTOMY  1974    COLONOSCOPY      2013    COLONOSCOPY  11/2014    10yr    EYE SURGERY      macular tear and cataract right    HYSTERECTOMY  1977    partial    JOINT REPLACEMENT Right 2017    KNEE ARTHROSCOPY  2005    knee surgery    PAIN MANAGEMENT PROCEDURE (COUMADIN) 1 MG TABLET    TAKE ONE TABLET BY MOUTH ON SUNDAY, TUESDAY AND THURSDAY. TAKE ONE-HALF TABLET BY MOUTH ON ALL OTHER DAYS OR AS DIRECTED BY CLINIC         ALLERGIES     Diclofenac, Adhesive tape, and Penicillins    FAMILYHISTORY       Family History   Problem Relation Age of Onset    Heart Attack Father     Heart Disease Father     Stroke Brother           SOCIAL HISTORY       Social History     Tobacco Use    Smoking status: Never Smoker    Smokeless tobacco: Never Used   Vaping Use    Vaping Use: Never used   Substance Use Topics    Alcohol use: No    Drug use: Never       SCREENINGS             PHYSICAL EXAM    (up to 7 for level 4, 8 or more for level 5)     ED Triage Vitals [12/15/21 1158]   BP Temp Temp Source Pulse Resp SpO2 Height Weight   (!) 140/96 97.9 °F (36.6 °C) Oral 96 22 95 % 5' 6\" (1.676 m) 260 lb (117.9 kg)       Physical Exam  Vitals and nursing note reviewed. Constitutional:       General: She is awake. She is not in acute distress. Appearance: Normal appearance. She is well-developed. She is obese. She is not ill-appearing, toxic-appearing or diaphoretic. HENT:      Head: Normocephalic and atraumatic. Nose: Nose normal.   Eyes:      General:         Right eye: No discharge. Left eye: No discharge. Cardiovascular:      Rate and Rhythm: Normal rate and regular rhythm. Pulses:           Radial pulses are 2+ on the right side and 2+ on the left side. Dorsalis pedis pulses are 2+ on the right side and 2+ on the left side. Posterior tibial pulses are 2+ on the right side and 2+ on the left side. Heart sounds: Normal heart sounds. No murmur heard. No gallop. Pulmonary:      Effort: Pulmonary effort is normal. No respiratory distress. Breath sounds: Normal breath sounds. No decreased breath sounds, wheezing, rhonchi or rales. Chest:      Chest wall: No tenderness. Abdominal:      General: Abdomen is flat.  Bowel sounds are normal.      Palpations: Abdomen is soft. Tenderness: There is no abdominal tenderness. There is no right CVA tenderness, left CVA tenderness, guarding or rebound. Negative signs include Hdz's sign and McBurney's sign. Musculoskeletal:         General: No deformity. Normal range of motion. Cervical back: Normal, normal range of motion and neck supple. No swelling, edema, deformity, erythema, signs of trauma, lacerations, rigidity, spasms, torticollis, tenderness, bony tenderness or crepitus. No pain with movement. Normal range of motion. Thoracic back: Normal. No swelling, edema, deformity, signs of trauma, lacerations, spasms, tenderness or bony tenderness. Normal range of motion. No scoliosis. Lumbar back: Normal. No swelling, edema, deformity, signs of trauma, lacerations, spasms, tenderness or bony tenderness. Normal range of motion. Negative right straight leg raise test and negative left straight leg raise test. No scoliosis. Back:       Left hip: Tenderness present. No deformity, lacerations, bony tenderness or crepitus. Normal range of motion. Normal strength. Comments: No bony tenderness to the cervical, thoracic or lumbar spine. No step-off deformity. No skin changes color streaking or ecchymosis. Sensation intact in L4 L5-S1 and symmetric. Neurovascularly intact. Distal pulses +2. Reproducible left-sided hip pain. No obvious deformity to the left hip. No skin changes color streaking or ecchymosis noted to the left hip. No deformity to the left extremity. No shortening or rotation. Skin:     General: Skin is warm and dry. Neurological:      General: No focal deficit present. Mental Status: She is alert and oriented to person, place, and time. GCS: GCS eye subscore is 4. GCS verbal subscore is 5. GCS motor subscore is 6. Cranial Nerves: Cranial nerves are intact. Psychiatric:         Behavior: Behavior normal. Behavior is cooperative. DIAGNOSTIC RESULTS   LABS:    Labs Reviewed   CBC WITH AUTO DIFFERENTIAL - Abnormal; Notable for the following components:       Result Value    .4 (*)     MCH 34.6 (*)     RDW 16.4 (*)     Neutrophils Absolute 8.1 (*)     All other components within normal limits    Narrative:     Performed at:  [facility],  [employer]   Phone [phone]   COMPREHENSIVE METABOLIC PANEL W/ REFLEX TO MG FOR LOW K - Abnormal; Notable for the following components:    Glucose 110 (*)     BUN 25 (*)     CREATININE 1.5 (*)     GFR Non- 33 (*)     GFR  40 (*)     All other components within normal limits    Narrative:     Performed at:  [facility],  [employer]   Phone [phone] - Abnormal; Notable for the following components:    Protime 41.0 (*)     INR 3.45 (*)     All other components within normal limits    Narrative:     Performed at:  [facility],  [employer]   Phone [phone]   URINE RT REFLEX TO CULTURE - Abnormal; Notable for the following components:    Protein, UA 30 (*)     Nitrite, Urine POSITIVE (*)     Leukocyte Esterase, Urine TRACE (*)     All other components within normal limits    Narrative:     Performed at:  [facility],  [employer]   Phone [phone]   MICROSCOPIC URINALYSIS - Abnormal; Notable for the following components:    Mucus, UA 2+ (*)     WBC, UA  (*)     RBC, UA 5-10 (*)     Bacteria, UA 3+ (*)     All other components within normal limits    Narrative:     Performed at:  [facility],  [employer]   Phone [phone]   CULTURE, URINE       When ordered only abnormal lab results are displayed.  All other labs were within normal range or not returned as of this dictation. EKG: When ordered, EKG's are interpreted by the Emergency Department Physician in the absence of a cardiologist.  Please see their note for interpretation of EKG. RADIOLOGY:   Non-plain film images such as CT, Ultrasound and MRI are read by the radiologist. Plain radiographic images are visualized and preliminarily interpreted by the ED Provider with the below findings:        Interpretation per the Radiologist below, if available at the time of this note:    CT LUMBAR SPINE WO CONTRAST   Final Result   Severe multilevel degenerative changes without significant canal stenosis      Degenerative changes seen in the SI joints bilaterally      No acute lumbar spine abnormality         XR HIP 2-3 VW W PELVIS LEFT   Final Result   No acute osseous abnormality. Degenerative changes lower lumbar spine           No results found. PROCEDURES   Unless otherwise noted below, none     Procedures    CRITICAL CARE TIME   N/A    CONSULTS:  None      EMERGENCY DEPARTMENT COURSE and DIFFERENTIAL DIAGNOSIS/MDM:   Vitals:    Vitals:    12/15/21 1158 12/15/21 1407   BP: (!) 140/96 (!) 133/91   Pulse: 96 95   Resp: 22 16   Temp: 97.9 °F (36.6 °C) 97.7 °F (36.5 °C)   TempSrc: Oral Oral   SpO2: 95% 98%   Weight: 260 lb (117.9 kg)    Height: 5' 6\" (1.676 m)        Patient was given the following medications:  Medications   lidocaine 4 % external patch 1 patch (1 patch TransDERmal Patch Applied 12/15/21 1412)   cefUROXime (CEFTIN) tablet 250 mg (has no administration in time range)   acetaminophen (TYLENOL) tablet 650 mg (650 mg Oral Given 12/15/21 1412)   predniSONE (DELTASONE) tablet 40 mg (40 mg Oral Given 12/15/21 1412)           Patient brought in today by private vehicle with complaints of left-sided low back pain and left hip pain after making her bed. On exam she is alert oriented afebrile breathing on room air satting at 95%. Nontoxic in appearance.   No acute respiratory distress. Old labs and records reviewed. Patient seen and evaluated by myself my attending was available as needed for consultation. CT lumbar spine shows severe multilevel degenerative changes without significant canal stenosis. Degenerative change seen in the SI joints bilaterally. No acute lumbar spine abnormality. No acute osseous abnormality of the left hip. Degenerative changes in the lower lumbar spine. CBC shows no acute leukocytosis. Hemoglobin of 14.8. No acute electrolyte abnormalities. Creatinine of 1.5 BUN of 25 GFR 33. This appears to be chronic for the patient. INR 3.45. Urine positive nitrites and trace leukocytes.  WBCs +3 bacteria. We will plan to treat. Patient given pain medicine Lidoderm patch and prednisone. Patient given Ceftin for UTI. She did ambulate here in the ED. Plan at this time will be to discharge home. She was updated on all results. She will be given steroids Tylenol Lidoderm patch for home as well as Ceftin for UTI. She is instructed to follow-up with her PCP. Patient told to return to the ED with any new or worsening symptoms including but not limited to increased pain, numbness or tingling bowel or bladder incontinence fevers chills nausea or vomiting or any new or changing symptoms. She verbalized understanding. I did feel comfortable sending this patient home with close follow-up instructions and strict return precautions. Patient discharged in stable condition. FINAL IMPRESSION      1. Sciatica of left side    2. Acute cystitis without hematuria    3. Supratherapeutic INR    4.  Degenerative disc disease, lumbar          DISPOSITION/PLAN   DISPOSITION Discharge - Pending Orders Complete 12/15/2021 04:27:22 PM      PATIENT REFERRED TO:  Deb Real MD  [address] [place] [address]  [phone]    Schedule an appointment as soon as possible for a visit   As needed, If symptoms worsen    [facility] ED  [address] [address]  [phone]  Schedule an appointment as soon as possible for a visit   As needed, If symptoms worsen      DISCHARGE MEDICATIONS:  New Prescriptions    ACETAMINOPHEN (TYLENOL) 500 MG TABLET    Take 1 tablet by mouth 4 times daily as needed for Pain    CEFUROXIME (CEFTIN) 250 MG TABLET    Take 1 tablet by mouth 2 times daily for 7 days    LIDOCAINE (LIDODERM) 5 %    Place 1 patch onto the skin daily for 10 days 12 hours on, 12 hours off.     PREDNISONE (DELTASONE) 10 MG TABLET    Take 6 tablets by mouth daily for 5 doses       DISCONTINUED MEDICATIONS:  Discontinued Medications    No medications on file              (Please note that portions of this note were completed with a voice recognition program.  Efforts were made to edit the dictations but occasionally words are mis-transcribed.)    Sammy Mann PA-C (electronically signed)           Sammy Mann PA-C  12/15/21 77[address] [facility] TRUMAN Silvestre  12/15/21 0083

## 2021-12-15 NOTE — TELEPHONE ENCOUNTER
Left message-pt likely needs to go to ER-?? Needs in pt rehab?? Can you call again and see if you can reach them?

## 2021-12-23 NOTE — CARE COORDINATION
RN, ACM Note:  The pt denied any CHF symptoms. The RN, ACM informed the pt that she would be receiving information by My Chart on the CHF zones, CHF food swaps for the holidays and reminding the pt to take all her medications as directed especially her diuretic. The RN, ACM reviewed signs and symptoms of CHF with the pt.

## 2022-01-01 ENCOUNTER — APPOINTMENT (OUTPATIENT)
Dept: GENERAL RADIOLOGY | Age: 82
DRG: 177 | End: 2022-01-01
Payer: MEDICARE

## 2022-01-01 ENCOUNTER — APPOINTMENT (OUTPATIENT)
Dept: CT IMAGING | Age: 82
DRG: 177 | End: 2022-01-01
Payer: MEDICARE

## 2022-01-01 ENCOUNTER — HOSPITAL ENCOUNTER (INPATIENT)
Age: 82
LOS: 7 days | Discharge: NURSING, REHAB OR LONG-TERM CARE | DRG: 177 | End: 2022-01-10
Attending: EMERGENCY MEDICINE | Admitting: HOSPITALIST
Payer: MEDICARE

## 2022-01-01 ENCOUNTER — TELEPHONE (OUTPATIENT)
Dept: INTERNAL MEDICINE CLINIC | Age: 82
End: 2022-01-01

## 2022-01-01 ENCOUNTER — CARE COORDINATION (OUTPATIENT)
Dept: CARE COORDINATION | Age: 82
End: 2022-01-01

## 2022-01-01 ENCOUNTER — APPOINTMENT (OUTPATIENT)
Dept: MRI IMAGING | Age: 82
DRG: 177 | End: 2022-01-01
Payer: MEDICARE

## 2022-01-01 ENCOUNTER — HOSPITAL ENCOUNTER (INPATIENT)
Age: 82
LOS: 2 days | DRG: 177 | End: 2022-02-11
Attending: EMERGENCY MEDICINE | Admitting: HOSPITALIST
Payer: MEDICARE

## 2022-01-01 VITALS
TEMPERATURE: 97.6 F | OXYGEN SATURATION: 94 % | RESPIRATION RATE: 20 BRPM | SYSTOLIC BLOOD PRESSURE: 135 MMHG | HEART RATE: 81 BPM | WEIGHT: 262.44 LBS | HEIGHT: 66 IN | BODY MASS INDEX: 42.18 KG/M2 | DIASTOLIC BLOOD PRESSURE: 73 MMHG

## 2022-01-01 DIAGNOSIS — S22.030A COMPRESSION FRACTURE OF T3 VERTEBRA, INITIAL ENCOUNTER (HCC): Primary | ICD-10-CM

## 2022-01-01 DIAGNOSIS — M25.562 CHRONIC PAIN OF BOTH KNEES: ICD-10-CM

## 2022-01-01 DIAGNOSIS — S22.040A COMPRESSION FRACTURE OF T4 VERTEBRA, INITIAL ENCOUNTER (HCC): Primary | ICD-10-CM

## 2022-01-01 DIAGNOSIS — S22.030S COMPRESSION FRACTURE OF T3 VERTEBRA, SEQUELA: ICD-10-CM

## 2022-01-01 DIAGNOSIS — R53.81 PHYSICAL DECONDITIONING: ICD-10-CM

## 2022-01-01 DIAGNOSIS — R79.1 SUPRATHERAPEUTIC INR: ICD-10-CM

## 2022-01-01 DIAGNOSIS — R77.8 TROPONIN LEVEL ELEVATED: ICD-10-CM

## 2022-01-01 DIAGNOSIS — R26.9 GAIT DISTURBANCE: Primary | ICD-10-CM

## 2022-01-01 DIAGNOSIS — R29.6 FREQUENT FALLS: ICD-10-CM

## 2022-01-01 DIAGNOSIS — G89.29 CHRONIC PAIN OF BOTH KNEES: ICD-10-CM

## 2022-01-01 DIAGNOSIS — S22.000A COMPRESSION FRACTURE OF BODY OF THORACIC VERTEBRA (HCC): ICD-10-CM

## 2022-01-01 DIAGNOSIS — R79.89 ELEVATED BRAIN NATRIURETIC PEPTIDE (BNP) LEVEL: ICD-10-CM

## 2022-01-01 DIAGNOSIS — Z20.822 PERSON UNDER INVESTIGATION FOR COVID-19: ICD-10-CM

## 2022-01-01 DIAGNOSIS — S22.030A COMPRESSION FRACTURE OF T3 VERTEBRA, INITIAL ENCOUNTER (HCC): ICD-10-CM

## 2022-01-01 DIAGNOSIS — S22.040A COMPRESSION FRACTURE OF T4 VERTEBRA, INITIAL ENCOUNTER (HCC): ICD-10-CM

## 2022-01-01 DIAGNOSIS — M25.561 CHRONIC PAIN OF BOTH KNEES: ICD-10-CM

## 2022-01-01 DIAGNOSIS — B37.49 YEAST UTI: ICD-10-CM

## 2022-01-01 DIAGNOSIS — R41.82 ALTERED MENTAL STATUS, UNSPECIFIED ALTERED MENTAL STATUS TYPE: Primary | ICD-10-CM

## 2022-01-01 DIAGNOSIS — R09.02 HYPOXIA: ICD-10-CM

## 2022-01-01 DIAGNOSIS — S22.21XA FRACTURE OF MANUBRIUM, INITIAL ENCOUNTER FOR CLOSED FRACTURE: ICD-10-CM

## 2022-01-01 DIAGNOSIS — J18.9 PNEUMONIA OF RIGHT UPPER LOBE DUE TO INFECTIOUS ORGANISM: ICD-10-CM

## 2022-01-01 DIAGNOSIS — D64.9 ANEMIA, UNSPECIFIED TYPE: ICD-10-CM

## 2022-01-01 LAB
A/G RATIO: 0.8 (ref 1.1–2.2)
A/G RATIO: 0.9 (ref 1.1–2.2)
A/G RATIO: 1.1 (ref 1.1–2.2)
A/G RATIO: 1.2 (ref 1.1–2.2)
A/G RATIO: 1.3 (ref 1.1–2.2)
ABO/RH: NORMAL
ALBUMIN SERPL-MCNC: 2.7 G/DL (ref 3.4–5)
ALBUMIN SERPL-MCNC: 3.1 G/DL (ref 3.4–5)
ALBUMIN SERPL-MCNC: 3.2 G/DL (ref 3.4–5)
ALP BLD-CCNC: 103 U/L (ref 40–129)
ALP BLD-CCNC: 104 U/L (ref 40–129)
ALP BLD-CCNC: 140 U/L (ref 40–129)
ALP BLD-CCNC: 141 U/L (ref 40–129)
ALP BLD-CCNC: 147 U/L (ref 40–129)
ALT SERPL-CCNC: 102 U/L (ref 10–40)
ALT SERPL-CCNC: 54 U/L (ref 10–40)
ALT SERPL-CCNC: 58 U/L (ref 10–40)
ALT SERPL-CCNC: 97 U/L (ref 10–40)
ALT SERPL-CCNC: 98 U/L (ref 10–40)
AMMONIA: 21 UMOL/L (ref 11–51)
ANION GAP SERPL CALCULATED.3IONS-SCNC: 10 MMOL/L (ref 3–16)
ANION GAP SERPL CALCULATED.3IONS-SCNC: 10 MMOL/L (ref 3–16)
ANION GAP SERPL CALCULATED.3IONS-SCNC: 11 MMOL/L (ref 3–16)
ANION GAP SERPL CALCULATED.3IONS-SCNC: 15 MMOL/L (ref 3–16)
ANION GAP SERPL CALCULATED.3IONS-SCNC: 16 MMOL/L (ref 3–16)
ANION GAP SERPL CALCULATED.3IONS-SCNC: 7 MMOL/L (ref 3–16)
ANION GAP SERPL CALCULATED.3IONS-SCNC: 8 MMOL/L (ref 3–16)
ANION GAP SERPL CALCULATED.3IONS-SCNC: 9 MMOL/L (ref 3–16)
ANTIBODY SCREEN: NORMAL
APTT: 32.8 SEC (ref 26.2–38.6)
AST SERPL-CCNC: 102 U/L (ref 15–37)
AST SERPL-CCNC: 22 U/L (ref 15–37)
AST SERPL-CCNC: 28 U/L (ref 15–37)
AST SERPL-CCNC: 67 U/L (ref 15–37)
AST SERPL-CCNC: 83 U/L (ref 15–37)
ATYPICAL LYMPHOCYTE RELATIVE PERCENT: 3 % (ref 0–6)
BANDED NEUTROPHILS RELATIVE PERCENT: 3 % (ref 0–7)
BANDED NEUTROPHILS RELATIVE PERCENT: 5 % (ref 0–7)
BASE EXCESS ARTERIAL: 0.8 MMOL/L (ref -3–3)
BASE EXCESS VENOUS: 1.7 MMOL/L (ref -2–3)
BASOPHILS ABSOLUTE: 0 K/UL (ref 0–0.2)
BASOPHILS RELATIVE PERCENT: 0 %
BASOPHILS RELATIVE PERCENT: 0.1 %
BASOPHILS RELATIVE PERCENT: 0.1 %
BASOPHILS RELATIVE PERCENT: 0.2 %
BASOPHILS RELATIVE PERCENT: 0.2 %
BASOPHILS RELATIVE PERCENT: 0.3 %
BASOPHILS RELATIVE PERCENT: 0.3 %
BETA-HYDROXYBUTYRATE: 0.5 MMOL/L (ref 0–0.27)
BILIRUB SERPL-MCNC: 0.6 MG/DL (ref 0–1)
BILIRUB SERPL-MCNC: 0.6 MG/DL (ref 0–1)
BILIRUB SERPL-MCNC: 0.8 MG/DL (ref 0–1)
BILIRUB SERPL-MCNC: 1.1 MG/DL (ref 0–1)
BILIRUB SERPL-MCNC: 1.2 MG/DL (ref 0–1)
BILIRUBIN URINE: NEGATIVE
BLOOD, URINE: NEGATIVE
BUN BLDV-MCNC: 21 MG/DL (ref 7–20)
BUN BLDV-MCNC: 22 MG/DL (ref 7–20)
BUN BLDV-MCNC: 23 MG/DL (ref 7–20)
BUN BLDV-MCNC: 29 MG/DL (ref 7–20)
BUN BLDV-MCNC: 44 MG/DL (ref 7–20)
BUN BLDV-MCNC: 48 MG/DL (ref 7–20)
BUN BLDV-MCNC: 54 MG/DL (ref 7–20)
BUN BLDV-MCNC: 58 MG/DL (ref 7–20)
BUN BLDV-MCNC: 61 MG/DL (ref 7–20)
BUN BLDV-MCNC: 61 MG/DL (ref 7–20)
BURR CELLS: ABNORMAL
C-REACTIVE PROTEIN: 216.9 MG/L (ref 0–5.1)
C-REACTIVE PROTEIN: 4.8 MG/L (ref 0–5.1)
CALCIUM SERPL-MCNC: 8.3 MG/DL (ref 8.3–10.6)
CALCIUM SERPL-MCNC: 8.4 MG/DL (ref 8.3–10.6)
CALCIUM SERPL-MCNC: 8.4 MG/DL (ref 8.3–10.6)
CALCIUM SERPL-MCNC: 8.5 MG/DL (ref 8.3–10.6)
CALCIUM SERPL-MCNC: 8.6 MG/DL (ref 8.3–10.6)
CALCIUM SERPL-MCNC: 8.7 MG/DL (ref 8.3–10.6)
CALCIUM SERPL-MCNC: 8.8 MG/DL (ref 8.3–10.6)
CALCIUM SERPL-MCNC: 9 MG/DL (ref 8.3–10.6)
CARBOXYHEMOGLOBIN ARTERIAL: 0.9 % (ref 0–1.5)
CARBOXYHEMOGLOBIN: 1.6 % (ref 0–1.5)
CHLORIDE BLD-SCNC: 101 MMOL/L (ref 99–110)
CHLORIDE BLD-SCNC: 101 MMOL/L (ref 99–110)
CHLORIDE BLD-SCNC: 102 MMOL/L (ref 99–110)
CHLORIDE BLD-SCNC: 97 MMOL/L (ref 99–110)
CHLORIDE BLD-SCNC: 97 MMOL/L (ref 99–110)
CHLORIDE BLD-SCNC: 98 MMOL/L (ref 99–110)
CHLORIDE BLD-SCNC: 99 MMOL/L (ref 99–110)
CHLORIDE BLD-SCNC: 99 MMOL/L (ref 99–110)
CLARITY: CLEAR
CO2: 19 MMOL/L (ref 21–32)
CO2: 21 MMOL/L (ref 21–32)
CO2: 21 MMOL/L (ref 21–32)
CO2: 24 MMOL/L (ref 21–32)
CO2: 25 MMOL/L (ref 21–32)
CO2: 26 MMOL/L (ref 21–32)
CO2: 29 MMOL/L (ref 21–32)
CO2: 29 MMOL/L (ref 21–32)
COLOR: YELLOW
COMMENT UA: ABNORMAL
CREAT SERPL-MCNC: 1 MG/DL (ref 0.6–1.2)
CREAT SERPL-MCNC: 1.2 MG/DL (ref 0.6–1.2)
CREAT SERPL-MCNC: 1.3 MG/DL (ref 0.6–1.2)
CREAT SERPL-MCNC: 1.4 MG/DL (ref 0.6–1.2)
CREAT SERPL-MCNC: 1.5 MG/DL (ref 0.6–1.2)
CREAT SERPL-MCNC: 1.6 MG/DL (ref 0.6–1.2)
CREAT SERPL-MCNC: 1.7 MG/DL (ref 0.6–1.2)
CULTURE, RESPIRATORY: NORMAL
D DIMER: 221 NG/ML DDU (ref 0–229)
D DIMER: 221 NG/ML DDU (ref 0–229)
D DIMER: 265 NG/ML DDU (ref 0–229)
D DIMER: 289 NG/ML DDU (ref 0–229)
D DIMER: 345 NG/ML DDU (ref 0–229)
D DIMER: 351 NG/ML DDU (ref 0–229)
EKG ATRIAL RATE: 220 BPM
EKG ATRIAL RATE: 250 BPM
EKG ATRIAL RATE: 87 BPM
EKG DIAGNOSIS: NORMAL
EKG Q-T INTERVAL: 366 MS
EKG Q-T INTERVAL: 368 MS
EKG Q-T INTERVAL: 396 MS
EKG QRS DURATION: 84 MS
EKG QRS DURATION: 90 MS
EKG QRS DURATION: 90 MS
EKG QTC CALCULATION (BAZETT): 456 MS
EKG QTC CALCULATION (BAZETT): 477 MS
EKG QTC CALCULATION (BAZETT): 486 MS
EKG R AXIS: 17 DEGREES
EKG R AXIS: 20 DEGREES
EKG R AXIS: 48 DEGREES
EKG T AXIS: 195 DEGREES
EKG T AXIS: 213 DEGREES
EKG T AXIS: 45 DEGREES
EKG VENTRICULAR RATE: 101 BPM
EKG VENTRICULAR RATE: 106 BPM
EKG VENTRICULAR RATE: 80 BPM
EOSINOPHILS ABSOLUTE: 0 K/UL (ref 0–0.6)
EOSINOPHILS ABSOLUTE: 0.1 K/UL (ref 0–0.6)
EOSINOPHILS ABSOLUTE: 0.1 K/UL (ref 0–0.6)
EOSINOPHILS RELATIVE PERCENT: 0 %
EOSINOPHILS RELATIVE PERCENT: 0.1 %
EOSINOPHILS RELATIVE PERCENT: 0.5 %
EOSINOPHILS RELATIVE PERCENT: 1.2 %
EOSINOPHILS RELATIVE PERCENT: 1.2 %
EPITHELIAL CELLS, UA: 1 /HPF (ref 0–5)
FERRITIN: 669.8 NG/ML (ref 15–150)
FOLATE: 12.71 NG/ML (ref 4.78–24.2)
FOLATE: 6.47 NG/ML (ref 4.78–24.2)
GFR AFRICAN AMERICAN: 35
GFR AFRICAN AMERICAN: 37
GFR AFRICAN AMERICAN: 40
GFR AFRICAN AMERICAN: 44
GFR AFRICAN AMERICAN: 47
GFR AFRICAN AMERICAN: 52
GFR AFRICAN AMERICAN: >60
GFR NON-AFRICAN AMERICAN: 29
GFR NON-AFRICAN AMERICAN: 31
GFR NON-AFRICAN AMERICAN: 33
GFR NON-AFRICAN AMERICAN: 36
GFR NON-AFRICAN AMERICAN: 39
GFR NON-AFRICAN AMERICAN: 43
GFR NON-AFRICAN AMERICAN: 53
GLUCOSE BLD-MCNC: 102 MG/DL (ref 70–99)
GLUCOSE BLD-MCNC: 115 MG/DL (ref 70–99)
GLUCOSE BLD-MCNC: 116 MG/DL (ref 70–99)
GLUCOSE BLD-MCNC: 120 MG/DL (ref 70–99)
GLUCOSE BLD-MCNC: 123 MG/DL (ref 70–99)
GLUCOSE BLD-MCNC: 125 MG/DL (ref 70–99)
GLUCOSE BLD-MCNC: 127 MG/DL (ref 70–99)
GLUCOSE BLD-MCNC: 128 MG/DL (ref 70–99)
GLUCOSE BLD-MCNC: 132 MG/DL (ref 70–99)
GLUCOSE BLD-MCNC: 139 MG/DL (ref 70–99)
GLUCOSE BLD-MCNC: 142 MG/DL (ref 70–99)
GLUCOSE BLD-MCNC: 157 MG/DL (ref 70–99)
GLUCOSE BLD-MCNC: 181 MG/DL (ref 70–99)
GLUCOSE BLD-MCNC: 194 MG/DL (ref 70–99)
GLUCOSE BLD-MCNC: 265 MG/DL (ref 70–99)
GLUCOSE BLD-MCNC: 270 MG/DL (ref 70–99)
GLUCOSE BLD-MCNC: 271 MG/DL (ref 70–99)
GLUCOSE BLD-MCNC: 274 MG/DL (ref 70–99)
GLUCOSE BLD-MCNC: 277 MG/DL (ref 70–99)
GLUCOSE BLD-MCNC: 281 MG/DL (ref 70–99)
GLUCOSE BLD-MCNC: 315 MG/DL (ref 70–99)
GLUCOSE BLD-MCNC: 87 MG/DL (ref 70–99)
GLUCOSE BLD-MCNC: 90 MG/DL (ref 70–99)
GLUCOSE URINE: NEGATIVE MG/DL
GRAM STAIN RESULT: NORMAL
HCO3 ARTERIAL: 24.9 MMOL/L (ref 21–29)
HCO3 VENOUS: 27.4 MMOL/L (ref 24–28)
HCT VFR BLD CALC: 32.2 % (ref 36–48)
HCT VFR BLD CALC: 32.2 % (ref 36–48)
HCT VFR BLD CALC: 32.3 % (ref 36–48)
HCT VFR BLD CALC: 33.3 % (ref 36–48)
HCT VFR BLD CALC: 33.4 % (ref 36–48)
HCT VFR BLD CALC: 33.7 % (ref 36–48)
HCT VFR BLD CALC: 34.1 % (ref 36–48)
HCT VFR BLD CALC: 34.5 % (ref 36–48)
HCT VFR BLD CALC: 34.7 % (ref 36–48)
HCT VFR BLD CALC: 34.7 % (ref 36–48)
HCT VFR BLD CALC: 37.9 % (ref 36–48)
HCT VFR BLD CALC: 38.1 % (ref 36–48)
HEMOGLOBIN, ART, EXTENDED: 10.5 G/DL (ref 12–16)
HEMOGLOBIN, VEN, REDUCED: 37 %
HEMOGLOBIN: 10.5 G/DL (ref 12–16)
HEMOGLOBIN: 11 G/DL (ref 12–16)
HEMOGLOBIN: 11.3 G/DL (ref 12–16)
HEMOGLOBIN: 11.3 G/DL (ref 12–16)
HEMOGLOBIN: 11.5 G/DL (ref 12–16)
HEMOGLOBIN: 11.7 G/DL (ref 12–16)
HEMOGLOBIN: 12.6 G/DL (ref 12–16)
HEMOGLOBIN: 12.7 G/DL (ref 12–16)
HYALINE CASTS: 7 /LPF (ref 0–8)
IMMATURE RETIC FRACT: 0.51 (ref 0.21–0.37)
INR BLD: 1.71 (ref 0.88–1.12)
INR BLD: 3.87 (ref 0.88–1.12)
INR BLD: 4.14 (ref 0.88–1.12)
INR BLD: 4.19 (ref 0.88–1.12)
INR BLD: 4.24 (ref 0.88–1.12)
INR BLD: 4.65 (ref 0.88–1.12)
INR BLD: 4.98 (ref 0.88–1.12)
INR BLD: 5.04 (ref 0.88–1.12)
INR BLD: 6.58 (ref 0.88–1.12)
INR BLD: 6.65 (ref 0.88–1.12)
IRON SATURATION: 18 % (ref 15–50)
IRON: 34 UG/DL (ref 37–145)
KETONES, URINE: NEGATIVE MG/DL
L. PNEUMOPHILA SEROGP 1 UR AG: NORMAL
LACTIC ACID, SEPSIS: 1.3 MMOL/L (ref 0.4–1.9)
LACTIC ACID, SEPSIS: 1.3 MMOL/L (ref 0.4–1.9)
LEUKOCYTE ESTERASE, URINE: ABNORMAL
LYMPHOCYTES ABSOLUTE: 0.4 K/UL (ref 1–5.1)
LYMPHOCYTES ABSOLUTE: 0.4 K/UL (ref 1–5.1)
LYMPHOCYTES ABSOLUTE: 0.5 K/UL (ref 1–5.1)
LYMPHOCYTES ABSOLUTE: 0.5 K/UL (ref 1–5.1)
LYMPHOCYTES ABSOLUTE: 0.6 K/UL (ref 1–5.1)
LYMPHOCYTES ABSOLUTE: 0.6 K/UL (ref 1–5.1)
LYMPHOCYTES ABSOLUTE: 0.7 K/UL (ref 1–5.1)
LYMPHOCYTES ABSOLUTE: 0.7 K/UL (ref 1–5.1)
LYMPHOCYTES ABSOLUTE: 0.9 K/UL (ref 1–5.1)
LYMPHOCYTES ABSOLUTE: 0.9 K/UL (ref 1–5.1)
LYMPHOCYTES RELATIVE PERCENT: 10.8 %
LYMPHOCYTES RELATIVE PERCENT: 4 %
LYMPHOCYTES RELATIVE PERCENT: 5.8 %
LYMPHOCYTES RELATIVE PERCENT: 6 %
LYMPHOCYTES RELATIVE PERCENT: 7 %
LYMPHOCYTES RELATIVE PERCENT: 7.5 %
LYMPHOCYTES RELATIVE PERCENT: 8 %
LYMPHOCYTES RELATIVE PERCENT: 8.7 %
LYMPHOCYTES RELATIVE PERCENT: 8.7 %
LYMPHOCYTES RELATIVE PERCENT: 9.1 %
MACROCYTES: ABNORMAL
MCH RBC QN AUTO: 33.7 PG (ref 26–34)
MCH RBC QN AUTO: 34.2 PG (ref 26–34)
MCH RBC QN AUTO: 34.4 PG (ref 26–34)
MCH RBC QN AUTO: 34.5 PG (ref 26–34)
MCH RBC QN AUTO: 34.7 PG (ref 26–34)
MCH RBC QN AUTO: 34.7 PG (ref 26–34)
MCH RBC QN AUTO: 34.8 PG (ref 26–34)
MCH RBC QN AUTO: 34.9 PG (ref 26–34)
MCH RBC QN AUTO: 35.5 PG (ref 26–34)
MCH RBC QN AUTO: 35.5 PG (ref 26–34)
MCHC RBC AUTO-ENTMCNC: 32.5 G/DL (ref 31–36)
MCHC RBC AUTO-ENTMCNC: 33.1 G/DL (ref 31–36)
MCHC RBC AUTO-ENTMCNC: 33.7 G/DL (ref 31–36)
MCHC RBC AUTO-ENTMCNC: 33.8 G/DL (ref 31–36)
MCHC RBC AUTO-ENTMCNC: 33.8 G/DL (ref 31–36)
MCHC RBC AUTO-ENTMCNC: 33.9 G/DL (ref 31–36)
MCHC RBC AUTO-ENTMCNC: 34 G/DL (ref 31–36)
MCHC RBC AUTO-ENTMCNC: 34.2 G/DL (ref 31–36)
MCHC RBC AUTO-ENTMCNC: 34.2 G/DL (ref 31–36)
MCHC RBC AUTO-ENTMCNC: 34.5 G/DL (ref 31–36)
MCV RBC AUTO: 100.9 FL (ref 80–100)
MCV RBC AUTO: 101.3 FL (ref 80–100)
MCV RBC AUTO: 101.9 FL (ref 80–100)
MCV RBC AUTO: 101.9 FL (ref 80–100)
MCV RBC AUTO: 102.1 FL (ref 80–100)
MCV RBC AUTO: 103 FL (ref 80–100)
MCV RBC AUTO: 103.4 FL (ref 80–100)
MCV RBC AUTO: 103.6 FL (ref 80–100)
MCV RBC AUTO: 104.1 FL (ref 80–100)
MCV RBC AUTO: 105 FL (ref 80–100)
METAMYELOCYTES RELATIVE PERCENT: 1 %
METAMYELOCYTES RELATIVE PERCENT: 2 %
METHEMOGLOBIN ARTERIAL: 0.2 %
METHEMOGLOBIN VENOUS: 0.2 % (ref 0–1.5)
MICROCYTES: ABNORMAL
MICROSCOPIC EXAMINATION: YES
MONOCYTES ABSOLUTE: 0.3 K/UL (ref 0–1.3)
MONOCYTES ABSOLUTE: 0.4 K/UL (ref 0–1.3)
MONOCYTES ABSOLUTE: 0.4 K/UL (ref 0–1.3)
MONOCYTES ABSOLUTE: 0.5 K/UL (ref 0–1.3)
MONOCYTES ABSOLUTE: 0.5 K/UL (ref 0–1.3)
MONOCYTES ABSOLUTE: 0.6 K/UL (ref 0–1.3)
MONOCYTES ABSOLUTE: 0.7 K/UL (ref 0–1.3)
MONOCYTES ABSOLUTE: 1 K/UL (ref 0–1.3)
MONOCYTES RELATIVE PERCENT: 12 %
MONOCYTES RELATIVE PERCENT: 3.8 %
MONOCYTES RELATIVE PERCENT: 4 %
MONOCYTES RELATIVE PERCENT: 4 %
MONOCYTES RELATIVE PERCENT: 5.3 %
MONOCYTES RELATIVE PERCENT: 5.4 %
MONOCYTES RELATIVE PERCENT: 5.6 %
MONOCYTES RELATIVE PERCENT: 5.9 %
MONOCYTES RELATIVE PERCENT: 7.8 %
MONOCYTES RELATIVE PERCENT: 9 %
NEUTROPHILS ABSOLUTE: 10.1 K/UL (ref 1.7–7.7)
NEUTROPHILS ABSOLUTE: 3.6 K/UL (ref 1.7–7.7)
NEUTROPHILS ABSOLUTE: 5.1 K/UL (ref 1.7–7.7)
NEUTROPHILS ABSOLUTE: 5.9 K/UL (ref 1.7–7.7)
NEUTROPHILS ABSOLUTE: 6 K/UL (ref 1.7–7.7)
NEUTROPHILS ABSOLUTE: 6.4 K/UL (ref 1.7–7.7)
NEUTROPHILS ABSOLUTE: 6.9 K/UL (ref 1.7–7.7)
NEUTROPHILS ABSOLUTE: 7.4 K/UL (ref 1.7–7.7)
NEUTROPHILS ABSOLUTE: 8.5 K/UL (ref 1.7–7.7)
NEUTROPHILS ABSOLUTE: 9.8 K/UL (ref 1.7–7.7)
NEUTROPHILS RELATIVE PERCENT: 76.8 %
NEUTROPHILS RELATIVE PERCENT: 81 %
NEUTROPHILS RELATIVE PERCENT: 82 %
NEUTROPHILS RELATIVE PERCENT: 83 %
NEUTROPHILS RELATIVE PERCENT: 84.6 %
NEUTROPHILS RELATIVE PERCENT: 85.7 %
NEUTROPHILS RELATIVE PERCENT: 85.7 %
NEUTROPHILS RELATIVE PERCENT: 88 %
NEUTROPHILS RELATIVE PERCENT: 88.2 %
NEUTROPHILS RELATIVE PERCENT: 88.4 %
NITRITE, URINE: NEGATIVE
NUCLEATED RED BLOOD CELLS: 3 /100 WBC
NUCLEATED RED BLOOD CELLS: 4 /100 WBC
O2 SAT, ARTERIAL: 94.8 %
O2 SAT, VEN: 62 %
O2 THERAPY: ABNORMAL
OVALOCYTES: ABNORMAL
OVALOCYTES: ABNORMAL
PCO2 ARTERIAL: 37.1 MMHG (ref 35–45)
PCO2, VEN: 46.7 MMHG (ref 41–51)
PDW BLD-RTO: 16.7 % (ref 12.4–15.4)
PDW BLD-RTO: 16.9 % (ref 12.4–15.4)
PDW BLD-RTO: 17 % (ref 12.4–15.4)
PDW BLD-RTO: 17 % (ref 12.4–15.4)
PDW BLD-RTO: 17.1 % (ref 12.4–15.4)
PDW BLD-RTO: 17.1 % (ref 12.4–15.4)
PDW BLD-RTO: 17.2 % (ref 12.4–15.4)
PDW BLD-RTO: 17.3 % (ref 12.4–15.4)
PDW BLD-RTO: 17.3 % (ref 12.4–15.4)
PDW BLD-RTO: 17.5 % (ref 12.4–15.4)
PERFORMED ON: ABNORMAL
PH ARTERIAL: 7.43 (ref 7.35–7.45)
PH UA: 5 (ref 5–8)
PH VENOUS: 7.38 (ref 7.35–7.45)
PLATELET # BLD: 146 K/UL (ref 135–450)
PLATELET # BLD: 174 K/UL (ref 135–450)
PLATELET # BLD: 206 K/UL (ref 135–450)
PLATELET # BLD: 213 K/UL (ref 135–450)
PLATELET # BLD: 272 K/UL (ref 135–450)
PLATELET # BLD: 339 K/UL (ref 135–450)
PLATELET # BLD: 436 K/UL (ref 135–450)
PLATELET # BLD: 481 K/UL (ref 135–450)
PLATELET # BLD: 532 K/UL (ref 135–450)
PLATELET # BLD: 570 K/UL (ref 135–450)
PLATELET SLIDE REVIEW: ADEQUATE
PLATELET SLIDE REVIEW: ADEQUATE
PMV BLD AUTO: 7.4 FL (ref 5–10.5)
PMV BLD AUTO: 7.4 FL (ref 5–10.5)
PMV BLD AUTO: 7.5 FL (ref 5–10.5)
PMV BLD AUTO: 7.5 FL (ref 5–10.5)
PMV BLD AUTO: 7.6 FL (ref 5–10.5)
PMV BLD AUTO: 7.7 FL (ref 5–10.5)
PMV BLD AUTO: 7.9 FL (ref 5–10.5)
PMV BLD AUTO: 8.1 FL (ref 5–10.5)
PMV BLD AUTO: 9.3 FL (ref 5–10.5)
PMV BLD AUTO: 9.8 FL (ref 5–10.5)
PO2 ARTERIAL: 69.5 MMHG (ref 75–108)
PO2, VEN: 32.4 MMHG (ref 25–40)
POLYCHROMASIA: ABNORMAL
POLYCHROMASIA: ABNORMAL
POTASSIUM REFLEX MAGNESIUM: 3.7 MMOL/L (ref 3.5–5.1)
POTASSIUM REFLEX MAGNESIUM: 4.3 MMOL/L (ref 3.5–5.1)
POTASSIUM REFLEX MAGNESIUM: 4.3 MMOL/L (ref 3.5–5.1)
POTASSIUM REFLEX MAGNESIUM: 4.4 MMOL/L (ref 3.5–5.1)
POTASSIUM REFLEX MAGNESIUM: 4.4 MMOL/L (ref 3.5–5.1)
POTASSIUM REFLEX MAGNESIUM: 4.5 MMOL/L (ref 3.5–5.1)
POTASSIUM REFLEX MAGNESIUM: 4.6 MMOL/L (ref 3.5–5.1)
POTASSIUM REFLEX MAGNESIUM: 4.7 MMOL/L (ref 3.5–5.1)
POTASSIUM REFLEX MAGNESIUM: 5.1 MMOL/L (ref 3.5–5.1)
POTASSIUM SERPL-SCNC: 4.1 MMOL/L (ref 3.5–5.1)
PRO-BNP: 1545 PG/ML (ref 0–449)
PRO-BNP: 1787 PG/ML (ref 0–449)
PRO-BNP: 3943 PG/ML (ref 0–449)
PROCALCITONIN: 0.27 NG/ML (ref 0–0.15)
PROCALCITONIN: 0.3 NG/ML (ref 0–0.15)
PROTEIN UA: NEGATIVE MG/DL
PROTHROMBIN TIME: 19.7 SEC (ref 9.9–12.7)
PROTHROMBIN TIME: 46.3 SEC (ref 9.9–12.7)
PROTHROMBIN TIME: 49.6 SEC (ref 9.9–12.7)
PROTHROMBIN TIME: 50.2 SEC (ref 9.9–12.7)
PROTHROMBIN TIME: 50.9 SEC (ref 9.9–12.7)
PROTHROMBIN TIME: 56 SEC (ref 9.9–12.7)
PROTHROMBIN TIME: 60.2 SEC (ref 9.9–12.7)
PROTHROMBIN TIME: 60.9 SEC (ref 9.9–12.7)
PROTHROMBIN TIME: 80.4 SEC (ref 9.9–12.7)
PROTHROMBIN TIME: 81.3 SEC (ref 9.9–12.7)
RAPID INFLUENZA  B AGN: NEGATIVE
RAPID INFLUENZA A AGN: NEGATIVE
RBC # BLD: 3.11 M/UL (ref 4–5.2)
RBC # BLD: 3.18 M/UL (ref 4–5.2)
RBC # BLD: 3.24 M/UL (ref 4–5.2)
RBC # BLD: 3.26 M/UL (ref 4–5.2)
RBC # BLD: 3.27 M/UL (ref 4–5.2)
RBC # BLD: 3.31 M/UL (ref 4–5.2)
RBC # BLD: 3.34 M/UL (ref 4–5.2)
RBC # BLD: 3.4 M/UL (ref 4–5.2)
RBC # BLD: 3.42 M/UL (ref 4–5.2)
RBC # BLD: 3.65 M/UL (ref 4–5.2)
RBC UA: ABNORMAL /HPF (ref 0–4)
RETICULOCYTE ABSOLUTE COUNT: 0.04 M/UL (ref 0.02–0.1)
RETICULOCYTE COUNT PCT: 1.14 % (ref 0.5–2.18)
SARS-COV-2: DETECTED
SARS-COV-2: DETECTED
SCHISTOCYTES: ABNORMAL
SLIDE REVIEW: ABNORMAL
SLIDE REVIEW: ABNORMAL
SODIUM BLD-SCNC: 130 MMOL/L (ref 136–145)
SODIUM BLD-SCNC: 130 MMOL/L (ref 136–145)
SODIUM BLD-SCNC: 132 MMOL/L (ref 136–145)
SODIUM BLD-SCNC: 135 MMOL/L (ref 136–145)
SODIUM BLD-SCNC: 136 MMOL/L (ref 136–145)
SODIUM BLD-SCNC: 136 MMOL/L (ref 136–145)
SODIUM BLD-SCNC: 137 MMOL/L (ref 136–145)
SODIUM BLD-SCNC: 138 MMOL/L (ref 136–145)
SPECIFIC GRAVITY UA: 1.01 (ref 1–1.03)
STREP PNEUMONIAE ANTIGEN, URINE: NORMAL
TCO2 ARTERIAL: 58.3 MMOL/L
TCO2 CALC VENOUS: 29 MMOL/L
TEAR DROP CELLS: ABNORMAL
TOTAL IRON BINDING CAPACITY: 184 UG/DL (ref 260–445)
TOTAL PROTEIN: 5.6 G/DL (ref 6.4–8.2)
TOTAL PROTEIN: 5.7 G/DL (ref 6.4–8.2)
TOTAL PROTEIN: 6.2 G/DL (ref 6.4–8.2)
TOTAL PROTEIN: 6.2 G/DL (ref 6.4–8.2)
TOTAL PROTEIN: 6.6 G/DL (ref 6.4–8.2)
TOXIC GRANULATION: PRESENT
TOXIC GRANULATION: PRESENT
TROPONIN: 0.07 NG/ML
TROPONIN: <0.01 NG/ML
TSH REFLEX FT4: 0.92 UIU/ML (ref 0.27–4.2)
URINE REFLEX TO CULTURE: ABNORMAL
URINE TYPE: ABNORMAL
UROBILINOGEN, URINE: 0.2 E.U./DL
VITAMIN B-12: 1978 PG/ML (ref 211–911)
VITAMIN B-12: >2000 PG/ML (ref 211–911)
VITAMIN D 25-HYDROXY: 31.7 NG/ML
WBC # BLD: 10.6 K/UL (ref 4–11)
WBC # BLD: 11.5 K/UL (ref 4–11)
WBC # BLD: 4.4 K/UL (ref 4–11)
WBC # BLD: 6 K/UL (ref 4–11)
WBC # BLD: 7 K/UL (ref 4–11)
WBC # BLD: 7.3 K/UL (ref 4–11)
WBC # BLD: 7.8 K/UL (ref 4–11)
WBC # BLD: 8.3 K/UL (ref 4–11)
WBC # BLD: 8.7 K/UL (ref 4–11)
WBC # BLD: 9.6 K/UL (ref 4–11)
WBC UA: 4 /HPF (ref 0–5)
YEAST: PRESENT /HPF

## 2022-01-01 PROCEDURE — 85610 PROTHROMBIN TIME: CPT

## 2022-01-01 PROCEDURE — 97167 OT EVAL HIGH COMPLEX 60 MIN: CPT

## 2022-01-01 PROCEDURE — 85045 AUTOMATED RETICULOCYTE COUNT: CPT

## 2022-01-01 PROCEDURE — 6360000002 HC RX W HCPCS: Performed by: EMERGENCY MEDICINE

## 2022-01-01 PROCEDURE — 6360000002 HC RX W HCPCS: Performed by: INTERNAL MEDICINE

## 2022-01-01 PROCEDURE — 85379 FIBRIN DEGRADATION QUANT: CPT

## 2022-01-01 PROCEDURE — 80048 BASIC METABOLIC PNL TOTAL CA: CPT

## 2022-01-01 PROCEDURE — 6370000000 HC RX 637 (ALT 250 FOR IP): Performed by: STUDENT IN AN ORGANIZED HEALTH CARE EDUCATION/TRAINING PROGRAM

## 2022-01-01 PROCEDURE — 51702 INSERT TEMP BLADDER CATH: CPT

## 2022-01-01 PROCEDURE — 6370000000 HC RX 637 (ALT 250 FOR IP): Performed by: INTERNAL MEDICINE

## 2022-01-01 PROCEDURE — 97530 THERAPEUTIC ACTIVITIES: CPT

## 2022-01-01 PROCEDURE — 94761 N-INVAS EAR/PLS OXIMETRY MLT: CPT

## 2022-01-01 PROCEDURE — 81001 URINALYSIS AUTO W/SCOPE: CPT

## 2022-01-01 PROCEDURE — U0003 INFECTIOUS AGENT DETECTION BY NUCLEIC ACID (DNA OR RNA); SEVERE ACUTE RESPIRATORY SYNDROME CORONAVIRUS 2 (SARS-COV-2) (CORONAVIRUS DISEASE [COVID-19]), AMPLIFIED PROBE TECHNIQUE, MAKING USE OF HIGH THROUGHPUT TECHNOLOGIES AS DESCRIBED BY CMS-2020-01-R: HCPCS

## 2022-01-01 PROCEDURE — 82728 ASSAY OF FERRITIN: CPT

## 2022-01-01 PROCEDURE — 99222 1ST HOSP IP/OBS MODERATE 55: CPT | Performed by: HOSPITALIST

## 2022-01-01 PROCEDURE — 99232 SBSQ HOSP IP/OBS MODERATE 35: CPT | Performed by: HOSPITALIST

## 2022-01-01 PROCEDURE — 85025 COMPLETE CBC W/AUTO DIFF WBC: CPT

## 2022-01-01 PROCEDURE — 87070 CULTURE OTHR SPECIMN AEROBIC: CPT

## 2022-01-01 PROCEDURE — 2580000003 HC RX 258: Performed by: HOSPITALIST

## 2022-01-01 PROCEDURE — 83880 ASSAY OF NATRIURETIC PEPTIDE: CPT

## 2022-01-01 PROCEDURE — 2580000003 HC RX 258: Performed by: INTERNAL MEDICINE

## 2022-01-01 PROCEDURE — 6370000000 HC RX 637 (ALT 250 FOR IP): Performed by: EMERGENCY MEDICINE

## 2022-01-01 PROCEDURE — 97535 SELF CARE MNGMENT TRAINING: CPT

## 2022-01-01 PROCEDURE — 6370000000 HC RX 637 (ALT 250 FOR IP): Performed by: HOSPITALIST

## 2022-01-01 PROCEDURE — 96365 THER/PROPH/DIAG IV INF INIT: CPT

## 2022-01-01 PROCEDURE — 85730 THROMBOPLASTIN TIME PARTIAL: CPT

## 2022-01-01 PROCEDURE — 94640 AIRWAY INHALATION TREATMENT: CPT

## 2022-01-01 PROCEDURE — 36415 COLL VENOUS BLD VENIPUNCTURE: CPT

## 2022-01-01 PROCEDURE — 6360000002 HC RX W HCPCS

## 2022-01-01 PROCEDURE — 82746 ASSAY OF FOLIC ACID SERUM: CPT

## 2022-01-01 PROCEDURE — 93010 ELECTROCARDIOGRAM REPORT: CPT | Performed by: INTERNAL MEDICINE

## 2022-01-01 PROCEDURE — 87205 SMEAR GRAM STAIN: CPT

## 2022-01-01 PROCEDURE — 80053 COMPREHEN METABOLIC PANEL: CPT

## 2022-01-01 PROCEDURE — 71250 CT THORAX DX C-: CPT

## 2022-01-01 PROCEDURE — 2580000003 HC RX 258

## 2022-01-01 PROCEDURE — 86140 C-REACTIVE PROTEIN: CPT

## 2022-01-01 PROCEDURE — 71045 X-RAY EXAM CHEST 1 VIEW: CPT

## 2022-01-01 PROCEDURE — 82607 VITAMIN B-12: CPT

## 2022-01-01 PROCEDURE — 99285 EMERGENCY DEPT VISIT HI MDM: CPT

## 2022-01-01 PROCEDURE — 2700000000 HC OXYGEN THERAPY PER DAY

## 2022-01-01 PROCEDURE — 82010 KETONE BODYS QUAN: CPT

## 2022-01-01 PROCEDURE — 2060000000 HC ICU INTERMEDIATE R&B

## 2022-01-01 PROCEDURE — 70450 CT HEAD/BRAIN W/O DYE: CPT

## 2022-01-01 PROCEDURE — U0005 INFEC AGEN DETEC AMPLI PROBE: HCPCS

## 2022-01-01 PROCEDURE — 1200000000 HC SEMI PRIVATE

## 2022-01-01 PROCEDURE — 99239 HOSP IP/OBS DSCHRG MGMT >30: CPT | Performed by: HOSPITALIST

## 2022-01-01 PROCEDURE — 97116 GAIT TRAINING THERAPY: CPT

## 2022-01-01 PROCEDURE — 6360000002 HC RX W HCPCS: Performed by: HOSPITALIST

## 2022-01-01 PROCEDURE — 6370000000 HC RX 637 (ALT 250 FOR IP): Performed by: NURSE PRACTITIONER

## 2022-01-01 PROCEDURE — 99223 1ST HOSP IP/OBS HIGH 75: CPT | Performed by: INTERNAL MEDICINE

## 2022-01-01 PROCEDURE — 86850 RBC ANTIBODY SCREEN: CPT

## 2022-01-01 PROCEDURE — 86901 BLOOD TYPING SEROLOGIC RH(D): CPT

## 2022-01-01 PROCEDURE — 84484 ASSAY OF TROPONIN QUANT: CPT

## 2022-01-01 PROCEDURE — 6360000002 HC RX W HCPCS: Performed by: STUDENT IN AN ORGANIZED HEALTH CARE EDUCATION/TRAINING PROGRAM

## 2022-01-01 PROCEDURE — 72146 MRI CHEST SPINE W/O DYE: CPT

## 2022-01-01 PROCEDURE — 93005 ELECTROCARDIOGRAM TRACING: CPT | Performed by: INTERNAL MEDICINE

## 2022-01-01 PROCEDURE — 83550 IRON BINDING TEST: CPT

## 2022-01-01 PROCEDURE — 86900 BLOOD TYPING SEROLOGIC ABO: CPT

## 2022-01-01 PROCEDURE — 97162 PT EVAL MOD COMPLEX 30 MIN: CPT

## 2022-01-01 PROCEDURE — 84145 PROCALCITONIN (PCT): CPT

## 2022-01-01 PROCEDURE — 6370000000 HC RX 637 (ALT 250 FOR IP)

## 2022-01-01 PROCEDURE — 96367 TX/PROPH/DG ADDL SEQ IV INF: CPT

## 2022-01-01 PROCEDURE — 71100 X-RAY EXAM RIBS UNI 2 VIEWS: CPT

## 2022-01-01 PROCEDURE — 87449 NOS EACH ORGANISM AG IA: CPT

## 2022-01-01 PROCEDURE — P9612 CATHETERIZE FOR URINE SPEC: HCPCS

## 2022-01-01 PROCEDURE — 82140 ASSAY OF AMMONIA: CPT

## 2022-01-01 PROCEDURE — 82803 BLOOD GASES ANY COMBINATION: CPT

## 2022-01-01 PROCEDURE — XW0DXM6 INTRODUCTION OF BARICITINIB INTO MOUTH AND PHARYNX, EXTERNAL APPROACH, NEW TECHNOLOGY GROUP 6: ICD-10-PCS | Performed by: STUDENT IN AN ORGANIZED HEALTH CARE EDUCATION/TRAINING PROGRAM

## 2022-01-01 PROCEDURE — 99284 EMERGENCY DEPT VISIT MOD MDM: CPT

## 2022-01-01 PROCEDURE — 72070 X-RAY EXAM THORAC SPINE 2VWS: CPT

## 2022-01-01 PROCEDURE — 83605 ASSAY OF LACTIC ACID: CPT

## 2022-01-01 PROCEDURE — 84443 ASSAY THYROID STIM HORMONE: CPT

## 2022-01-01 PROCEDURE — 87040 BLOOD CULTURE FOR BACTERIA: CPT

## 2022-01-01 PROCEDURE — 97166 OT EVAL MOD COMPLEX 45 MIN: CPT

## 2022-01-01 PROCEDURE — 92526 ORAL FUNCTION THERAPY: CPT

## 2022-01-01 PROCEDURE — 83540 ASSAY OF IRON: CPT

## 2022-01-01 PROCEDURE — 2580000003 HC RX 258: Performed by: STUDENT IN AN ORGANIZED HEALTH CARE EDUCATION/TRAINING PROGRAM

## 2022-01-01 PROCEDURE — 85018 HEMOGLOBIN: CPT

## 2022-01-01 PROCEDURE — 96375 TX/PRO/DX INJ NEW DRUG ADDON: CPT

## 2022-01-01 PROCEDURE — 93005 ELECTROCARDIOGRAM TRACING: CPT

## 2022-01-01 PROCEDURE — 92610 EVALUATE SWALLOWING FUNCTION: CPT

## 2022-01-01 PROCEDURE — 93005 ELECTROCARDIOGRAM TRACING: CPT | Performed by: NURSE PRACTITIONER

## 2022-01-01 PROCEDURE — 82306 VITAMIN D 25 HYDROXY: CPT

## 2022-01-01 PROCEDURE — 97163 PT EVAL HIGH COMPLEX 45 MIN: CPT

## 2022-01-01 PROCEDURE — 85014 HEMATOCRIT: CPT

## 2022-01-01 PROCEDURE — 87804 INFLUENZA ASSAY W/OPTIC: CPT

## 2022-01-01 PROCEDURE — 83036 HEMOGLOBIN GLYCOSYLATED A1C: CPT

## 2022-01-01 RX ORDER — VITAMIN B COMPLEX
2000 TABLET ORAL DAILY
Status: DISCONTINUED | OUTPATIENT
Start: 2022-01-01 | End: 2022-02-11 | Stop reason: HOSPADM

## 2022-01-01 RX ORDER — INSULIN LISPRO 100 [IU]/ML
0-6 INJECTION, SOLUTION INTRAVENOUS; SUBCUTANEOUS
Status: DISCONTINUED | OUTPATIENT
Start: 2022-01-01 | End: 2022-01-01 | Stop reason: HOSPADM

## 2022-01-01 RX ORDER — POLYETHYLENE GLYCOL 3350 17 G/17G
17 POWDER, FOR SOLUTION ORAL DAILY
Status: COMPLETED | OUTPATIENT
Start: 2022-01-01 | End: 2022-01-01

## 2022-01-01 RX ORDER — NICOTINE POLACRILEX 4 MG
15 LOZENGE BUCCAL PRN
Status: DISCONTINUED | OUTPATIENT
Start: 2022-01-01 | End: 2022-02-11 | Stop reason: HOSPADM

## 2022-01-01 RX ORDER — ACETAMINOPHEN 650 MG/1
650 SUPPOSITORY RECTAL EVERY 6 HOURS PRN
Status: DISCONTINUED | OUTPATIENT
Start: 2022-01-01 | End: 2022-01-01 | Stop reason: HOSPADM

## 2022-01-01 RX ORDER — LORAZEPAM 0.5 MG/1
0.5 TABLET ORAL DAILY
COMMUNITY

## 2022-01-01 RX ORDER — INSULIN LISPRO 100 [IU]/ML
0-3 INJECTION, SOLUTION INTRAVENOUS; SUBCUTANEOUS NIGHTLY
Status: DISCONTINUED | OUTPATIENT
Start: 2022-01-01 | End: 2022-01-01 | Stop reason: HOSPADM

## 2022-01-01 RX ORDER — DIMETHICONE, CAMPHOR (SYNTHETIC), MENTHOL, AND PHENOL 1.1; .5; .625; .5 G/100G; G/100G; G/100G; G/100G
OINTMENT TOPICAL PRN
Status: DISCONTINUED | OUTPATIENT
Start: 2022-01-01 | End: 2022-01-01 | Stop reason: HOSPADM

## 2022-01-01 RX ORDER — PANTOPRAZOLE SODIUM 40 MG/1
40 TABLET, DELAYED RELEASE ORAL
Status: DISCONTINUED | OUTPATIENT
Start: 2022-01-01 | End: 2022-01-01 | Stop reason: HOSPADM

## 2022-01-01 RX ORDER — MAGNESIUM SULFATE IN WATER 40 MG/ML
2000 INJECTION, SOLUTION INTRAVENOUS PRN
Status: DISCONTINUED | OUTPATIENT
Start: 2022-01-01 | End: 2022-02-11 | Stop reason: HOSPADM

## 2022-01-01 RX ORDER — SODIUM CHLORIDE 9 MG/ML
25 INJECTION, SOLUTION INTRAVENOUS PRN
Status: DISCONTINUED | OUTPATIENT
Start: 2022-01-01 | End: 2022-02-11 | Stop reason: HOSPADM

## 2022-01-01 RX ORDER — FUROSEMIDE 40 MG/1
20 TABLET ORAL DAILY PRN
Status: DISCONTINUED | OUTPATIENT
Start: 2022-01-01 | End: 2022-01-01

## 2022-01-01 RX ORDER — ALLOPURINOL 300 MG/1
300 TABLET ORAL DAILY
Status: DISCONTINUED | OUTPATIENT
Start: 2022-01-01 | End: 2022-01-01 | Stop reason: HOSPADM

## 2022-01-01 RX ORDER — ATORVASTATIN CALCIUM 20 MG/1
20 TABLET, FILM COATED ORAL DAILY
Status: DISCONTINUED | OUTPATIENT
Start: 2022-01-01 | End: 2022-02-11 | Stop reason: HOSPADM

## 2022-01-01 RX ORDER — WARFARIN SODIUM 1 MG/1
0.5 TABLET ORAL DAILY
Qty: 15 TABLET | Refills: 0 | Status: SHIPPED | OUTPATIENT
Start: 2022-01-01

## 2022-01-01 RX ORDER — ONDANSETRON 4 MG/1
4 TABLET, ORALLY DISINTEGRATING ORAL EVERY 8 HOURS PRN
Status: DISCONTINUED | OUTPATIENT
Start: 2022-01-01 | End: 2022-01-01 | Stop reason: HOSPADM

## 2022-01-01 RX ORDER — DULOXETIN HYDROCHLORIDE 20 MG/1
40 CAPSULE, DELAYED RELEASE ORAL DAILY
Status: DISCONTINUED | OUTPATIENT
Start: 2022-01-01 | End: 2022-02-11 | Stop reason: HOSPADM

## 2022-01-01 RX ORDER — DEXAMETHASONE 4 MG/1
6 TABLET ORAL DAILY
Status: DISCONTINUED | OUTPATIENT
Start: 2022-01-01 | End: 2022-01-01

## 2022-01-01 RX ORDER — DEXAMETHASONE 6 MG/1
6 TABLET ORAL DAILY
Qty: 7 TABLET | Refills: 0 | Status: CANCELLED | OUTPATIENT
Start: 2022-01-01 | End: 2022-01-01

## 2022-01-01 RX ORDER — DULOXETIN HYDROCHLORIDE 20 MG/1
40 CAPSULE, DELAYED RELEASE ORAL DAILY
Status: DISCONTINUED | OUTPATIENT
Start: 2022-01-01 | End: 2022-01-01 | Stop reason: HOSPADM

## 2022-01-01 RX ORDER — BISACODYL 10 MG
10 SUPPOSITORY, RECTAL RECTAL DAILY PRN
Status: DISCONTINUED | OUTPATIENT
Start: 2022-01-01 | End: 2022-01-01 | Stop reason: HOSPADM

## 2022-01-01 RX ORDER — WARFARIN SODIUM 1 MG/1
0.5 TABLET ORAL DAILY
Qty: 30 TABLET | Refills: 3 | Status: SHIPPED | OUTPATIENT
Start: 2022-01-01 | End: 2022-01-01 | Stop reason: SDUPTHER

## 2022-01-01 RX ORDER — NICOTINE POLACRILEX 4 MG
15 LOZENGE BUCCAL PRN
Status: DISCONTINUED | OUTPATIENT
Start: 2022-01-01 | End: 2022-01-01 | Stop reason: HOSPADM

## 2022-01-01 RX ORDER — ONDANSETRON 4 MG/1
4 TABLET, ORALLY DISINTEGRATING ORAL EVERY 8 HOURS PRN
Status: DISCONTINUED | OUTPATIENT
Start: 2022-01-01 | End: 2022-02-11 | Stop reason: HOSPADM

## 2022-01-01 RX ORDER — METHOCARBAMOL 500 MG/1
750 TABLET, FILM COATED ORAL 4 TIMES DAILY
Status: DISCONTINUED | OUTPATIENT
Start: 2022-01-01 | End: 2022-01-01 | Stop reason: HOSPADM

## 2022-01-01 RX ORDER — DEXTROSE MONOHYDRATE 50 MG/ML
100 INJECTION, SOLUTION INTRAVENOUS PRN
Status: DISCONTINUED | OUTPATIENT
Start: 2022-01-01 | End: 2022-02-11 | Stop reason: HOSPADM

## 2022-01-01 RX ORDER — MAGNESIUM HYDROXIDE/ALUMINUM HYDROXICE/SIMETHICONE 120; 1200; 1200 MG/30ML; MG/30ML; MG/30ML
30 SUSPENSION ORAL EVERY 6 HOURS PRN
Status: DISCONTINUED | OUTPATIENT
Start: 2022-01-01 | End: 2022-02-11 | Stop reason: HOSPADM

## 2022-01-01 RX ORDER — ACETAMINOPHEN 325 MG/1
650 TABLET ORAL ONCE
Status: COMPLETED | OUTPATIENT
Start: 2022-01-01 | End: 2022-01-01

## 2022-01-01 RX ORDER — HYDROCODONE BITARTRATE AND ACETAMINOPHEN 5; 325 MG/1; MG/1
1 TABLET ORAL EVERY 6 HOURS PRN
Qty: 12 TABLET | Refills: 0 | Status: SHIPPED | OUTPATIENT
Start: 2022-01-01 | End: 2022-01-01

## 2022-01-01 RX ORDER — ALLOPURINOL 300 MG/1
300 TABLET ORAL DAILY
Status: DISCONTINUED | OUTPATIENT
Start: 2022-01-01 | End: 2022-02-11 | Stop reason: HOSPADM

## 2022-01-01 RX ORDER — PANTOPRAZOLE SODIUM 40 MG/1
40 TABLET, DELAYED RELEASE ORAL NIGHTLY
Status: ON HOLD | COMMUNITY
End: 2022-01-01

## 2022-01-01 RX ORDER — SODIUM CHLORIDE 0.9 % (FLUSH) 0.9 %
5-40 SYRINGE (ML) INJECTION PRN
Status: DISCONTINUED | OUTPATIENT
Start: 2022-01-01 | End: 2022-02-11 | Stop reason: HOSPADM

## 2022-01-01 RX ORDER — ONDANSETRON 2 MG/ML
4 INJECTION INTRAMUSCULAR; INTRAVENOUS EVERY 6 HOURS PRN
Status: DISCONTINUED | OUTPATIENT
Start: 2022-01-01 | End: 2022-02-11 | Stop reason: HOSPADM

## 2022-01-01 RX ORDER — WARFARIN SODIUM 1 MG/1
0.5 TABLET ORAL DAILY
Status: DISCONTINUED | OUTPATIENT
Start: 2022-01-01 | End: 2022-01-01 | Stop reason: HOSPADM

## 2022-01-01 RX ORDER — SODIUM CHLORIDE 9 MG/ML
25 INJECTION, SOLUTION INTRAVENOUS PRN
Status: DISCONTINUED | OUTPATIENT
Start: 2022-01-01 | End: 2022-01-01 | Stop reason: HOSPADM

## 2022-01-01 RX ORDER — DEXTROSE MONOHYDRATE 25 G/50ML
12.5 INJECTION, SOLUTION INTRAVENOUS PRN
Status: DISCONTINUED | OUTPATIENT
Start: 2022-01-01 | End: 2022-01-01 | Stop reason: HOSPADM

## 2022-01-01 RX ORDER — DEXAMETHASONE 6 MG/1
6 TABLET ORAL
COMMUNITY

## 2022-01-01 RX ORDER — SENNA AND DOCUSATE SODIUM 50; 8.6 MG/1; MG/1
2 TABLET, FILM COATED ORAL 2 TIMES DAILY
Status: DISCONTINUED | OUTPATIENT
Start: 2022-01-01 | End: 2022-01-01 | Stop reason: HOSPADM

## 2022-01-01 RX ORDER — METHOCARBAMOL 500 MG/1
750 TABLET, FILM COATED ORAL EVERY 6 HOURS PRN
Status: DISCONTINUED | OUTPATIENT
Start: 2022-01-01 | End: 2022-01-01

## 2022-01-01 RX ORDER — DOCUSATE SODIUM 100 MG/1
100 CAPSULE, LIQUID FILLED ORAL 2 TIMES DAILY
Qty: 60 CAPSULE | Refills: 2 | DISCHARGE
Start: 2022-01-01

## 2022-01-01 RX ORDER — SODIUM CHLORIDE 0.9 % (FLUSH) 0.9 %
5-40 SYRINGE (ML) INJECTION EVERY 12 HOURS SCHEDULED
Status: DISCONTINUED | OUTPATIENT
Start: 2022-01-01 | End: 2022-02-11 | Stop reason: HOSPADM

## 2022-01-01 RX ORDER — HYDROCODONE BITARTRATE AND ACETAMINOPHEN 5; 325 MG/1; MG/1
TABLET ORAL
Status: ON HOLD | COMMUNITY
Start: 2021-01-01 | End: 2022-01-01 | Stop reason: SDUPTHER

## 2022-01-01 RX ORDER — DEXTROSE MONOHYDRATE 50 MG/ML
100 INJECTION, SOLUTION INTRAVENOUS PRN
Status: DISCONTINUED | OUTPATIENT
Start: 2022-01-01 | End: 2022-01-01 | Stop reason: HOSPADM

## 2022-01-01 RX ORDER — ASPIRIN 81 MG/1
324 TABLET, CHEWABLE ORAL ONCE
Status: COMPLETED | OUTPATIENT
Start: 2022-01-01 | End: 2022-01-01

## 2022-01-01 RX ORDER — POTASSIUM CHLORIDE 7.45 MG/ML
10 INJECTION INTRAVENOUS PRN
Status: DISCONTINUED | OUTPATIENT
Start: 2022-01-01 | End: 2022-02-11 | Stop reason: HOSPADM

## 2022-01-01 RX ORDER — ZIPRASIDONE MESYLATE 20 MG/ML
20 INJECTION, POWDER, LYOPHILIZED, FOR SOLUTION INTRAMUSCULAR EVERY 12 HOURS PRN
Status: DISCONTINUED | OUTPATIENT
Start: 2022-01-01 | End: 2022-02-11 | Stop reason: HOSPADM

## 2022-01-01 RX ORDER — ACETAMINOPHEN 325 MG/1
650 TABLET ORAL EVERY 6 HOURS PRN
Status: DISCONTINUED | OUTPATIENT
Start: 2022-01-01 | End: 2022-01-01 | Stop reason: HOSPADM

## 2022-01-01 RX ORDER — METHOCARBAMOL 500 MG/1
750 TABLET, FILM COATED ORAL ONCE
Status: COMPLETED | OUTPATIENT
Start: 2022-01-01 | End: 2022-01-01

## 2022-01-01 RX ORDER — OXYCODONE HYDROCHLORIDE 5 MG/1
5 TABLET ORAL ONCE
Status: COMPLETED | OUTPATIENT
Start: 2022-01-01 | End: 2022-01-01

## 2022-01-01 RX ORDER — FUROSEMIDE 20 MG/1
20 TABLET ORAL DAILY
Status: DISCONTINUED | OUTPATIENT
Start: 2022-01-01 | End: 2022-01-01 | Stop reason: HOSPADM

## 2022-01-01 RX ORDER — OXYCODONE HYDROCHLORIDE 5 MG/1
5 TABLET ORAL EVERY 6 HOURS PRN
Status: DISCONTINUED | OUTPATIENT
Start: 2022-01-01 | End: 2022-01-01 | Stop reason: HOSPADM

## 2022-01-01 RX ORDER — ATORVASTATIN CALCIUM 20 MG/1
20 TABLET, FILM COATED ORAL NIGHTLY
Status: DISCONTINUED | OUTPATIENT
Start: 2022-01-01 | End: 2022-01-01 | Stop reason: HOSPADM

## 2022-01-01 RX ORDER — SODIUM CHLORIDE 0.9 % (FLUSH) 0.9 %
5-40 SYRINGE (ML) INJECTION PRN
Status: DISCONTINUED | OUTPATIENT
Start: 2022-01-01 | End: 2022-01-01 | Stop reason: HOSPADM

## 2022-01-01 RX ORDER — WARFARIN SODIUM 2.5 MG/1
2.5 TABLET ORAL
Status: DISCONTINUED | OUTPATIENT
Start: 2022-01-01 | End: 2022-01-01

## 2022-01-01 RX ORDER — DEXAMETHASONE SODIUM PHOSPHATE 10 MG/ML
6 INJECTION, SOLUTION INTRAMUSCULAR; INTRAVENOUS ONCE
Status: COMPLETED | OUTPATIENT
Start: 2022-01-01 | End: 2022-01-01

## 2022-01-01 RX ORDER — DEXTROSE MONOHYDRATE 50 MG/ML
100 INJECTION, SOLUTION INTRAVENOUS PRN
Status: DISCONTINUED | OUTPATIENT
Start: 2022-01-01 | End: 2022-01-01 | Stop reason: SDUPTHER

## 2022-01-01 RX ORDER — MECOBALAMIN 5000 MCG
5 TABLET,DISINTEGRATING ORAL NIGHTLY
Status: DISCONTINUED | OUTPATIENT
Start: 2022-01-01 | End: 2022-01-01 | Stop reason: HOSPADM

## 2022-01-01 RX ORDER — POLYETHYLENE GLYCOL 3350 17 G/17G
17 POWDER, FOR SOLUTION ORAL DAILY PRN
Status: DISCONTINUED | OUTPATIENT
Start: 2022-01-01 | End: 2022-02-11 | Stop reason: HOSPADM

## 2022-01-01 RX ORDER — SODIUM CHLORIDE 9 MG/ML
INJECTION, SOLUTION INTRAVENOUS CONTINUOUS
Status: DISCONTINUED | OUTPATIENT
Start: 2022-01-01 | End: 2022-01-01

## 2022-01-01 RX ORDER — INSULIN LISPRO 100 [IU]/ML
0-12 INJECTION, SOLUTION INTRAVENOUS; SUBCUTANEOUS EVERY 4 HOURS
Status: DISCONTINUED | OUTPATIENT
Start: 2022-01-01 | End: 2022-02-11 | Stop reason: HOSPADM

## 2022-01-01 RX ORDER — SODIUM CHLORIDE 9 MG/ML
INJECTION, SOLUTION INTRAVENOUS CONTINUOUS
Status: DISCONTINUED | OUTPATIENT
Start: 2022-01-01 | End: 2022-02-11 | Stop reason: HOSPADM

## 2022-01-01 RX ORDER — LIDOCAINE 4 G/G
1 PATCH TOPICAL EVERY 12 HOURS PRN
Status: DISCONTINUED | OUTPATIENT
Start: 2022-01-01 | End: 2022-01-01 | Stop reason: HOSPADM

## 2022-01-01 RX ORDER — LIDOCAINE HYDROCHLORIDE 10 MG/ML
5 INJECTION, SOLUTION EPIDURAL; INFILTRATION; INTRACAUDAL; PERINEURAL ONCE
Status: DISCONTINUED | OUTPATIENT
Start: 2022-01-01 | End: 2022-02-11 | Stop reason: HOSPADM

## 2022-01-01 RX ORDER — INSULIN LISPRO 100 [IU]/ML
0-12 INJECTION, SOLUTION INTRAVENOUS; SUBCUTANEOUS
Status: DISCONTINUED | OUTPATIENT
Start: 2022-01-01 | End: 2022-01-01 | Stop reason: ALTCHOICE

## 2022-01-01 RX ORDER — POTASSIUM CHLORIDE 20 MEQ/1
40 TABLET, EXTENDED RELEASE ORAL PRN
Status: DISCONTINUED | OUTPATIENT
Start: 2022-01-01 | End: 2022-02-11 | Stop reason: HOSPADM

## 2022-01-01 RX ORDER — 0.9 % SODIUM CHLORIDE 0.9 %
1000 INTRAVENOUS SOLUTION INTRAVENOUS ONCE
Status: DISCONTINUED | OUTPATIENT
Start: 2022-01-01 | End: 2022-02-11 | Stop reason: HOSPADM

## 2022-01-01 RX ORDER — IPRATROPIUM BROMIDE AND ALBUTEROL SULFATE 2.5; .5 MG/3ML; MG/3ML
1 SOLUTION RESPIRATORY (INHALATION) 3 TIMES DAILY
Status: DISCONTINUED | OUTPATIENT
Start: 2022-01-01 | End: 2022-01-01

## 2022-01-01 RX ORDER — DULOXETINE 40 MG/1
40 CAPSULE, DELAYED RELEASE ORAL DAILY
COMMUNITY

## 2022-01-01 RX ORDER — POLYETHYLENE GLYCOL 3350 17 G/17G
17 POWDER, FOR SOLUTION ORAL DAILY
Status: DISCONTINUED | OUTPATIENT
Start: 2022-01-01 | End: 2022-01-01 | Stop reason: HOSPADM

## 2022-01-01 RX ORDER — QUETIAPINE FUMARATE 25 MG/1
12.5 TABLET, FILM COATED ORAL NIGHTLY
Status: DISCONTINUED | OUTPATIENT
Start: 2022-01-01 | End: 2022-01-01 | Stop reason: HOSPADM

## 2022-01-01 RX ORDER — LIDOCAINE 4 G/G
1 PATCH TOPICAL DAILY
Status: DISCONTINUED | OUTPATIENT
Start: 2022-01-01 | End: 2022-01-01 | Stop reason: HOSPADM

## 2022-01-01 RX ORDER — DEXTROSE MONOHYDRATE 25 G/50ML
12.5 INJECTION, SOLUTION INTRAVENOUS PRN
Status: DISCONTINUED | OUTPATIENT
Start: 2022-01-01 | End: 2022-02-11 | Stop reason: HOSPADM

## 2022-01-01 RX ORDER — PHYTONADIONE 5 MG/1
2.5 TABLET ORAL ONCE
Status: COMPLETED | OUTPATIENT
Start: 2022-01-01 | End: 2022-01-01

## 2022-01-01 RX ORDER — ONDANSETRON 2 MG/ML
4 INJECTION INTRAMUSCULAR; INTRAVENOUS EVERY 6 HOURS PRN
Status: DISCONTINUED | OUTPATIENT
Start: 2022-01-01 | End: 2022-01-01 | Stop reason: HOSPADM

## 2022-01-01 RX ORDER — AZITHROMYCIN 250 MG/1
500 TABLET, FILM COATED ORAL EVERY 24 HOURS
Status: COMPLETED | OUTPATIENT
Start: 2022-01-01 | End: 2022-01-01

## 2022-01-01 RX ORDER — DOCUSATE SODIUM 100 MG/1
100 CAPSULE, LIQUID FILLED ORAL DAILY PRN
Status: DISCONTINUED | OUTPATIENT
Start: 2022-01-01 | End: 2022-01-01 | Stop reason: HOSPADM

## 2022-01-01 RX ORDER — INSULIN LISPRO 100 [IU]/ML
0-6 INJECTION, SOLUTION INTRAVENOUS; SUBCUTANEOUS NIGHTLY
Status: DISCONTINUED | OUTPATIENT
Start: 2022-01-01 | End: 2022-01-01 | Stop reason: ALTCHOICE

## 2022-01-01 RX ORDER — POLYETHYLENE GLYCOL 3350 17 G/17G
17 POWDER, FOR SOLUTION ORAL DAILY PRN
Status: DISCONTINUED | OUTPATIENT
Start: 2022-01-01 | End: 2022-01-01 | Stop reason: HOSPADM

## 2022-01-01 RX ORDER — SODIUM CHLORIDE 0.9 % (FLUSH) 0.9 %
5-40 SYRINGE (ML) INJECTION EVERY 12 HOURS SCHEDULED
Status: DISCONTINUED | OUTPATIENT
Start: 2022-01-01 | End: 2022-01-01 | Stop reason: HOSPADM

## 2022-01-01 RX ORDER — DILTIAZEM HYDROCHLORIDE 300 MG/1
300 CAPSULE, COATED, EXTENDED RELEASE ORAL DAILY
Status: DISCONTINUED | OUTPATIENT
Start: 2022-01-01 | End: 2022-01-01 | Stop reason: HOSPADM

## 2022-01-01 RX ORDER — ACETAMINOPHEN 650 MG/1
650 SUPPOSITORY RECTAL EVERY 6 HOURS PRN
Status: DISCONTINUED | OUTPATIENT
Start: 2022-01-01 | End: 2022-02-11 | Stop reason: HOSPADM

## 2022-01-01 RX ORDER — NICOTINE POLACRILEX 4 MG
15 LOZENGE BUCCAL PRN
Status: DISCONTINUED | OUTPATIENT
Start: 2022-01-01 | End: 2022-01-01 | Stop reason: SDUPTHER

## 2022-01-01 RX ORDER — BISACODYL 10 MG
10 SUPPOSITORY, RECTAL RECTAL DAILY
Status: DISCONTINUED | OUTPATIENT
Start: 2022-01-01 | End: 2022-01-01

## 2022-01-01 RX ORDER — FUROSEMIDE 10 MG/ML
40 INJECTION INTRAMUSCULAR; INTRAVENOUS ONCE
Status: COMPLETED | OUTPATIENT
Start: 2022-01-01 | End: 2022-01-01

## 2022-01-01 RX ORDER — DEXTROSE MONOHYDRATE 25 G/50ML
12.5 INJECTION, SOLUTION INTRAVENOUS PRN
Status: DISCONTINUED | OUTPATIENT
Start: 2022-01-01 | End: 2022-01-01 | Stop reason: SDUPTHER

## 2022-01-01 RX ORDER — MIRTAZAPINE 7.5 MG/1
7.5 TABLET, FILM COATED ORAL NIGHTLY
COMMUNITY

## 2022-01-01 RX ORDER — ACETAMINOPHEN 325 MG/1
650 TABLET ORAL EVERY 6 HOURS PRN
Status: DISCONTINUED | OUTPATIENT
Start: 2022-01-01 | End: 2022-02-11 | Stop reason: HOSPADM

## 2022-01-01 RX ORDER — INSULIN LISPRO 100 [IU]/ML
0-12 INJECTION, SOLUTION INTRAVENOUS; SUBCUTANEOUS EVERY 4 HOURS
Status: DISCONTINUED | OUTPATIENT
Start: 2022-01-01 | End: 2022-01-01 | Stop reason: SDUPTHER

## 2022-01-01 RX ADMIN — SODIUM CHLORIDE 25 ML: 9 INJECTION, SOLUTION INTRAVENOUS at 22:21

## 2022-01-01 RX ADMIN — DILTIAZEM HYDROCHLORIDE 300 MG: 300 CAPSULE, COATED, EXTENDED RELEASE ORAL at 08:13

## 2022-01-01 RX ADMIN — INSULIN LISPRO 3 UNITS: 100 INJECTION, SOLUTION INTRAVENOUS; SUBCUTANEOUS at 02:53

## 2022-01-01 RX ADMIN — METHOCARBAMOL 750 MG: 500 TABLET ORAL at 11:52

## 2022-01-01 RX ADMIN — SODIUM CHLORIDE, PRESERVATIVE FREE 10 ML: 5 INJECTION INTRAVENOUS at 23:41

## 2022-01-01 RX ADMIN — METOPROLOL 25 MG: 25 TABLET ORAL at 21:53

## 2022-01-01 RX ADMIN — INSULIN LISPRO 6 UNITS: 100 INJECTION, SOLUTION INTRAVENOUS; SUBCUTANEOUS at 16:30

## 2022-01-01 RX ADMIN — BISACODYL 10 MG: 10 SUPPOSITORY RECTAL at 12:01

## 2022-01-01 RX ADMIN — CEFTRIAXONE 1000 MG: 1 INJECTION, POWDER, FOR SOLUTION INTRAMUSCULAR; INTRAVENOUS at 23:04

## 2022-01-01 RX ADMIN — POLYETHYLENE GLYCOL (3350) 17 G: 17 POWDER, FOR SOLUTION ORAL at 09:40

## 2022-01-01 RX ADMIN — Medication 2000 UNITS: at 12:01

## 2022-01-01 RX ADMIN — BARICITINIB 2 MG: 2 TABLET, FILM COATED ORAL at 11:52

## 2022-01-01 RX ADMIN — METOPROLOL 25 MG: 25 TABLET ORAL at 09:39

## 2022-01-01 RX ADMIN — ATORVASTATIN CALCIUM 20 MG: 20 TABLET, FILM COATED ORAL at 12:02

## 2022-01-01 RX ADMIN — ALLOPURINOL 300 MG: 300 TABLET ORAL at 08:13

## 2022-01-01 RX ADMIN — ASPIRIN 81 MG 324 MG: 81 TABLET ORAL at 21:54

## 2022-01-01 RX ADMIN — PANTOPRAZOLE SODIUM 40 MG: 40 TABLET, DELAYED RELEASE ORAL at 07:09

## 2022-01-01 RX ADMIN — DULOXETINE HYDROCHLORIDE 40 MG: 20 CAPSULE, DELAYED RELEASE ORAL at 09:10

## 2022-01-01 RX ADMIN — ATORVASTATIN CALCIUM 20 MG: 20 TABLET, FILM COATED ORAL at 20:20

## 2022-01-01 RX ADMIN — METOPROLOL TARTRATE 25 MG: 25 TABLET, FILM COATED ORAL at 03:21

## 2022-01-01 RX ADMIN — DILTIAZEM HYDROCHLORIDE 300 MG: 300 CAPSULE, COATED, EXTENDED RELEASE ORAL at 12:02

## 2022-01-01 RX ADMIN — METOPROLOL 25 MG: 25 TABLET ORAL at 10:01

## 2022-01-01 RX ADMIN — ALLOPURINOL 300 MG: 300 TABLET ORAL at 09:10

## 2022-01-01 RX ADMIN — BISACODYL 10 MG: 10 SUPPOSITORY RECTAL at 09:30

## 2022-01-01 RX ADMIN — SODIUM CHLORIDE, PRESERVATIVE FREE 10 ML: 5 INJECTION INTRAVENOUS at 09:21

## 2022-01-01 RX ADMIN — POLYETHYLENE GLYCOL 3350 17 G: 17 POWDER, FOR SOLUTION ORAL at 10:02

## 2022-01-01 RX ADMIN — Medication 10 ML: at 13:42

## 2022-01-01 RX ADMIN — METOPROLOL 25 MG: 25 TABLET ORAL at 09:20

## 2022-01-01 RX ADMIN — INSULIN LISPRO 8 UNITS: 100 INJECTION, SOLUTION INTRAVENOUS; SUBCUTANEOUS at 21:47

## 2022-01-01 RX ADMIN — CEFTRIAXONE 1000 MG: 1 INJECTION, POWDER, FOR SOLUTION INTRAMUSCULAR; INTRAVENOUS at 22:25

## 2022-01-01 RX ADMIN — AZITHROMYCIN MONOHYDRATE 500 MG: 250 TABLET ORAL at 21:05

## 2022-01-01 RX ADMIN — METOPROLOL 25 MG: 25 TABLET ORAL at 12:00

## 2022-01-01 RX ADMIN — DULOXETINE HYDROCHLORIDE 40 MG: 20 CAPSULE, DELAYED RELEASE ORAL at 10:01

## 2022-01-01 RX ADMIN — SODIUM CHLORIDE, PRESERVATIVE FREE 10 ML: 5 INJECTION INTRAVENOUS at 20:03

## 2022-01-01 RX ADMIN — OXYCODONE HYDROCHLORIDE 5 MG: 5 TABLET ORAL at 19:21

## 2022-01-01 RX ADMIN — DILTIAZEM HYDROCHLORIDE 300 MG: 180 CAPSULE, COATED, EXTENDED RELEASE ORAL at 12:02

## 2022-01-01 RX ADMIN — DEXAMETHASONE SODIUM PHOSPHATE 6 MG: 10 INJECTION, SOLUTION INTRAMUSCULAR; INTRAVENOUS at 21:55

## 2022-01-01 RX ADMIN — DILTIAZEM HYDROCHLORIDE 300 MG: 300 CAPSULE, COATED, EXTENDED RELEASE ORAL at 10:30

## 2022-01-01 RX ADMIN — POLYETHYLENE GLYCOL (3350) 17 G: 17 POWDER, FOR SOLUTION ORAL at 12:38

## 2022-01-01 RX ADMIN — AZITHROMYCIN MONOHYDRATE 500 MG: 500 INJECTION, POWDER, LYOPHILIZED, FOR SOLUTION INTRAVENOUS at 20:29

## 2022-01-01 RX ADMIN — INSULIN LISPRO 6 UNITS: 100 INJECTION, SOLUTION INTRAVENOUS; SUBCUTANEOUS at 10:00

## 2022-01-01 RX ADMIN — CEFTRIAXONE 1000 MG: 1 INJECTION, POWDER, FOR SOLUTION INTRAMUSCULAR; INTRAVENOUS at 21:10

## 2022-01-01 RX ADMIN — DULOXETINE HYDROCHLORIDE 40 MG: 20 CAPSULE, DELAYED RELEASE ORAL at 09:39

## 2022-01-01 RX ADMIN — DILTIAZEM HYDROCHLORIDE 300 MG: 300 CAPSULE, COATED, EXTENDED RELEASE ORAL at 09:10

## 2022-01-01 RX ADMIN — OXYCODONE 5 MG: 5 TABLET ORAL at 05:35

## 2022-01-01 RX ADMIN — FUROSEMIDE 40 MG: 10 INJECTION, SOLUTION INTRAVENOUS at 21:54

## 2022-01-01 RX ADMIN — ATORVASTATIN CALCIUM 20 MG: 20 TABLET, FILM COATED ORAL at 21:49

## 2022-01-01 RX ADMIN — POLYETHYLENE GLYCOL 3350 17 G: 17 POWDER, FOR SOLUTION ORAL at 09:09

## 2022-01-01 RX ADMIN — Medication 5 MG: at 21:07

## 2022-01-01 RX ADMIN — DEXTROSE MONOHYDRATE 1000 MG: 5 INJECTION INTRAVENOUS at 19:58

## 2022-01-01 RX ADMIN — ALLOPURINOL 300 MG: 300 TABLET ORAL at 10:02

## 2022-01-01 RX ADMIN — BARICITINIB 2 MG: 2 TABLET, FILM COATED ORAL at 11:07

## 2022-01-01 RX ADMIN — SODIUM CHLORIDE, PRESERVATIVE FREE 10 ML: 5 INJECTION INTRAVENOUS at 10:09

## 2022-01-01 RX ADMIN — METHOCARBAMOL 750 MG: 500 TABLET ORAL at 09:20

## 2022-01-01 RX ADMIN — ATORVASTATIN CALCIUM 20 MG: 20 TABLET, FILM COATED ORAL at 20:28

## 2022-01-01 RX ADMIN — Medication 5 MG: at 20:03

## 2022-01-01 RX ADMIN — METHOCARBAMOL 750 MG: 500 TABLET ORAL at 18:07

## 2022-01-01 RX ADMIN — DILTIAZEM HYDROCHLORIDE 300 MG: 300 CAPSULE, COATED, EXTENDED RELEASE ORAL at 08:15

## 2022-01-01 RX ADMIN — DULOXETINE HYDROCHLORIDE 40 MG: 20 CAPSULE, DELAYED RELEASE ORAL at 08:15

## 2022-01-01 RX ADMIN — PHYTONADIONE 2.5 MG: 5 TABLET ORAL at 12:02

## 2022-01-01 RX ADMIN — BARICITINIB 2 MG: 2 TABLET, FILM COATED ORAL at 12:37

## 2022-01-01 RX ADMIN — ALLOPURINOL 300 MG: 300 TABLET ORAL at 08:15

## 2022-01-01 RX ADMIN — SODIUM CHLORIDE, PRESERVATIVE FREE 10 ML: 5 INJECTION INTRAVENOUS at 21:50

## 2022-01-01 RX ADMIN — CEFTRIAXONE 1000 MG: 1 INJECTION, POWDER, FOR SOLUTION INTRAMUSCULAR; INTRAVENOUS at 20:25

## 2022-01-01 RX ADMIN — PANTOPRAZOLE SODIUM 40 MG: 40 TABLET, DELAYED RELEASE ORAL at 05:15

## 2022-01-01 RX ADMIN — ATORVASTATIN CALCIUM 20 MG: 20 TABLET, FILM COATED ORAL at 21:08

## 2022-01-01 RX ADMIN — DULOXETINE HYDROCHLORIDE 40 MG: 20 CAPSULE, DELAYED RELEASE ORAL at 09:21

## 2022-01-01 RX ADMIN — ACETAMINOPHEN 650 MG: 325 TABLET ORAL at 19:21

## 2022-01-01 RX ADMIN — PANTOPRAZOLE SODIUM 40 MG: 40 TABLET, DELAYED RELEASE ORAL at 12:02

## 2022-01-01 RX ADMIN — QUETIAPINE FUMARATE 12.5 MG: 25 TABLET ORAL at 20:23

## 2022-01-01 RX ADMIN — ATORVASTATIN CALCIUM 20 MG: 20 TABLET, FILM COATED ORAL at 20:03

## 2022-01-01 RX ADMIN — OXYCODONE 5 MG: 5 TABLET ORAL at 07:28

## 2022-01-01 RX ADMIN — ALLOPURINOL 300 MG: 300 TABLET ORAL at 12:02

## 2022-01-01 RX ADMIN — METOPROLOL 25 MG: 25 TABLET ORAL at 20:23

## 2022-01-01 RX ADMIN — METHOCARBAMOL 750 MG: 500 TABLET ORAL at 12:38

## 2022-01-01 RX ADMIN — Medication 7.5 G: at 03:25

## 2022-01-01 RX ADMIN — ALLOPURINOL 300 MG: 300 TABLET ORAL at 09:39

## 2022-01-01 RX ADMIN — DILTIAZEM HYDROCHLORIDE 300 MG: 300 CAPSULE, COATED, EXTENDED RELEASE ORAL at 09:21

## 2022-01-01 RX ADMIN — METOPROLOL 25 MG: 25 TABLET ORAL at 08:55

## 2022-01-01 RX ADMIN — PANTOPRAZOLE SODIUM 40 MG: 40 TABLET, DELAYED RELEASE ORAL at 06:11

## 2022-01-01 RX ADMIN — SODIUM CHLORIDE, PRESERVATIVE FREE 5 ML: 5 INJECTION INTRAVENOUS at 09:40

## 2022-01-01 RX ADMIN — SODIUM CHLORIDE: 9 INJECTION, SOLUTION INTRAVENOUS at 01:56

## 2022-01-01 RX ADMIN — POLYETHYLENE GLYCOL (3350) 17 G: 17 POWDER, FOR SOLUTION ORAL at 08:56

## 2022-01-01 RX ADMIN — IPRATROPIUM BROMIDE AND ALBUTEROL 1 PUFF: 20; 100 SPRAY, METERED RESPIRATORY (INHALATION) at 08:47

## 2022-01-01 RX ADMIN — SENNOSIDES AND DOCUSATE SODIUM 2 TABLET: 50; 8.6 TABLET ORAL at 11:08

## 2022-01-01 RX ADMIN — SENNOSIDES AND DOCUSATE SODIUM 2 TABLET: 50; 8.6 TABLET ORAL at 10:01

## 2022-01-01 RX ADMIN — OXYCODONE 5 MG: 5 TABLET ORAL at 18:22

## 2022-01-01 RX ADMIN — METOPROLOL 25 MG: 25 TABLET ORAL at 23:50

## 2022-01-01 RX ADMIN — METHOCARBAMOL 750 MG: 500 TABLET ORAL at 16:36

## 2022-01-01 RX ADMIN — METHOCARBAMOL 750 MG: 500 TABLET ORAL at 15:08

## 2022-01-01 RX ADMIN — IPRATROPIUM BROMIDE AND ALBUTEROL 1 PUFF: 20; 100 SPRAY, METERED RESPIRATORY (INHALATION) at 00:26

## 2022-01-01 RX ADMIN — METHOCARBAMOL 750 MG: 500 TABLET ORAL at 09:10

## 2022-01-01 RX ADMIN — PANTOPRAZOLE SODIUM 40 MG: 40 TABLET, DELAYED RELEASE ORAL at 10:43

## 2022-01-01 RX ADMIN — ACETAMINOPHEN 650 MG: 325 TABLET ORAL at 08:53

## 2022-01-01 RX ADMIN — METOPROLOL 25 MG: 25 TABLET ORAL at 21:07

## 2022-01-01 RX ADMIN — DEXAMETHASONE 6 MG: 4 TABLET ORAL at 14:11

## 2022-01-01 RX ADMIN — METHOCARBAMOL 750 MG: 500 TABLET ORAL at 09:39

## 2022-01-01 RX ADMIN — ALLOPURINOL 300 MG: 300 TABLET ORAL at 09:21

## 2022-01-01 RX ADMIN — ACETAMINOPHEN 650 MG: 325 TABLET ORAL at 15:24

## 2022-01-01 RX ADMIN — METHOCARBAMOL 750 MG: 500 TABLET ORAL at 20:03

## 2022-01-01 RX ADMIN — OXYCODONE 5 MG: 5 TABLET ORAL at 11:07

## 2022-01-01 RX ADMIN — PANTOPRAZOLE SODIUM 40 MG: 40 TABLET, DELAYED RELEASE ORAL at 05:35

## 2022-01-01 RX ADMIN — SODIUM CHLORIDE, PRESERVATIVE FREE 5 ML: 5 INJECTION INTRAVENOUS at 10:20

## 2022-01-01 RX ADMIN — Medication 5 MG: at 20:23

## 2022-01-01 RX ADMIN — METHOCARBAMOL 750 MG: 500 TABLET ORAL at 10:01

## 2022-01-01 RX ADMIN — AZITHROMYCIN MONOHYDRATE 500 MG: 250 TABLET ORAL at 18:07

## 2022-01-01 RX ADMIN — DULOXETINE HYDROCHLORIDE 40 MG: 20 CAPSULE, DELAYED RELEASE ORAL at 12:02

## 2022-01-01 RX ADMIN — METHOCARBAMOL 750 MG: 500 TABLET ORAL at 12:00

## 2022-01-01 RX ADMIN — SODIUM CHLORIDE, PRESERVATIVE FREE 10 ML: 5 INJECTION INTRAVENOUS at 20:20

## 2022-01-01 RX ADMIN — SENNOSIDES AND DOCUSATE SODIUM 2 TABLET: 50; 8.6 TABLET ORAL at 09:10

## 2022-01-01 RX ADMIN — METHOCARBAMOL 750 MG: 500 TABLET ORAL at 21:50

## 2022-01-01 RX ADMIN — ALLOPURINOL 300 MG: 300 TABLET ORAL at 08:55

## 2022-01-01 RX ADMIN — METOPROLOL 25 MG: 25 TABLET ORAL at 20:03

## 2022-01-01 RX ADMIN — DEXAMETHASONE 6 MG: 4 TABLET ORAL at 08:54

## 2022-01-01 RX ADMIN — METOPROLOL 25 MG: 25 TABLET ORAL at 09:10

## 2022-01-01 RX ADMIN — METHOCARBAMOL 750 MG: 500 TABLET ORAL at 21:07

## 2022-01-01 RX ADMIN — DILTIAZEM HYDROCHLORIDE 300 MG: 300 CAPSULE, COATED, EXTENDED RELEASE ORAL at 10:43

## 2022-01-01 RX ADMIN — DULOXETINE HYDROCHLORIDE 40 MG: 20 CAPSULE, DELAYED RELEASE ORAL at 08:13

## 2022-01-01 RX ADMIN — METOPROLOL TARTRATE 25 MG: 25 TABLET, FILM COATED ORAL at 12:02

## 2022-01-01 RX ADMIN — SENNOSIDES AND DOCUSATE SODIUM 2 TABLET: 50; 8.6 TABLET ORAL at 21:08

## 2022-01-01 RX ADMIN — OXYCODONE 5 MG: 5 TABLET ORAL at 00:03

## 2022-01-01 RX ADMIN — DEXAMETHASONE 6 MG: 4 TABLET ORAL at 09:21

## 2022-01-01 RX ADMIN — SENNOSIDES AND DOCUSATE SODIUM 2 TABLET: 50; 8.6 TABLET ORAL at 20:23

## 2022-01-01 RX ADMIN — BARICITINIB 2 MG: 2 TABLET, FILM COATED ORAL at 13:23

## 2022-01-01 RX ADMIN — DULOXETINE HYDROCHLORIDE 40 MG: 20 CAPSULE, DELAYED RELEASE ORAL at 08:53

## 2022-01-01 RX ADMIN — AZITHROMYCIN MONOHYDRATE 500 MG: 250 TABLET ORAL at 20:20

## 2022-01-01 RX ADMIN — Medication 5 MG: at 21:51

## 2022-01-01 RX ADMIN — FUROSEMIDE 20 MG: 20 TABLET ORAL at 09:38

## 2022-01-01 RX ADMIN — METOPROLOL 25 MG: 25 TABLET ORAL at 20:20

## 2022-01-01 RX ADMIN — BARICITINIB 2 MG: 2 TABLET, FILM COATED ORAL at 15:59

## 2022-01-01 RX ADMIN — FLUCONAZOLE 200 MG: 2 INJECTION, SOLUTION INTRAVENOUS at 22:00

## 2022-01-01 RX ADMIN — METOPROLOL 25 MG: 25 TABLET ORAL at 08:15

## 2022-01-01 RX ADMIN — FUROSEMIDE 20 MG: 20 TABLET ORAL at 10:01

## 2022-01-01 RX ADMIN — SODIUM CHLORIDE, PRESERVATIVE FREE 10 ML: 5 INJECTION INTRAVENOUS at 08:15

## 2022-01-01 RX ADMIN — Medication 1000 MG: at 01:53

## 2022-01-01 RX ADMIN — ATORVASTATIN CALCIUM 20 MG: 20 TABLET, FILM COATED ORAL at 23:50

## 2022-01-01 RX ADMIN — METHOCARBAMOL 750 MG: 500 TABLET ORAL at 08:52

## 2022-01-01 RX ADMIN — Medication 5 MG: at 20:20

## 2022-01-01 ASSESSMENT — PAIN SCALES - GENERAL
PAINLEVEL_OUTOF10: 0
PAINLEVEL_OUTOF10: 5
PAINLEVEL_OUTOF10: 0
PAINLEVEL_OUTOF10: 0
PAINLEVEL_OUTOF10: 5
PAINLEVEL_OUTOF10: 0
PAINLEVEL_OUTOF10: 7
PAINLEVEL_OUTOF10: 8
PAINLEVEL_OUTOF10: 0
PAINLEVEL_OUTOF10: 7
PAINLEVEL_OUTOF10: 8
PAINLEVEL_OUTOF10: 0
PAINLEVEL_OUTOF10: 5
PAINLEVEL_OUTOF10: 10
PAINLEVEL_OUTOF10: 0
PAINLEVEL_OUTOF10: 0
PAINLEVEL_OUTOF10: 3
PAINLEVEL_OUTOF10: 0
PAINLEVEL_OUTOF10: 4
PAINLEVEL_OUTOF10: 0
PAINLEVEL_OUTOF10: 4
PAINLEVEL_OUTOF10: 0
PAINLEVEL_OUTOF10: 0
PAINLEVEL_OUTOF10: 3
PAINLEVEL_OUTOF10: 0
PAINLEVEL_OUTOF10: 5
PAINLEVEL_OUTOF10: 0

## 2022-01-01 ASSESSMENT — ENCOUNTER SYMPTOMS
CONSTIPATION: 1
WHEEZING: 1
ABDOMINAL PAIN: 0
NAUSEA: 0
SHORTNESS OF BREATH: 1
SORE THROAT: 0
NAUSEA: 0
ABDOMINAL DISTENTION: 0
VOICE CHANGE: 0
SHORTNESS OF BREATH: 1
EYE DISCHARGE: 0
ABDOMINAL DISTENTION: 0
CONSTIPATION: 1
WHEEZING: 0
VOMITING: 0
NAUSEA: 0
SHORTNESS OF BREATH: 1
DIARRHEA: 0
CONSTIPATION: 1
NAUSEA: 0
COUGH: 1
SORE THROAT: 0
BLOOD IN STOOL: 0
NAUSEA: 0
VOICE CHANGE: 0
NAUSEA: 0
DIARRHEA: 0
NAUSEA: 0
ABDOMINAL PAIN: 0
CONSTIPATION: 1
ABDOMINAL PAIN: 0
VOMITING: 0
SORE THROAT: 0
WHEEZING: 0
EYE DISCHARGE: 0
CHOKING: 0
ABDOMINAL PAIN: 0
CHEST TIGHTNESS: 0
WHEEZING: 0
VOMITING: 0
COUGH: 1
ABDOMINAL PAIN: 0
RHINORRHEA: 1
WHEEZING: 0
VOMITING: 0
ABDOMINAL PAIN: 0
SORE THROAT: 0
VOICE CHANGE: 0
EYE DISCHARGE: 0
COLOR CHANGE: 1
EYE DISCHARGE: 0
COUGH: 1
WHEEZING: 0
VOMITING: 0
SORE THROAT: 0
EYE DISCHARGE: 0
VOICE CHANGE: 0
SHORTNESS OF BREATH: 1
SHORTNESS OF BREATH: 0
SHORTNESS OF BREATH: 1
APNEA: 0
WHEEZING: 0
BACK PAIN: 0
EYE DISCHARGE: 0
VOMITING: 0
CONSTIPATION: 1
ABDOMINAL PAIN: 0
SINUS PRESSURE: 0
EYE PAIN: 0
SHORTNESS OF BREATH: 1
COUGH: 1
SORE THROAT: 0
ROS SKIN COMMENTS: DIFFUSE ECCHYMOSIS
NAUSEA: 0
SORE THROAT: 0
VOICE CHANGE: 0
VOMITING: 0
WHEEZING: 0
EYE DISCHARGE: 0
SHORTNESS OF BREATH: 1
ABDOMINAL PAIN: 0
CHEST TIGHTNESS: 0
VOMITING: 0
SHORTNESS OF BREATH: 1
DIARRHEA: 0
VOICE CHANGE: 0
VOICE CHANGE: 0
EYE ITCHING: 0

## 2022-01-01 ASSESSMENT — PAIN - FUNCTIONAL ASSESSMENT: PAIN_FUNCTIONAL_ASSESSMENT: ACTIVITIES ARE NOT PREVENTED

## 2022-01-01 ASSESSMENT — PAIN DESCRIPTION - LOCATION
LOCATION: BACK;HIP
LOCATION: BACK;HIP
LOCATION: GENERALIZED

## 2022-01-01 ASSESSMENT — PAIN DESCRIPTION - PROGRESSION
CLINICAL_PROGRESSION: NOT CHANGED

## 2022-01-01 ASSESSMENT — PAIN DESCRIPTION - FREQUENCY
FREQUENCY: CONTINUOUS
FREQUENCY: CONTINUOUS

## 2022-01-01 ASSESSMENT — PAIN DESCRIPTION - PAIN TYPE
TYPE: ACUTE PAIN

## 2022-01-01 ASSESSMENT — PAIN DESCRIPTION - DESCRIPTORS
DESCRIPTORS: SHOOTING
DESCRIPTORS: ACHING;DISCOMFORT;SHOOTING

## 2022-01-01 ASSESSMENT — PAIN DESCRIPTION - ORIENTATION
ORIENTATION: LEFT;LOWER
ORIENTATION: LEFT;LOWER

## 2022-01-01 ASSESSMENT — PAIN DESCRIPTION - ONSET
ONSET: ON-GOING
ONSET: ON-GOING

## 2022-01-03 PROBLEM — O99.511 PNEUMONIA AFFECTING PREGNANCY IN FIRST TRIMESTER: Status: ACTIVE | Noted: 2022-01-01

## 2022-01-03 PROBLEM — J18.9 PNEUMONIA AFFECTING PREGNANCY IN FIRST TRIMESTER: Status: ACTIVE | Noted: 2022-01-01

## 2022-01-03 NOTE — ED PROVIDER NOTES
[address] [facility]          PHYSICIAN ASSISTANT NOTE       Date of evaluation: 1/3/2022    Chief Complaint     Chest Pain (with coughing, since christmas eve )      History of Present Illness     Dann Handley is a 79 y.o. female with a history of hypertension, atrial fibrillation on warfarin, stage 3 CKD, HFpEF (EF 50-55% 9/4/2020, on 20mg lasix QD) who presents with chest pain. The patient localizes this to her right lower chest.  She states on Ernestina day, which was about 9 days ago, she was walking up the stairs and had a mechanical fall and subsequently laid on her front side and has chest pain ever since. She also notes that she has had a cough and generalized body aches since that time as well. She states that she did not hit her head or lose consciousness and has not had any headache, dizziness, amnesia, nausea, vomiting or any other neurologic symptoms since that time. She denies any specific joint pain or trauma to any of her extremities. She denies any fever or chills. She states that she has not been able to walk since incident due to the generalized body aches, which she describes as aching. She states that she takes Tylenol twice a day in addition to her oxycodone that she has been on chronically. She also endorses shortness of breath when she attempts to exert herself. She also states that 2 weeks prior to the symptoms she had URI symptoms such as rhinorrhea, congestion, which she described as a \"cold\". She is vaccinated for Covid with [facility]. She has had no sick contacts. She does live with a roommate. She has been urinating and eating and drinking like normal.  However, she has not had a bowel movement in 2 days. She denies any increased leg swelling, orthopnea, PND. She denies any bleeding, hematuria, hematochezia, melena, coffee-ground emesis      Most recent INR 4.5 12/20/21  Stress test 10/10/2020:    There is normal isotope uptake at stress and rest. There is no evidence of    myocardial ischemia or scar. Hyperdynamic LV systolic function with LQ>17%    with uniform wall motion. Low risk study. Review of Systems     Review of Systems   Constitutional: Negative for activity change, appetite change, chills, fatigue and fever. HENT: Negative for congestion, ear pain, nosebleeds, sinus pressure, sore throat and voice change. Eyes: Negative for pain, discharge and itching. Respiratory: Positive for cough and shortness of breath. Negative for apnea, choking and wheezing. Cardiovascular: Negative for chest pain, palpitations and leg swelling. Gastrointestinal: Positive for constipation. Negative for abdominal distention, abdominal pain, diarrhea, nausea and vomiting. Genitourinary: Negative for difficulty urinating, dysuria, flank pain and hematuria. Musculoskeletal: Positive for myalgias. Negative for back pain and neck pain. Skin:        Diffuse ecchymosis   Neurological: Negative for dizziness, seizures, syncope, speech difficulty, weakness, light-headedness and headaches. Psychiatric/Behavioral: Negative for agitation, behavioral problems and confusion. Past Medical, Surgical, Family, and Social History     She has a past medical history of Anemia, Arthritis, Atrial fibrillation ([facility] [address] Chronic kidney disease (CKD), stage II (mild), Community acquired pneumonia of left lower lobe of lung, Depression, Diverticulosis, Foot pain, GI bleed, Gout, Hemorrhoids, Hyperlipidemia, Hypertension, Hyperuricemia, Iron deficiency anemia, Medical history reviewed with no changes, Menopausal syndrome, Obesity, Pelvic relaxation, PONV (postoperative nausea and vomiting), Stress, Syncope, Unspecified sleep apnea, Vitamin D deficiency, Wears dentures, and Wears glasses. She has a past surgical history that includes Cholecystectomy (1974); Hysterectomy (1977); Knee arthroscopy (2005); bladder suspension (1997);  Uvulopalatopharygoplasty (3/2002); eye surgery; Colonoscopy; Colonoscopy (11/2014); joint replacement (Right, 2017); Pain management procedure (Left, 8/11/2020); and Pain management procedure (N/A, 8/25/2020). Her family history includes Heart Attack in her father; Heart Disease in her father; Stroke in her brother. She reports that she has never smoked. She has never used smokeless tobacco. She reports that she does not drink alcohol and does not use drugs. Medications     Previous Medications    ACETAMINOPHEN (TYLENOL) 500 MG TABLET    Take 1 tablet by mouth 4 times daily as needed for Pain    ALLOPURINOL (ZYLOPRIM) 300 MG TABLET    TAKE ONE TABLET BY MOUTH DAILY    ATORVASTATIN (LIPITOR) 20 MG TABLET    TAKE ONE TABLET BY MOUTH ONCE NIGHTLY    CARTIA  MG EXTENDED RELEASE CAPSULE    TAKE ONE CAPSULE BY MOUTH DAILY    CHOLECALCIFEROL (VITAMIN D) 2000 UNITS CAPS CAPSULE    Take 2,000 Units by mouth daily    CLINDAMYCIN (CLEOCIN) 300 MG CAPSULE    TAKE 2 CAPSULES BY MOUTH 1 HOUR BEFORE PROCEDURE    DOCUSATE SODIUM (COLACE) 100 MG CAPSULE    Take 100 mg by mouth daily as needed for Constipation     DULOXETINE 40 MG CPEP    Take 20 mg by mouth daily    FUROSEMIDE (LASIX) 20 MG TABLET    Take 1 tablet by mouth daily as needed (SOB, edema, weight gain)    LIDOCAINE (LIDODERM) 5 %    Place 1 patch onto the skin daily 12 hours on, 12 hours off. METOPROLOL TARTRATE (LOPRESSOR) 25 MG TABLET    TAKE ONE TABLET BY MOUTH TWICE A DAY    OMEPRAZOLE (PRILOSEC) 20 MG DELAYED RELEASE CAPSULE    TAKE ONE CAPSULE BY MOUTH DAILY    SERTRALINE (ZOLOFT) 50 MG TABLET    TAKE ONE TABLET BY MOUTH DAILY    WARFARIN (COUMADIN) 1 MG TABLET    TAKE ONE TABLET BY MOUTH ON SUNDAY, TUESDAY AND THURSDAY. TAKE ONE-HALF TABLET BY MOUTH ON ALL OTHER DAYS OR AS DIRECTED BY CLINIC       Allergies     She is allergic to diclofenac, adhesive tape, and penicillins.     Physical Exam     INITIAL VITALS: BP: 118/77, Temp: 98.4 °F (36.9 °C), Pulse: 85, Resp: 22, SpO2: 92 %  Physical Exam  Constitutional:       General: She is not in acute distress. Appearance: Normal appearance. She is obese. She is not toxic-appearing. HENT:      Head: Normocephalic and atraumatic. Right Ear: Tympanic membrane, ear canal and external ear normal.      Left Ear: Tympanic membrane, ear canal and external ear normal.      Nose: Nose normal. No congestion or rhinorrhea. Mouth/Throat:      Mouth: Mucous membranes are moist.      Pharynx: No oropharyngeal exudate or posterior oropharyngeal erythema. Eyes:      Extraocular Movements: Extraocular movements intact. Pupils: Pupils are equal, round, and reactive to light. Cardiovascular:      Rate and Rhythm: Normal rate. Rhythm irregular. Pulses: Normal pulses. Heart sounds: Normal heart sounds. No murmur heard. No gallop. Comments: Tenderness to palpation of the right lower ribs in the anterior axillary line with no gross deformities, crepitus  Pulmonary:      Effort: Pulmonary effort is normal. No respiratory distress. Breath sounds: Normal breath sounds. No wheezing, rhonchi or rales. Chest:      Chest wall: Tenderness present. Abdominal:      General: Abdomen is flat. Bowel sounds are normal. There is no distension. Palpations: Abdomen is soft. Tenderness: There is no abdominal tenderness. There is no guarding or rebound. Musculoskeletal:         General: No tenderness. Cervical back: Normal range of motion and neck supple. No rigidity or tenderness. Right lower leg: No edema. Left lower leg: No edema. Comments: No tenderness palpation of the joints in the bilateral upper and lower extremities. No pain upon logroll of bilateral lower extremities. No pain to palpation of the pelvis or hips. No tenderness of the C/T/L-spine. Skin:     General: Skin is warm and dry. Capillary Refill: Capillary refill takes less than 2 seconds. Findings: Bruising present.  No rash.      Comments: Severe ecchymosis to the right lower extremity, however is not tender to palpation and there are no gross deformity   Neurological:      General: No focal deficit present. Mental Status: She is alert. Comments: Alert and oriented x4. Pupils are equal reactive to light, extraocular movements are intact. Facial movements are symmetric bilaterally. Bilateral upper and lower extremity strength 4/5. Sensation intact bilaterally   Psychiatric:         Mood and Affect: Mood normal.         Behavior: Behavior normal.         Diagnostic Results     EKG   Interpreted in conjunction with emergency department physician No att. providers found  Rhythm: atrial fibrillation - controlled  Rate: normal  Axis: normal  Ectopy: none  Conduction: normal  ST Segments: no acute change  T Waves: no acute change  Q Waves:none  Clinical Impression: atrial fibrillation (chronic)  Comparison:  10/13/2020    RADIOLOGY:  XR RIBS RIGHT (2 VIEWS)   Final Result      Airspace disease concerning for pneumonia      No evidence for acute right rib abnormality        Ribs      XR CHEST PORTABLE   Final Result      Right upper lobe patchy airspace disease concerning for pneumonia. Follow-up to clearing is warranted. CT CHEST WO CONTRAST   Final Result      1. Bilateral airspace disease compatible with pneumonia. Probably with mild reactive lymphadenopathy. Correlate for clinical evidence of viral/Covid 19 pneumonia. 2. Acute fracture of manubrium   3. Acute vertebral compression fractures of T3 and T4   4. No definite rib fracture.             CT Head WO Contrast   Final Result      No skull fracture or acute intracranial abnormality          LABS:   Results for orders placed or performed during the hospital encounter of 01/03/22   CBC Auto Differential   Result Value Ref Range    WBC 7.8 4.0 - 11.0 K/uL    RBC 3.31 (L) 4.00 - 5.20 M/uL    Hemoglobin 11.7 (L) 12.0 - 16.0 g/dL    Hematocrit 34.7 (L) 36.0 - 48.0 % .0 (H) 80.0 - 100.0 fL    MCH 35.5 (H) 26.0 - 34.0 pg    MCHC 33.8 31.0 - 36.0 g/dL    RDW 17.1 (H) 12.4 - 15.4 %    Platelets 255 743 - 097 K/uL    MPV 7.9 5.0 - 10.5 fL    Neutrophils % 88.4 %    Lymphocytes % 7.0 %    Monocytes % 3.8 %    Eosinophils % 0.5 %    Basophils % 0.3 %    Neutrophils Absolute 6.9 1.7 - 7.7 K/uL    Lymphocytes Absolute 0.5 (L) 1.0 - 5.1 K/uL    Monocytes Absolute 0.3 0.0 - 1.3 K/uL    Eosinophils Absolute 0.0 0.0 - 0.6 K/uL    Basophils Absolute 0.0 0.0 - 0.2 K/uL   Basic Metabolic Panel w/ Reflex to MG   Result Value Ref Range    Sodium 136 136 - 145 mmol/L    Potassium reflex Magnesium 4.3 3.5 - 5.1 mmol/L    Chloride 102 99 - 110 mmol/L    CO2 24 21 - 32 mmol/L    Anion Gap 10 3 - 16    Glucose 102 (H) 70 - 99 mg/dL    BUN 21 (H) 7 - 20 mg/dL    CREATININE 1.0 0.6 - 1.2 mg/dL    GFR Non- 53 (A) >60    GFR African American >60 >60    Calcium 8.3 8.3 - 10.6 mg/dL   Protime-INR   Result Value Ref Range    Protime 56.0 (H) 9.9 - 12.7 sec    INR 4.65 (HH) 0.88 - 1.12   Brain Natriuretic Peptide   Result Value Ref Range    Pro-BNP 1,545 (H) 0 - 449 pg/mL   Troponin   Result Value Ref Range    Troponin <0.01 <0.01 ng/mL   Blood Gas, Venous   Result Value Ref Range    pH, Asim 7.377 7.350 - 7.450    pCO2, Asim 46.7 41.0 - 51.0 mmHg    pO2, Asim 32.4 25.0 - 40.0 mmHg    HCO3, Venous 27.4 24.0 - 28.0 mmol/L    Base Excess, Asim 1.7 -2.0 - 3.0 mmol/L    O2 Sat, Asim 62 Not established %    Carboxyhemoglobin 1.6 (H) 0.0 - 1.5 %    MetHgb, Asim 0.2 0.0 - 1.5 %    TC02 (Calc), Asim 29 mmol/L    Hemoglobin, Asim, Reduced 37.00 %   EKG 12 Lead   Result Value Ref Range    Ventricular Rate 80 BPM    Atrial Rate 220 BPM    QRS Duration 84 ms    Q-T Interval 396 ms    QTc Calculation (Bazett) 456 ms    R Axis 48 degrees    T Axis 45 degrees    Diagnosis       EKG performed in ER and to be interpreted by ER physician. Confirmed by MD, ER ([id number]),  [facility] ([phone]) on 1/3/2022 7:15:23 PM       ED BEDSIDE ULTRASOUND:  none    RECENT VITALS:  BP: 118/77, Temp: 98.4 °F (36.9 °C), Pulse: 99, Resp: 22, SpO2: 90 % (RA)     Procedures     none    ED Course     Nursing Notes, Past Medical Hx,Past Surgical Hx, Social Hx, Allergies, and Family Hx were reviewed. The patient was given the following medications:  Orders Placed This Encounter   Medications    acetaminophen (TYLENOL) tablet 650 mg    oxyCODONE (ROXICODONE) immediate release tablet 5 mg    cefTRIAXone (ROCEPHIN) 1000 mg IVPB in 50 mL D5W minibag     Order Specific Question:   Antimicrobial Indications     Answer:   Pneumonia (CAP)    azithromycin (ZITHROMAX) 500 mg in dextrose 5 % 250 mL IVPB     Order Specific Question:   Antimicrobial Indications     Answer:   Pneumonia (CAP)       CONSULTS:  IP CONSULT TO HOSPITALIST    [facility] / ASSESSMENT / Jeanette Bradford is a 79 y.o. female hypertension, atrial fibrillation on warfarin, stage 3 CKD, HFpEF (EF 50-55% 9/4/2020, on 20mg lasix QD) who presents with chest pain, cough, generalized body aches. She fell on 12/25, approximate 9 days ago and landed on her chest and has been complaining of chest pain ever since. On physical exam, she is hemodynamically stable, although her oxygen saturation is 92% on room air, upon re-evaluation her oxygen was steady at 90%. Cardiopulmonary exam does not reveal any obvious adventitious breath sounds. Her heart rate is irregular. She has no peripheral edema. she does have diffuse ecchymosis, mostly on the RLE but not associated with tenderness. She has TTP over the sternum and right lower rib boarder in the mid axillary line. There are no gross deformities. She has no other evidence of injuries. She has TTP over the thoracic spine but none in the cervical or lumbar spine. She is neurovascularly intact. Her neuro examination shows no focal deficits. Chest x-ray shows finding consistent with pneumonia.   X-ray of the ribs shows no definite fractures. Given clinical concern of occult fracture, CT chest without contrast was obtained. This showed findings consistent with right upper lobe pneumonia, she was given ceftriaxone and azithromycin for community-acquired pneumonia coverage. There was also a questionable manubrial fracture in addition to acute T3 and T4 vertebral compression fractures. However, upon further questioning, the patient states that she has a history of vertebral compression fractures in those areas (approximatley one year ago). However, upon chart review, she had a T spine CT 7/2021 that did not mention any compression fractures, so I suspect these truly are acute fractures. The patient was put in a TLSO brace. She is neurovascuarly intact. Labs were significant for acute anemia at 11.7, which is down from 14.8 on 12/15. However, there is an unclear source of bleeding, this could have been from the initial injury. Her INR is also supratherapeutic at 4.6. Given no evidence of acute hemorrhage, vitamin K was held in the emergency department. BNP was elevated, however this appears to be her baseline and there is no clinical evidence of acute heart failure exacerbation. Given that this patient has several factors that would contribute to clinical decline including pneumonia, fractures, supratherapeutic INR, I believe this patient warrants admission into the hospital for further management. She would also likely benefit from IR kyphoplasty while inpatient. She states that she has not been able to ambulate since the initial injury and I am concerned for clinical deterioration if she is to be managed as an outpatient. The patient was in agreement to admission. I spoke with the admission team who was in agreement for admission as well. The patient will be cared for in emergency department until a bed is obtained upstairs. This patient was also evaluated by the attending physician.  All care plans were discussed and agreed upon. Clinical Impression     1. Compression fracture of T3 vertebra, initial encounter ([facility] [address]    2. Compression fracture of T4 vertebra, initial encounter ([facility] [address]    3. Fracture of manubrium, initial encounter for closed fracture    4. Anemia, unspecified type    5. Supratherapeutic INR    6. Physical deconditioning    7. Pneumonia of right upper lobe due to infectious organism        Disposition     PATIENT REFERRED TO:  No follow-up provider specified.     DISCHARGE MEDICATIONS:  New Prescriptions    No medications on file       [address] [place], [place]  01/04/22 [id number]

## 2022-01-04 PROBLEM — U07.1 PNEUMONIA DUE TO COVID-19 VIRUS: Status: ACTIVE | Noted: 2022-01-01

## 2022-01-04 PROBLEM — J12.82 PNEUMONIA DUE TO COVID-19 VIRUS: Status: ACTIVE | Noted: 2022-01-01

## 2022-01-04 NOTE — PROGRESS NOTES
Following secure message sent to resident:    Patient is ED hold admitted for cough and SOB. CAP and r/o covid. Patient is in uncontrolled afib with rates from 120-140's. Will go up then come back down to 120's. She takes metorprolol BID. She did not get her HS dose until around 0300. Do you want to give anything else for rate control? She is asymptomatic sleeping. BP has been stable.      Electronically signed by Jesus Barba RN on 1/4/2022 at 5:06 AM

## 2022-01-04 NOTE — ED NOTES
[place] [facility] [place]  Expected date:   Expected time:   Means of arrival:   Comments:  Move room [address] [place] [address]  01/04/22 [id number]

## 2022-01-04 NOTE — PROGRESS NOTES
Pt's heart rate in the 130s, a fib. Pt given oral diltiazem per Mar. Night shift medical team aware of rates, per nursing report. Will monitor.

## 2022-01-04 NOTE — H&P
Internal Medicine  PGY 1  History & Physical      CC Chest pain, cough, fall    History Obtained From:  patient, electronic medical record    HISTORY OF PRESENT ILLNESS:  Ms. Joyce Hernandez is an 79year old female with a PMH of Afib on warfarin, CKD, HFpEF (EF 50-55% on 9/4/20), falls, obesity and iron deficiency anemia who presents to the ED with chest pain brought on by a fall which happened [place]. She reports that she was walking up the stairs to her home when she misplaced her cane and fell with her arms crossed over her chest. She denies head trauma, but states that she has pain over her chest where her arms were crossed, as well as generalized body aches. After her fall, she was unable to ambulate or perform ADLs. She then also developed a cough productive of brown-black sputum 4 days ago. About 2 weeks prior to this, she reports having a cold-like URI with rhinorrhea and congestion. The patient also admits to constipation unresponsive to stool softeners, and a decreased appetite. In the ED, the patient was noted to have bruising. INR of 4.5. Hgb of 11.7, which is about 3 grams/dL lower than her normal. No active acute hemorrhage was suspected, however. X-rays of the chest and ribs showed no fracture of the ribs but did reveal a right upper lobe pneumonia. Chest CT showed bilateral airspace disease, mild reactive lymphadenopathy, acute fractures of the manubrium and T3 & T4 vertebrae. The patient was started on ceftriaxone and azithromycin and a TLSO brace was ordered. The patient is admitted for workup and treatment of pneumonia and fractures.     Past Medical History:        Diagnosis Date    Anemia     NOS    Arthritis     knee     Atrial fibrillation (HCC)     on coumadin    Chronic kidney disease (CKD), stage II (mild)     Community acquired pneumonia of left lower lobe of lung 12/26/2017    Depression     Diverticulosis     Foot pain     GI bleed 4/18/2018    Due to esophageal tear     Gout     Hemorrhoids     Hyperlipidemia     Hypertension     Hyperuricemia     Iron deficiency anemia 1/8/2014    Iron deficiency anemia-s/p EGD and colonoscopy 2/11/2014 Esophageal tear likely source     Medical history reviewed with no changes     Menopausal syndrome     Obesity     Pelvic relaxation     PONV (postoperative nausea and vomiting)     Stress 8/2006    NL stress    Syncope     Unspecified sleep apnea 03/2002    uvulectomy -hx of    Vitamin D deficiency     20ng/ml 6/2009    Wears dentures     Wears glasses    ·     Past Surgical History:        Procedure Laterality Date    BLADDER SUSPENSION  1997    CHOLECYSTECTOMY  1974    COLONOSCOPY      2013    COLONOSCOPY  11/2014    10yr    EYE SURGERY      macular tear and cataract right    HYSTERECTOMY  1977    partial    JOINT REPLACEMENT Right 2017    KNEE ARTHROSCOPY  2005    knee surgery    PAIN MANAGEMENT PROCEDURE Left 8/11/2020    LEFT LUMBAR THREE AND LUMBAR FOUR TRANSFORAMINAL EPIDURAL STEROID INJECTION SITE CONFIRMED BY FLUOROSCOPY performed by Patricia Adan MD at [address] [facility] N/A 8/25/2020    MIDLINE LUMBAR FIVE SACRAL ONE INTERLAMINAR EPIDURAL STEROID INJECTION SITE CONFIRMED BY FLUOROSCOPY performed by Patricia Adan MD at [facility]  3/2002   ·     No current facility-administered medications on file prior to encounter.      Current Outpatient Medications on File Prior to Encounter   Medication Sig Dispense Refill    DULoxetine HCl 40 MG CPEP Take 36 m by mouth daily      HYDROcodone-acetaminophen (NORCO) 5-325 MG per tablet       acetaminophen (TYLENOL) 500 MG tablet Take 1 tablet by mouth 4 times daily as needed for Pain 120 tablet 0    allopurinol (ZYLOPRIM) 300 MG tablet TAKE ONE TABLET BY MOUTH DAILY 90 tablet 3    atorvastatin (LIPITOR) 20 MG tablet TAKE ONE TABLET BY MOUTH ONCE NIGHTLY 90 tablet 3    omeprazole (PRILOSEC) 20 MG delayed release capsule TAKE ONE CAPSULE BY MOUTH DAILY 90 capsule 2    metoprolol tartrate (LOPRESSOR) 25 MG tablet TAKE ONE TABLET BY MOUTH TWICE A  tablet 1    CARTIA  MG extended release capsule TAKE ONE CAPSULE BY MOUTH DAILY 90 capsule 2    warfarin (COUMADIN) 1 MG tablet TAKE ONE TABLET BY MOUTH ON SUNDAY, TUESDAY AND THURSDAY. TAKE ONE-HALF TABLET BY MOUTH ON ALL OTHER DAYS OR AS DIRECTED BY CLINIC 65 tablet 5    Cholecalciferol (VITAMIN D) 2000 UNITS CAPS capsule Take 2,000 Units by mouth daily      docusate sodium (COLACE) 100 MG capsule Take 100 mg by mouth daily as needed for Constipation       clindamycin (CLEOCIN) 300 MG capsule TAKE 2 CAPSULES BY MOUTH 1 HOUR BEFORE PROCEDURE 2 capsule 5    lidocaine (LIDODERM) 5 % Place 1 patch onto the skin daily 12 hours on, 12 hours off. 30 patch 0    furosemide (LASIX) 20 MG tablet Take 1 tablet by mouth daily as needed (SOB, edema, weight gain) 30 tablet 5    sertraline (ZOLOFT) 50 MG tablet TAKE ONE TABLET BY MOUTH DAILY 90 tablet 1     Allergies:  Diclofenac, Adhesive tape, and Penicillins    Social History:   · TOBACCO:   reports that she has never smoked. She has never used smokeless tobacco.  · ETOH:   reports no history of alcohol use. · DRUGS : none reported  · Patient currently lives at home with a friend  ·   Family History:       Problem Relation Age of Onset    Heart Attack Father     Heart Disease Father     Stroke Brother    ·     Review of Systems   Constitutional: Positive for appetite change (decreased). Negative for chills and fever. HENT: Positive for congestion (about 3 weeks ago) and rhinorrhea (about 3 weeks ago). Negative for hearing loss and sore throat. Eyes: Negative for visual disturbance. Respiratory: Positive for cough. Negative for chest tightness and shortness of breath. Cardiovascular: Positive for chest pain and leg swelling. Negative for palpitations.    Gastrointestinal: Positive for constipation. Negative for abdominal distention, abdominal pain, blood in stool, diarrhea, nausea and vomiting. Endocrine: Negative for polyuria. Genitourinary: Negative for difficulty urinating and dysuria. Musculoskeletal: Positive for myalgias. Negative for arthralgias. Skin: Positive for color change (ecchymoses). Negative for pallor and rash. Neurological: Negative for dizziness, seizures, syncope, facial asymmetry, light-headedness and headaches. Psychiatric/Behavioral: Negative for behavioral problems, confusion and hallucinations. Physical Exam  Constitutional:       General: She is not in acute distress. Appearance: Normal appearance. She is obese. She is diaphoretic. She is not ill-appearing or toxic-appearing. HENT:      Head: Normocephalic and atraumatic. Right Ear: External ear normal.      Left Ear: External ear normal.      Nose: Nose normal. No congestion or rhinorrhea. Mouth/Throat:      Mouth: Mucous membranes are moist.      Pharynx: Oropharynx is clear. Eyes:      Extraocular Movements: Extraocular movements intact. Conjunctiva/sclera: Conjunctivae normal.      Pupils: Pupils are equal, round, and reactive to light. Cardiovascular:      Rate and Rhythm: Normal rate. Rhythm irregular. Pulses: Normal pulses. Heart sounds: Normal heart sounds. No friction rub. No gallop. Pulmonary:      Effort: Pulmonary effort is normal. No respiratory distress. Breath sounds: Wheezing (throughout the lungs) and rhonchi (throughout the lungs) present. No rales. Chest:      Chest wall: Tenderness (across the chest at the level of the xiphoid) present. Abdominal:      General: Abdomen is flat. Bowel sounds are normal. There is no distension. Palpations: Abdomen is soft. There is no mass. Tenderness: There is no abdominal tenderness. There is no guarding or rebound. Hernia: No hernia is present.    Musculoskeletal: General: No swelling, tenderness, deformity or signs of injury. Normal range of motion. Cervical back: Normal range of motion and neck supple. Right lower leg: Edema (1+ pitting to the knee) present. Left lower leg: Edema (1+ pitting to the knee) present. Skin:     General: Skin is warm. Capillary Refill: Capillary refill takes less than 2 seconds. Coloration: Skin is not jaundiced or pale. Findings: Bruising (across the chest at the level of the xiphoid; RLE over the lateral shin; right hand) present. Neurological:      General: No focal deficit present. Mental Status: She is alert and oriented to person, place, and time. Mental status is at baseline. Cranial Nerves: No cranial nerve deficit. Psychiatric:         Mood and Affect: Mood normal.         Behavior: Behavior normal.         Thought Content: Thought content normal.         Judgment: Judgment normal.       Physical exam:       Vitals:    01/03/22 2015   BP:    Pulse: 99   Resp:    Temp:    SpO2: 90%       DATA:    Labs:  CBC:   Recent Labs     01/03/22  1835   WBC 7.8   HGB 11.7*   HCT 34.7*          BMP:   Recent Labs     01/03/22  1835      K 4.3      CO2 24   BUN 21*   CREATININE 1.0   GLUCOSE 102*     Troponin:   Recent Labs     01/03/22  1835   TROPONINI <0.01     Pro-BNP: 1,545    INR:   Recent Labs     01/03/22  1835   INR 4.65*         XR RIBS RIGHT (2 VIEWS)   Final Result      Airspace disease concerning for pneumonia      No evidence for acute right rib abnormality        Ribs      XR CHEST PORTABLE   Final Result      Right upper lobe patchy airspace disease concerning for pneumonia. Follow-up to clearing is warranted. CT CHEST WO CONTRAST   Final Result      1. Bilateral airspace disease compatible with pneumonia. Probably with mild reactive lymphadenopathy. Correlate for clinical evidence of viral/Covid 19 pneumonia. 2. Acute fracture of manubrium   3.  Acute vertebral compression fractures of T3 and T4   4. No definite rib fracture. CT Head WO Contrast   Final Result      No skull fracture or acute intracranial abnormality      MRI LUMBAR SPINE WO CONTRAST    (Results Pending)         ASSESSMENT AND PLAN:  Ms. Stewart Evangelista is an 79year old female with a PMH of Afib on warfarin, CKD, HFpEF (EF 50-55% on 9/4/20), falls, obesity and iron deficiency anemia who presents to the ED with chest pain brought on by a fall which happened [place]. She then also developed productive cough. Community acquired pneumonia  Patient denies sick contacts. She is overdue for her COVID-19 booster and flu vaccine. It is likely that the patient's lack of mobility and chest pain has been effecting her breathing. Wheezing present throughout the lung fields. - Ceftriaxone 1g IV QD  - Azithromycin 500mg PO QD  - Pain control with oxycodone 5mg PO Q6H  - Mini respiratory virus panel  - Respiratory culture  - Legionella antigen  - Strep pneumoniae antigen  - Duoneb PRN  - Incentive spirometry  - CBC with diff daily  - F/u COVID test    Acute fractures  Of the T3, T4 vertebrae, and manubrium.  - MRI of the thoracic spine  - Neurosurgery consult  - Strict bedrest  - Continue with TLSO brace    Falls  Patient describes she last fell in August and states she still has some bruising from this, which would now be over 4 months ago. - Strict bedrest for now considering vertebral fractures    Afib on warfarin  INR of 4.5 POA. Heart rate irregular. - Continue home Lopressor 25mg BID  - Holding home warfarin in the setting of supratherapeutic INR  - No sign of active hemorrhage so will hold off on warfarin reversal    HFpEF  Stress test (10/12/20): normal isotope uptake at stress and rest, no evidence of myocardial ischemia or scar; hyperdynamic LV systolic function with JA>61%.  Pro-BNP 1,545 POA, which appears to be around her baseline.  - Continue home Lasix 20mg PO QD  - Continue home Cardizem ER 300mg PO QD    CKD3a  GFR 53, Cr 1.0, BUN 21 POA. - BMP daily    Macrocytic anemia  , hgb 11.7 POA. - F/u H&H at 2am  - Folate, B12 levels    H/o iron deficiency anemia   Currently not taking any iron supplements. Increased RDW (17.1). - Iron studies    Obesity  BMI 83.71; complicating medical treatment.  - Recommend weight loss    Depression  - Continue home Zoloft 50mg PO QD    Constipation  - Continue home Colace  - Glycolax PRN       Will discuss with attending physician Dr. Carlyn Evans MD.    Code Status: Full code  FEN: adult regular diet  PPX: SCDs  DISPO: IP    Dwaine Marquis MD, PGY-1  1/4/2022,  12:13 AM      Addendum to Resident H& P/Progress note:  I have personally seen,examined and evaluated the patient.  I have reviewed the current history, physical findings, labs and assessment and plan and agree with note as documented by resident MD ( Selam Vidales)      Carlyn Evans MD, [address]

## 2022-01-04 NOTE — PROGRESS NOTES
Internal Medicine Progress Note        PCP: Beltran Gilman MD    Date of Admission: 1/3/2022    Chief Complaint: Chest Pain, Cough, [facility] Course: ***     Subjective:   Chest pain is decreased today. Patient still coughing brown/black sputum, but amount has decreased as well. HPI:  Ms. Sammie Brewer is an 79year old female with a PMH of Afib on warfarin, CKD, HFpEF (EF 50-55% on 9/4/20), falls, obesity and iron deficiency anemia who presents to the ED with chest pain brought on by a fall which happened [place]. She reports that she was walking up the stairs to her home when she misplaced her cane and fell with her arms crossed over her chest. She denies head trauma, but states that she has pain over her chest where her arms were crossed, as well as generalized body aches. After her fall, she was unable to ambulate or perform ADLs. She then also developed a cough productive of brown-black sputum 4 days ago. About 2 weeks prior to this, she reports having a cold-like URI with rhinorrhea and congestion. The patient also admits to constipation unresponsive to stool softeners, and a decreased appetite.     Medications:  Reviewed    Infusion Medications    sodium chloride       Scheduled Medications    sodium chloride flush  5-40 mL IntraVENous 2 times per day    cefTRIAXone (ROCEPHIN) IV  1,000 mg IntraVENous Q24H    And    azithromycin  500 mg Oral Q24H    sertraline  1 tablet Oral Daily    DULoxetine  40 mg Oral Daily    allopurinol  300 mg Oral Daily    atorvastatin  20 mg Oral Nightly    dilTIAZem  300 mg Oral Daily    metoprolol tartrate  25 mg Oral BID    pantoprazole  40 mg Oral QAM AC     PRN Meds: oxyCODONE, sodium chloride flush, sodium chloride, ondansetron **OR** ondansetron, polyethylene glycol, acetaminophen **OR** acetaminophen, albuterol-ipratropium, docusate sodium, furosemide, lidocaine      Intake/Output Summary (Last 24 hours) at 1/4/2022 1024  Last data filed at 1/3/2022 2029  Gross per 24 hour   Intake 50 ml   Output    Net 50 ml       Physical Exam Performed:    /86   Pulse 116   Temp 98.6 °F (37 °C) (Oral)   Resp 22   Ht 5' 6\" (1.676 m)   Wt 260 lb (117.9 kg)   SpO2 93%   BMI 41.97 kg/m²     General appearance: No apparent distress, appears stated age and cooperative. HEENT: Pupils equal, round, and reactive to light. Conjunctivae/corneas clear. Respiratory:  Increased respiratory effort. No respiratory distress. Wheezing and crackles heard bilaterally  Cardiovascular: Regular rate. Irregular rhythm with normal S1/S2 without murmurs, rubs or gallops. Chest wall tender near level of the xiphoid. Abdomen: Soft, non-tender, non-distended with normal bowel sounds. Musculoskeletal: 1+ pitting edema in LE bilaterally. Strength Testing: Hip flexors 3/5, Knee flexion 5/5 extension 4/5, Ankle dorsiflexion/plantarflexion 5/5  Skin: Some bruising over right foreleg. Neurologic: Slightly decreased sensation on RLE. Psychiatric: Alert and oriented, thought content appropriate, normal insight        Labs:   Recent Labs     01/03/22  1835 01/04/22  0216   WBC 7.8  --    HGB 11.7* 12.7   HCT 34.7* 38.1     --      Recent Labs     01/03/22  1835      K 4.3      CO2 24   BUN 21*   CREATININE 1.0   CALCIUM 8.3     No results for input(s): AST, ALT, BILIDIR, BILITOT, ALKPHOS in the last 72 hours. Recent Labs     01/03/22  1835   INR 4.65*     Recent Labs     01/03/22  1835   TROPONINI <0.01     Recent Labs     01/03/22  [address]*       Radiology:  XR RIBS RIGHT (2 VIEWS)   Final Result      Airspace disease concerning for pneumonia      No evidence for acute right rib abnormality        Ribs      XR CHEST PORTABLE   Final Result      Right upper lobe patchy airspace disease concerning for pneumonia. Follow-up to clearing is warranted. CT CHEST WO CONTRAST   Final Result      1. Bilateral airspace disease compatible with pneumonia. Probably with mild reactive lymphadenopathy. Correlate for clinical evidence of viral/Covid 19 pneumonia. 2. Acute fracture of manubrium   3. Acute vertebral compression fractures of T3 and T4   4. No definite rib fracture. CT Head WO Contrast   Final Result      No skull fracture or acute intracranial abnormality      MRI LUMBAR SPINE WO CONTRAST    (Results Pending)           Assessment/Plan:    Lex Brooks, 79 y.o. female w/ Afib (on warfarin), CKD, HFpEF, and falls who was admitted for chest pain 2/2 a fall and productive cough most likely due to a community acquired pneumonia. Community Acquired Pneumonia  - Ceftriaxone 1g IV QD  - Azithromycin 500mg IV QD  - Pain control with oxycodone 5mg PO Q6H  - Mini respiratory virus panel  - Respiratory culture  - Legionella antigen  - Strep pneumoniae antigen  - Duoneb PRN  - Incentive spirometry  - CBC with diff daily  - F/u COVID test    Fractures  Acute fracture of manubrium and compression fractures of T3 and T4. No definite rib fracture. - MRI of the thoracic spine  -Vitamin D levels  - Strict bedrest    Afib  -INR 4.65, D/C warfarin  -Irregular heart rate  -continue home metoprolol    HfpEF  -pro-BNP 1545, wa 1535 7 month ago  -continue home lasix  -continue home cardizem    CKD  -BMP daily    Macrocytic anemia  , hgb 11.7 POA. - F/u H&H at 2am  - Folate, B12 levels    Depression  - Continue home Zoloft 50mg PO QD     Constipation  - Continue home Colace  - Glycolax PRN      DVT Prophylaxis: ***  Diet: ADULT DIET; Regular;  Low Sodium (2 gm)  Code Status: Full Code  PT/OT Eval Status: ***  Dispo - ***    I will discuss the patient with MD Mirella Brooks  MS3

## 2022-01-04 NOTE — PROGRESS NOTES
Patient is A&O x4.  RA, sat 88%. Patient placed on 2 liters of oxygen per NC, sat 95%. No complaints of SOB. Medicated for c/o lower back and hip pain as needed. Respirations appear to easy and unlabored. Lungs diminished with wheezes in bases. Respirations easy with c/o productive brown cough. No complaints of nausea/vomiting/diarrhea. Up with assist/walker to the bathroom/BSC as needed. Right FA PIV intact and flushed. Tolerating regular diet. Plan of care and safety measures reviewed with the patient. Call light in reach and bed alarm in place. Will continue to monitor.   Electronically signed by Lynn Bailon RN on 1/4/2022 at 3:13 AM

## 2022-01-04 NOTE — PROGRESS NOTES
Progress Note    Admit Date: 1/3/2022  Day: 1  Diet: ADULT DIET; Regular; Low Sodium (2 gm)    CC: chest pain, cough and fall    Interval history: doing well, still has some cough but no major changes. States that after her fall she should have presented to the ED to be evaluated but otherwise no major fevers, chills, or sputum production. HPI: Ms. Kristin Samayoa is an 79year old female with a PMH of Afib on warfarin, CKD, HFpEF (EF 50-55% on 9/4/20), falls, obesity and iron deficiency anemia who presents to the ED with chest pain brought on by a fall which happened [place]. She reports that she was walking up the stairs to her home when she misplaced her cane and fell with her arms crossed over her chest. She denies head trauma, but states that she has pain over her chest where her arms were crossed, as well as generalized body aches. After her fall, she was unable to ambulate or perform ADLs. She then also developed a cough productive of brown-black sputum 4 days ago. About 2 weeks prior to this, she reports having a cold-like URI with rhinorrhea and congestion. The patient also admits to constipation unresponsive to stool softeners, and a decreased appetite.     In the ED, the patient was noted to have bruising. INR of 4.5. Hgb of 11.7, which is about 3 grams/dL lower than her normal. No active acute hemorrhage was suspected, however. X-rays of the chest and ribs showed no fracture of the ribs but did reveal a right upper lobe pneumonia. Chest CT showed bilateral airspace disease, mild reactive lymphadenopathy, acute fractures of the manubrium and T3 & T4 vertebrae. The patient was started on ceftriaxone and azithromycin and a TLSO brace was ordered. The patient is admitted for workup and treatment of pneumonia and fractures.     Medications:     Scheduled Meds:   metoprolol tartrate  25 mg Oral BID    melatonin  5 mg Oral Nightly    sodium chloride flush  5-40 mL IntraVENous 2 times per day    cefTRIAXone (ROCEPHIN) IV  1,000 mg IntraVENous Q24H    And    azithromycin  500 mg Oral Q24H    sertraline  1 tablet Oral Daily    DULoxetine  40 mg Oral Daily    allopurinol  300 mg Oral Daily    atorvastatin  20 mg Oral Nightly    dilTIAZem  300 mg Oral Daily    pantoprazole  40 mg Oral QAM AC     Continuous Infusions:   sodium chloride       PRN Meds:oxyCODONE, sodium chloride flush, sodium chloride, ondansetron **OR** ondansetron, polyethylene glycol, acetaminophen **OR** acetaminophen, albuterol-ipratropium, docusate sodium, furosemide, lidocaine    Objective:   Vitals:   T-max:  Patient Vitals for the past 8 hrs:   BP Temp Temp src Pulse Resp SpO2   01/04/22 1130 131/82 -- -- 117 20 95 %   01/04/22 0809 135/86 98.6 °F (37 °C) Oral 116 22 93 %   01/04/22 0800 -- -- -- 142 28 --   01/04/22 0745 -- -- -- 124 26 (!) 85 %   01/04/22 0730 -- -- -- 134 24 91 %   01/04/22 0700 -- -- -- 122 22 --       Intake/Output Summary (Last 24 hours) at 1/4/2022 1421  Last data filed at 1/4/2022 0930  Gross per 24 hour   Intake 290 ml   Output --   Net 290 ml       Review of Systems   Constitutional: Negative for chills, diaphoresis and fever. HENT: Negative for sore throat and voice change. Eyes: Negative for discharge. Respiratory: Positive for cough and shortness of breath. Negative for wheezing. Cardiovascular: Negative for chest pain and palpitations. Gastrointestinal: Negative for abdominal pain, nausea and vomiting. Genitourinary: Negative for dysuria and urgency. Neurological: Negative for dizziness. Psychiatric/Behavioral: Negative for behavioral problems. Physical Exam  Constitutional:       General: She is not in acute distress. Appearance: She is obese. She is not ill-appearing or diaphoretic. HENT:      Head: Normocephalic and atraumatic. Eyes:      Extraocular Movements: Extraocular movements intact. Pupils: Pupils are equal, round, and reactive to light. vertebral compression fractures of T3 and T4   4. No definite rib fracture. CT Head WO Contrast   Final Result      No skull fracture or acute intracranial abnormality          Assessment/Plan:     Ms. Deisy Powers is an 79year old female with a PMH of Afib on warfarin, CKD, HFpEF (EF 50-55% on 9/4/20), falls, obesity and iron deficiency anemia who presents to the ED with chest pain brought on by a fall which happened [place]. She then also developed productive cough.     Covid PNA vs Community acquired pneumonia 2/2 decreased respiration after fracture  Patient denies sick contacts. She is overdue for her COVID-19 booster and flu vaccine. It is likely that the patient's lack of mobility and chest pain has been effecting her breathing. Wheezing present throughout the lung fields. - Pain control with oxycodone 5mg PO Q6H  - F/u respiratory virus panel, Respiratory culture Legionella antigen, Strep pneumoniae antigen  - Duoneb PRN, Incentive spirometry  - Continue Ceftriaxone 1g IV QD 5 days Azithromycin 500mg PO QD for 3 days short course given sputum production  - Will not start on steroids or monoclonal for covid given no oxygen requirement    Compression fracture T3/T4 and Fx manubrium  Of the T3, T4 vertebrae, and manubrium.  - Neurosurgery for recs  - Continue with TLSO brace    Afib on warfarin  INR of 4.5 POA. Heart rate irregular. - Continue home Lopressor 25mg BID. If no improvement in HR will consider increasing to 50mg BID  - Holding home warfarin in the setting of supratherapeutic INR  - No sign of active hemorrhage so will hold off on warfarin reversal. PT/INR daily     HFpEF  Stress test (10/12/20): normal isotope uptake at stress and rest, no evidence of myocardial ischemia or scar; hyperdynamic LV systolic function with JW>23%.  Pro-BNP 1,545 POA, which appears to be around her baseline.  - Continue home Lasix 20mg PO QD  - Continue home Cardizem ER 300mg PO QD     CKD (stable)  GFR 53, Cr 1.0, BUN 21 POA. - BMP daily     Chronic Disease  H/o iron deficiency anemia and Macrocytic anemia - Iron studies, Folate, B12 levels  Depression - Continue home Zoloft 50mg PO QD  Constipation- Continue home Colace and Glycolax PRN    Code Status: Full Code  FEN: ADULT DIET; Regular; Low Sodium (2 gm)  PPX: SCD  DISPO: [facility]    Mynor Jones DO, PGY-1  01/04/22  2:21 PM    This patient has been staffed and discussed with Quique Morrell MD.     Addendum to Resident H& P/Progress note:  I have personally seen,examined and evaluated the patient.  I have reviewed the current history, physical findings, labs and assessment and plan and agree with note as documented by resident MD ( [place]ide)      Quique Morrell MD, [address]

## 2022-01-04 NOTE — ED NOTES
Patient refused TLSO brace.  Stated \"I'm uncomfortable enough\"     Richie Jordan RN  01/04/22 8610

## 2022-01-04 NOTE — ED PROVIDER NOTES
ED Attending Attestation Note     Date of evaluation: 1/3/2022    This patient was seen by the SUNG. I have seen and examined the patient, agree with the workup, evaluation, management and diagnosis. The care plan has been discussed. I have reviewed the ECG and concur with the SUNG's interpretation. I was present for any procedures performed in the SUNG's note and have made edits to the note where appropriate. My assessment reveals 79 y.o. female presenting after a fall 2 weeks ago with ongoing chest pain. She has tenderness across the sternum without crepitus or obvious deformity. No other reproducible chest wall tenderness. Abdomen soft and benign. Pelvis is stable, no tenderness in hips with logroll of legs. No bony tenderness of the extremities.          Marisa Mendoza MD  01/03/22 1484

## 2022-01-04 NOTE — PLAN OF CARE
Pt free from falls this shift. Fall precautions in place at all times. Call light always withinreach. Pt able and agreeable to contact for safety appropriately. Bed alarm on. Pain/discomfort being managed with PRN analgesics per MD orders. Pt able to express presence and absence of pain and rate pain appropriately using numerical scale.

## 2022-01-04 NOTE — ED NOTES
Bed: C28-28  Expected date:   Expected time:   Means of arrival:   Comments:  Christopher CRISTINA 61. Melanie Victoria RN  01/03/22 1146

## 2022-01-05 NOTE — PROGRESS NOTES
Progress Note    Admit Date: 1/3/2022  Day: 2  Diet: ADULT DIET; Regular; Low Sodium (2 gm)    CC: chest pain, cough and fall    Interval history: JOSE, doing well, continues to have a cough with sputum production but cough is very intermittent. No other complaints at this time. HPI: Ms. Katerina Perez is an 79year old female with a PMH of Afib on warfarin, CKD, HFpEF (EF 50-55% on 9/4/20), falls, obesity and iron deficiency anemia who presents to the ED with chest pain brought on by a fall which happened [place]. She reports that she was walking up the stairs to her home when she misplaced her cane and fell with her arms crossed over her chest. She denies head trauma, but states that she has pain over her chest where her arms were crossed, as well as generalized body aches. After her fall, she was unable to ambulate or perform ADLs. She then also developed a cough productive of brown-black sputum 4 days ago. About 2 weeks prior to this, she reports having a cold-like URI with rhinorrhea and congestion. The patient also admits to constipation unresponsive to stool softeners, and a decreased appetite.     In the ED, the patient was noted to have bruising. INR of 4.5. Hgb of 11.7, which is about 3 grams/dL lower than her normal. No active acute hemorrhage was suspected, however. X-rays of the chest and ribs showed no fracture of the ribs but did reveal a right upper lobe pneumonia. Chest CT showed bilateral airspace disease, mild reactive lymphadenopathy, acute fractures of the manubrium and T3 & T4 vertebrae. The patient was started on ceftriaxone and azithromycin and a TLSO brace was ordered. The patient is admitted for workup and treatment of pneumonia and fractures.     Medications:     Scheduled Meds:   metoprolol tartrate  25 mg Oral BID    melatonin  5 mg Oral Nightly    sodium chloride flush  5-40 mL IntraVENous 2 times per day    cefTRIAXone (ROCEPHIN) IV  1,000 mg IntraVENous Q24H    And  azithromycin  500 mg Oral Q24H    DULoxetine  40 mg Oral Daily    allopurinol  300 mg Oral Daily    atorvastatin  20 mg Oral Nightly    dilTIAZem  300 mg Oral Daily    pantoprazole  40 mg Oral QAM AC     Continuous Infusions:   sodium chloride       PRN Meds:methocarbamol, oxyCODONE, sodium chloride flush, sodium chloride, ondansetron **OR** ondansetron, polyethylene glycol, acetaminophen **OR** acetaminophen, albuterol-ipratropium, docusate sodium, furosemide, lidocaine    Objective:   Vitals:   T-max:  Patient Vitals for the past 8 hrs:   BP Temp Temp src Pulse Resp SpO2   01/05/22 0815 128/82 98.5 °F (36.9 °C) Oral 103 18 --   01/05/22 0430 127/84 99.7 °F (37.6 °C) Oral 106 24 93 %       Intake/Output Summary (Last 24 hours) at 1/5/2022 1001  Last data filed at 1/4/2022 2000  Gross per 24 hour   Intake 240 ml   Output 300 ml   Net -60 ml       Review of Systems   Constitutional: Negative for chills, diaphoresis and fever. HENT: Negative for sore throat and voice change. Eyes: Negative for discharge. Respiratory: Positive for cough and shortness of breath. Negative for wheezing. Cardiovascular: Negative for chest pain and palpitations. Gastrointestinal: Negative for abdominal pain, nausea and vomiting. Genitourinary: Negative for dysuria and urgency. Neurological: Negative for dizziness. Psychiatric/Behavioral: Negative for behavioral problems. Physical Exam  Constitutional:       General: She is not in acute distress. Appearance: She is obese. She is not ill-appearing or diaphoretic. HENT:      Head: Normocephalic and atraumatic. Eyes:      Extraocular Movements: Extraocular movements intact. Pupils: Pupils are equal, round, and reactive to light. Cardiovascular:      Rate and Rhythm: Normal rate and regular rhythm. Pulses: Normal pulses. Heart sounds: No murmur heard. Pulmonary:      Breath sounds: Rales present. No wheezing.    Abdominal: General: Abdomen is flat. Bowel sounds are normal.      Palpations: Abdomen is soft. Tenderness: There is no abdominal tenderness. There is no guarding. Musculoskeletal:         General: No swelling or tenderness. Skin:     Coloration: Skin is not jaundiced or pale. Neurological:      Mental Status: She is alert and oriented to person, place, and time. Psychiatric:         Mood and Affect: Mood normal.         Behavior: Behavior normal.         Thought Content: Thought content normal.       LABS:    CBC:   Recent Labs     01/03/22 1835 01/03/22 1835 01/04/22 0216 01/04/22 1916 01/05/22  0510   WBC 7.8  --   --  7.0 8.7   HGB 11.7*   < > 12.7 12.6 11.3*   HCT 34.7*   < > 38.1 37.9 33.7*     --   --  213 272   .0*  --   --  104.1* 103.4*    < > = values in this interval not displayed. Renal:    Recent Labs     01/03/22 1835 01/04/22 1916 01/05/22  0510    130* 136   K 4.3 4.3 4.4    99 101   CO2 24 21 24   BUN 21* 23* 22*   CREATININE 1.0 1.0 1.0   GLUCOSE 102* 87 90   CALCIUM 8.3 8.4 8.4   ANIONGAP 10 10 11     Hepatic: No results for input(s): AST, ALT, BILITOT, BILIDIR, PROT, LABALBU, ALKPHOS in the last 72 hours. Troponin:   Recent Labs     01/03/22 1835   TROPONINI <0.01     INR:   Recent Labs     01/03/22 1835 01/05/22  0510   INR 4.65* 4.24*     Lactate: No results for input(s): LACTATE in the last 72 hours. Cultures:  -----------------------------------------------------------------  RAD:   XR RIBS RIGHT (2 VIEWS)   Final Result      Airspace disease concerning for pneumonia      No evidence for acute right rib abnormality        Ribs      XR CHEST PORTABLE   Final Result      Right upper lobe patchy airspace disease concerning for pneumonia. Follow-up to clearing is warranted. CT CHEST WO CONTRAST   Final Result      1. Bilateral airspace disease compatible with pneumonia. Probably with mild reactive lymphadenopathy.  Correlate for clinical evidence of viral/Covid 19 pneumonia. 2. Acute fracture of manubrium   3. Acute vertebral compression fractures of T3 and T4   4. No definite rib fracture. CT Head WO Contrast   Final Result      No skull fracture or acute intracranial abnormality      MRI THORACIC SPINE WO CONTRAST    (Results Pending)       Assessment/Plan:     Ms. Jewell Vaughan is an 79year old female with a PMH of Afib on warfarin, CKD, HFpEF (EF 50-55% on 9/4/20), falls, obesity and iron deficiency anemia who presents to the ED with chest pain brought on by a fall which happened [place]. She then also developed productive cough.     Covid PNA vs Community acquired pneumonia 2/2 decreased respiration after fracture  - F/u respiratory virus panel, Respiratory culture Legionella antigen, Strep pneumoniae antigen  - Duoneb PRN, Incentive spirometry  - Continue Ceftriaxone 1g IV QD 5 days Azithromycin 500mg PO QD for 3 days short course given sputum production  - Start on decadron 6mg for 10 days.   - F/U crp, ddimer, ferritin    Compression fracture T3/T4 and Fx manubrium  Of the T3, T4 vertebrae, and manubrium.  - Pain control with oxycodone 5mg PO Q6H  - Neurosurgery for recs. MRI pending  -  brace    Afib on warfarin  INR of 4.5 POA. Heart rate irregular. - Continue home Lopressor 25mg BID. If no improvement in HR will consider increasing to 50mg BID  - Will likely hold home warfarin in the setting of supratherapeutic INR. Pharmacy for warfarin dosing. PT/INR daily     HFpEF  Stress test (10/12/20): normal isotope uptake at stress and rest, no evidence of myocardial ischemia or scar; hyperdynamic LV systolic function with RR>10%. Pro-BNP 1,545 POA, which appears to be around her baseline.  - Continue home Lasix 20mg PO QD  - Continue home Cardizem ER 300mg PO QD     CKD (stable)  GFR 53, Cr 1.0, BUN 21 POA.   - BMP daily     Chronic Disease  H/o iron deficiency anemia and Macrocytic anemia - Iron studies, Folate, B12 levels  Depression - Continue home Zoloft 50mg PO QD  Constipation- Continue home Colace and Glycolax PRN    Code Status: Full Code  FEN: ADULT DIET; Regular; Low Sodium (2 gm)  PPX: SCD  DISPO: [facility]. Likely home on discharge    Kirstin Berg DO, PGY-1  01/05/22  10:01 AM    This patient has been staffed and discussed with Alessandro Hui MD.    Addendum to Resident H& P/Progress note:  I have personally seen,examined and evaluated the patient.  I have reviewed the current history, physical findings, labs and assessment and plan and agree with note as documented by resident MD ( )  Neurosurgical recommendations were reviewed and greatly appreciated    Alessandro Hui MD, [address]

## 2022-01-05 NOTE — PROGRESS NOTES
Clinical Pharmacy Progress Note    Warfarin - Management by Pharmacy    Consult Date(s): 1/5/22  Consulting Provider(s): Dr. Chavez Null / Plan    1)  H/o A.fib - Warfarin   Goal INR: 2 - 3   Concurrent Anticoagulants / Antiplatelets: n/a   Interactions:   o Allopurinol - can increase INR, but chronic home medication  o Dexamethasone - can increase INR  o Azithromycin / Ceftriaxone - can increase INR   Plan:  o INR today = 4.24 despite no Warfarin in the past 3 days  o Will hold Warfarin today, and will continue to hold until INR is steadily decreasing and approaches 3.  o Placeholder entered on MAR.  o Would not recommend reversing Warfarin with Vitamin K unless pt has S/Sx of acute bleeding or urgent procedure is needed.  Will monitor pt's clinical status and INR daily, and make dose adjustments as needed. Please call with questions--  Thanks--  Bernardino Vazquez, PharmD, BCPS, BCGP  [id number] (hospitals)   1/5/2022 12:42 PM        Interval update:  MRI pending for further evaluation of T3/T4 fracture per neurosurgery. Subjective/Objective: Ms. Audie Galeazzi is a 79 y.o. female with a PMHx significant for A.fib, CKD, Depression, HFpEF, GI bleed (2018), gout, HTN< HLD, and Vit D deficiency who is admitted for COVID pneumonia vs CAP and T3/T4 compression fracture. Pharmacy has been consulted to Warfarin. Ht Readings from Last 1 Encounters:   01/03/22 5' 6\" (1.676 m)     Wt Readings from Last 1 Encounters:   01/03/22 260 lb (117.9 kg)       Prior / Home Warfarin Regimen:   1mg on Tuesday & Friday + 0.5mg all other days   Warfarin is managed by [facility]  o Last appt 12/20 - INR = 4.5. Pt was instructed to hold Warfarin x1 day and continue above regimen.     Date INR Warfarin   1/3 4.65 --   1/4  --   1/5 4.24 --          Labs / Ancillary Data:    Recent Labs     01/03/22  1835 01/03/22  1835 01/04/22  0216 01/04/22  1916 01/05/22  0510   INR 4.65*  --   --   -- 4.24*   HGB 11.7*   < > 12.7 12.6 11.3*     --   --  213 272   CREATININE 1.0  --   --  1.0 1.0    < > = values in this interval not displayed.

## 2022-01-05 NOTE — FLOWSHEET NOTE
01/05/22 1459   Encounter Summary   Services provided to: Patient   Referral/Consult From:   (Advance Care Planning. )   Support System Family members   Continue Visiting   (1/5/2022, HECTOR.)   Complexity of Encounter Moderate   Length of Encounter 15 minutes   Advance Care Planning Yes   Routine   Type Initial   Assessment Calm; Approachable   Intervention Active listening;Nurtured hope   Outcome Expressed gratitude;Engaged in conversation

## 2022-01-05 NOTE — PROGRESS NOTES
Physical Therapy    Facility/Department: [address]  Initial Assessment    NAME: Jamel Ardon  : 1940  MRN: [id number]    Date of Service: 2022    Discharge Recommendations:Maria Isabel Marquis scored a  on the AM-PAC short mobility form. Current research shows that an AM-PAC score of 17 or less is typically not associated with a discharge to the patient's home setting. Based on the patient's AM-PAC score and their current functional mobility deficits, it is recommended that the patient have 3-5 sessions per week of Physical Therapy at d/c to increase the patient's independence. Please see assessment section for further patient specific details. If patient discharges prior to next session this note will serve as a discharge summary. Please see below for the latest assessment towards goals. PT Equipment Recommendations  Equipment Needed:  (defer to next level of care)    Assessment   Assessment: 78 yo admitted 1/3/22 for fall/COVID. Per KEENAN JONES, pt is pending T spine MRI and she does not need to wear TLSO this am. Pt demo mobility below her reported baseline of independent ambulator with cane. Pt now requiring assist x2 and desat on 3L 02 going bed to chair. Pt plans to return home at discharge. If home, recommend 24 hour close assist, home PT, use of RW for increased safety. Pt may benefit from continued skilled IP PT at discharge to maximize her functional independence prior to d/c home. Treatment Diagnosis: mobility impairment due to fall  Decision Making: Medium Complexity  Patient Education: Pt educated on PT role,  need to call for assist to get up, importance of OOB mobility and she verbalized partial understanding and will need reinforcment. REQUIRES PT FOLLOW UP: Yes  Activity Tolerance  Activity Tolerance: Patient limited by endurance; Patient limited by pain; Patient limited by fatigue;Patient limited by cognitive status       Patient Diagnosis(es): The primary encounter diagnosis was Compression fracture of T3 vertebra, initial encounter ([address] Diagnoses of Compression fracture of T4 vertebra, initial encounter ([address] Fracture of manubrium, initial encounter for closed fracture, Anemia, unspecified type, Supratherapeutic INR, Physical deconditioning, and Pneumonia of right upper lobe due to infectious organism were also pertinent to this visit. has a past medical history of Anemia, Arthritis, Atrial fibrillation ([address] Chronic kidney disease (CKD), stage II (mild), Community acquired pneumonia of left lower lobe of lung, Compression fracture of T3 vertebra ([address] Depression, Diastolic CHF ([address] Diverticulosis, GI bleed, Gout, Hemorrhoids, Hyperlipidemia, Hypertension, Hyperuricemia, Iron deficiency anemia, PONV (postoperative nausea and vomiting), Unspecified sleep apnea, and Vitamin D deficiency. has a past surgical history that includes Cholecystectomy (1974); Hysterectomy (1977); Knee arthroscopy (2005); bladder suspension (1997); Uvulopalatopharygoplasty (3/2002); eye surgery; Colonoscopy; Colonoscopy (11/2014); joint replacement (Right, 2017); Pain management procedure (Left, 8/11/2020); and Pain management procedure (N/A, 8/25/2020). Restrictions  Position Activity Restriction  Other position/activity restrictions: up with assistance, no TLSO per vo from CHARBEL Aceves CNP, droplet plus precautions     Vision/Hearing  Vision Exceptions: Wears glasses for reading  Hearing Exceptions: Left hearing aid (hearing aid not here)       Subjective  General  Chart Reviewed: Yes  Additional Pertinent Hx: 79 y.o. female with a history of hypertension, atrial fibrillation on warfarin, stage 3 CKD, HFpEF (EF 50-55% 9/4/2020, on 20mg lasix QD) who presents with chest pain/fall. NS consult; MRI T spine pending; chest CT positive PNA, T3 and T4 vertebral fx, CXR positive PNA.   Family / Caregiver Present: No  Diagnosis: fall/COVID positive/T3 and T4 fx  Follows Commands: Within Functional Limits  Subjective  Subjective: Pt found supine in bed and agreeable to PT with encouragement. Pt expressed max frustration with \"COVID\" and \"people not doing what they're supposed to\" and \"I can't even have any visitors! \" Emotional support provided.   Pain Screening  Patient Currently in Pain: Yes (rates back pain 8/10, RN aware)         Orientation  Orientation  Overall Orientation Status: Impaired (oriented to name/, place; confused on time/situation)     Social/Functional History  Social/Functional History  Lives With: Friend(s)  Type of Home: House (Condo)  Home Layout: Two level,Able to Live on Main level with bedroom/bathroom,1/2 bath on main level,Bed/Bath upstairs  Home Access: Stairs to enter with rails  Entrance Stairs - Number of Steps: 2 to enter -- 14 steps to upstairs bed / bath  Bathroom Shower/Tub: Tub/Shower unit  Bathroom Toilet: Handicap height  Bathroom Equipment: Shower chair,Grab bars in shower (doesn't use shower chair)  Home Equipment: 4 wheeled walker,Cane,Lift chair  ADL Assistance: Independent  Homemaking Assistance: Needs assistance  Ambulation Assistance: Independent (cane or motorized scooter at grocery store)  Transfer Assistance: Independent  Active : Yes  Occupation: Retired  Additional Comments: Pt is a questionable historian - inconsistent report         Objective          AROM RLE (degrees)  RLE AROM: WFL (limited by body habitus)  AROM LLE (degrees)  LLE AROM : WFL (limited by body habitus)     Strength RLE  Strength RLE:  (3+/5 grossly)  Strength LLE  Strength LLE:  (3+/5 grossly)        Bed mobility  Supine to Sit: Contact guard assistance (HOB up with heavy use of rail; slow and effortful with max encouragement)  Scooting: Contact guard assistance (slow and effortful)     Transfers  Sit to Stand: 2 Person Assistance;Minimal Assistance (from raised ht bed with max encouragement; effortful)  Stand to sit: Minimal Assistance;2 Person Assistance (with max vc for hand placement/safety)  Comment: desat on 3L 02 to 88% initially but back up to 92% with vc for pursed lip breathing     Ambulation  Device: No Device (HHA x2 (min assist))  Quality of Gait: posterior lean with increased wt shift side to side; decreased step length/height; MONACO on 3L with sp02 88% initially and up to 91% with vc for pursed lip breathing  Distance: 4 steps to chair  Stairs/Curb  Stairs? :  (unable to assess this am due to droplet plus precautions)              Plan   Plan  Times per week: 2-5  Current Treatment Recommendations: Betty Mishra Mobility Training,Transfer Training,Gait Training  Safety Devices  Type of devices: Left in chair,Call light within reach,Nurse notified (pt reclined; RN to locate chair alarm box)                        AM-PAC Score  AM-PAC Inpatient Mobility Raw Score : 9 (01/05/22 1016)  AM-PAC Inpatient T-Scale Score : 30.55 (01/05/22 1016)  Mobility Inpatient CMS 0-100% Score: 81.38 (01/05/22 1016)  Mobility Inpatient CMS G-Code Modifier : CM (01/05/22 1016)          Goals  Short term goals  Time Frame for Short term goals: discharge  Short term goal 1: sit to/from supine independent  Short term goal 2: sit to/from stand supervision  Short term goal 3: ambulate 25 ft with RW supervision  Patient Goals   Patient goals : return home       Therapy Time   Individual Concurrent Group Co-treatment   Time In 0820         Time Out 0915         Minutes 55           Timed Code Treatment Minutes:45       Total Treatment Minutes:  1463 Charanjit Mathews PT

## 2022-01-05 NOTE — CONSULTS
NEUROSURGERY CONSULT NOTE    Belkys Hernandez  [id number]   1940 1/5/2022    Requesting physician: Mariely Law MD    Reason for consultation: T3 & T4 fractures    History of present illness: Patient is a 79 y.o. female that  has a past medical history of Anemia, Arthritis, Atrial fibrillation ([facility] Chronic kidney disease (CKD), stage II (mild), Community acquired pneumonia of left lower lobe of lung (12/26/2017), Compression fracture of T3 vertebra ([facility] Depression, Diverticulosis, Foot pain, GI bleed (4/18/2018), Gout, Hemorrhoids, Hyperlipidemia, Hypertension, Hyperuricemia, Iron deficiency anemia (1/8/2014), Medical history reviewed with no changes, Menopausal syndrome, Obesity, Pelvic relaxation, PONV (postoperative nausea and vomiting), Stress (8/2006), Syncope, Unspecified sleep apnea (03/2002), Vitamin D deficiency, Wears dentures, and Wears glasses. Patient  presented on 1/3/2022 to [facility] ED c/o chest pain. Due to limiting exposure to COVID-19, initial encounter was completed by using EMR and from conversations with medical team involved in case. Patient was not interviewed or examined, at this time. I have personally reviewed the reports and images of labs, radiological studies, current and old medical records. The patient c/o right lower chest pain. Patient states she was walking up the stairs about 2 weeks ago and had a mechanical fall and subsequently landed on her front side and has chest pain ever since. She states that she did not hit her head or lose consciousness and has not had any headache, dizziness, amnesia, nausea, vomiting or any other neurologic symptoms since that time. She also notes that she has had a cough and generalized body aches since that time as well. She denies any fever or chills. She states that she has not been able to walk since incident due to the generalized body aches, which she describes as aching.  She also endorses shortness of breath when she attempts to exert herself. She is vaccinated for Covid with [employer]. She has had no sick contacts. She does live with a roommate.          Allergies   Allergen Reactions    Diclofenac Hives     Severe itching    Adhesive Tape Rash    Penicillins Nausea And Vomiting     \"years ago\"       Past Medical History:   Diagnosis Date    Anemia     NOS    Arthritis     knee     Atrial fibrillation (HCC)     on coumadin    Chronic kidney disease (CKD), stage II (mild)     Community acquired pneumonia of left lower lobe of lung 12/26/2017    Compression fracture of T3 vertebra (HCC)     Depression     Diverticulosis     Foot pain     GI bleed 4/18/2018    Due to esophageal tear     Gout     Hemorrhoids     Hyperlipidemia     Hypertension     Hyperuricemia     Iron deficiency anemia 1/8/2014    Iron deficiency anemia-s/p EGD and colonoscopy 2/11/2014 Esophageal tear likely source     Medical history reviewed with no changes     Menopausal syndrome     Obesity     Pelvic relaxation     PONV (postoperative nausea and vomiting)     Stress 8/2006    NL stress    Syncope     Unspecified sleep apnea 03/2002    uvulectomy -hx of    Vitamin D deficiency     20ng/ml 6/2009    Wears dentures     Wears glasses         Past Surgical History:   Procedure Laterality Date    BLADDER SUSPENSION  1997    CHOLECYSTECTOMY  1974    COLONOSCOPY      2013    COLONOSCOPY  11/2014    10yr    EYE SURGERY      macular tear and cataract right    HYSTERECTOMY  1977    partial    JOINT REPLACEMENT Right 2017    KNEE ARTHROSCOPY  2005    knee surgery    PAIN MANAGEMENT PROCEDURE Left 8/11/2020    LEFT LUMBAR THREE AND LUMBAR FOUR TRANSFORAMINAL EPIDURAL STEROID INJECTION SITE CONFIRMED BY FLUOROSCOPY performed by Franco Freire MD at [address] [facility] N/A 8/25/2020    MIDLINE LUMBAR FIVE SACRAL ONE INTERLAMINAR EPIDURAL STEROID INJECTION SITE CONFIRMED BY FLUOROSCOPY performed by Douglas Ruelas Radha Diallo MD at [address] [facility]  3/2002       Social History     Occupational History    Not on file   Tobacco Use    Smoking status: Never Smoker    Smokeless tobacco: Never Used   Vaping Use    Vaping Use: Never used   Substance and Sexual Activity    Alcohol use: No    Drug use: Never    Sexual activity: Not on file        Family History   Problem Relation Age of Onset    Heart Attack Father     Heart Disease Father     Stroke Brother         Outpatient Medications Marked as Taking for the 1/3/22 encounter [facility] Encounter)   Medication Sig Dispense Refill    DULoxetine HCl 40 MG CPEP Take 40 m by mouth daily      HYDROcodone-acetaminophen (NORCO) 5-325 MG per tablet       acetaminophen (TYLENOL) 500 MG tablet Take 1 tablet by mouth 4 times daily as needed for Pain 120 tablet 0    allopurinol (ZYLOPRIM) 300 MG tablet TAKE ONE TABLET BY MOUTH DAILY 90 tablet 3    atorvastatin (LIPITOR) 20 MG tablet TAKE ONE TABLET BY MOUTH ONCE NIGHTLY 90 tablet 3    omeprazole (PRILOSEC) 20 MG delayed release capsule TAKE ONE CAPSULE BY MOUTH DAILY 90 capsule 2    metoprolol tartrate (LOPRESSOR) 25 MG tablet TAKE ONE TABLET BY MOUTH TWICE A  tablet 1    CARTIA  MG extended release capsule TAKE ONE CAPSULE BY MOUTH DAILY 90 capsule 2    warfarin (COUMADIN) 1 MG tablet TAKE ONE TABLET BY MOUTH ON SUNDAY, TUESDAY AND THURSDAY.   TAKE ONE-HALF TABLET BY MOUTH ON ALL OTHER DAYS OR AS DIRECTED BY CLINIC 65 tablet 5    Cholecalciferol (VITAMIN D) 2000 UNITS CAPS capsule Take 2,000 Units by mouth daily      docusate sodium (COLACE) 100 MG capsule Take 100 mg by mouth daily as needed for Constipation           Current Facility-Administered Medications   Medication Dose Route Frequency Provider Last Rate Last Admin    metoprolol tartrate (LOPRESSOR) tablet 25 mg  25 mg Oral BID Anisha Harp MD   25 mg at 01/05/22 0815    melatonin disintegrating tablet 5 mg  5 mg Oral Nightly Rimma Chiu MD        oxyCODONE (ROXICODONE) immediate release tablet 5 mg  5 mg Oral Q6H PRN Kamille Hodgson MD   5 mg at 01/04/22 1123    sodium chloride flush 0.9 % injection 5-40 mL  5-40 mL IntraVENous 2 times per day Kamille Hodgson MD   10 mL at 01/05/22 0815    sodium chloride flush 0.9 % injection 5-40 mL  5-40 mL IntraVENous PRN Kamille Hodgson MD        0.9 % sodium chloride infusion  25 mL IntraVENous PRN Kamille Hodgson MD        ondansetron (ZOFRAN-ODT) disintegrating tablet 4 mg  4 mg Oral Q8H PRN Kamille Hodgson MD        Or    ondansetron [facility] injection 4 mg  4 mg IntraVENous Q6H PRN Kamille Hodgson MD        polyethylene glycol [facility]) packet 17 g  17 g Oral Daily PRN Kamille Hodgson MD        acetaminophen (TYLENOL) tablet 650 mg  650 mg Oral Q6H PRN Kamille Hogdson MD        Or   Jacek Berg acetaminophen (TYLENOL) suppository 650 mg  650 mg Rectal Q6H PRN Kamille Hodgson MD        albuterol-ipratropium (COMBIVENT RESPIMAT)  MCG/ACT inhaler 1 puff  1 puff Inhalation Q4H PRN Kamille Hodgson MD        cefTRIAXone (ROCEPHIN) 1000 mg IVPB in 50 mL D5W minibag  1,000 mg IntraVENous Q24H Kamille Hodgson MD   Stopped at 01/04/22 2305    And    azithromycin (ZITHROMAX) tablet 500 mg  500 mg Oral Q24H Kamille Hodgson MD   500 mg at 01/04/22 2105    docusate sodium (COLACE) capsule 100 mg  100 mg Oral Daily PRN Kamille Hodgson MD        furosemide (LASIX) tablet 20 mg  20 mg Oral Daily PRN Kamille Hodgson MD        DULoxetine (CYMBALTA) extended release capsule 40 mg  40 mg Oral Daily Kamille Hodgson MD   40 mg at 01/05/22 0815    allopurinol (ZYLOPRIM) tablet 300 mg  300 mg Oral Daily Kamille Hodgson MD   300 mg at 01/05/22 0815    atorvastatin (LIPITOR) tablet 20 mg  20 mg Oral Nightly Kamille Hodgson MD   20 mg at 01/04/22 8690    dilTIAZem (CARDIZEM CD) extended release capsule 300 mg  300 mg Oral Daily Kamille Hodgson MD   300 mg at 01/05/22 0815    lidocaine 4 % external patch 1 patch  1 patch TransDERmal Q12H PRN Francisco Weldon MD        pantoprazole (PROTONIX) tablet 40 mg  40 mg Oral QAM AC Francisco Weldon MD   40 mg at 01/05/22 0709        Objective:  /82   Pulse 103   Temp 98.5 °F (36.9 °C) (Oral)   Resp 18   Ht 5' 6\" (1.676 m)   Wt 260 lb (117.9 kg)   SpO2 93%   BMI 41.97 kg/m²     Physical Exam:   Due to limiting exposure to COVID-19, initial encounter was completed by using EMR and from conversations with medical team involved in case. Patient was not interviewed or examined, at this time. I have personally reviewed the reports and images of labs, radiological studies, current and old medical records    Per bedside RN: Patient has generalized weakness, but does appear to her that legs are more weak than arms, otherwise neurologically intact. Patient is A&O x4. No numbness, tingling, and/or pain along spine or in extremities. Radiological Findings:  CT Head WO Contrast  Result Date: 1/3/2022  No skull fracture or acute intracranial abnormality    CT CHEST WO CONTRAST  Result Date: 1/3/2022  1. Bilateral airspace disease compatible with pneumonia. Probably with mild reactive lymphadenopathy. Correlate for clinical evidence of viral/Covid 19 pneumonia. 2. Acute fracture of manubrium  3. Acute vertebral compression fractures of T3 and T4  4. No definite rib fracture.      Labs:  Recent Labs     01/05/22  0510   WBC 8.7   HGB 11.3*   HCT 33.7*          Recent Labs     01/05/22  0510      K 4.4      CO2 24   BUN 22*   CREATININE 1.0   GLUCOSE 90   CALCIUM 8.4       Recent Labs     01/05/22  0510   PROTIME 50.9*   INR 4.24*       Patient Active Problem List    Diagnosis Date Noted    Pure hypercholesterolemia     Pneumonia of both lower lobes due to infectious organism     Compression fracture of T3 vertebra (HCC)     Compression fracture of T4 vertebra (HCC)     Fracture of manubrium, initial encounter for closed fracture     Physical deconditioning     COVID-19 virus infection 01/03/2022    Frequent falls 08/03/2021    Lumbar disc disease 12/04/2020    New onset of congestive heart failure (HCC)     Acute renal failure superimposed on stage 3 chronic kidney disease (HCC)     Acute diastolic congestive heart failure ([facility] 10/10/2020    Gait disturbance 11/13/2019    Hyperglycemia 10/31/2018    Gastroesophageal reflux disease without esophagitis     Chronic diastolic heart failure ([facility] 12/26/2017    Stage 3 chronic kidney disease ([facility] 12/26/2017    Primary localized osteoarthrosis, hand 12/09/2016    Primary osteoarthritis of right knee 12/09/2016    Atrial fibrillation with RVR (HCC)     Bilateral knee pain 10/18/2011    Gout 09/07/2010    Diverticulosis     Hemorrhoids     Pelvic relaxation     Hyperuricemia     Menopausal syndrome     Morbid obesity with BMI of 40.0-44.9, adult ([facility] 09/10/2007    Essential hypertension 09/10/2007    Sleep apnea 03/01/2002       Assessment:  Patient is a 79 y.o. female s/p fall about a week ago w/ T3 & T4 fractures seen on recent CT Chest that were not seen on CT thoracic in July 2021. Plan:  1. No emergent neurosurgical intervention indicated  2. Neurologic exams frequency: Q4H  3. For change in exam MUST contact neurosurgery team along with critical care or primary team  4. Thoracic fractures:  - MRI Thoracic to determine chronicity of fractures. If fractures are acute, then patient will need  brace. If fractures are chronic, then no brace will be needed. - Do not need TLSO brace as it does not cover T3/4 area of thoracic spine. 5. Muscle spasms: PRN Robaxin  6. Pain control: Managed by medical team  7. PT/OT consulted, appreciate recs  8. Advance diet / activity per primary team  9. Thank you for consult. Will follow inpatient.   Please call with any questions or decline in neurological status    DISPO: Remain inpatient from

## 2022-01-05 NOTE — ACP (ADVANCE CARE PLANNING)
Advance Care Planning     Advance Care Planning Inpatient Note  [facility] Department    Today's Date: 1/5/2022  Unit: [address]    Received request from IDT Member. Upon review of chart and communication with care team, patient's decision making abilities are not in question. . Patient and This was a telephonic visit.  was/were present in the room during visit. Goals of ACP Conversation:  Discuss advance care planning documents  Facilitate a discussion related to patient's goals of care as they align with the patient's values and beliefs. Health Care Decision Makers:    Primary Decision Maker: Radha Montenegro  Phone: [phone]    Secondary Decision Maker: Luis Felipe Marina  Phone: [phone]    Secondary Decision Maker: Brigida Thapa  Phone: [phone]      [address]  Documented Next of Kin, per patient report    [address] (Patient Wishes):  The patient stated that she has a DNR on file with her doctor. Assessment:  The  confirmed that the patient has a HCPOA and Living Will in her hospital information. Her primary agent is her daughter, Hina Tam. She stated that her family consist of two (2) families, but they provide good support. The patient is Buddhist. If she had a medical problem that needed to be discussed, she would include her .     Interventions:  Provided education on documents for clarity and greater understanding  Discussed and provided education on state decision maker hierarchy  Encouraged ongoing ACP conversation with future decision makers and loved ones    Care Preferences Communicated:   Medical staff will address these questions. Outcomes/Plan:  Existing advance directive reviewed with patient; no changes to patient's previously recorded wishes.     Electronically signed by Jakub Shearer. [place] on 1/5/2022 at 2:35 PM

## 2022-01-05 NOTE — PROGRESS NOTES
Occupational Therapy   Occupational Therapy Initial Assessment and Treatment Note   Date: 2022   Patient Name: Celsa Thomas  MRN: [id number]     : 1940    Date of Service: 2022    Discharge Recommendations:Maria Isabel Mcdonnell scored a 14/24 on the AM-PAC ADL Inpatient form. Current research shows that an AM-PAC score of 17 or less is typically not associated with a discharge to the patient's home setting. Based on the patient's AM-PAC score and their current ADL deficits, it is recommended that the patient have 3-5 sessions per week of Occupational Therapy at d/c to increase the patient's independence. Please see assessment section for further patient specific details. If patient discharges prior to next session this note will serve as a discharge summary. Please see below for the latest assessment towards goals. OT Equipment Recommendations  Equipment Needed: No  Other: defer to next care facility    Assessment   Performance deficits / Impairments: Decreased functional mobility ; Decreased endurance;Decreased ADL status  Assessment: Pt demo functioning below stated baseline level. Limited activity tolerance 2/2 MONACO / pain. Pt would benefit from ongoing OT to maximize functional level. Recommend inpt at d/c. Will follow as inpt. If home will need 24hr assist / care. Treatment Diagnosis: impaired ADLs /functional transfers /functional mobility 2/2 COVID+ / T2-T3 Comp fractures  Prognosis: Fair  Decision Making: Medium Complexity  OT Education: OT Role;Plan of Care;ADL Adaptive Strategies;Transfer Training  Patient Education: verb partial understanding - reinforce as needed  REQUIRES OT FOLLOW UP: Yes  Activity Tolerance  Activity Tolerance: Patient limited by fatigue;Patient limited by pain;Treatment limited secondary to decreased cognition  Activity Tolerance: Pt demo slight desat on 3L o2 to 88% with minimal activity.  Returned to 94% + after 2-3 mins seated rest  Safety Devices  Safety Devices in place: Yes  Type of devices: Call light within reach; Left in chair;Nurse notified; Chair alarm in place (no box in room - RN aware)           Patient Diagnosis(es): The primary encounter diagnosis was Compression fracture of T3 vertebra, initial encounter ([address] Diagnoses of Compression fracture of T4 vertebra, initial encounter ([address] Fracture of manubrium, initial encounter for closed fracture, Anemia, unspecified type, Supratherapeutic INR, Physical deconditioning, and Pneumonia of right upper lobe due to infectious organism were also pertinent to this visit. has a past medical history of Anemia, Arthritis, Atrial fibrillation ([address] Chronic kidney disease (CKD), stage II (mild), Community acquired pneumonia of left lower lobe of lung, Compression fracture of T3 vertebra ([address] Depression, Diastolic CHF ([address] Diverticulosis, GI bleed, Gout, Hemorrhoids, Hyperlipidemia, Hypertension, Hyperuricemia, Iron deficiency anemia, PONV (postoperative nausea and vomiting), Unspecified sleep apnea, and Vitamin D deficiency. has a past surgical history that includes Cholecystectomy (1974); Hysterectomy (1977); Knee arthroscopy (2005); bladder suspension (1997); Uvulopalatopharygoplasty (3/2002); eye surgery; Colonoscopy; Colonoscopy (11/2014); joint replacement (Right, 2017); Pain management procedure (Left, 8/11/2020); and Pain management procedure (N/A, 8/25/2020). Treatment Diagnosis: impaired ADLs /functional transfers /functional mobility 2/2 COVID+ / T2-T3 Comp fractures      Restrictions  Position Activity Restriction  Other position/activity restrictions: up with assistance, no TLSO per vo from CHARBEL Aceves CNP, droplet plus precautions    Subjective   General  Chart Reviewed:  Yes  Additional Pertinent Hx: Admit 1/3 with fall    + COVID 19,     CT chest -  bilateral airspace dx compatible w/ PNA, + fracture of manubrium and vertebral compression fractures of T3 and T4 PMHX:Afib on warfarin, CKD, EF 50% , falls, obesity  Family / Caregiver Present: No  Diagnosis: Falls,  + COVID 19 , Compression fx T3-4  Subjective  Subjective: \" This virus thing is a farce\" pt upset with visitor policy and COVID -19 restrictions. Emotional support offered - pt agreeable for OOB/OT eval and tx mod encouragement  Patient Currently in Pain: Yes (rates back pain 8/10, RN aware)  Vital Signs  Patient Currently in Pain: Yes (rates back pain 8/10, RN aware)    Social/Functional History  Social/Functional History  Lives With: Friend(s)  Type of Home: House (Condo)  Home Layout: Two level,Able to Live on Main level with bedroom/bathroom,1/2 bath on main level,Bed/Bath upstairs  Home Access: Stairs to enter with rails  Entrance Stairs - Number of Steps: 2 to enter -- 14 steps to upstairs bed / bath  Bathroom Shower/Tub: Tub/Shower unit  Bathroom Toilet: Handicap height  Bathroom Equipment: Shower chair,Grab bars in shower (doesn't use shower chair)  Home Equipment: 4 wheeled walker,Cane,Lift chair  ADL Assistance: Independent  Homemaking Assistance: Needs assistance  Ambulation Assistance: Independent (cane or motorized scooter at grocery store)  Transfer Assistance: Independent  Active : Yes  Occupation: Retired  Additional Comments: Pt is a questionable historian - inconsistent report       Objective Treatment included functional transfer training, ADL's and pt. education. Vision: Within Functional Limits (glasses for reading at times  Simultaneous filing. User may not have seen previous data.)  H[facility]: Ex[facility] to [facility] ([facility] hearing aide not here  Simultaneous filing.  User may not have seen previous data.)    Orientation  Overall Orientation Status: Within Functional Limits (thought it was tuesday night at 900pm)     Balance  Sitting Balance: Stand by assistance  Standing Balance: Minimal assistance (with HHA)  Standing Balance  Time: 3 mins x 1 , 30 sec x 1  Activity: functional transfers/ stance  Functional Mobility  Functional Mobility Comments: unable to ambulate this date. Toilet Transfers  Toilet - Technique: Stand step  Equipment Used: Extra wide bedside commode  Toilet Transfer: Minimal assistance;2 Person assistance  Toilet Transfers Comments: pt needing cues for encouragement  ADL  Feeding: Setup (displeased with meal -)  Grooming: Setup (seated in bed -- denies to attempt)  LE Dressing: Maximum assistance (with donning's socks / shoes - unable to reach past knees today)  Toileting: Dependent/Total (using pure wick)  Tone RUE  RUE Tone: Normotonic  Tone LUE  LUE Tone: Normotonic  Coordination  Movements Are Fluid And Coordinated: Yes        Transfers  Sit to stand: Minimal assistance  Stand to sit: Minimal assistance  Transfer Comments: raised bed and max encouraged  Vision - Basic Assessment  Prior Vision: Wears glasses only for reading  Cognition  Overall Cognitive Status: Exceptions  Arousal/Alertness: Delayed responses to stimuli  Following Commands:  Follows one step commands with repetition  Attention Span: Difficulty attending to directions  Memory: Decreased recall of recent events;Decreased short term memory  Safety Judgement: Decreased awareness of need for assistance  Insights: Decreased awareness of deficits  Initiation: Requires cues for some  Sequencing: Requires cues for some  Cognition Comment: slow /delayed processing - inconsistent report of events    LUE AROM (degrees)  LUE AROM : WFL  Left Hand AROM (degrees)  Left Hand AROM: WFL  RUE AROM (degrees)  RUE AROM : WFL  RUE General AROM: Pt reports rotator cuff tear limited shoulder flexion 0-100  Right Hand AROM (degrees)  Right Hand AROM: WFL  LUE Strength  Gross LUE Strength: WFL  RUE Strength  Gross RUE Strength: WFL     Plan   Plan  Times per week: 2-5x  Times per day: Daily  Current Treatment Recommendations: Functional Mobility Training,Endurance Training,Safety Education & Training,Self-Care / ADL,Equipment Evaluation, Education, & procurement,Patient/Caregiver Education & Training    AM-PAC Score  AM-PAC Inpatient Daily Activity Raw Score: 14 (01/05/22 1020)  AM-PAC Inpatient ADL T-Scale Score : 33.39 (01/05/22 1020)  ADL Inpatient CMS 0-100% Score: 59.67 (01/05/22 1020)  ADL Inpatient CMS G-Code Modifier : CK (01/05/22 1020)    Goals  Short term goals  Time Frame for Short term goals: at d/c  Short term goal 1: Stance x 4 mins with supervision ADLs / IADLs  Short term goal 2: Commode transfers/ BSC with CGA  Short term goal 3: LE Dressing with CGA and AE prn  Short term goal 4: Oriented x 3/4 attempts     Therapy Time   Individual Concurrent Group Co-treatment   Time In 0819         Time Out 0914         Minutes 55              Timed Code Treatment Minutes:   40 mins     Total Treatment Minutes:  54 mins       Ortega Silva, OT

## 2022-01-05 NOTE — PROGRESS NOTES
Progress Note    Admit Date: 1/3/2022  Day: 2  Diet: ADULT DIET; Regular; Low Sodium (2 gm)    CC: chest pain, cough and fall    Interval history: doing well, still has some cough but no major changes. Pain is being managed well. No major fevers, chills, or sputum production. HPI: Ms. Aldo Jones is an 79year old female with a PMH of Afib on warfarin, CKD, HFpEF (EF 50-55% on 9/4/20), falls, obesity and iron deficiency anemia who presents to the ED with chest pain brought on by a fall which happened [place]. She reports that she was walking up the stairs to her home when she misplaced her cane and fell with her arms crossed over her chest. She denies head trauma, but states that she has pain over her chest where her arms were crossed, as well as generalized body aches. After her fall, she was unable to ambulate or perform ADLs. She then also developed a cough productive of brown-black sputum 4 days ago. About 2 weeks prior to this, she reports having a cold-like URI with rhinorrhea and congestion. The patient also admits to constipation unresponsive to stool softeners, and a decreased appetite.     In the ED, the patient was noted to have bruising. INR of 4.5. Hgb of 11.7, which is about 3 grams/dL lower than her normal. No active acute hemorrhage was suspected, however. X-rays of the chest and ribs showed no fracture of the ribs but did reveal a right upper lobe pneumonia. Chest CT showed bilateral airspace disease, mild reactive lymphadenopathy, acute fractures of the manubrium and T3 & T4 vertebrae. The patient was started on ceftriaxone and azithromycin and a TLSO brace was ordered. The patient is admitted for workup and treatment of pneumonia and fractures.     Medications:     Scheduled Meds:   dexamethasone  6 mg Oral Daily    metoprolol tartrate  25 mg Oral BID    melatonin  5 mg Oral Nightly    sodium chloride flush  5-40 mL IntraVENous 2 times per day    cefTRIAXone (ROCEPHIN) IV 1,000 mg IntraVENous Q24H    And    azithromycin  500 mg Oral Q24H    DULoxetine  40 mg Oral Daily    allopurinol  300 mg Oral Daily    atorvastatin  20 mg Oral Nightly    dilTIAZem  300 mg Oral Daily    pantoprazole  40 mg Oral QAM AC     Continuous Infusions:   sodium chloride       PRN Meds:methocarbamol, oxyCODONE, sodium chloride flush, sodium chloride, ondansetron **OR** ondansetron, polyethylene glycol, acetaminophen **OR** acetaminophen, albuterol-ipratropium, docusate sodium, furosemide, lidocaine    Objective:   Vitals:   T-max:  Patient Vitals for the past 8 hrs:   BP Temp Temp src Pulse Resp SpO2   01/05/22 0815 128/82 98.5 °F (36.9 °C) Oral 103 18    01/05/22 0430 127/84 99.7 °F (37.6 °C) Oral 106 24 93 %       Intake/Output Summary (Last 24 hours) at 1/5/2022 0956  Last data filed at 1/4/2022 2000  Gross per 24 hour   Intake 240 ml   Output 300 ml   Net -60 ml       Review of Systems   Constitutional: Negative for chills, diaphoresis and fever. HENT: Negative for postnasal drip and sore throat. Eyes: Negative for discharge. Respiratory: Positive for cough, shortness of breath and wheezing. Cardiovascular: Negative for chest pain and palpitations. Gastrointestinal: Negative for abdominal pain, nausea and vomiting. Genitourinary: Negative for dysuria and urgency. Psychiatric/Behavioral: Negative for behavioral problems. Physical Exam  Constitutional:       General: She is not in acute distress. Appearance: She is obese. She is not ill-appearing or diaphoretic. HENT:      Head: Normocephalic and atraumatic. Eyes:      Extraocular Movements: Extraocular movements intact. Pupils: Pupils are equal, round, and reactive to light. Cardiovascular:      Rate and Rhythm: Normal rate and regular rhythm. Pulses: Normal pulses. Heart sounds: No murmur heard. Pulmonary:      Breath sounds: Wheezing and rales present.    Abdominal:      General: Abdomen is flat. Bowel sounds are normal.      Palpations: Abdomen is soft. Tenderness: There is no abdominal tenderness. There is no guarding. Musculoskeletal:         General: No swelling or tenderness. Right lower le+ Edema present. Left lower le+ Edema present. Skin:     Coloration: Skin is not jaundiced or pale. Findings: Bruising present. Comments: Brusing present on R anterior foreleg   Neurological:      Mental Status: She is alert and oriented to person, place, and time. Psychiatric:         Mood and Affect: Mood normal.         Behavior: Behavior normal.         Thought Content: Thought content normal.         LABS:    CBC:   Recent Labs     226 22  0510   WBC 7.8  --   --  7.0 8.7   HGB 11.7*   < > 12.7 12.6 11.3*   HCT 34.7*   < > 38.1 37.9 33.7*     --   --  213 272   .0*  --   --  104.1* 103.4*    < > = values in this interval not displayed. Renal:    Recent Labs     22  0510    130* 136   K 4.3 4.3 4.4    99 101   CO2 24 21 24   BUN 21* 23* 22*   CREATININE 1.0 1.0 1.0   GLUCOSE 102* 87 90   CALCIUM 8.3 8.4 8.4   ANIONGAP 10 10 11     Recent Labs     22  0510   LABGLOM 53*       Hepatic: No results for input(s): AST, ALT, BILITOT, BILIDIR, PROT, LABALBU, ALKPHOS in the last 72 hours. Troponin:   Recent Labs     22   TROPONINI <0.01     INR:   Recent Labs     22  0510   INR 4.65* 4.24*     Lactate: No results for input(s): LACTATE in the last 72 hours.   Cultures:  Recent Labs     22   COVID19 Detected*     -----------------------------------------------------------------  RAD:   XR RIBS RIGHT (2 VIEWS)   Final Result      Airspace disease concerning for pneumonia      No evidence for acute right rib abnormality        Ribs      XR CHEST PORTABLE   Final Result      Right upper lobe patchy airspace disease concerning for pneumonia. Follow-up to clearing is warranted. CT CHEST WO CONTRAST   Final Result      1. Bilateral airspace disease compatible with pneumonia. Probably with mild reactive lymphadenopathy. Correlate for clinical evidence of viral/Covid 19 pneumonia. 2. Acute fracture of manubrium   3. Acute vertebral compression fractures of T3 and T4   4. No definite rib fracture. CT Head WO Contrast   Final Result      No skull fracture or acute intracranial abnormality      MRI THORACIC SPINE WO CONTRAST    (Results Pending)       Assessment/Plan:     Ms. Victorina Rubi is an 79year old female with a PMH of Afib on warfarin, CKD, HFpEF (EF 50-55% on 9/4/20), falls, obesity and iron deficiency anemia who presents to the ED with chest pain brought on by a fall which happened [place]. She then also developed productive cough.     Covid PNA vs Community acquired pneumonia 2/2 decreased respiration after fracture  Patient denies sick contacts. She is overdue for her COVID-19 booster and flu vaccine. It is likely that the patient's lack of mobility and chest pain has been affecting her breathing. Wheezing present throughout the lung fields. - Pain control with oxycodone 5mg PO Q6H  - F/u respiratory virus panel, Respiratory culture Legionella antigen, Strep pneumoniae antigen  - Duoneb PRN, Incentive spirometry  - Continue Ceftriaxone 1g IV QD 5 days Azithromycin 500mg PO QD for 3 days short course given sputum production  - Will not start on steroids or monoclonal for covid given no oxygen requirement    Compression fracture T3/T4 and Fx manubrium  Of the T3, T4 vertebrae, and manubrium.  - Neurosurgery for recs  - Continue with TLSO brace    Afib on warfarin  INR of 4.5 POA. Heart rate irregular. - Continue home Lopressor 25mg BID.  If no improvement in HR will consider increasing to 50mg BID  - Holding home warfarin in the setting of supratherapeutic INR  - No sign of active hemorrhage so will hold off on warfarin reversal. PT/INR daily     HFpEF  Stress test (10/12/20): normal isotope uptake at stress and rest, no evidence of myocardial ischemia or scar; hyperdynamic LV systolic function with KK>63%. Pro-BNP 1,545 POA, which appears to be around her baseline.  - Continue home Lasix 20mg PO QD  - Continue home Cardizem ER 300mg PO QD     CKD (stable)  GFR 53, Cr 1.0, BUN 22 POA. - BMP daily     Chronic Disease  H/o iron deficiency anemia and Macrocytic anemia - Iron studies, Folate, B12 levels  Depression - Continue home Zoloft 50mg PO QD  Constipation- Continue home Colace and Glycolax PRN    Code Status: Full Code  FEN: ADULT DIET; Regular;  Low Sodium (2 gm)  PPX: SCD  DISPO: JOHN Walls, PGY-1  01/05/22  9:56 AM

## 2022-01-06 NOTE — PROGRESS NOTES
Clinical Pharmacy Progress Note    Warfarin - Management by Pharmacy    Consult Date(s): 1/5/22  Consulting Provider(s): Dr. Mclean Fus / Plan  1)  H/o A.fib - Warfarin   Goal INR: 2 - 3   Concurrent Anticoagulants / Antiplatelets: n/a   Interactions:   o Allopurinol - can increase INR, but chronic home medication  o Dexamethasone - can increase INR  o Azithromycin / Ceftriaxone - can increase INR   Plan:  o INR today = 3.87  o Will hold Warfarin again today. If INR declines further tomorrow, will likely be able to resume Warfarin. Placeholder is on MAR.  o Would not recommend reversing Warfarin with Vitamin K unless pt has S/Sx of acute bleeding or urgent procedure is needed.  Will monitor pt's clinical status and INR daily, and make dose adjustments as needed. Please call with questions--  Thanks--  Daylin Bello, PharmD, BCPS, BCGP  [id number] ([facility])   1/6/2022 11:03 AM        Interval update:  Baricitinib added today for COVID PNA with elevated CRP. On 2.5L O2. Subjective/Objective: Ms. Mary Jane Roe is a 79 y.o. female with a PMHx significant for A.fib, CKD, Depression, HFpEF, GI bleed (2018), gout, HTN< HLD, and Vit D deficiency who is admitted for COVID pneumonia vs CAP and T3/T4 compression fracture. Pharmacy has been consulted to Warfarin. Ht Readings from Last 1 Encounters:   01/06/22 5' 6\" (1.676 m)     Wt Readings from Last 1 Encounters:   01/06/22 263 lb 4.8 oz (119.4 kg)       Prior / Home Warfarin Regimen:   1mg on Tuesday & Friday + 0.5mg all other days   Warfarin is managed by [facility]  o Last appt 12/20 - INR = 4.5. Pt was instructed to hold Warfarin x1 day and continue above regimen.     Date INR Warfarin   1/3 4.65 --   1/4  --   1/5 4.24 --   1/6 3.87 --     Labs / Ancillary Data:    Recent Labs     01/03/22  1835 01/04/22  0216 01/04/22  1916 01/05/22  0510 01/06/22  0805   INR 4.65*  --   --  4.24* 3.87*   HGB 11.7*   < > 12.6 11.3* 11.5*     --  213 272 339   CREATININE 1.0  --  1.0 1.0 1.0    < > = values in this interval not displayed.

## 2022-01-06 NOTE — PROGRESS NOTES
Occupational Therapy  Facility/Department: [address]  Daily Treatment Note  NAME: Deng Barillas  : 1940  MRN: [id number]    Date of Service: 2022    Discharge Recommendations:Maria Isabel Cooley scored a 14/24 on the AM-PAC ADL Inpatient form. Current research shows that an AM-PAC score of 17 or less is typically not associated with a discharge to the patient's home setting. Based on the patient's AM-PAC score and their current ADL deficits, it is recommended that the patient have 3-5 sessions per week of Occupational Therapy at d/c to increase the patient's independence. Please see assessment section for further patient specific details. If patient discharges prior to next session this note will serve as a discharge summary. Please see below for the latest assessment towards goals. OT Equipment Recommendations  Equipment Needed: No  Other: defer to next care facility- if home W/c and BSC for toileting - unable to ambulate to commode    Assessment   Performance deficits / Impairments: Decreased functional mobility ; Decreased endurance;Decreased ADL status  Assessment: Pt continues to demo functioning below stated baseline level. Limited activity tolerance 2/2 MONACO and  . Pt would benefit from ongoing OT to maximize functional level. Recommend inpt at d/c. Will follow as inpt. If home will need 24hr assist / care.  BSC / wc 2/2 inability to ambulate  Treatment Diagnosis: impaired ADLs /functional transfers /functional mobility 2/2 COVID+ / T2-T3 Comp fractures  Prognosis: Fair  OT Education: ADL Adaptive Strategies;Precautions;IADL Safety  Patient Education: verb partial understanding - reinforce as needed  REQUIRES OT FOLLOW UP: Yes  Activity Tolerance  Activity Tolerance: Patient limited by fatigue;Treatment limited secondary to decreased cognition  Activity Tolerance: Pt demo desat on to 85-86% on RA -- pt recovers to 91% with 3-4 mins of rest. Pt returned to 93%on 2L - -RN support. Orientation  Orientation  Overall Orientation Status: Impaired  Orientation Level: Oriented to place;Oriented to person;Disoriented to time;Disoriented to situation    Objective    ADL  Feeding: Increased time to complete;Setup;Stand by assistance  Grooming: Minimal assistance; Increased time to complete;Setup (pt with limited peripheral vision with  on)  UE Dressing: Maximum assistance (to correctly straighten CT - brace.)  LE Dressing: Maximum assistance (with socks/ shoes - -pt able help pull up heel of shoe)  Toileting: Dependent/Total;Maximum assistance (pure wick in place)     Balance  Sitting Balance: Supervision (at EOB x 13 mins +)  Standing Balance: Minimal assistance (improved to CGA with walker -- fatigues easily)  Standing Balance  Time: 3 mins x 2 and 1 min x 1  Activity: functional transfers/ stance at walker/ functional mobility to chair  Functional Mobility  Functional - Mobility Device: Rolling Walker  Activity: Other  Assist Level: Minimal assistance  Functional Mobility Comments: 3-4 ft to chair -- Pt c/o and demo L LE buckling with stance  Toilet Transfers  Toilet - Technique: Ambulating  Equipment Used: Extra wide bedside commode  Toilet Transfer: Minimal assistance  Toilet Transfers Comments: 3-4 ft simulated to chair     Transfers  Sit to stand: Minimal assistance  Stand to sit: Minimal assistance  Transfer Comments: raised bed and v.cues for tech    Cognition  Overall Cognitive Status: Exceptions  Arousal/Alertness: Appropriate responses to stimuli  Following Commands: Follows one step commands with increased time; Follows one step commands with repetition  Attention Span: Attends with cues to redirect  Memory: Decreased short term memory;Decreased recall of recent events  Safety Judgement: Decreased awareness of need for assistance  Insights: Decreased awareness of deficits  Initiation: Requires cues for some  Sequencing: Requires cues for some  Cognition Comment: increased processing but poor carryover of learning - poor STM ?  pain meds     Plan   Plan  Times per week: 2-5x  Times per day: Daily  Current Treatment Recommendations: Functional Mobility Training,Endurance Training,Safety Education & Training,Self-Care / ADL,Equipment Evaluation, Education, & procurement,Patient/Caregiver Education & Training    AM-PAC Score  AM-PAC Inpatient Daily Activity Raw Score: 14 (01/06/22 1458)  AM-PAC Inpatient ADL T-Scale Score : 33.39 (01/06/22 1458)  ADL Inpatient CMS 0-100% Score: 59.67 (01/06/22 1458)  ADL Inpatient CMS G-Code Modifier : CK (01/06/22 1458)    Goals  Short term goals  Time Frame for Short term goals: at d/c  Short term goal 1: Stance x 4 mins with supervision ADLs / IADLs- not met  Short term goal 2: Commode transfers/ BSC with CGA - not met  Short term goal 3: LE Dressing with CGA and AE prn- not met  Short term goal 4: Oriented x 3/4 attempts- part met 1/6  Patient Goals   Patient goals : go home and be independent     Therapy Time   Individual Concurrent Group Co-treatment   Time In 1405         Time Out 1459         Minutes 54              Timed Code Treatment Minutes:   54 mins     Total Treatment Minutes:  54 mins       Anant Abraham OT

## 2022-01-06 NOTE — PROGRESS NOTES
Progress Note    Admit Date: 1/3/2022  Day: 3  Diet: ADULT DIET; Regular; Low Sodium (2 gm)    CC: chest pain, cough and fall    Interval history: NAOE, oxygenation is slightly improved with a 2.5L requirement. Has not had a bowel movement in a week but does not have any constipation. No fvers, chills, cough, change in urination. Does have soa    HPI: Ms. Andreia Manriquez is an 79year old female with a PMH of Afib on warfarin, CKD, HFpEF (EF 50-55% on 9/4/20), falls, obesity and iron deficiency anemia who presents to the ED with chest pain brought on by a fall which happened [place] Lisa. She reports that she was walking up the stairs to her home when she misplaced her cane and fell with her arms crossed over her chest. She denies head trauma, but states that she has pain over her chest where her arms were crossed, as well as generalized body aches. After her fall, she was unable to ambulate or perform ADLs. She then also developed a cough productive of brown-black sputum 4 days ago. About 2 weeks prior to this, she reports having a cold-like URI with rhinorrhea and congestion. The patient also admits to constipation unresponsive to stool softeners, and a decreased appetite.     In the ED, the patient was noted to have bruising. INR of 4.5. Hgb of 11.7, which is about 3 grams/dL lower than her normal. No active acute hemorrhage was suspected, however. X-rays of the chest and ribs showed no fracture of the ribs but did reveal a right upper lobe pneumonia. Chest CT showed bilateral airspace disease, mild reactive lymphadenopathy, acute fractures of the manubrium and T3 & T4 vertebrae. The patient was started on ceftriaxone and azithromycin and a TLSO brace was ordered. The patient is admitted for workup and treatment of pneumonia and fractures.     Medications:     Scheduled Meds:   dexamethasone  6 mg Oral Daily    warfarin (COUMADIN) daily dosing (placeholder)   Other See Admin Instructions    metoprolol tartrate  25 mg Oral BID    melatonin  5 mg Oral Nightly    sodium chloride flush  5-40 mL IntraVENous 2 times per day    cefTRIAXone (ROCEPHIN) IV  1,000 mg IntraVENous Q24H    And    azithromycin  500 mg Oral Q24H    DULoxetine  40 mg Oral Daily    allopurinol  300 mg Oral Daily    atorvastatin  20 mg Oral Nightly    dilTIAZem  300 mg Oral Daily    pantoprazole  40 mg Oral QAM AC     Continuous Infusions:   sodium chloride       PRN Meds:methocarbamol, oxyCODONE, sodium chloride flush, sodium chloride, ondansetron **OR** ondansetron, polyethylene glycol, acetaminophen **OR** acetaminophen, albuterol-ipratropium, docusate sodium, furosemide, lidocaine    Objective:   Vitals:   T-max:  Patient Vitals for the past 8 hrs:   BP Temp Temp src Pulse Resp SpO2 Height Weight   01/06/22 0545 125/83 98.3 °F (36.8 °C) Axillary 99 18 97 % 5' 6\" (1.676 m) 263 lb 4.8 oz (119.4 kg)   01/06/22 0030 (!) 124/92 97.3 °F (36.3 °C) Oral 95 18 95 % -- --       Intake/Output Summary (Last 24 hours) at 1/6/2022 0655  Last data filed at 1/6/2022 0545  Gross per 24 hour   Intake 480 ml   Output 500 ml   Net -20 ml       Review of Systems   Constitutional: Negative for chills, diaphoresis and fever. HENT: Negative for sore throat and voice change. Eyes: Negative for discharge. Respiratory: Positive for cough and shortness of breath. Negative for wheezing. Cardiovascular: Negative for chest pain and palpitations. Gastrointestinal: Negative for abdominal pain, nausea and vomiting. Genitourinary: Negative for dysuria and urgency. Neurological: Negative for dizziness. Psychiatric/Behavioral: Negative for behavioral problems. Physical Exam  Constitutional:       General: She is not in acute distress. Appearance: She is obese. She is not ill-appearing or diaphoretic. HENT:      Head: Normocephalic and atraumatic. Eyes:      Extraocular Movements: Extraocular movements intact.       Pupils: Pupils are equal, round, and reactive to light. Cardiovascular:      Rate and Rhythm: Normal rate and regular rhythm. Pulses: Normal pulses. Heart sounds: No murmur heard. Pulmonary:      Breath sounds: Rales present. No wheezing. Abdominal:      General: Abdomen is flat. Bowel sounds are normal.      Palpations: Abdomen is soft. Tenderness: There is no abdominal tenderness. There is no guarding. Musculoskeletal:         General: No swelling or tenderness. Skin:     Coloration: Skin is not jaundiced or pale. Neurological:      Mental Status: She is alert and oriented to person, place, and time. Psychiatric:         Mood and Affect: Mood normal.         Behavior: Behavior normal.         Thought Content: Thought content normal.       LABS:    CBC:   Recent Labs     01/03/22 1835 01/03/22 1835 01/04/22 0216 01/04/22 1916 01/05/22  0510   WBC 7.8  --   --  7.0 8.7   HGB 11.7*   < > 12.7 12.6 11.3*   HCT 34.7*   < > 38.1 37.9 33.7*     --   --  213 272   .0*  --   --  104.1* 103.4*    < > = values in this interval not displayed. Renal:    Recent Labs     01/03/22 1835 01/04/22 1916 01/05/22  0510    130* 136   K 4.3 4.3 4.4    99 101   CO2 24 21 24   BUN 21* 23* 22*   CREATININE 1.0 1.0 1.0   GLUCOSE 102* 87 90   CALCIUM 8.3 8.4 8.4   ANIONGAP 10 10 11     Hepatic: No results for input(s): AST, ALT, BILITOT, BILIDIR, PROT, LABALBU, ALKPHOS in the last 72 hours. Troponin:   Recent Labs     01/03/22 1835   TROPONINI <0.01     INR:   Recent Labs     01/03/22 1835 01/05/22  0510   INR 4.65* 4.24*     Lactate: No results for input(s): LACTATE in the last 72 hours.   Cultures:  -----------------------------------------------------------------  RAD:   XR RIBS RIGHT (2 VIEWS)   Final Result      Airspace disease concerning for pneumonia      No evidence for acute right rib abnormality        Ribs      XR CHEST PORTABLE   Final Result      Right upper lobe patchy airspace disease concerning for pneumonia. Follow-up to clearing is warranted. CT CHEST WO CONTRAST   Final Result      1. Bilateral airspace disease compatible with pneumonia. Probably with mild reactive lymphadenopathy. Correlate for clinical evidence of viral/Covid 19 pneumonia. 2. Acute fracture of manubrium   3. Acute vertebral compression fractures of T3 and T4   4. No definite rib fracture. CT Head WO Contrast   Final Result      No skull fracture or acute intracranial abnormality      MRI THORACIC SPINE WO CONTRAST    (Results Pending)   XR THORACIC SPINE (2 VIEWS)    (Results Pending)       Assessment/Plan:     Ms. Haja Hua is an 79year old female with a PMH of Afib on warfarin, CKD, HFpEF (EF 50-55% on 9/4/20), falls, obesity and iron deficiency anemia who presents to the ED with chest pain brought on by a fall which happened [place]. She then also developed productive cough.     Covid PNA 2/2 decreased respiration after fracture  - F/u respiratory virus panel, Respiratory culture Legionella antigen, Strep pneumoniae antigen  - Duoneb PRN, Incentive spirometry  - Continue Ceftriaxone 1g IV QD 5 days Azithromycin 500mg PO QD for 3 days short course given sputum production  - Start on decadron 6mg for 10 days. Start baricitinib. Compression fracture T3/T4 and Fx manubrium  Of the T3, T4 vertebrae, and manubrium.  - Pain control with oxycodone 5mg PO Q6H  - Neurosurgery for recs. -  brace    Afib on warfarin  INR of 4.5 POA. Heart rate irregular. - Continue home Lopressor 25mg BID. If no improvement in HR will consider increasing to 50mg BID  - Pharmacy for warfarin dosing. PT/INR daily     HFpEF  Stress test (10/12/20): normal isotope uptake at stress and rest, no evidence of myocardial ischemia or scar; hyperdynamic LV systolic function with IQ>66%.  Pro-BNP 1,545 POA, which appears to be around her baseline.  - Continue home Lasix 20mg PO QD  - Continue home Cardizem ER 300mg PO QD     CKD (stable)  GFR 53, Cr 1.0, BUN 21 POA. - BMP daily     Chronic Disease  H/o iron deficiency anemia and Macrocytic anemia - Iron studies, Folate, B12 levels  Depression - Continue home Zoloft 50mg PO QD  Constipation - Continue home Colace and Glycolax PRN    Code Status: Full Code  FEN: ADULT DIET; Regular; Low Sodium (2 gm)  PPX: SCD  DISPO: GMF. Will need SNF placement on discharge    Deborah Raygoza DO, PGY-1  01/06/22  6:55 AM    This patient has been staffed and discussed with Shanta Ahmadi MD.    Addendum to Resident H& P/Progress note:  I have personally seen,examined and evaluated the patient.  I have reviewed the current history, physical findings, labs and assessment and plan and agree with note as documented by resident MD ( )  INR is down to 3.87    Shanta Ahmadi MD, [place]

## 2022-01-06 NOTE — PROGRESS NOTES
NEUROSURGERY PROGRESS NOTE    1/6/2022 12:53 PM                               Mason Woodnico                      LOS: 3 days     Subjective: Patient sitting up in bed upon entering the room. No acute events overnight. Patient continues to c/o upper chest pain. Physical Exam:  Patient seen and examined    Vitals:    01/06/22 1239   BP:    Pulse:    Resp:    Temp:    SpO2: 95%     GCS:  4 - Opens eyes on own  5 - Alert and oriented  6 - Follows simple motor commands  General: Well developed. Alert and cooperative in no acute distress. HENT: atraumatic, neck supple  Eyes: Optic discs: Not tested  Pulmonary: unlabored respiratory effort  Cardiovascular:  Warm well perfused. No peripheral edema  Gastrointestinal: abdomen soft, NT, ND    Neurological:  Mental Status: Awake, alert, oriented x 4, speech clear and appropriate  Attention: Intact  Language: No aphasia or dysarthria noted  Sensation: Intact to all extremities to light touch  Coordination: Intact    Musculoskeletal:   Gait: Not tested   Assist devices: None   Tone: Normal  Motor strength:    Right  Left    Right  Left    Deltoid  5 5  Hip Flex  5 5   Biceps  5 5  Knee Extensors  5 5   Triceps  5 5  Knee Flexors  5 5   Wrist Ext  5 5  Ankle Dorsiflex. 5 5   Wrist Flex  5 5  Ankle Plantarflex. 5 5   Handgrip  5 5  Ext [place]ion Longus  5 5   Thumb Ext  5 5         Radiological Findings:  MRI THORACIC SPINE WO CONTRAST  Result Date: 1/6/2022  Acute compression deformities, mild of T3 and T4 with retropulsion. Retropulsion is more significant at T4 resulting in mild to moderate narrowing of the central canal, although the posterior CSF cleft is preserved. There is no myelomalacia.        Labs:  Recent Labs     01/06/22  0805   WBC 4.4   HGB 11.5*   HCT 33.4*          Recent Labs     01/06/22  0805   *   K 5.1   CL 97*   CO2 26   BUN 29*   CREATININE 1.0   GLUCOSE 120*   CALCIUM 9.0       Recent Labs     01/06/22  0805   PROTIME 46.3*   INR 3.87*       Patient Active Problem List    Diagnosis Date Noted    Pure hypercholesterolemia     Pneumonia of both lower lobes due to infectious organism     Compression fracture of T3 vertebra (HCC)     Compression fracture of T4 vertebra ([address]     Fracture of manubrium, initial encounter for closed fracture     Physical deconditioning     COVID-19 virus infection 01/03/2022    Frequent falls 08/03/2021    Lumbar disc disease 12/04/2020    New onset of congestive heart failure (HCC)     Acute renal failure superimposed on stage 3 chronic kidney disease (HCC)     Acute diastolic congestive heart failure ([address] 10/10/2020    Gait disturbance 11/13/2019    Hyperglycemia 10/31/2018    Gastroesophageal reflux disease without esophagitis     Chronic diastolic heart failure ([address] 12/26/2017    Stage 3 chronic kidney disease ([address] 12/26/2017    Primary localized osteoarthrosis, hand 12/09/2016    Primary osteoarthritis of right knee 12/09/2016    Atrial fibrillation with RVR ([facility])     Bilateral knee pain 10/18/2011    Gout 09/07/2010    Diverticulosis     Hemorrhoids     Pelvic relaxation     Hyperuricemia     Menopausal syndrome     Morbid obesity with BMI of 40.0-44.9, adult ([facility] 09/10/2007    Essential hypertension 09/10/2007    Sleep apnea 03/01/2002     Assessment:  Patient is a 79 y.o. female s/p fall about a week ago w/ T3 & T4 fractures seen on recent CT Chest that were not seen on CT thoracic in July 2021.     Plan:  1. Neurologic exams frequency: Q4H  2. For change in exam MUST contact neurosurgery team along with critical care or primary team  3. Thoracic fractures:  - MRI Thoracic determined T3 & T4 fractures are acute  -  brace ordered and should be worn whenever patient is out of bed  - XR Thoracic in brace  - Follow up with repeat XR Thoracic in 6 weeks  4. Muscle spasms: Scheduled Robaxin  5. Pain control: Managed by medical team  6.  PT/OT following, appreciate recs  7. Advance diet / activity per primary team  8. Will follow peripherally while inpatient.  Please call with any questions or decline in neurological status     DISPO: Remain inpatient from neurosurgery standpoint. Dispo timing to be determined by primary team once patient is medically stable for discharge.     Patient was discussed and images reviewed with Dr. Justin Curran who agrees with above assessment and plan.      Electronically signed by: RADHA Wen CNP, APRN-CNP, 1/6/2022 12:53 PM  [phone]

## 2022-01-06 NOTE — PROGRESS NOTES
Physical Therapy  Facility/Department: [address] [facility] [address]  Daily Treatment Note  NAME: Anahi Aden  : 1940  MRN: [id number]    Date of Service: 2022    Discharge Recommendations:Maria Isabel Nagel scored a 14/24 on the AM-PAC short mobility form. Current research shows that an AM-PAC score of 17 or less is typically not associated with a discharge to the patient's home setting. Based on the patient's AM-PAC score and their current functional mobility deficits, it is recommended that the patient have 3-5 sessions per week of Physical Therapy at d/c to increase the patient's independence. Please see assessment section for further patient specific details. If patient discharges prior to next session this note will serve as a discharge summary. Please see below for the latest assessment towards goals. PT Equipment Recommendations  Equipment Needed:  (wheelchair if home)    Assessment   Assessment: Pt demo slight improved mobility but she is still performing well below her reported baseline of independent at home with cane. Pt now in  brace with orders for wearing when OOB. Pt continues confused and possibly partly due to pain meds. Pt still plans return home at discharge or possibly to daughter's home. If home, recommend 24 hour close assist for mobility, w/c for home use, home PT. Pt would benefit from continued skilled IP PT at discharge. Discussed with CM. Treatment Diagnosis: mobility impairment due to fall  Patient Education: Pt educated on PT role,  need to call for assist to get up, importance of OOB mobility, wear  when OOB  and she verbalized partial understanding and will need reinforcment. REQUIRES PT FOLLOW UP: Yes  Activity Tolerance  Activity Tolerance: Patient limited by endurance; Patient limited by pain; Patient limited by fatigue;Patient limited by cognitive status     Patient Diagnosis(es): The primary encounter diagnosis was Compression fracture of T3 vertebra, initial encounter [facility]). Diagnoses of Compression fracture of T4 vertebra, initial encounter ([facility] Fracture of manubrium, initial encounter for closed fracture, Anemia, unspecified type, Supratherapeutic INR, Physical deconditioning, and Pneumonia of right upper lobe due to infectious organism were also pertinent to this visit. has a past medical history of Anemia, Arthritis, Atrial fibrillation ([address] Chronic kidney disease (CKD), stage II (mild), Community acquired pneumonia of left lower lobe of lung, Compression fracture of T3 vertebra ([facility] Depression, Diastolic CHF ([facility] [address] [facility] Diverticulosis, GI bleed, Gout, Hemorrhoids, Hyperlipidemia, Hypertension, Hyperuricemia, Iron deficiency anemia, PONV (postoperative nausea and vomiting), Unspecified sleep apnea, and Vitamin D deficiency. has a past surgical history that includes Cholecystectomy (1974); Hysterectomy (1977); Knee arthroscopy (2005); bladder suspension (1997); Uvulopalatopharygoplasty (3/2002); eye surgery; Colonoscopy; Colonoscopy (11/2014); joint replacement (Right, 2017); Pain management procedure (Left, 8/11/2020); and Pain management procedure (N/A, 8/25/2020). Restrictions  Position Activity Restriction  Other position/activity restrictions: up with assistance,  on when OOB, droplet plus precautions     Subjective   General  Chart Reviewed: Yes  Additional Pertinent Hx: 79 y.o. female with a history of hypertension, atrial fibrillation on warfarin, stage 3 CKD, HFpEF (EF 50-55% 9/4/2020, on 20mg lasix QD) who presents with chest pain/fall. NS consult; MRI T spine pending; chest CT positive PNA, T3 and T4 vertebral fx, CXR positive PNA. Family / Caregiver Present: No  Subjective  Subjective: Pt found supine in bed and agreeable to PT. Pt states she is still planning to go home at discharge.   Pain Screening  Patient Currently in Pain: Yes (c/o pain in L LE with WB (states is premorbid); did not rate; RN aware) Orientation  Orientation  Overall Orientation Status:  (oriented to name//place; disoriented to situation)          Objective   Bed mobility  Supine to Sit: Moderate assistance (with HOB up and heavy use of rail ( on))  Scooting: Contact guard assistance     Transfers  Sit to Stand: Minimal Assistance (from raised ht bed with vc for hand placement; slow and effortful)  Stand to sit: Minimal Assistance (with max vc for hand placement)     Ambulation  Device: Rolling Walker  Assistance: Minimal assistance  Quality of Gait: posterior lean with increased wt shift side to side; decreased step length/height; L knee buckling with increased stance;  MONACO on RA  with sp02 84% initially and up to 91% with vc for pursed lip breathing  Distance: 4 steps to chair  Comments: Pt waxing and waning with 02 sats;definite desat with mobility on and off 02 and some desat on 2L 02 at rest. RN aware.                            AM-PAC Score  AM-PAC Inpatient Mobility Raw Score : 14 (22 1508)  AM-PAC Inpatient T-Scale Score : 38.1 (22 1508)  Mobility Inpatient CMS 0-100% Score: 61.29 (22 1508)  Mobility Inpatient CMS G-Code Modifier : CL (22 1508)          Goals  Short term goals  Time Frame for Short term goals: discharge  Short term goal 1: sit to/from supine independent  ongoing  Short term goal 2: sit to/from stand supervision  ongoing  Short term goal 3: ambulate 25 ft with RW supervision  ongoing  Patient Goals   Patient goals : return home    Plan    Plan  Times per week: 2-5  Current Treatment Recommendations: Strengthening,Endurance Training,Functional Mobility Training,Transfer Training,Gait Training  Safety Devices  Type of devices: Left in chair,Call light within reach,Nurse notified (pt reclined; RN to locate chair alarm)     Therapy Time   Individual Concurrent Group Co-treatment   Time In 1340         Time Out 1435         Minutes 55           Timed Code Treatment Minutes:55       Total Treatment Minutes:  [address] [facility], PT

## 2022-01-06 NOTE — CARE COORDINATION
Case Management Assessment           Initial Evaluation                Date / Time of Evaluation: 1/6/2022 2:58 PM                 Assessment Completed by: LORAINE David, LSW    Patient Name: Solo Cadena     YOB: 1940  Diagnosis: Physical deconditioning [R53.81]  Supratherapeutic INR [R79.1]  Pneumonia affecting pregnancy in first trimester [O99.511, J18.9]  Pneumonia of right upper lobe due to infectious organism [J18.9]  Fracture of manubrium, initial encounter for closed fracture [S22.21XA]  Anemia, unspecified type [D64.9]  Compression fracture of T3 vertebra, initial encounter ([facility] [H38.380R]  Compression fracture of T4 vertebra, initial encounter [facility]) [T79.394G]     Date / Time: 1/3/2022  4:56 PM    Patient Admission Status: Inpatient    If patient is discharged prior to next notation, then this note serves as note for discharge by case management.      Current PCP: Danya Mariano MD  Clinic Patient: No    Chart Reviewed: Yes  Patient/ Family Interviewed: Yes    Initial assessment completed at bedside with:     Hospitalization in the last 30 days: No    Emergency Contacts:  Extended Emergency Contact Information  Primary Emergency Contact: [address] Phone: [phone]  Relation: Child  Secondary Emergency Contact: Emilyvince Brittany  Donalds Phone: [phone]  Mobile Phone: [phone]  Relation: Other    Advance Directives:   Code Status: Full [address]: Yes  Agent: Faith Beckford  Contact Number: [phone]    Copy present: No     In paper Chart: No    Scanned into EMR Yes    Financial  Payor: MEDICARE / Plan: MEDICARE PART A AND B / Product Type: *No Product type* /     Pre-cert required for SNF: No    Pharmacy    [address] [place] [address] Drive [address] [place] [address] [phone] - f [phone] [address] [facility] [address]  Phone: [phone] Fax: [address] [address] [address] Jamarcus García  Phone: [phone] Fax: [phone]      Potential assistance Purchasing Medications:    Does Patient want to participate in local refill/ meds to beds program?:      Meds To Beds General Rules:  1. Can ONLY be done Monday- Friday between 8:30am-5pm  2. Prescription(s) must be in pharmacy by 3pm to be filled same day  3. Copy of patient's insurance/ prescription drug card and patient face sheet must be sent along with the prescription(s)  4. Cost of Rx cannot be added to hospital bill. If financial assistance is needed, please contact unit  or ;  or  CANNOT provide pharmacy voucher for patients co-pays  5.  Patients can then  the prescription on their way out of the hospital at discharge, or pharmacy can deliver to the bedside if staff is available. (payment due at time of pick-up or delivery - cash, check, or card accepted)     Able to afford home medications/ co-pay costs: Yes    ADLS  Support Systems:      PT AM-PAC: 9 /24  OT AM-PAC: 14 /24    HOUSING  Home Environment: home  Steps:     Plans to RETURN to current housing: Yes  Barriers to RETURNING to current housing: limited ambulation    Home Care Information  Currently ACTIVE with [address]: No  Home Care Agency: Not Applicable    Currently ACTIVE with [place] [facility]: No      Durable Medical Equipment  DME Provider: unknown  Equipment: cane    Home Oxygen and Respiratory Equipment  Has HOME OXYGEN prior to admission: No    Dialysis  Active with HD/PD prior to admission: No      DISCHARGE PLAN:  Disposition:  SNF - TBD    Transportation PLAN for discharge: EMS transportation     Factors facilitating achievement of predicted outcomes: Family support    Barriers to discharge: Medical complications    Additional Case Management Notes:  Pt from home, was unable to ambulate or perform ADLs after recent fall. Pt now requiring assist x2 and PT recommending SNF. CM sent referrals to the following Covid+ accepting SNFs:    [address] [facility] [address] [address]       Internal [address] [facility] Details  FAX          [address] , [place] [address]       Internal comment     CM Teresita Zain Details  [address]          [address], [address] [place] [address]       Internal comment     [address] [facility] Details  FAX          [address] [facility] [address] [place] [address]       Internal comment     CM Bryan Details  [address] [place] [address]                The Plan for Transition of Care is related to the following treatment goals of Physical deconditioning [R53.81]  Supratherapeutic INR [R79.1]  Pneumonia affecting pregnancy in first trimester [O99.511, J18.9]  Pneumonia of right upper lobe due to infectious organism [J18.9]  Fracture of manubrium, initial encounter for closed fracture [S22.21XA]  Anemia, unspecified type [D64.9]  Compression fracture of T3 vertebra, initial encounter ([facility] [S22.030A]  Compression fracture of T4 vertebra, initial encounter ([facility] [R59.781K]    The Patient and/or patient representative Luz Brittle and her family were provided with a choice of provider and agrees with the discharge plan Yes    Freedom of choice list was provided with basic dialogue that supports the patient's individualized plan of care/goals and shares the quality data associated with the providers.  Yes    Care Transition patient: Yes    LORAINE Jo, [facility]  Case Management Department  Ph: [phone]

## 2022-01-07 NOTE — PROGRESS NOTES
Clinical Pharmacy Progress Note    Warfarin - Management by Pharmacy    Consult Date(s): 1/5/22  Consulting Provider(s): Dr. Lianna España / Plan    h/o Atrial Fibrillation - Warfarin   Goal INR: 2 - 3   Concurrent Anticoagulants / Antiplatelets: n/a   Interactions:   o Allopurinol - can increase INR (chronic home med)  o Dexamethasone - can increase INR  o  / Ceftriaxone - can increase INR   Plan:  o INR today = 4.98  o Will hold warfarin again today. o Depending on INR trend moving forward, and if sufficient decline tomorrow, may be able to resume warfarin. Placeholder is on MAR.  o Would not recommend reversing warfarin with vitamin K unless pt has S/Sx of acute bleeding or urgent procedure is needed.  Will monitor pt's clinical status and INR daily, and make dose adjustments as needed. Thank you for allowing us to participate in the care of this patient. Please reach out with any questions. Sherie Carpenter, PharmD, BCPS  1/7/2022  Wireless: [phone]      Interval update: Baricitinib added yesterday for COVID-19 PNA with elevated CRP. On 2L nasal cannula. Subjective/Objective: Ms. Emeli Mckee is a 79 y.o. female with a PMHx significant for A.fib, CKD, Depression, HFpEF, GI bleed (2018), gout, HTN< HLD, and Vit D deficiency who is admitted for COVID pneumonia vs CAP and T3/T4 compression fracture. Pharmacy has been consulted to Warfarin. Ht Readings from Last 1 Encounters:   01/06/22 5' 6\" (1.676 m)     Wt Readings from Last 1 Encounters:   01/07/22 258 lb (117 kg)     Prior / Home Warfarin Regimen:   1 mg on Tuesday & Friday + 0.5 mg all other days   Warfarin is managed by Marty Cano  o Last appt 12/20 - INR = 4.5. Pt was instructed to hold Warfarin x1 day and continue above regimen.     Date INR Warfarin   1/3 4.65 --   1/4  --   1/5 4.24 --   1/6 3.87 --   1/7 4.98 --     Labs / Ancillary Data:    Recent Labs     01/05/22  0510 01/06/22  0805 01/07/22  0801   INR 4.24* 3.87* 4.98*   HGB 11.3* 11.5* 11.0*    339 436   CREATININE 1.0 1.0 1.2

## 2022-01-07 NOTE — CARE COORDINATION
Pt accepted to [facility] per Sayra Bullard [phone]. CM set transport for tomorrow at 1:30PM via [employer].         Electronically signed by LORAINE Morrow, MERCEDEZ on 1/7/2022 at 11:25 AM

## 2022-01-07 NOTE — DISCHARGE SUMMARY
INTERNAL MEDICINE DEPARTMENT AT [address] [place] [address]  DISCHARGE SUMMARY    Patient ID: Celsa Thomas                                             Discharge Date: 1/10/2022   Patient's PCP: Beltran Gilman MD                                          Discharge Physician: Myranda Rodriguez DO   Admit Date: 1/3/2022   Admitting Physician: Davis De La Rosa MD    PROBLEMS DURING HOSPITALIZATION:  Present on Admission:   COVID-19 virus infection   Atrial fibrillation with RVR ([facility]   Pneumonia of both lower lobes due to infectious organism   Acute renal failure superimposed on stage 3 chronic kidney disease ([facility]   Slow transit constipation   Supratherapeutic INR   Delirium  T3/ T4 compression fracture  Non-displced manubrial fracture    DISCHARGE DIAGNOSES:    HPI: Ms. Celsa Thomas is an 79year old female with a PMH of Afib on warfarin, CKD, HFpEF (EF 50-55% on 9/4/20), falls, obesity and iron deficiency anemia who presents to the ED with chest pain brought on by a fall which happened Ernestina Lisa. She reports that she was walking up the stairs to her home when she misplaced her cane and fell with her arms crossed over her chest. She denies head trauma, but states that she has pain over her chest where her arms were crossed, as well as generalized body aches. After her fall, she was unable to ambulate or perform ADLs. She then also developed a cough productive of brown-black sputum 4 days ago. About 2 weeks prior to this, she reports having a cold-like URI with rhinorrhea and congestion. The patient also admits to constipation unresponsive to stool softeners, and a decreased appetite.     In the ED, the patient was noted to have bruising. INR of 4.5. Hgb of 11.7, which is about 3 grams/dL lower than her normal. No active acute hemorrhage was suspected, however. X-rays of the chest and ribs showed no fracture of the ribs but did reveal a right upper lobe pneumonia.  Chest CT showed bilateral airspace disease, mild reactive lymphadenopathy, acute fractures of the manubrium and T3 & T4 vertebrae. The patient was started on ceftriaxone and azithromycin and a TLSO brace was ordered. The patient is admitted for workup and treatment of pneumonia and fractures.     Patient found to have COVID was started on steroids and continued treatment of ABx. Patient did well with her medical treatment. Did have some constipation and was given suppository. Did become confused and stopped steroids which improved mentation. Was discharged to a SNF for rehab     The following issues were addressed during hospitalization:    Covid PNA 2/2 decreased respiration after fracture  -Completed antibiotic course while in the hospital  - Was given steroids and baricitinib while in the hospital.      Compression fracture T3/T4 and Fx manubrium  -  brace on discharge when out of the bed. Will follow up with neurosurgery in 6 weeks and require Thoracic spine xray prior to       Afib on warfarin  - Continue home Lopressor 25mg BID. - Start taking warfarin 0.5 mg every day. Continue checking your PT/INR if it gets too low you  May need to increase your dosage.       Chronic Disease  HFpEF- Continue home Lasix 20mg PO QD and  Cardizem ER 300mg PO QD  H/o iron deficiency anemia and Macrocytic anemia - continue to monitor  Depression - Continue home Zoloft 50mg PO QD  CKD (stable) - stable  Constipation - stable    Physical Exam:  /73   Pulse 81   Temp 97.6 °F (36.4 °C) (Axillary)   Resp 20   Ht 5' 6\" (1.676 m)   Wt 262 lb 7 oz (119 kg)   SpO2 94%   BMI 42.36 kg/m²     Consults: neurosurgery  Significant Diagnostic Studies:  MRI thoracic spine  Treatments: steroids: decadron  Disposition: SNF  Discharged Condition: Stable  Follow Up: Primary Care Physician in one week    DISCHARGE MEDICATION:     Medication List      CHANGE how you take these medications    docusate sodium 100 MG capsule  Commonly known as: COLACE  Take 1 capsule by mouth 2 times daily  What changed:   · when to take this  · reasons to take this     HYDROcodone-acetaminophen 5-325 MG per tablet  Commonly known as: NORCO  Take 1 tablet by mouth every 6 hours as needed for Pain for up to 3 days. What changed:   · how much to take  · how to take this  · when to take this  · reasons to take this     warfarin 1 MG tablet  Commonly known as: Coumadin  Take as directed. If you are unsure how to take this medication, talk to your nurse or doctor. Original instructions:  Take 0.5 tablets by mouth daily Please continue to check PT/INR if your INR level is too low you may need to resume your original 1mg dosage as previously prescribed  What changed:   · how much to take  · how to take this  · when to take this  · additional instructions        CONTINUE taking these medications    acetaminophen 500 MG tablet  Commonly known as: TYLENOL  Take 1 tablet by mouth 4 times daily as needed for Pain     allopurinol 300 MG tablet  Commonly known as: ZYLOPRIM  TAKE ONE TABLET BY MOUTH DAILY     atorvastatin 20 MG tablet  Commonly known as: LIPITOR  TAKE ONE TABLET BY MOUTH ONCE NIGHTLY     Cartia  MG extended release capsule  Generic drug: dilTIAZem  TAKE ONE CAPSULE BY MOUTH DAILY     DULoxetine HCl 40 MG Cpep     furosemide 20 MG tablet  Commonly known as: LASIX  Take 1 tablet by mouth daily as needed (SOB, edema, weight gain)     lidocaine 5 %  Commonly known as: Lidoderm  Place 1 patch onto the skin daily 12 hours on, 12 hours off.     metoprolol tartrate 25 MG tablet  Commonly known as: LOPRESSOR  TAKE ONE TABLET BY MOUTH TWICE A DAY     omeprazole 20 MG delayed release capsule  Commonly known as: PRILOSEC  TAKE ONE CAPSULE BY MOUTH DAILY     vitamin D 50 MCG (2000 UT) Caps capsule        STOP taking these medications    clindamycin 300 MG capsule  Commonly known as: CLEOCIN     sertraline 50 MG tablet  Commonly known as: ZOLOFT           Where to Get Your Medications      You can get these medications from any pharmacy    Bring a paper prescription for each of these medications  · HYDROcodone-acetaminophen 5-325 MG per tablet  · warfarin 1 MG tablet     Information about where to get these medications is not yet available    Ask your nurse or doctor about these medications  · docusate sodium 100 MG capsule       Activity: activity as tolerated  Diet: regular diet  Wound Care: none needed    Time Spent on discharge is more than 1 hour    Signed:  Deborah Raygoza DO, PGY-1  1/10/2022     Addendum to Resident H& P/Progress note:  I have personally seen,examined and evaluated the patient.  I have reviewed the current history, physical findings, labs and assessment and plan and agree with note as documented by resident MD ( Lavern Menon)      Shanta Ahmadi MD, [place]

## 2022-01-07 NOTE — PROGRESS NOTES
Progress Note    Admit Date: 1/3/2022  Day: 4  Diet: ADULT DIET; Regular; Low Sodium (2 gm)  ADULT ORAL NUTRITION SUPPLEMENT; Breakfast, Lunch, PM Snack; Fortified Pudding Oral Supplement    CC: chest pain, cough and fall    Interval history: NAOE,. Is generally fatigued and winded when moving from location to location. Oxygenation is slightly improved with a 2L denies fevers, chills, cough, change in urination. Does have soa and constipation. HPI: Ms. Dina Stratton is an 79year old female with a PMH of Afib on warfarin, CKD, HFpEF (EF 50-55% on 9/4/20), falls, obesity and iron deficiency anemia who presents to the ED with chest pain brought on by a fall which happened [place] Lisa. She reports that she was walking up the stairs to her home when she misplaced her cane and fell with her arms crossed over her chest. She denies head trauma, but states that she has pain over her chest where her arms were crossed, as well as generalized body aches. After her fall, she was unable to ambulate or perform ADLs. She then also developed a cough productive of brown-black sputum 4 days ago. About 2 weeks prior to this, she reports having a cold-like URI with rhinorrhea and congestion. The patient also admits to constipation unresponsive to stool softeners, and a decreased appetite.     In the ED, the patient was noted to have bruising. INR of 4.5. Hgb of 11.7, which is about 3 grams/dL lower than her normal. No active acute hemorrhage was suspected, however. X-rays of the chest and ribs showed no fracture of the ribs but did reveal a right upper lobe pneumonia. Chest CT showed bilateral airspace disease, mild reactive lymphadenopathy, acute fractures of the manubrium and T3 & T4 vertebrae. The patient was started on ceftriaxone and azithromycin and a TLSO brace was ordered. The patient is admitted for workup and treatment of pneumonia and fractures.     Patient found to have COVID was started on steroids and continued treatment of ABx. Medications:     Scheduled Meds:   ipratropium-albuterol  1 ampule Inhalation TID    bisacodyl  10 mg Rectal Daily    methocarbamol  750 mg Oral 4x Daily    polyethylene glycol  17 g Oral Daily    baricitinib  2 mg Oral Daily    dexamethasone  6 mg Oral Daily    warfarin (COUMADIN) daily dosing (placeholder)   Other See Admin Instructions    metoprolol tartrate  25 mg Oral BID    melatonin  5 mg Oral Nightly    sodium chloride flush  5-40 mL IntraVENous 2 times per day    cefTRIAXone (ROCEPHIN) IV  1,000 mg IntraVENous Q24H    DULoxetine  40 mg Oral Daily    allopurinol  300 mg Oral Daily    atorvastatin  20 mg Oral Nightly    dilTIAZem  300 mg Oral Daily    pantoprazole  40 mg Oral QAM AC     Continuous Infusions:   sodium chloride 25 mL (01/06/22 2221)     PRN Meds:medicated lip ointment, oxyCODONE, sodium chloride flush, sodium chloride, ondansetron **OR** ondansetron, polyethylene glycol, acetaminophen **OR** acetaminophen, albuterol-ipratropium, docusate sodium, furosemide, lidocaine    Objective:   Vitals:   T-max:  Patient Vitals for the past 8 hrs:   BP Temp Temp src Pulse Resp SpO2 Weight   01/07/22 0838 (!) 142/87 98.1 °F (36.7 °C) Oral 96 20 97 % --   01/07/22 0328 -- -- -- -- -- -- 258 lb (117 kg)   01/07/22 0323 120/84 98.1 °F (36.7 °C) Oral 98 24 96 % --   01/07/22 0315 -- -- -- -- -- (!) 88 % --       Intake/Output Summary (Last 24 hours) at 1/7/2022 1111  Last data filed at 1/7/2022 0333  Gross per 24 hour   Intake 553 ml   Output 200 ml   Net 353 ml       Review of Systems   Constitutional: Negative for chills, diaphoresis and fever. HENT: Negative for sore throat and voice change. Eyes: Negative for discharge. Respiratory: Positive for shortness of breath. Negative for wheezing. Cardiovascular: Negative for chest pain and palpitations. Gastrointestinal: Positive for constipation. Negative for abdominal pain, nausea and vomiting.    Genitourinary: Negative for dysuria and urgency. Neurological: Negative for dizziness. Psychiatric/Behavioral: Negative for behavioral problems. Physical Exam  Constitutional:       General: She is not in acute distress. Appearance: She is obese. She is not ill-appearing or diaphoretic. HENT:      Head: Normocephalic and atraumatic. Eyes:      Extraocular Movements: Extraocular movements intact. Pupils: Pupils are equal, round, and reactive to light. Cardiovascular:      Rate and Rhythm: Normal rate and regular rhythm. Pulses: Normal pulses. Heart sounds: No murmur heard. Pulmonary:      Breath sounds: Wheezing present. No rales. Abdominal:      General: Abdomen is flat. Bowel sounds are normal.      Palpations: Abdomen is soft. Tenderness: There is no abdominal tenderness. There is no guarding. Musculoskeletal:         General: No swelling or tenderness. Skin:     Coloration: Skin is not jaundiced or pale. Neurological:      Mental Status: She is alert and oriented to person, place, and time. Psychiatric:         Mood and Affect: Mood normal.         Behavior: Behavior normal.         Thought Content: Thought content normal.       LABS:    CBC:   Recent Labs     01/05/22  0510 01/06/22  0805 01/07/22  0801   WBC 8.7 4.4 6.0   HGB 11.3* 11.5* 11.0*   HCT 33.7* 33.4* 32.3*  32.2*    339 436   .4* 103.0* 101.3*     Renal:    Recent Labs     01/05/22  0510 01/06/22  0805 01/07/22  0801    132* 135*   K 4.4 5.1 4.4    97* 98*   CO2 24 26 29   BUN 22* 29* 44*   CREATININE 1.0 1.0 1.2   GLUCOSE 90 120* 132*   CALCIUM 8.4 9.0 8.8   ANIONGAP 11 9 8     Hepatic: No results for input(s): AST, ALT, BILITOT, BILIDIR, PROT, LABALBU, ALKPHOS in the last 72 hours. Troponin:   No results for input(s): TROPONINI in the last 72 hours.   INR:   Recent Labs     01/05/22  0510 01/06/22  0805 01/07/22  0801   INR 4.24* 3.87* 4.98*     Lactate: No results for input(s): LACTATE in the last 72 hours. Cultures:  -----------------------------------------------------------------  RAD:   XR THORACIC SPINE (2 VIEWS)   Final Result      1. Mild compression T3 and T4, partially obscured. 2. Thoracic scoliosis. MRI THORACIC SPINE WO CONTRAST   Final Result      Acute compression deformities, mild of T3 and T4 with retropulsion. Retropulsion is more significant at T4 resulting in mild to moderate narrowing of the central canal, although the posterior CSF cleft is preserved. There is no myelomalacia. XR RIBS RIGHT (2 VIEWS)   Final Result      Airspace disease concerning for pneumonia      No evidence for acute right rib abnormality        Ribs      XR CHEST PORTABLE   Final Result      Right upper lobe patchy airspace disease concerning for pneumonia. Follow-up to clearing is warranted. CT CHEST WO CONTRAST   Final Result      1. Bilateral airspace disease compatible with pneumonia. Probably with mild reactive lymphadenopathy. Correlate for clinical evidence of viral/Covid 19 pneumonia. 2. Acute fracture of manubrium   3. Acute vertebral compression fractures of T3 and T4   4. No definite rib fracture. CT Head WO Contrast   Final Result      No skull fracture or acute intracranial abnormality          Assessment/Plan:     Ms. Jesenia Herbert is an 79year old female with a PMH of Afib on warfarin, CKD, HFpEF (EF 50-55% on 9/4/20), falls, obesity and iron deficiency anemia who presents to the ED with chest pain brought on by a fall which happened [place] Lisa. She then also developed productive cough.     Covid PNA 2/2 decreased respiration after fracture  - F/u respiratory virus panel, Respiratory culture Legionella antigen, Strep pneumoniae antigen  - Duoneb PRN, Incentive spirometry  - Continue Ceftriaxone 1g IV QD 5 days Azithromycin 500mg PO QD for 3 days short course given sputum production  - Start on decadron 6mg for 10 days. Start baricitinib. - Start on duoneb scheduled    Compression fracture T3/T4 and Fx manubrium  Of the T3, T4 vertebrae, and manubrium.  - Pain control with oxycodone 5mg PO Q6H  - Neurosurgery for recs. -  brace    Constipation  -Had a small bowel movement on 1/7, no significant abdominal pain  - Scheduled miralax and suppository ducolax. - If no bowel movement tomorrow consider enema and KUB        Chronic Disease  HFpEF- Continue home Lasix 20mg PO QD and  Cardizem ER 300mg PO QD  Afib on warfarin- Continue home Lopressor 25mg BID. If no improvement in HR will consider increasing to 50mg BID  - Pharmacy for warfarin dosing. PT/INR daily  H/o iron deficiency anemia and Macrocytic anemia - continue monitor  Depression - Continue home Zoloft 50mg PO QD  CKD (stable) - Stable continue BMP trend      Code Status: Full Code  FEN: ADULT DIET; Regular; Low Sodium (2 gm)  ADULT ORAL NUTRITION SUPPLEMENT; Breakfast, Lunch, PM Snack; Fortified Pudding Oral Supplement  PPX: SCD  DISPO: GMF. Will need SNF placement on discharge possibly 1/8/21    Karen Buckley DO, PGY-1  01/07/22  11:11 AM    This patient has been staffed and discussed with Komal Sneed MD.    Addendum to Resident H& P/Progress note:  I have personally seen,examined and evaluated the patient. I have reviewed the current history, physical findings, labs and assessment and plan and agree with note as documented by resident MD ( Magdalen Krabbe)  The patient has been accepted to [facility] skilled nursing facility.   Will discharge to skilled nursing facility tomorrow if condition is stable    Komal Sneed MD, [address]

## 2022-01-08 NOTE — CARE COORDINATION
Pt's RN reported that pt cannot DC today due to pt's elevated INR. CM canceled [place] transport for today, and informed Keshav Kovacs at [facility] [phone].         Electronically signed by Milagros Licona, MSW, LSW on 1/8/2022 at 12:18 PM

## 2022-01-08 NOTE — PROGRESS NOTES
Progress Note    Admit Date: 1/3/2022  Day: 5  Diet: ADULT DIET; Regular; Low Sodium (2 gm)  ADULT ORAL NUTRITION SUPPLEMENT; Breakfast, Lunch, PM Snack; Fortified Pudding Oral Supplement    CC: chest pain, cough and fall    Interval history:     Patient with verbal aggression overnight. Confused with delusions this morning. For example, discusses kidnapping at hospital occurring in her room   Had small BM overnight. Urinating ok. No chest pain. Breathing stable. HPI: Ms. Dina Stratton is an 79year old female with a PMH of Afib on warfarin, CKD, HFpEF (EF 50-55% on 9/4/20), falls, obesity and iron deficiency anemia who presents to the ED with chest pain brought on by a fall which happened [place] Lisa. She reports that she was walking up the stairs to her home when she misplaced her cane and fell with her arms crossed over her chest. She denies head trauma, but states that she has pain over her chest where her arms were crossed, as well as generalized body aches. After her fall, she was unable to ambulate or perform ADLs. She then also developed a cough productive of brown-black sputum 4 days ago. About 2 weeks prior to this, she reports having a cold-like URI with rhinorrhea and congestion. The patient also admits to constipation unresponsive to stool softeners, and a decreased appetite.     In the ED, the patient was noted to have bruising. INR of 4.5. Hgb of 11.7, which is about 3 grams/dL lower than her normal. No active acute hemorrhage was suspected, however. X-rays of the chest and ribs showed no fracture of the ribs but did reveal a right upper lobe pneumonia. Chest CT showed bilateral airspace disease, mild reactive lymphadenopathy, acute fractures of the manubrium and T3 & T4 vertebrae. The patient was started on ceftriaxone and azithromycin and a TLSO brace was ordered. The patient is admitted for workup and treatment of pneumonia and fractures.     Patient found to have COVID was started on steroids and continued treatment of ABx. Medications:     Scheduled Meds:   furosemide  20 mg Oral Daily    QUEtiapine  12.5 mg Oral Nightly    bisacodyl  10 mg Rectal Daily    lidocaine  1 patch TransDERmal Daily    methocarbamol  750 mg Oral 4x Daily    polyethylene glycol  17 g Oral Daily    baricitinib  2 mg Oral Daily    warfarin (COUMADIN) daily dosing (placeholder)   Other See Admin Instructions    metoprolol tartrate  25 mg Oral BID    melatonin  5 mg Oral Nightly    sodium chloride flush  5-40 mL IntraVENous 2 times per day    cefTRIAXone (ROCEPHIN) IV  1,000 mg IntraVENous Q24H    DULoxetine  40 mg Oral Daily    allopurinol  300 mg Oral Daily    atorvastatin  20 mg Oral Nightly    dilTIAZem  300 mg Oral Daily    pantoprazole  40 mg Oral QAM AC     Continuous Infusions:   sodium chloride 25 mL (01/06/22 2221)     PRN Meds:albuterol-ipratropium, medicated lip ointment, oxyCODONE, sodium chloride flush, sodium chloride, ondansetron **OR** ondansetron, polyethylene glycol, acetaminophen **OR** acetaminophen, albuterol-ipratropium, docusate sodium, lidocaine    Objective:   Vitals:   T-max:  Patient Vitals for the past 8 hrs:   BP Temp Temp src Pulse Resp SpO2 Weight   01/08/22 0847 -- -- -- -- 20 96 % --   01/08/22 0528 -- -- -- -- -- -- 262 lb 7 oz (119 kg)   01/08/22 0401 (!) 128/98 98.7 °F (37.1 °C) Axillary 101 22 95 % --       Intake/Output Summary (Last 24 hours) at 1/8/2022 9474  Last data filed at 1/7/2022 1521  Gross per 24 hour   Intake 480 ml   Output 200 ml   Net 280 ml       Review of Systems   Constitutional: Negative for chills, diaphoresis and fever. HENT: Negative for sore throat and voice change. Eyes: Negative for discharge. Respiratory: Positive for shortness of breath. Negative for wheezing. Cardiovascular: Negative for chest pain and palpitations. Gastrointestinal: Positive for constipation. Negative for abdominal pain, nausea and vomiting. Genitourinary: Negative for dysuria and urgency. Neurological: Negative for dizziness. Psychiatric/Behavioral: Negative for behavioral problems. Physical Exam  Constitutional:       General: She is not in acute distress. Appearance: She is obese. She is not ill-appearing or diaphoretic. HENT:      Head: Normocephalic and atraumatic. Eyes:      Extraocular Movements: Extraocular movements intact. Pupils: Pupils are equal, round, and reactive to light. Cardiovascular:      Rate and Rhythm: Normal rate and regular rhythm. Pulses: Normal pulses. Heart sounds: No murmur heard. Pulmonary:      Breath sounds: Wheezing present. No rales. Abdominal:      General: Abdomen is flat. Bowel sounds are normal.      Palpations: Abdomen is soft. Tenderness: There is no abdominal tenderness. There is no guarding. Musculoskeletal:         General: No swelling or tenderness. Skin:     Coloration: Skin is not jaundiced or pale. Neurological:      Mental Status: She is alert. She is disoriented. Psychiatric:      Comments: +delusionsional       LABS:    CBC:   Recent Labs     01/06/22  0805 01/07/22  0801   WBC 4.4 6.0   HGB 11.5* 11.0*   HCT 33.4* 32.3*  32.2*    436   .0* 101.3*     Renal:    Recent Labs     01/06/22  0805 01/07/22  0801   * 135*   K 5.1 4.4   CL 97* 98*   CO2 26 29   BUN 29* 44*   CREATININE 1.0 1.2   GLUCOSE 120* 132*   CALCIUM 9.0 8.8   ANIONGAP 9 8     Hepatic: No results for input(s): AST, ALT, BILITOT, BILIDIR, PROT, LABALBU, ALKPHOS in the last 72 hours. Troponin:   No results for input(s): TROPONINI in the last 72 hours. INR:   Recent Labs     01/06/22  0805 01/07/22  0801   INR 3.87* 4.98*     Lactate: No results for input(s): LACTATE in the last 72 hours. Cultures:  -----------------------------------------------------------------  RAD:   XR THORACIC SPINE (2 VIEWS)   Final Result      1.  Mild compression T3 and T4, partially obscured. 2. Thoracic scoliosis. MRI THORACIC SPINE WO CONTRAST   Final Result      Acute compression deformities, mild of T3 and T4 with retropulsion. Retropulsion is more significant at T4 resulting in mild to moderate narrowing of the central canal, although the posterior CSF cleft is preserved. There is no myelomalacia. XR RIBS RIGHT (2 VIEWS)   Final Result      Airspace disease concerning for pneumonia      No evidence for acute right rib abnormality        Ribs      XR CHEST PORTABLE   Final Result      Right upper lobe patchy airspace disease concerning for pneumonia. Follow-up to clearing is warranted. CT CHEST WO CONTRAST   Final Result      1. Bilateral airspace disease compatible with pneumonia. Probably with mild reactive lymphadenopathy. Correlate for clinical evidence of viral/Covid 19 pneumonia. 2. Acute fracture of manubrium   3. Acute vertebral compression fractures of T3 and T4   4. No definite rib fracture. CT Head WO Contrast   Final Result      No skull fracture or acute intracranial abnormality      XR THORACIC SPINE (3 VIEWS)    (Results Pending)       Assessment/Plan:     Ms. Kristin Samayoa is an 79year old female with a PMH of Afib on warfarin, CKD, HFpEF (EF 50-55% on 9/4/20), falls, obesity and iron deficiency anemia who presents to the ED with chest pain brought on by a fall which happened [place] Lisa. She then also developed productive cough.     Hypoxemia due to Covid PNA, stable  - Duoneb PRN, Incentive spirometry  - Continue Ceftriaxone 1g IV QD 5 days   - Azithromycin 500mg PO QD for 3 days. Completed. - Started on decadron 6mg, will hold given worsening confusion  - Started baricitinib. - start daily lasix    Compression fracture T3/T4 and Fx manubrium  Of the T3, T4 vertebrae, and manubrium.  - Pain control with oxycodone 5mg PO Q6H  - Neurosurgery for recs.   -  brace when OOB    Constipation  - continues to have small BMs  - Scheduled miralax and senna-S  - suppository prn      Chronic Disease  HFpEF- Continue home Lasix 20mg PO QD and  Cardizem ER 300mg PO QD  Afib on warfarin- Continue home Lopressor 25mg BID. If no improvement in HR will consider increasing to 50mg BID  - Pharmacy for warfarin dosing. PT/INR daily  H/o iron deficiency anemia and Macrocytic anemia - continue monitor  Depression - Continue home Zoloft 50mg PO QD  CKD (stable) - Stable continue BMP trend      Code Status: Full Code  FEN: ADULT DIET; Regular; Low Sodium (2 gm)  ADULT ORAL NUTRITION SUPPLEMENT; Breakfast, Lunch, PM Snack; Fortified Pudding Oral Supplement  PPX: SCD  DISPO: Will keep one more day given worsening confusion. Anticipate DC 01/09    Joe Hicks MD, PGY-3  01/08/22  9:09 AM    This patient has been staffed and discussed with Jena Berg MD.    Addendum to Resident H& P/Progress note:  I have personally seen,examined and evaluated the patient.  I have reviewed the current history, physical findings, labs and assessment and plan and agree with note as documented by resident MD ( Lauren Bruner)      Jena Berg MD, [address]

## 2022-01-08 NOTE — PROGRESS NOTES
Pt has what appears to be decreased mentation. Pt unable to tell me her name and birth date as opposed to assessment one hour previously. Pt sats on 1L noted at 86%, (pt was sats 97% on RA earlier today). HR noted on telemetry to decrease to 35 non-sustained. HR now 71 afib. O2 increased to 3L/NC sats 92%. Medical resident on call notified, states will come to see pt.

## 2022-01-08 NOTE — PROGRESS NOTES
Clinical Pharmacy Progress Note    Warfarin - Management by Pharmacy    Consult Date(s): 1/5/22  Consulting Provider(s): Dr. Charly Doyle / Plan    h/o Atrial Fibrillation - Warfarin   Goal INR: 2 - 3   Concurrent Anticoagulants / Antiplatelets: n/a   Interactions:   o Allopurinol - can increase INR (chronic home med)  o Dexamethasone - can increase INR  o  / Ceftriaxone - can increase INR   Plan:  o INR today = 5.04  o Will hold warfarin again today. INR continues to be supratherapeutic despite not receiving any Warfarin in several days. o Warfarin placeholder is on MAR.  Will monitor pt's clinical status and INR daily, and make dose adjustments as needed. Please call with questions--  Thanks--  Narendra Kimball, PharmD, BCPS, BCGP  [id number] ([facility])   1/8/2022 1:56 PM        Interval update:  LFTs elevated this AM with continued supratherapeutic INR. Subjective/Objective: Ms. Dann Handley is a 79 y.o. female with a PMHx significant for A.fib, CKD, Depression, HFpEF, GI bleed (2018), gout, HTN< HLD, and Vit D deficiency who is admitted for COVID pneumonia vs CAP and T3/T4 compression fracture. Pharmacy has been consulted to Warfarin. Ht Readings from Last 1 Encounters:   01/06/22 5' 6\" (1.676 m)     Wt Readings from Last 1 Encounters:   01/08/22 262 lb 7 oz (119 kg)     Prior / Home Warfarin Regimen:   1 mg on Tuesday & Friday + 0.5 mg all other days   Warfarin is managed by [facility]  o Last appt 12/20 - INR = 4.5. Pt was instructed to hold Warfarin x1 day and continue above regimen.     Date INR Warfarin   1/3 4.65 --   1/4  --   1/5 4.24 --   1/6 3.87 --   1/7 4.98 --   1/8 5.04 --                 Recent Labs     01/06/22  0805 01/07/22  0801 01/08/22  1126   INR 3.87* 4.98* 5.04*   HGB 11.5* 11.0* 11.7*    436 570*   LABALBU  --   --  3.2*   CREATININE 1.0 1.2 1.6*

## 2022-01-08 NOTE — PROGRESS NOTES
Pt crying out attempting to have a BM throughout the morning. All stool softeners and supp given as ordered. Pt noted to have a medium soft BM. Appears less stressed at this time. Tolerating po well.

## 2022-01-08 NOTE — PROGRESS NOTES
This patient was very pleasant and nice at the beginning of the shift. But comes after midnight she became verbally aggressive to me and the other nurse. We were trying to calm her down but she became more and more aggressive. She finally calm down this morning. For follow up.

## 2022-01-08 NOTE — DISCHARGE INSTR - COC
Continuity of Care Form    Patient Name: Nayan Hahn   :  1940  MRN:  [id number]    Admit date:  1/3/2022  Discharge date:  ***    Code Status Order: Full Code   Advance Directives:      Admitting Physician:  Tiffanie Del Cid MD  PCP: Huy Burt MD    Discharging Nurse: [facility] Unit/Room#: 1960/1800-98  Discharging Unit Phone Number: [phone]    Emergency Contact:   Extended Emergency Contact Information  Primary Emergency Contact: Jayden Smalls [address] Phone: [phone]  Relation: Child  Secondary Emergency Contact: Lilliam Kim  Home Phone: [phone]  Mobile Phone: [phone]  Relation: Other    Past Surgical History:  Past Surgical History:   Procedure Laterality Date    [place]    COLONOSCOPY      2013    COLONOSCOPY  2014    10yr    EYE SURGERY      macular tear and cataract right    HYSTERECTOMY  1977    partial    JOINT REPLACEMENT Right 2017    KNEE ARTHROSCOPY  2005    knee surgery    PAIN MANAGEMENT PROCEDURE Left 2020    LEFT LUMBAR THREE AND LUMBAR FOUR TRANSFORAMINAL EPIDURAL STEROID INJECTION SITE CONFIRMED BY FLUOROSCOPY performed by Eric Son MD at [address] [place] [address] N/A 2020    MIDLINE LUMBAR FIVE SACRAL ONE INTERLAMINAR EPIDURAL STEROID INJECTION SITE CONFIRMED BY FLUOROSCOPY performed by Eric Son MD at [place] [address]  3/2002       Immunization History:   Immunization History   Administered Date(s) Administered    COVID-19, Pfizer, PF, 30mcg/0.3mL 2021, 2021    DTP 2003    DTaP 2003, 2017    DTaP vaccine 2017    Influenza Vaccine, unspecified formulation 2015    Influenza Virus Vaccine 10/11/2007, 10/24/2014, 10/11/2016    Influenza Whole 10/11/2007    Influenza, High Dose (Fluzone 65 yrs and older) 10/18/2011, 10/17/2012, 10/24/2014, 10/11/2016, 10/24/2016, 10/17/2017, 10/31/2018, 11/18/2020    Influenza, Triv, inactivated, subunit, adjuvanted, IM (Fluad 65 yrs and older) 11/13/2019    Pneumococcal Conjugate 13-valent ([id number]) 01/11/2017    Pneumococcal Conjugate 7-valent (Prevnar7) 07/13/2006    Pneumococcal Polysaccharide ([id number]) 07/13/2006    Tdap (Boostrix, Adacel) 04/17/2017    Zoster Live (Zostavax) 04/24/2017       Active Problems:  Patient Active Problem List   Diagnosis Code    Sleep apnea G47.30    Diverticulosis K57.90    Hemorrhoids K64.9    Pelvic relaxation N81.89    Hyperuricemia E79.0    Menopausal syndrome N95.1    Gout M10.9    Bilateral knee pain M25.561, M25.562    Morbid obesity with BMI of 40.0-44.9, adult ([facility]) E66.01, Z68.41    Essential hypertension I10    Atrial fibrillation with RVR ([facility]) I48.91    Primary localized osteoarthrosis, hand M19.049    Primary osteoarthritis of right knee M17.11    Chronic diastolic heart failure (HCC) I50.32    Stage 3 chronic kidney disease (HCC) N18.30    Pneumonia of both lower lobes due to infectious organism J18.9    Pure hypercholesterolemia E78.00    Gastroesophageal reflux disease without esophagitis K21.9    Hyperglycemia R73.9    Gait disturbance C57.5    Acute diastolic congestive heart failure (HCC) I50.31    New onset of congestive heart failure (HCC) I50.9    Acute renal failure superimposed on stage 3 chronic kidney disease (HCC) N17.9, N18.30    Lumbar disc disease M51.9    Frequent falls R29.6    COVID-19 virus infection U07.1    Compression fracture of T3 vertebra ([facility]) S22.030A    Compression fracture of T4 vertebra ([facility]) S22.040A    Fracture of manubrium, initial encounter for closed fracture S22.21XA    Physical deconditioning R53.81       Isolation/Infection:   Isolation            Droplet Plus          Patient Infection Status       Infection Onset Added Last Indicated Last Indicated By Review Planned Expiration Resolved Resolved By    COVID-19 01/03/22 01/04/22 01/03/22 COVID-19 01/11/22 01/17/22      Resolved    COVID-19 (Rule Out) 01/03/22 01/03/22 01/03/22 COVID-19 (Ordered)   01/04/22 Rule-Out Test Resulted            Nurse Assessment:  Last Vital Signs: BP (!) 128/98   Pulse 101   Temp 98.7 °F (37.1 °C) (Axillary)   Resp 22   Ht 5' 6\" (1.676 m)   Wt 262 lb 7 oz (119 kg)   SpO2 95%   BMI 42.36 kg/m²     Last documented pain score (0-10 scale): Pain Level: 7  Last Weight:   Wt Readings from Last 1 Encounters:   01/08/22 262 lb 7 oz (119 kg)     Mental Status:  oriented and alert    IV Access:  - None    Nursing Mobility/ADLs:  Walking   Assisted  Transfer  Assisted  Bathing  Assisted  Dressing  Assisted  Toileting  Assisted  Feeding  Independent  Med Admin  Assisted  Med Delivery   whole    Wound Care Documentation and Therapy:        Elimination:  Continence: Bowel: No  Bladder: No  Urinary Catheter: None   Colostomy/Ileostomy/Ileal Conduit: No       Date of Last BM: 1/10    Intake/Output Summary (Last 24 hours) at 1/8/2022 0826  Last data filed at 1/7/2022 1521  Gross per 24 hour   Intake 480 ml   Output 200 ml   Net 280 ml     I/O last 3 completed shifts: In: 1033 [P.O.:960; I.V.:73]  Out: 400 [Urine:400]    Safety Concerns:     History of Falls (last 30 days)    Impairments/Disabilities:      None    Nutrition Therapy:  Current Nutrition Therapy:   - Oral Diet:  Low Sodium (2gm)    Routes of Feeding: Oral  Liquids: Thin Liquids  Daily Fluid Restriction: no  Last Modified Barium Swallow with Video (Video Swallowing Test): not done    Treatments at the Time of Hospital Discharge:   Respiratory Treatments: ***  Oxygen Therapy:  is not on home oxygen therapy. Ventilator:    - No ventilator support    Heart Failure Instructions for Daily Management  Patient was treated for chronic diastolic heart failure. she  will require the following:    Please weigh daily on the same scale and approximately the same time of day.   Report weight gain of 3 pounds/day or 5 pounds/week to : [facility] [phone], catherine ADAM, and Mynor Evans [phone]. Please use hospital discharge weight as baseline reference. Please monitor for signs and symptoms of and report to MD:  Worsening Heart Failure: sudden weight gain, shortness of breath, lower extremity or general edema/swelling, abdominal bloating/swelling, inability to lie flat, intolerance to usual activity, or cough (especially at night). Report these finding even if no increase in weight. Dehydration:  having difficulty or a decrease in urination, dizziness, worsening fatigue, or new onset/worsening of generalized weakness. Please continue a LOW SODIUM diet and LIMIT fluid intake to 48 - 64 ounces ( 1.5 - 2 liters) per day. Call  [facility] [phone], Mynor Evans [phone], and catherine ADAM with any questions or concerns. Please continue heart failure education to patient and family/support system. See After Visit Summary for hospital follow up appointment details. Consider spiritual care referral for support and/or completion of advance directives . Consider: having the facility MD complete required 7 day follow up, [place] [id number] telehealth program if patient agreeable and able to participate, and palliative care consult for ongoing goals of care, end of life, and/or chronic disease management discussions.   Dr Yonathan Herrera is pt's primary cardiologist.       Rehab Therapies: Physical Therapy and Occupational Therapy  Weight Bearing Status/Restrictions: No weight bearing restirctions  Other Medical Equipment (for information only, NOT a DME order):  walker  Other Treatments: ***    Patient's personal belongings (please select all that are sent with patient):  None    RN SIGNATURE:  Electronically signed by Ilda Antonio RN on 1/10/22 at 10:43 AM EST    CASE MANAGEMENT/SOCIAL WORK SECTION    Inpatient Status Date: 1/3/2022    Readmission Risk Assessment Score:  Readmission Risk

## 2022-01-09 NOTE — PROGRESS NOTES
Progress Note    Admit Date: 1/3/2022  Day: 6  Diet: ADULT DIET; Regular; Low Sodium (2 gm)  ADULT ORAL NUTRITION SUPPLEMENT; Breakfast, Lunch, PM Snack; Fortified Pudding Oral Supplement    CC: chest pain, cough and fall    Interval history: Blair Olivia doing well this morning. Still altered today, able to follow basic commands but was not having any meaningful conversation. Still groaning like in pain but not able to location any particular issues. No aggression noted today, no discussion of kidnapping. HPI: Ms. Deisy Powers is an 79year old female with a PMH of Afib on warfarin, CKD, HFpEF (EF 50-55% on 9/4/20), falls, obesity and iron deficiency anemia who presents to the ED with chest pain brought on by a fall which happened Ernestina Lisa. She reports that she was walking up the stairs to her home when she misplaced her cane and fell with her arms crossed over her chest. She denies head trauma, but states that she has pain over her chest where her arms were crossed, as well as generalized body aches. After her fall, she was unable to ambulate or perform ADLs. She then also developed a cough productive of brown-black sputum 4 days ago. About 2 weeks prior to this, she reports having a cold-like URI with rhinorrhea and congestion. The patient also admits to constipation unresponsive to stool softeners, and a decreased appetite.     In the ED, the patient was noted to have bruising. INR of 4.5. Hgb of 11.7, which is about 3 grams/dL lower than her normal. No active acute hemorrhage was suspected, however. X-rays of the chest and ribs showed no fracture of the ribs but did reveal a right upper lobe pneumonia. Chest CT showed bilateral airspace disease, mild reactive lymphadenopathy, acute fractures of the manubrium and T3 & T4 vertebrae. The patient was started on ceftriaxone and azithromycin and a TLSO brace was ordered. The patient is admitted for workup and treatment of pneumonia and fractures.     Patient found to have COVID was started on steroids and continued treatment of ABx. On day 5 of hospital stay Patient noted to have verbal aggression overnight. Confused with delusions this morning. For example, discusses kidnapping at hospital occurring in her room. Continued to have confusion and decadron was stopped.      Medications:     Scheduled Meds:   phytonadione  2.5 mg Oral Once    furosemide  20 mg Oral Daily    QUEtiapine  12.5 mg Oral Nightly    sennosides-docusate sodium  2 tablet Oral BID    polyethylene glycol  17 g Oral Daily    insulin lispro  0-6 Units SubCUTAneous TID WC    insulin lispro  0-3 Units SubCUTAneous Nightly    lidocaine  1 patch TransDERmal Daily    methocarbamol  750 mg Oral 4x Daily    baricitinib  2 mg Oral Daily    warfarin (COUMADIN) daily dosing (placeholder)   Other See Admin Instructions    metoprolol tartrate  25 mg Oral BID    melatonin  5 mg Oral Nightly    sodium chloride flush  5-40 mL IntraVENous 2 times per day    cefTRIAXone (ROCEPHIN) IV  1,000 mg IntraVENous Q24H    DULoxetine  40 mg Oral Daily    allopurinol  300 mg Oral Daily    atorvastatin  20 mg Oral Nightly    dilTIAZem  300 mg Oral Daily    pantoprazole  40 mg Oral QAM AC     Continuous Infusions:   dextrose      sodium chloride 25 mL (01/06/22 2221)     PRN Meds:albuterol-ipratropium, bisacodyl, glucose, dextrose, glucagon (rDNA), dextrose, medicated lip ointment, oxyCODONE, sodium chloride flush, sodium chloride, ondansetron **OR** ondansetron, polyethylene glycol, acetaminophen **OR** acetaminophen, albuterol-ipratropium, docusate sodium, lidocaine    Objective:   Vitals:   T-max:  Patient Vitals for the past 8 hrs:   BP Temp Temp src Pulse Resp SpO2   01/09/22 0400 123/85 97.4 °F (36.3 °C) Axillary 95 18 98 %       Intake/Output Summary (Last 24 hours) at 1/9/2022 0947  Last data filed at 1/9/2022 0400  Gross per 24 hour   Intake --   Output 925 ml   Net -925 ml       Review of Systems Constitutional: Negative for chills, diaphoresis and fever. HENT: Negative for sore throat and voice change. Eyes: Negative for discharge. Respiratory: Positive for shortness of breath. Negative for wheezing. Cardiovascular: Negative for chest pain and palpitations. Gastrointestinal: Positive for constipation. Negative for abdominal pain, nausea and vomiting. Genitourinary: Negative for dysuria and urgency. Neurological: Negative for dizziness. Psychiatric/Behavioral: Negative for behavioral problems. Physical Exam  Constitutional:       General: She is not in acute distress. Appearance: She is obese. She is not ill-appearing or diaphoretic. HENT:      Head: Normocephalic and atraumatic. Eyes:      Extraocular Movements: Extraocular movements intact. Pupils: Pupils are equal, round, and reactive to light. Cardiovascular:      Rate and Rhythm: Normal rate and regular rhythm. Pulses: Normal pulses. Heart sounds: No murmur heard. Pulmonary:      Breath sounds: Wheezing present. No rales. Abdominal:      General: Abdomen is flat. Bowel sounds are normal.      Palpations: Abdomen is soft. Tenderness: There is no abdominal tenderness. There is no guarding. Musculoskeletal:         General: No swelling or tenderness. Skin:     Coloration: Skin is not jaundiced or pale. Neurological:      Mental Status: She is alert. She is disoriented.    Psychiatric:      Comments: +delusionsional       LABS:    CBC:   Recent Labs     01/07/22  0801 01/08/22  1126 01/09/22  0823   WBC 6.0 9.6 7.3   HGB 11.0* 11.7* 11.7*   HCT 32.3*  32.2* 34.5* 34.7*    570* 481*   .3* 100.9* 101.9*     Renal:    Recent Labs     01/07/22  0801 01/08/22  1126 01/09/22  0823   * 130* 135*   K 4.4 4.5 4.6   CL 98* 97* 99   CO2 29 24 29   BUN 44* 58* 54*   CREATININE 1.2 1.6* 1.4*   GLUCOSE 132* 194* 125*   CALCIUM 8.8 8.5 8.8   ANIONGAP 8 9 7     Hepatic:   Recent Labs 01/08/22  1126 01/09/22  0823   * 83*   ALT 98* 102*   BILITOT 0.6 0.6   PROT 6.2* 6.6   LABALBU 3.2* 3.2*   ALKPHOS 141* 147*     Troponin:   No results for input(s): TROPONINI in the last 72 hours. INR:   Recent Labs     01/07/22  0801 01/08/22  1126 01/09/22  0823   INR 4.98* 5.04* 4.14*     Lactate: No results for input(s): LACTATE in the last 72 hours. Cultures:  -----------------------------------------------------------------  RAD:   XR THORACIC SPINE (2 VIEWS)   Final Result      1. Mild compression T3 and T4, partially obscured. 2. Thoracic scoliosis. MRI THORACIC SPINE WO CONTRAST   Final Result      Acute compression deformities, mild of T3 and T4 with retropulsion. Retropulsion is more significant at T4 resulting in mild to moderate narrowing of the central canal, although the posterior CSF cleft is preserved. There is no myelomalacia. XR RIBS RIGHT (2 VIEWS)   Final Result      Airspace disease concerning for pneumonia      No evidence for acute right rib abnormality        Ribs      XR CHEST PORTABLE   Final Result      Right upper lobe patchy airspace disease concerning for pneumonia. Follow-up to clearing is warranted. CT CHEST WO CONTRAST   Final Result      1. Bilateral airspace disease compatible with pneumonia. Probably with mild reactive lymphadenopathy. Correlate for clinical evidence of viral/Covid 19 pneumonia. 2. Acute fracture of manubrium   3. Acute vertebral compression fractures of T3 and T4   4. No definite rib fracture. CT Head WO Contrast   Final Result      No skull fracture or acute intracranial abnormality      XR THORACIC SPINE (3 VIEWS)    (Results Pending)       Assessment/Plan:     Ms. Priscilla Amin is an 79year old female with a PMH of Afib on warfarin, CKD, HFpEF (EF 50-55% on 9/4/20), falls, obesity and iron deficiency anemia who presents to the ED with chest pain brought on by a fall which happened Ernestina Lisa.  She then

## 2022-01-09 NOTE — PROGRESS NOTES
Clinical Pharmacy Progress Note    Warfarin - Management by Pharmacy    Consult Date(s): 1/5/22  Consulting Provider(s): Dr. Adria Haywood / Plan    h/o Atrial Fibrillation - Warfarin   Goal INR: 2 - 3   Concurrent Anticoagulants / Antiplatelets: n/a   Interactions:   o Allopurinol - can increase INR (chronic home med)  o Dexamethasone - can increase INR  o  / Ceftriaxone - can increase INR   Plan:  o INR today = 4.19  o Will hold warfarin again today. INR continues to be supratherapeutic despite not receiving any Warfarin in several days. o Warfarin placeholder is on MAR. Pt received Vit K 2.5mg po x1 today per IM.  Will monitor pt's clinical status and INR daily, and make dose adjustments as needed. Please call with questions--  Thanks--  Evonne Sacks, PharmD, BCPS, BCGP  [id number] ([facility])   1/9/2022 1:31 PM        Interval update:  Vit K given for continued elevated INR this AM.  Dexamethasone being held due to delirium. Subjective/Objective: Ms. Jamel Ardon is a 79 y.o. female with a PMHx significant for A.fib, CKD, Depression, HFpEF, GI bleed (2018), gout, HTN< HLD, and Vit D deficiency who is admitted for COVID pneumonia vs CAP and T3/T4 compression fracture. Pharmacy has been consulted to Warfarin. Ht Readings from Last 1 Encounters:   01/06/22 5' 6\" (1.676 m)     Wt Readings from Last 1 Encounters:   01/08/22 262 lb 7 oz (119 kg)     Prior / Home Warfarin Regimen:   1 mg on Tuesday & Friday + 0.5 mg all other days   Warfarin is managed by [facility]  o Last appt 12/20 - INR = 4.5. Pt was instructed to hold Warfarin x1 day and continue above regimen.     Date INR Warfarin Other   1/3 4.65 --    1/4  --    1/5 4.24 --    1/6 3.87 --    1/7 4.98 --    1/8 5.04 --    1/9 4.19 -- Vit K 2.5mg po             Recent Labs     01/07/22  0801 01/07/22  0801 01/08/22  1126 01/09/22  0823 01/09/22  1134   INR 4.98*   < > 5.04* 4.14* 4.19*   HGB 11.0*  --  11.7* 11.7*  --      --  570* 481*  --    LABALBU  --   --  3.2* 3.2*  --    CREATININE 1.2  --  1.6* 1.4*  --     < > = values in this interval not displayed.

## 2022-01-10 NOTE — PROGRESS NOTES
Loss of iv access. IV access attempted x5 unsuccessfully. Pt appears more alert this evening. Eating dinner and taking po. Dr. Vaughn Gibson aware ok with no iv access at this time as pt may likely d/c tomorrow. Will update physician of any changes.

## 2022-01-10 NOTE — PROGRESS NOTES
Clinical Pharmacy Progress Note    Warfarin - Management by Pharmacy    Consult Date(s): 1/5/22  Consulting Provider(s): Dr. Coleridge Cowden / Plan    h/o Atrial Fibrillation - Warfarin   Goal INR: 2 - 3   Concurrent Anticoagulants / Antiplatelets: n/a   Interactions:   o Allopurinol - can increase INR (chronic home med)  o Dexamethasone - can increase INR  o  / Ceftriaxone - can increase INR   Plan:  o INR today = 1.71, now subtherapeutic after receiving Vit K 2.5mg x1 yesterday for continued INR > 4.  o Will resume Warfarin at lower dose of 0.5mg po daily today.  Will monitor pt's clinical status and INR daily, and make dose adjustments as needed.  At discharge to SNF today, recommend resuming Warfarin at lower dose of 0.5mg daily with INR check no later than Friday 1/14 at [facility]. Discussed with Dr. Cristóbal Killian. Please call with questions--  Thanks--  Piyush Brandon, PharmD, BCPS, BCGP  [id number] ([facility])   1/10/2022 11:44 AM        Interval update:  Planning discharge to SNF today. Subjective/Objective: Ms. David Yañez is a 79 y.o. female with a PMHx significant for A.fib, CKD, Depression, HFpEF, GI bleed (2018), gout, HTN< HLD, and Vit D deficiency who is admitted for COVID pneumonia vs CAP and T3/T4 compression fracture. Pharmacy has been consulted to Warfarin. Ht Readings from Last 1 Encounters:   01/06/22 5' 6\" (1.676 m)     Wt Readings from Last 1 Encounters:   01/08/22 262 lb 7 oz (119 kg)     Prior / Home Warfarin Regimen:   1 mg on Tuesday & Friday + 0.5 mg all other days   Warfarin is managed by [facility]  o Last appt 12/20 - INR = 4.5. Pt was instructed to hold Warfarin x1 day and continue above regimen.     Date INR Warfarin Other   1/3 4.65 --    1/4  --    1/5 4.24 --    1/6 3.87 --    1/7 4.98 --    1/8 5.04 --    1/9 4.19 -- Vit K 2.5mg po   1/10 1.71         Recent Labs     01/08/22  1126 01/08/22  1126 01/09/22  0823 01/09/22  1134 01/10/22  0825 01/10/22  0826 01/10/22  0835   INR 5.04*   < > 4.14* 4.19* 1.71*  --   --    HGB 11.7*  --  11.7*  --   --  11.7*  --    *  --  481*  --   --  532*  --    LABALBU 3.2*  --  3.2*  --   --   --  2.7*   CREATININE 1.6*  --  1.4*  --   --   --  1.3*    < > = values in this interval not displayed.

## 2022-01-10 NOTE — CARE COORDINATION
Case Management Assessment            Discharge Note                    Date / Time of Note: 1/10/2022 10:12 AM                  Discharge Note Completed by: LORAINE King, LUIS ENRIQUEW    Patient Name: Audie Galeazzi   YOB: 1940  Diagnosis: Physical deconditioning [R53.81]  Supratherapeutic INR [R79.1]  Pneumonia affecting pregnancy in first trimester [O99.511, J18.9]  Pneumonia of right upper lobe due to infectious organism [J18.9]  Fracture of manubrium, initial encounter for closed fracture [S22.21XA]  Anemia, unspecified type [D64.9]  Compression fracture of T3 vertebra, initial encounter ([facility] [R14.770F]  Compression fracture of T4 vertebra, initial encounter ([facility] [Y98.620V]   Date / Time: 1/3/2022  4:56 PM    Current PCP: Alessia Keene MD  Clinic patient: No    Hospitalization in the last 30 days: No    Advance Directives:  Code Status: Full Code  [place] DNR form completed and on chart: Yes    Financial:  Payor: MEDICARE / Plan: MEDICARE PART A AND B / Product Type: *No Product type* /      Pharmacy:    Coleen Chapman [address] [place] [address] [phone] -  [phone] [address] [facility] [address]  Phone: [phone] Fax: [phone] [place] [address] [facility] [phone] [facility] [phone]   Jeny Cruz [address]  Phone: [phone] Fax: [phone]      Assistance purchasing medications?:    Assistance provided by Case Management: None at this time    Does patient want to participate in local refill/ meds to beds program?:      Meds To Beds General Rules:  1. Can ONLY be done Monday- Friday between 8:30am-5pm  2. Prescription(s) must be in pharmacy by 3pm to be filled same day  3. Copy of patient's insurance/ prescription drug card and patient face sheet must be sent along with the prescription(s)  4. Cost of Rx cannot be added to hospital bill.  If financial assistance is needed, please contact unit  or ;  or  CANNOT provide pharmacy voucher for patients co-pays  5. Patients can then  the prescription on their way out of the hospital at discharge, or pharmacy can deliver to the bedside if staff is available. (payment due at time of pick-up or delivery - cash, check, or card accepted)     Able to afford home medications/ co-pay costs: Yes    ADLS:  Current PT AM-PAC Score: 14 /24  Current OT AM-PAC Score: 14 /24      DISCHARGE Disposition: [facility] (SNF): [facility] Phone: [phone] Fax: [phone]    LOC at discharge: Skilled  [address] Completed: Yes    Notification completed in HENS/PAS?:  Yes : CM has completed HENS online through secure website for SNF admission at [facility]. Document ID #: [id number]    IMM Completed:   Not Indicated    Transportation:  Transportation PLAN for discharge: EMS transportation   Mode of Transport: Ambulance stretcher - BLS  Reason for medical transport: Other: CHF, fall risk - gait disturbance, physical deconditioning, hx of knee replacement  Name of [address]: [facility]  Phone: [phone]  Time of Transport: 4:00pm    Transport form completed: Yes    Home Oxygen and Respiratory Equipment:  Oxygen needed at discharge?: No    Dialysis:  Dialysis patient: No      Referrals made at [facility] for outpatient continued care:  Not Applicable    Additional CM Notes:   Pt to DC today to SNF - [facility]. CM notified Keshav Kovacs at [address] [facility] and pt's RN of transport time. No other needs at this time.         The Plan for Transition of Care is related to the following treatment goals of Physical deconditioning [R53.81]  Supratherapeutic INR [R79.1]  Pneumonia affecting pregnancy in first trimester [O99.511, J18.9]  Pneumonia of right upper lobe due to infectious organism [J18.9]  Fracture of manubrium, initial encounter for closed fracture [S22.21XA]  Anemia, unspecified type [D64.9]  Compression fracture of T3 vertebra, initial encounter ([facility] [S80.732Y]  Compression [facility] of [facility] vertebra, [facility] [Q95.815B]    The Patient and/or patient representative Jaime Stephenson and her family were provided with a choice of provider and agrees with the discharge plan Yes    Freedom of choice list was provided with basic dialogue that supports the patient's individualized plan of care/goals and shares the quality data associated with the providers.  Yes    Care Transitions patient: Yes    LORAINE Lin, [facility] [employer] [employer]  Case Management Department  Ph: [phone]

## 2022-01-14 NOTE — CARE COORDINATION
Pt was discharged to [facility] (SNF): [facility] for rehab.

## 2022-02-09 PROBLEM — J96.01 ACUTE HYPOXEMIC RESPIRATORY FAILURE (HCC): Status: ACTIVE | Noted: 2022-01-01

## 2022-02-09 NOTE — ED PROVIDER NOTES
[address] [facility]        Pt Name: Annamarie Zarco  MRN: [id number]  [place] 1940  Date of evaluation: 2/9/2022  Provider: RADHA Burger - CNP  PCP: Era Lugo MD  Note Started: 6:39 PM EST        I have seen and evaluated this patient with my supervising physician Gladys Acosta MD.    [address] [facility]       Chief Complaint   Patient presents with    Altered Mental Status     Pt in via [facility] from [facility]. EMS states that the pt is altered with a last known well of sometime today (02/09/22). 86% on RA to 97% Nasal Cannula. Pt repeating the phrase \"momma please take me with you\" or \"momma please\" and asking to die at triage. NH states that the pt is normally awake and alert.  Other     NH also states that they suspect bleeding based on abnormal labs and want her checked out. HISTORY OF PRESENT ILLNESS   (Location, Timing/Onset, Context/Setting, Quality, Duration, Modifying Factors, Severity, Associated Signs and Symptoms)  Note limiting factors. Chief Complaint: hypoxia and mental status marcella Zarco is a 79 y.o. female who presents to the ER with complaint of hypoxia. Patient is [facility] and does arrive per EMS. I did meet the patient once she was settled in room 5 on oxygen, resting. She does open her eyes and is uncertain why she is in the ER. She denies complaint. She is conversationally dyspneic. Not oriented. Nursing Notes were all reviewed and agreed with or any disagreements were addressed in the HPI. REVIEW OF SYSTEMS    (2-9 systems for level 4, 10 or more for level 5)     Review of Systems   Constitutional: Negative for activity change, chills and fever. Respiratory: Positive for shortness of breath. Negative for chest tightness. Cardiovascular: Negative for chest pain. Gastrointestinal: Negative for abdominal pain, diarrhea, nausea and vomiting. Genitourinary: Negative for dysuria. Psychiatric/Behavioral: Positive for confusion. All other systems reviewed and are negative. Positives and Pertinent negatives as per HPI. Except as noted above in the ROS, all other systems were reviewed and negative.        PAST MEDICAL HISTORY     Past Medical History:   Diagnosis Date    Anemia     NOS    Arthritis     knee     Atrial fibrillation (HCC)     on coumadin    Chronic kidney disease (CKD), stage II (mild)     Community acquired pneumonia of left lower lobe of lung 12/26/2017    Compression fracture of T3 vertebra (HCC)     Depression     Diastolic CHF ([facility]     Diverticulosis     GI bleed 04/18/2018    Due to esophageal tear     Gout     Hemorrhoids     Hyperlipidemia     Hypertension     Hyperuricemia     Iron deficiency anemia 01/08/2014    Iron deficiency anemia-s/p EGD and colonoscopy 2/11/2014 Esophageal tear likely source     PONV (postoperative nausea and vomiting)     Unspecified sleep apnea 03/2002    uvulectomy -hx of    Vitamin D deficiency     20ng/ml 6/2009         SURGICAL HISTORY     Past Surgical History:   Procedure Laterality Date    BLADDER SUSPENSION  1997    CHOLECYSTECTOMY  1974    COLONOSCOPY      2013    COLONOSCOPY  11/2014    10yr    EYE SURGERY      macular tear and cataract right    HYSTERECTOMY  1977    partial    JOINT REPLACEMENT Right 2017    KNEE ARTHROSCOPY  2005    knee surgery    PAIN MANAGEMENT PROCEDURE Left 8/11/2020    LEFT LUMBAR THREE AND LUMBAR FOUR TRANSFORAMINAL EPIDURAL STEROID INJECTION SITE CONFIRMED BY FLUOROSCOPY performed by Ladarius Rizvi MD at [address] [facility] N/A 8/25/2020    MIDLINE LUMBAR FIVE SACRAL ONE INTERLAMINAR EPIDURAL STEROID INJECTION SITE CONFIRMED BY FLUOROSCOPY performed by Ladarius Rizvi MD at [address] [facility]  3/2002         [address]       Current Discharge Medication List CONTINUE these medications which have NOT CHANGED    Details   sertraline (ZOLOFT) 50 MG tablet Take 50 mg by mouth daily      apixaban (ELIQUIS) 2.5 MG TABS tablet Take 2.5 mg by mouth daily      LORazepam (ATIVAN) 0.5 MG tablet Take 0.5 mg by mouth daily. dexamethasone (DECADRON) 6 MG tablet Take 6 mg by mouth daily (with breakfast)      mirtazapine (REMERON) 7.5 MG tablet Take 7.5 mg by mouth nightly      pantoprazole (PROTONIX) 40 MG tablet Take 40 mg by mouth at bedtime      docusate sodium (COLACE) 100 MG capsule Take 1 capsule by mouth 2 times daily  Qty: 60 capsule, Refills: 2      DULoxetine HCl 40 MG CPEP Take 40 m by mouth daily      acetaminophen (TYLENOL) 500 MG tablet Take 1 tablet by mouth 4 times daily as needed for Pain  Qty: 120 tablet, Refills: 0      allopurinol (ZYLOPRIM) 300 MG tablet TAKE ONE TABLET BY MOUTH DAILY  Qty: 90 tablet, Refills: 3    Associated Diagnoses: Well adult exam; Chest pain, unspecified type; Secondary hypertension; Hyperlipidemia, unspecified hyperlipidemia type; Hyperuricemia; History of depression; Hypothyroidism, unspecified type; Gout, unspecified cause, unspecified chronicity, unspecified site      atorvastatin (LIPITOR) 20 MG tablet TAKE ONE TABLET BY MOUTH ONCE NIGHTLY  Qty: 90 tablet, Refills: 3      omeprazole (PRILOSEC) 20 MG delayed release capsule TAKE ONE CAPSULE BY MOUTH DAILY  Qty: 90 capsule, Refills: 2      metoprolol tartrate (LOPRESSOR) 25 MG tablet TAKE ONE TABLET BY MOUTH TWICE A DAY  Qty: 180 tablet, Refills: 1      lidocaine (LIDODERM) 5 % Place 1 patch onto the skin daily 12 hours on, 12 hours off.   Qty: 30 patch, Refills: 0      CARTIA  MG extended release capsule TAKE ONE CAPSULE BY MOUTH DAILY  Qty: 90 capsule, Refills: 2      furosemide (LASIX) 20 MG tablet Take 1 tablet by mouth daily as needed (SOB, edema, weight gain)  Qty: 30 tablet, Refills: 5      Cholecalciferol (VITAMIN D) 2000 UNITS CAPS capsule Take 2,000 Units by mouth daily      warfarin (COUMADIN) 1 MG tablet Take 0.5 tablets by mouth daily Please continue to check PT/INR if your INR level is too low you may need to resume your original 1mg dosage as previously prescribed  Qty: 15 tablet, Refills: 0               ALLERGIES     Diclofenac, Adhesive tape, and Penicillins    FAMILYHISTORY       Family History   Problem Relation Age of Onset    Heart Attack Father     Heart Disease Father     Stroke Brother           SOCIAL HISTORY       Social History     Tobacco Use    Smoking status: Never Smoker    Smokeless tobacco: Never Used   Vaping Use    Vaping Use: Never used   Substance Use Topics    Alcohol use: No    Drug use: Never       SCREENINGS    Mark Coma Scale  Eye Opening: Spontaneous  Best Verbal Response: Confused  Best Motor Response: Obeys commands  Mark Coma Scale Score: 14        PHYSICAL EXAM    (up to 7 for level 4, 8 or more for level 5)     ED Triage Vitals [02/09/22 1759]   BP Temp Temp Source Pulse Resp SpO2 Height Weight   123/77 97 °F (36.1 °C) Oral 109 16 97 % 5' 7\" (1.702 m) 250 lb (113.4 kg)       Physical Exam  Vitals and nursing note reviewed. Constitutional:       Appearance: She is well-developed. She is not diaphoretic. HENT:      Head: Normocephalic and atraumatic. Right Ear: External ear normal.      Left Ear: External ear normal.   Eyes:      General:         Right eye: No discharge. Left eye: No discharge. Neck:      Vascular: No JVD. Cardiovascular:      Rate and Rhythm: Tachycardia present. Rhythm irregular. Pulses: Normal pulses. Pulmonary:      Effort: Pulmonary effort is normal. No respiratory distress. Abdominal:      Palpations: Abdomen is soft. Musculoskeletal:         General: Normal range of motion. Cervical back: Normal range of motion and neck supple. Skin:     General: Skin is warm and dry. Coloration: Skin is not pale. Neurological:      Mental Status: She is alert. Comments: Patient is awake, she responds to questions but is uncertain why she is in the ER   Psychiatric:         Behavior: Behavior normal.         DIAGNOSTIC RESULTS   LABS:    Labs Reviewed   CBC WITH AUTO DIFFERENTIAL - Abnormal; Notable for the following components:       Result Value    WBC 11.5 (*)     RBC 3.11 (*)     Hemoglobin 10.5 (*)     Hematocrit 32.2 (*)     .6 (*)     RDW 17.3 (*)     Neutrophils Absolute 10.1 (*)     Lymphocytes Absolute 0.9 (*)     Metamyelocytes Relative 2 (*)     nRBC 4 (*)     Toxic Granulation Present (*)     Macrocytes 1+ (*)     Polychromasia Occasional (*)     St[place]burg Cells Occasional (*)     Ovalocytes 1+ (*)     Tear Drop Cells Occasional (*)     All other components within normal limits    Narrative:     Performed at:  [facility] [address], [facility], [address] [employer]   Phone [phone]   COMPREHENSIVE METABOLIC PANEL - Abnormal; Notable for the following components:    Glucose 271 (*)     BUN 61 (*)     CREATININE 1.7 (*)     GFR Non- 29 (*)     GFR  35 (*)     Total Protein 5.6 (*)     Albumin 3.1 (*)     Total Bilirubin 1.2 (*)     ALT 54 (*)     All other components within normal limits    Narrative:     Performed at:  [facility] [address], [facility], [address] [employer]   Phone [phone]   TROPONIN - Abnormal; Notable for the following components:    Troponin 0.07 (*)     All other components within normal limits    Narrative:     Performed at:  [facility]  [address] [facility], [address] [employer]   Phone [phone]  - Abnormal; Notable for the following components:    Pro-BNP 3,943 (*)     All other components within normal limits    Narrative:     Performed at:  [facility] [address], [facility], [facility] [employer]   Phone [phone]   PROCALCITONIN - Abnormal; Notable for the following components:    Procalcitonin 0.27 (*)     All other components within normal limits    Narrative:     Performed at:  [facility] [address] [employer]   Phone [phone]   URINE RT REFLEX TO CULTURE - Abnormal; Notable for the following components:    Leukocyte Esterase, Urine TRACE (*)     All other components within normal limits    Narrative:     Performed at:  [facility] [address] [employer] [place] [employer]   Phone [phone]   PROTIME-INR - Abnormal; Notable for the following components:    Protime 80.4 (*)     INR 6.58 (*)     All other components within normal limits    Narrative:     CALL  [place]  [facility] tel. [phone],  Coag results called to and read back by KRISTEN Thorne, 02/09/2022 20:53, by  Socorro Hunter  Performed at:  [facility] [address]   Phone [phone]   MICROSCOPIC URINALYSIS - Abnormal; Notable for the following components:    Yeast, UA Present (*)     All other components within normal limits    Narrative:     Performed at:  [facility] [address] [employer]   Phone [phone]   BLOOD GAS, ARTERIAL - Abnormal; Notable for the following components:    pO2, Arterial 69.5 (*)     Hemoglobin, Art, Extended 10.5 (*)     All other components within normal limits    Narrative:     Performed at:  [facility] [address] [employer] [address] [employer]   Phone [phone]   COMPREHENSIVE METABOLIC PANEL W/ REFLEX TO MG FOR LOW K - Abnormal; Notable for the following components:    CO2 19 (*)     Glucose 274 (*)     BUN 61 (*)     CREATININE 1.5 (*)     GFR Non- 33 (*)     GFR  40 (*)     Total Protein 5.7 (*)     Albumin 3.2 (*)     Total Bilirubin 1.1 (*)     ALT 58 (*)     All other components within normal limits Narrative:     Performed at:  [facility]  [address]  [place], [address]   Phone [phone]   C-REACTIVE PROTEIN    Narrative:     Performed at:  [facility]  [address] [facility],  [place], [address]   Phone [phone]   AMMONIA    Narrative:     Performed at:  [facility]  [address] [facility],  [place], [address]   Phone [phone]   APTT    Narrative:     Performed at:  [facility]  [address] [facility],  [place], [address]   Phone [phone]   TROPONIN   VITAMIN B12 & FOLATE   HEMOGLOBIN A1C   TSH WITH REFLEX TO FT4   POCT GLUCOSE   POCT GLUCOSE   TYPE AND SCREEN    Narrative:     Performed at:  [facility]  [address] [facility]  [place], [address]   Phone [phone]       When ordered only abnormal lab results are displayed. All other labs were within normal range or not returned as of this dictation. EKG: When ordered, EKG's are interpreted by the Emergency Department Physician in the absence of a cardiologist.  Please see their note for interpretation of EKG. RADIOLOGY:   Non-plain film images such as CT, Ultrasound and MRI are read by the radiologist. Plain radiographic images are visualized and preliminarily interpreted by the ED Provider with the below findings:        Interpretation per the Radiologist below, if available at the time of this note:    CT CHEST [address]   Final Result   Persistent, but improved multifocal consolidation compared to CT exam   01/03/2022. Masslike opacities remain bilaterally, for which continued   noncontrast CT follow-up in 3 months is recommended to ensure complete   resolution. CT HEAD WO CONTRAST   Final Result   No acute intracranial abnormality. Stable pattern of atrophy and microvascular ischemic disease in the cerebral   white matter.          XR CHEST PORTABLE Final Result   Hypoinflated, grossly clear lungs. Cardiomegaly. No acute cardiopulmonary   abnormality. No results found.         PROCEDURES   Unless otherwise noted below, none     Procedures    CRITICAL CARE TIME       CONSULTS:  IP CONSULT TO HOSPITALIST  IP CONSULT TO PHARMACY  IP CONSULT TO CARDIOLOGY      EMERGENCY DEPARTMENT COURSE and DIFFERENTIAL DIAGNOSIS/MDM:   Vitals:    Vitals:    02/10/22 0000 02/10/22 0100 02/10/22 0200 02/10/22 0300   BP: 128/80 132/78 139/70 133/67   Pulse: 103 93 103 107   Resp: (!) 33 12 23 15   Temp:       TempSrc:       SpO2: 100% 100% 94% 99%   Weight:       Height:           Patient was given the following medications:  Medications   allopurinol (ZYLOPRIM) tablet 300 mg (has no administration in time range)   atorvastatin (LIPITOR) tablet 20 mg (has no administration in time range)   dilTIAZem (CARDIZEM CD) extended release capsule 300 mg (has no administration in time range)   Vitamin D (CHOLECALCIFEROL) tablet 2,000 Units (has no administration in time range)   DULoxetine (CYMBALTA) extended release capsule 40 mg (has no administration in time range)   metoprolol tartrate (LOPRESSOR) tablet 25 mg (25 mg Oral Not Given 2/10/22 0219)   sodium chloride flush 0.9 % injection 5-40 mL (has no administration in time range)   sodium chloride flush 0.9 % injection 5-40 mL (has no administration in time range)   0.9 % sodium chloride infusion (has no administration in time range)   ondansetron (ZOFRAN-ODT) disintegrating tablet 4 mg (has no administration in time range)     Or   ondansetron (ZOFRAN) injection 4 mg (has no administration in time range)   polyethylene glycol (GLYCOLAX) packet 17 g (has no administration in time range)   acetaminophen (TYLENOL) tablet 650 mg (has no administration in time range)     Or   acetaminophen (TYLENOL) suppository 650 mg (has no administration in time range)   potassium chloride (KLOR-CON M) extended release tablet 40 mEq (has no administration in time range)     Or   potassium bicarb-citric acid (EFFER-K) effervescent tablet 40 mEq (has no administration in time range)     Or   potassium chloride 10 mEq/100 mL IVPB (Peripheral Line) (has no administration in time range)   magnesium sulfate 2000 mg in 50 mL IVPB premix (has no administration in time range)   aluminum & magnesium hydroxide-simethicone (MAALOX) 200-200-20 MG/5ML suspension 30 mL (has no administration in time range)   cefTRIAXone (ROCEPHIN) 1000 mg in sterile water 10 mL IV syringe (1,000 mg IntraVENous Given 2/10/22 0153)   0.9 % sodium chloride infusion ( IntraVENous New Bag 2/10/22 0156)   glucose (GLUTOSE) 40 % oral gel 15 g (has no administration in time range)   dextrose 50 % IV solution (has no administration in time range)   glucagon (rDNA) injection 1 mg (has no administration in time range)   dextrose 5 % solution (has no administration in time range)   insulin lispro (1 Unit Dial) 0-12 Units (has no administration in time range)   insulin lispro (1 Unit Dial) 0-6 Units (3 Units SubCUTAneous Given 2/10/22 0253)   warfarin placeholder: dosing by pharmacy (has no administration in time range)   fluconazole (DIFLUCAN) 200 mg IVPB (200 mg IntraVENous New Bag 2/9/22 2200)   furosemide (LASIX) injection 40 mg (40 mg IntraVENous Given 2/9/22 2154)   aspirin chewable tablet 324 mg (324 mg Oral Given 2/9/22 2154)   dexamethasone (PF) (DECADRON) injection 6 mg (6 mg IntraVENous Given 2/9/22 2155)           Briefly, this is an 50-year-old female who presents to the emergency department with complaint of hypoxia and confusion. Patient is from nursing home. She is requiring 2 L supplemental oxygen to keep her oxygen saturation greater than 90%. ED course notable for hypoxia, stable on nasal cannula oxygen with encephalopathy. Patient given Decadron with acute hypoxia, Covid PCR is pending.   Patient will be admitted to hospitalist services for altered mental status and hypoxemia. FINAL IMPRESSION      1. Altered mental status, unspecified altered mental status type    2. Yeast UTI    3. Person under investigation for COVID-19    4. Hypoxia    5. Troponin level elevated    6. Elevated brain natriuretic peptide (BNP) level    7. Supratherapeutic INR          DISPOSITION/PLAN   DISPOSITION Admitted 02/09/2022 10:18:14 PM      PATIENT REFERRED TO:  No follow-up provider specified.     DISCHARGE MEDICATIONS:  Current Discharge Medication List          DISCONTINUED MEDICATIONS:  Current Discharge Medication List                 (Please note that portions of this note were completed with a voice recognition program.  Efforts were made to edit the dictations but occasionally words are mis-transcribed.)    RADHA Feng CNP (electronically signed)           RADHA Feng CNP  02/10/22 8366

## 2022-02-10 PROBLEM — G93.41 ACUTE METABOLIC ENCEPHALOPATHY: Status: ACTIVE | Noted: 2022-01-01

## 2022-02-10 NOTE — CARE COORDINATION
Discharge Planning:     (CM) reviewed patient's chart, which stated she is a resident at [facility]. CM called [facility] and spoke with admissions staff, Terence Moreno ([phone]), who stated that the patient is a resident of [facility] at [facility] at the [place] and confirmed that patient can return upon discharge.       MONTSERRAT Ellis, [facility] -   [phone]    Electronically signed by LORAINE Peralta on 2/10/2022 at 11:23 AM

## 2022-02-10 NOTE — ED NOTES
Pt received 5C bed assignment and report given to receiving RN. Pt transported to the floor in stable condition with no acute signs of distress noted.         Pamella Madison RN  02/09/22 1072

## 2022-02-10 NOTE — PROGRESS NOTES
Clinical Pharmacy Note: Pharmacy to Dose Warfarin    Pharmacy consulted by Dr. Kandice Beckford to dose warfarin. Paulino Cockayne is a 79 y.o. female  is receiving warfarin for indication: AFib. INR Goal Range: 2.0-3.0  Prior to admission warfarin dosing regimen: 1 mg every Tue, Fri; 0.5 mg all other days  INR today:   Lab Results   Component Value Date    INR 6.58 02/09/2022       Assessment/Plan:  INR is supratherapeutic on prior to admission dosing regimen. Based on today's assessment, HOLD warfarin until INR is back within goal range. Daily INR is ordered. Pharmacy will continue to monitor and make adjustments to regimen as necessary.      Thank you for the consult,     Mari Bailey PharmD

## 2022-02-10 NOTE — FLOWSHEET NOTE
Night shift reported no IV access and they had several attempts to start IV. The RNN caring for Pt and this RN were unable to find access. Called the PICC team to do a PIV with US. Left a message.

## 2022-02-10 NOTE — CARE COORDINATION
Discharge Planning:     (CR) received a voicemail from patient's Daughter/Power Johnny Christianson (on emergency contact list), requesting a callback regarding patient's discharge placement. CM called Marianela back and left a voicemail requesting a callback. CM provided contact information. Wendelin Hashimoto MSW, LISW-S, [facility] -   [phone]    Electronically signed by Wendelin Hashimoto, MSW on 2/10/2022 at 1:20 PM        Update at [phone]:  CM called patient's Daughter, Matty Bethea, back after receiving a voicemail from her. Daughter stated that she spoke with Dr. Alfonso Laws who informed her it might be time to call [facility]. Daughter stated that she wanted to speak with CM about the patient going to a different [facility] (SNF) upon discharge, but is unsure if this will happen. Daughter stated that Dr. Alfonso Laws has agreed to call her tomorrow after a few more tests are rand to discuss patient's disposition further. Daughter agreeable to calling CM team tomorrow, if patient is in need of new SNF referrals.        Wendelin Hashimoto MSW, LISW-S, [facility] -   [phone]    Electronically signed by Wendelin Hashimoto, MSW on 2/10/2022 at 3:46 PM

## 2022-02-10 NOTE — H&P
_    [facility] HOSPITALIST HISTORY AND PHYSICAL/CONSULT    2/9/2022 10:47 PM    Patient Information:  Gogo Hagan is a 79 y.o. female [id number]    PCP:  Alessia Keene MD (Tel: [phone] )    Date of Service:   Pt seen/examined on 2/9/2022   Admitted to Inpatient with expected LOS greater than two midnights due to medical therapy. Chief complaint:    Chief Complaint   Patient presents with    Altered Mental Status     Pt in via [facility] from [facility]. EMS states that the pt is altered with a last known well of sometime today (02/09/22). 86% on RA to 97% Nasal Cannula. Pt repeating the phrase \"momma please take me with you\" or \"momma please\" and asking to die at triage. NH states that the pt is normally awake and alert.  Other     NH also states that they suspect bleeding based on abnormal labs and want her checked out. History of Present Illness:  Audie Galeazzi is a 79 y.o. female who presented with   · AMS @ ECF + Hypoxemia noted @ ECF . Patient is normally AO @ Baseline . History and pertinent information obtained from   · ED provider   · Prior Chart    ·         Notable/Significant ED Findings/VItals :   · Hypoxemia . · Is now on 4 L NC O2   · No fever   · AFIB RVR @ admission   ·        While in ED  · Patient care Given. · Appropriate Monitoring done. · Pertinent Labs done. See [facility] for details   · Pertinent Acute issues identified and managed . See Epic for details  · Pertinent consults called and case d/w them by ED Provider . See Epic for details. · Imaging  CXR ,  CT,  done in ED . While in ED, Indicated/Pertinent Medications/Care given as below      · ED Chart reviewed. · Medications reviewed w/c were give in ED .  See Epic for details on medications and orders  · Steroids X 1   · Lasix X 1   · ASA X 1   · Diflucan X 1       · Imaging/Labs/Work up done in ED reviewed and their interpretation/active and acute issues, as mentioned below in Assessment and Plan. Currently, on my evaluation, patient's condition as below :   · Since arrival, post above Rx, patient is Altered   · Is O X 1 only   · Is unable to provide any Hx   · Is drowsy and lethargic as well   · Saturations stable on NC O2       REVIEW OF SYSTEMS:      Attempted but could not be completed d/t AMS in patient        Past Medical History:         has a past medical history of Anemia, Arthritis, Atrial fibrillation ([address] Chronic kidney disease (CKD), stage II (mild), Community acquired pneumonia of left lower lobe of lung, Compression fracture of T3 vertebra ([address] Depression, Diastolic CHF ([address] Diverticulosis, GI bleed, Gout, Hemorrhoids, Hyperlipidemia, Hypertension, Hyperuricemia, Iron deficiency anemia, PONV (postoperative nausea and vomiting), Unspecified sleep apnea, and Vitamin D deficiency. Past Surgical History:         has a past surgical history that includes Cholecystectomy (1974); Hysterectomy (1977); Knee arthroscopy (2005); bladder suspension (1997); Uvulopalatopharygoplasty (3/2002); eye surgery; Colonoscopy; Colonoscopy (11/2014); joint replacement (Right, 2017); Pain management procedure (Left, 8/11/2020); and Pain management procedure (N/A, 8/25/2020).        Medications:        Current Outpatient Medications on File Prior to Encounter   Medication Sig Dispense Refill    docusate sodium (COLACE) 100 MG capsule Take 1 capsule by mouth 2 times daily 60 capsule 2    warfarin (COUMADIN) 1 MG tablet Take 0.5 tablets by mouth daily Please continue to check PT/INR if your INR level is too low you may need to resume your original 1mg dosage as previously prescribed 15 tablet 0    DULoxetine HCl 40 MG CPEP Take 40 m by mouth daily      acetaminophen (TYLENOL) 500 MG tablet Take 1 tablet by mouth 4 times daily as needed for Pain 120 tablet 0    allopurinol (ZYLOPRIM) 300 MG tablet TAKE ONE TABLET BY MOUTH DAILY 90 tablet 3    atorvastatin (LIPITOR) 20 MG tablet TAKE ONE TABLET BY MOUTH ONCE NIGHTLY 90 tablet 3    omeprazole (PRILOSEC) 20 MG delayed release capsule TAKE ONE CAPSULE BY MOUTH DAILY 90 capsule 2    metoprolol tartrate (LOPRESSOR) 25 MG tablet TAKE ONE TABLET BY MOUTH TWICE A  tablet 1    lidocaine (LIDODERM) 5 % Place 1 patch onto the skin daily 12 hours on, 12 hours off. 30 patch 0    CARTIA  MG extended release capsule TAKE ONE CAPSULE BY MOUTH DAILY 90 capsule 2    furosemide (LASIX) 20 MG tablet Take 1 tablet by mouth daily as needed (SOB, edema, weight gain) 30 tablet 5    Cholecalciferol (VITAMIN D) 2000 UNITS CAPS capsule Take 2,000 Units by mouth daily           Allergies: Allergies   Allergen Reactions    Diclofenac Hives     Severe itching    Adhesive Tape Rash    Penicillins Nausea And Vomiting     \"years ago\"          Social History:   reports that she has never smoked. She has never used smokeless tobacco. She reports that she does not drink alcohol and does not use drugs. Family History:              Problem Relation Age of Onset    Heart Attack Father     Heart Disease Father     Stroke Brother            Physical Exam:  /83   Pulse 95   Temp 97 °F (36.1 °C) (Oral)   Resp 20   Ht 5' 7\" (1.702 m)   Wt 250 lb (113.4 kg)   SpO2 97%   BMI 39.16 kg/m²     General appearance:  Ill appearing . Weak . Lethargic    Eyes:  Sclera clear, pupils equal  ENT:  Dry  mucus membranes, Trachea midline. Cardiovascular: ir Regular rhythm, normal S1, S2. No edema in lower extremities   Respiratory:  Noted Dec AE B/ L . Gastrointestinal:  Abdomen soft,  non-tender, not distended,   Musculoskeletal:  No cyanosis in digits, neck supple  Neurology: lethargic . O X 1 ,.  BRAY X 4       Labs:     Recent Labs     02/09/22 1923   WBC 11.5*   HGB 10.5*   HCT 32.2*        Recent Labs     02/09/22 1923      K 4.1      CO2 21   BUN 61*   CREATININE 1.7*   CALCIUM 8.7     Recent Labs     02/09/22 1923   AST 28 ALT 54*   BILITOT 1.2*   ALKPHOS 103     Recent Labs     02/09/22 2029   INR 6.58*     Recent Labs     02/09/22 1923   TROPONINI 0.07*         Urinalysis:      Lab Results   Component Value Date    NITRU Negative 02/09/2022    WBCUA 4 02/09/2022    BACTERIA 3+ 12/15/2021    RBCUA None seen 02/09/2022    BLOODU Negative 02/09/2022    SPECGRAV 1.015 02/09/2022    GLUCOSEU Negative 02/09/2022         Radiology:         EKG/Telemonitor :    I have reviewed the EKG  AFIB RVR     IMAGING :     CT HEAD WO CONTRAST   Final Result   No acute intracranial abnormality. Stable pattern of atrophy and microvascular ischemic disease in the cerebral   white matter. XR CHEST PORTABLE   Final Result   Hypoinflated, grossly clear lungs. Cardiomegaly. No acute cardiopulmonary   abnormality. CT CHEST WO CONTRAST    (Results Pending)         PROBLEM LIST [ ACTIVE + CHRONIC ]     Active Hospital Problems    Diagnosis Date Noted    Pure hypercholesterolemia [E78.00]      Priority: High    Pneumonia of both lower lobes due to infectious organism [J18.9]      Priority: High    Acute hypoxemic respiratory failure (HCC) [J96.01] 02/09/2022    Supratherapeutic INR [R79.1]     Frequent falls [R29.6] 08/03/2021    Gastroesophageal reflux disease without esophagitis [K21.9]     Chronic diastolic heart failure (HCC) [I50.32] 12/26/2017    Stage 3 chronic kidney disease ([facility] [N18.30] 12/26/2017    Atrial fibrillation with RVR ([facility] [I48.91]     Morbid obesity with BMI of 40.0-44.9, adult ([facility] [E66.01, Z68.41] 09/10/2007    Essential hypertension [I10] 09/10/2007    Sleep apnea [G47.30] 03/01/2002           ACUTE & CHRONIC MEDICAL ISSUES, POA/PRESENT ON ADMISSION      · New onset hypoxemia 2/2 ? PNA vs CHF   · AFIB RVR POA . · Supra therapeutic INR POA . Is on coumadin @ ECF ? · Abnormal UA POA ? UTI   · Acute metabolic encephalopathy .  CT H shows No acute findings or is unremarkable for any acute pathology needing acute interventions, on review. · Troponin elevation POA ? 2/2 Demand supply mismatch   · SAMANTA over CKD III POA   · Known COVID + in 1/2022   · HTN Hx   · YUMIKO Hx   · Fall Hx         PERTINENT PLAN/ORDERS FOR ACTIVE MEDICAL ISSUES     · Medication received in ED and workup in ED so far Noted and reviewed as above. · Home medications for chronic medical problems Reviewed and Held/Resumed/Changes made, as clinically warranted/Indicated. · NPO for now w/ meds . SLP eval and Rx in AM   · CT chest and needs f/u ? PNA vs CHF . BNP elevated and Cardiomegaly on CXR ? Krishna Ward No infiltrate on CXR noted . · Will Avoid/Hold any nephrotoxic medications [ Including but Not limiting to  NSAIDS/ACEI/ARBs/IV Contrast Dye ] . Renally dose medications, as needed. Strict I/Os. Will f/u trend of Renal functions, during inpatient stay and monitor closely   · Empiric Abx for Abnormal UA started . F/u Cx ? Cause for AMS   · Gentle IVF for SAMANTA started . F/u renal functions in house   · CARDS c/s for Trops elevation and AFIB RVR POA   · Pharmacy c/s for coumadin mgt in house . INR > 6 @ admission noted   · Neuro checks   · FC placement   · Check ABG   · Check Ammonia levels            · Please See EPIC Order for detailed plans of care and further Details. · DVT Prophylaxis . Is on coumadin @ ECF   ; + SCDs   · Nutrition/Diet. No diet orders on file  · Code Status . Prior  · PT/OT and ambulatory Eval Status. Ambulate with Assist    · Probable LOS & future Disposition planned post discharge . ECF in 2-3 days       CONSULTS ORDERED @ ADMISSION   IP CONSULT TO HOSPITALIST   IP CONSULT TO PHARMACY   IP CONSULT TO CARDIOLOGY      Medical Decision Making : HIGH     Total patient   Care [ Direct and Indirect ] time spent in evaluating the patient an discussing plan with appropriate staff/patient/family members is 54 min       Lorenza Poe MD    Hospitalist, [facility] [place] [facility] [place]    2/9/2022 10:56 PM

## 2022-02-10 NOTE — PROGRESS NOTES
Occupational Therapy   Occupational Therapy Initial Assessment  Date: 2/10/2022   Patient Name: Winsome Wright  MRN: [id number]     : 1940    Date of Service: 2/10/2022    Discharge Recommendations: Winsome Wright scored a 6/24 on the AM-PAC ADL Inpatient form. Current research shows that an AM-PAC score of 17 or less is typically not associated with a discharge to the patient's home setting. Based on the patient's AM-PAC score and their current ADL deficits, it is recommended that the patient have 3-5 sessions per week of Occupational Therapy at d/c to increase the patient's independence. Please see assessment section for further patient specific details. If patient discharges prior to next session this note will serve as a discharge summary. Please see below for the latest assessment towards goals. OT Equipment Recommendations  Equipment Needed: Yes  Mobility Devices: Hospital Bed  Transport Devices: Patient Mechanical Lift  Hospital Bed : Electric - Semi (Head of Bed); Electric - Full (Head of Bed); Electric - Full  (Height of Bed); Bed Rails - Full  Other: If pt were to discharge to home or non-skilled facility, she would require hospital bed, mechanical lift, bed pan as pt currently requiring 2 person assist for rolling in bed--will continue to assess pending medical status    Assessment   Performance deficits / Impairments: Decreased functional mobility ; Decreased safe awareness;Decreased balance;Decreased ADL status; Decreased cognition;Decreased ROM; Decreased endurance;Decreased high-level IADLs;Decreased strength  Assessment: Patient presents below baseline level of function d/t AMS. Limited assessment this date d/t unstable vitals at rest and with minimal activity. Pt would benefit from further OT services at this time to help her get back to her baseline level of function.   Treatment Diagnosis: Hypoxia  Prognosis: Poor  Decision Making: High Complexity  Exam: as seen above  Assistance / Modification: fxl mobility and ADLs not assessed this date  OT Education: OT Role;Plan of Care  Patient Education: pt not an independent learner  Barriers to Learning: cognition  REQUIRES OT FOLLOW UP: Yes  Activity Tolerance  Activity Tolerance: Treatment limited secondary to decreased cognition;Treatment limited secondary to medical complications (free text)  Activity Tolerance: D/t unstable vitals during bed mobility, further mobility was not safe at this time. Pt HR 120s-140s at rest, RR 25-40--SpO2 reading % throughout  [address] [place] [address] in place: Yes  Type of devices: All fall risk precautions in place;Call light within reach; Left in bed;Bed alarm in place;Nurse notified           Patient Diagnosis(es): The primary encounter diagnosis was Altered mental status, unspecified altered mental status type. Diagnoses of Yeast UTI, Person under investigation for COVID-19, Hypoxia, Troponin level elevated, Elevated brain natriuretic peptide (BNP) level, and Supratherapeutic INR were also pertinent to this visit. has a past medical history of Anemia, Arthritis, Atrial fibrillation ([address] Chronic kidney disease (CKD), stage II (mild), Community acquired pneumonia of left lower lobe of lung, Compression fracture of T3 vertebra ([address] Depression, Diastolic CHF ([address] Diverticulosis, GI bleed, Gout, Hemorrhoids, Hyperlipidemia, Hypertension, Hyperuricemia, Iron deficiency anemia, PONV (postoperative nausea and vomiting), Unspecified sleep apnea, and Vitamin D deficiency. has a past surgical history that includes Cholecystectomy (1974); Hysterectomy (1977); Knee arthroscopy (2005); bladder suspension (1997); Uvulopalatopharygoplasty (3/2002); eye surgery; Colonoscopy; Colonoscopy (11/2014); joint replacement (Right, 2017); Pain management procedure (Left, 8/11/2020); and Pain management procedure (N/A, 8/25/2020).     Treatment Diagnosis: Hypoxia      Restrictions  Restrictions/Precautions  Restrictions/Precautions: Isolation,Fall Risk (high fall risk, COVID-19)  Required Braces or Orthoses?: No  Position Activity Restriction  Other position/activity restrictions: 80 y/o female with recent COVID and chronic afib who was admitted with hypoxia and mental status changes. Subjective   General  Chart Reviewed: Yes  Patient assessed for rehabilitation services?: Yes  Family / Caregiver Present: No  Diagnosis: Hypoxia  Subjective  Subjective: Pt in bed upon arrival. Unable to verbalize agreement to therapy d/t AMS--presenting anxious, yelling/calling out names of family/friends, difficulty with following conversation  Patient Currently in Pain: Other (comment) (Unable to verbalize.)  Pain Assessment  Pain Assessment: Faces  Pain Level: 0  Pre Treatment Pain Screening  Intervention List: Nurse/Physician notified; Patient able to continue with treatment  Vital Signs  Pulse: 125  BP: (!) 156/97  MAP (mmHg): 110  Patient Currently in Pain: Other (comment) (Unable to verbalize.)  Oxygen Therapy  SpO2: 98 %  Pulse Oximeter Device Mode: Continuous  Pulse Oximeter Device Location: Finger;Right  O2 Device: Nasal cannula  O2 Flow Rate (L/min): 4 L/min     Social/Functional History  Social/Functional History  Lives With: Friend(s)  Type of Home: House  Home Layout: Two level,Able to Live on Main level with bedroom/bathroom,1/2 bath on main level,Bed/Bath upstairs  Home Access: Stairs to enter with rails  Entrance Stairs - Number of Steps: 2 MADHU -- 14 steps to upstairs bed/bath  Bathroom Shower/Tub: Tub/Shower unit  Bathroom Toilet: Handicap height  Bathroom Equipment: Shower chair,Grab bars in shower  Home Equipment: 4 wheeled walker,Cane,Lift chair  ADL Assistance: [address] [place] [address] Avenue: Needs assistance  Ambulation Assistance: Independent (cane or motorized scooter for grocery shopping.)  Transfer Assistance: Independent  Active : Yes  Occupation: Retired  Additional Comments: Pt very questionable historian. Information pulled over from previous evaluation. Pt in from SNF this admission.        Objective        Orientation  Overall Orientation Status: Impaired  Orientation Level: Oriented to person  Observation/Palpation  Posture: Poor  Functional Mobility  Functional Mobility Comments: unable to assess this date  ADL  Additional Comments: Unable to assess d/t AMS  Tone RUE  RUE Tone: Normotonic  Tone LUE  LUE Tone: Normotonic  Coordination  Movements Are Fluid And Coordinated: Yes     Bed mobility  Rolling to Left: 2 Person assistance;Dependent/Total  Rolling to Right: 2 Person assistance;Dependent/Total  Transfers  Transfer Comments: KENN d/t cognition/medical status     Cognition  Overall Cognitive Status: Exceptions  Arousal/Alertness: Inconsistent responses to stimuli  Following Commands: Does not follow commands  Attention Span: Unable to maintain attention  Memory: Decreased recall of recent events;Decreased short term memory;Decreased long term memory;Decreased recall of precautions;Decreased recall of biographical Information  Safety Judgement: Decreased awareness of need for safety;Decreased awareness of need for assistance  Problem Solving: Decreased awareness of errors  Insights: Not aware of deficits  Initiation: Requires cues for all  Sequencing: Requires cues for all        Plan   Plan  Times per week: 1-2  Times per day: Daily  Current Treatment Recommendations: Strengthening,Endurance Training,Self-Care / ADL,Balance Training,Cognitive/Perceptual Training,Cognitive Reorientation,Functional Mobility Training,Safety Education & Training,Patient/Caregiver Education & Training      AM-PAC Score        AM-PAC Inpatient Daily Activity Raw Score: 6 (02/10/22 1618)  AM-PAC Inpatient ADL T-Scale Score : 17.07 (02/10/22 1618)  ADL Inpatient CMS 0-100% Score: 100 (02/10/22 1618)  ADL Inpatient CMS G-Code Modifier : CN (02/10/22 1618)    Goals  Short term goals  Time Frame for Short term goals: d/c  Short term goal 1: sitting EOB max A x1  Short term goal 2: UB ADL mod A  Short term goal 3: grooming ADL mod A  Short term goal 4: increase sitting tolerance to ~1 minute to complete ADL  Short term goal 5: bed mobility max A  Long term goals  Time Frame for Long term goals : LTG = STG       Therapy Time   Individual Concurrent Group Co-treatment   Time In 0258         Time Out 0336         Minutes 38           Timed Code Treatment Minutes:  23 [id number] minutes    Total Treatment Minutes:  38 minutes     Irlanda Marin, S/OT    OT provided direct supervision to student, facilitated in making skilled judgements throughout duration of session.     GONZÁLEZ Mejia OTR/L [id number]     Jose Myles OT

## 2022-02-10 NOTE — PROGRESS NOTES
Admission and assessment completed. Vital signs stable, SPO2 98% on 4L. Grier catheter in place and draining. Patient alert and oriented to self only. Safety precautions in place. Call light and bed side table within reach.

## 2022-02-10 NOTE — CONSULTS
[facility]  Cardiac Consult     Referring Provider:  Grier Dakins, MD         History of Present Illness:  78 y/o female with recent COVID and chronic afib who was admitted with hypoxia and mental status changes and we were asked to see for elevated troponin. She was admitted in January with COVID. CT chest at that time with bilateral airspace disease. She was transferred yesterday from the nursing home to the ER with hypoxia and decreased mental status. Repeat CT with continued but improved bilateral airspace disease and bilateral mass like densities. She is unable to give any history. Started on empiric antibiotics. We were consulted for flat troponin of 0.07. No ischemic EKG change    Past Medical History:   has a past medical history of Anemia, Arthritis, Atrial fibrillation ([address] Chronic kidney disease (CKD), stage II (mild), Community acquired pneumonia of left lower lobe of lung, Compression fracture of T3 vertebra ([address] Depression, Diastolic CHF ([address] Diverticulosis, GI bleed, Gout, Hemorrhoids, Hyperlipidemia, Hypertension, Hyperuricemia, Iron deficiency anemia, PONV (postoperative nausea and vomiting), Unspecified sleep apnea, and Vitamin D deficiency. Surgical History:   has a past surgical history that includes Cholecystectomy (1974); Hysterectomy (1977); Knee arthroscopy (2005); bladder suspension (1997); Uvulopalatopharygoplasty (3/2002); eye surgery; Colonoscopy; Colonoscopy (11/2014); joint replacement (Right, 2017); Pain management procedure (Left, 8/11/2020); and Pain management procedure (N/A, 8/25/2020). Social History:   reports that she has never smoked. She has never used smokeless tobacco. She reports that she does not drink alcohol and does not use drugs. Family History:  family history includes Heart Attack in her father; Heart Disease in her father; Stroke in her brother.      Medications:   allopurinol  300 mg Oral Daily    atorvastatin  20 mg Oral Daily    dilTIAZem  300 mg Oral Daily    Vitamin D  2,000 Units Oral Daily    DULoxetine  40 mg Oral Daily    metoprolol tartrate  25 mg Oral BID    sodium chloride flush  5-40 mL IntraVENous 2 times per day    cefTRIAXone (ROCEPHIN) IV  1,000 mg IntraVENous Q24H    insulin lispro  0-12 Units SubCUTAneous TID WC    insulin lispro  0-6 Units SubCUTAneous Nightly    warfarin placeholder: dosing by pharmacy   Other RX Placeholder         Allergies:  Diclofenac, Adhesive tape, and Penicillins     ? Medications and dosages reviewed. ROS:  ?Full ROS obtained and negative except as mentioned in HPI      Physical Examination:    Vitals:    02/10/22 0500   BP: (!) 144/75   Pulse: 106   Resp: 16   Temp:    SpO2: 99%        · GENERAL: elderly ill appearing female moaning  · NEUROLOGICAL: Not alert or oriented  · PSYCH: Calm affect  · SKIN: Warm and dry, No visible rash,   · EYES: Pupils equal and round, Sclera non-icteric,   · HENT:  External ears and nose unremarkable, mucus membranes moist  · MUSCULOSKELETAL: Normal cephalic, neck supple  · CAROTID: Normal upstroke, no bruits  · CARDIAC: JVP normal, Normal PMI, tachycardic irregular rate and rhythm, normal S1S2, no murmur, rub, or gallop  · RESPIRATORY: Normal respiratory effort, coarse BS bilaterally  · EXTREMITIES: No edema  · GASTROINTESTINAL: normal bowel sounds, soft, non-tender, No hepatomegaly     All testing and labs listed below were personally reviewed. CT CHEST  Impression:  Persistent, but improved multifocal consolidation compared to CT exam   01/03/2022. Masslike opacities remain bilaterally, for which continued   noncontrast CT follow-up in 3 months is recommended to ensure complete   resolution. CT HEAD  Impression:  No acute intracranial abnormality. Stable pattern of atrophy and microvascular ischemic disease in the cerebral   white matter. CXR  A portable upright frontal view chest radiograph was obtained. Lung volumes are low.   The heart is enlarged. The mediastinal contour and   pleural spaces are otherwise within normal limits. The lungs are grossly   clear. There is no focal consolidation or pneumothorax. The pulmonary   vascular pattern is within normal limits. No acute thoracic osseous   abnormality. Impression:  Hypoinflated, grossly clear lungs. Cardiomegaly. No acute cardiopulmonary   abnormality. LABS  Procalcitonin0.27 High ng/mL   Pro-BNP3,943 High pg/mL   Ynlbpdx12wmqr/L   pH, Arterial7.435 pCO2, Aahzqmrh07.1mmHg pO2, Jjcfhqdu13.5 Low mmHg   Calcium8.8mg/dL Total Protein5. 7 Low g/dL Albumin3. 2 Low g/dL Albumin/Globulin Ratio1. 3 Total Bilirubin1.1 High mg/dL Alkaline Rlkuninersz705S/L ALT58 High U/L AST22U/L   Ivylyy534bimc/L Potassium reflex Magnesium3.7mmol/L Irvioccy271rdef/L  Low mmol/L Anion Gap16 Rthhmqo751 High mg/dL BUN61 High mg/dL CREATININE1.5 High mg/dL   INR 6.65 High Panic       Ref. Range 1/3/2022 18:35 2/9/2022 19:23 2/10/2022 02:37 2/10/2022 07:27   Troponin Latest Ref Range: <0.01 ng/mL <0.01 0.07 (H) 0.07 (H) 0.07 (H)       EKG: (tracings reviewed)  Afib, Non-specific ST changes    ECHO 9/2020     Summary   Left ventricular systolic function is low normal with a visually estimated   ejection fraction of 50-55%. Normal wall motion   The left atrium appears moderate to severely enlarged. Mild tricuspid regurgitation. Systolic pulmonary artery pressure (SPAP) is normal and estimated at 36 mmHg   (right atrial pressure 8 mmHg). Assessment:     Afib:  Chronic  Rate in low 100's which is probably appropriate for her illness. Continue Cardizem  Coumadin held for elevated INR    Elevated troponin:  Secondary to stress and renal insufficiency. No evidence of ACS  No indication for cardiac intervention    Decreased mental status:  Metabolic. Secondary to illness    Possible sepsis:  Agree with antibiotics  No clear source. Lungs very abnormal but no great change.  Follow    Renal insufficiency:  Acute on chronic but close to baseline    Hypoxia:  R/O COVID  Likely due to lung issues.  Doubt CHF  Repeat ECHO    Plan:  Treat possible sepsis  Hold coumadin and follow INR  Continue Cardizem and follow tele  ECHO    Thank you for allowing me to participate in the care of this individual.      Elijah Stevens M.D., [facility]

## 2022-02-10 NOTE — PROGRESS NOTES
Attempt to contract Daughter Bright Mahan to very information on MRI questionnaire, no answer, voice message left to return call when available.

## 2022-02-10 NOTE — ED NOTES
PT family provided with visitation restriction policy under ABOJQ-22 precautions, told she will be updated via phone until negative test result. Kian Hauser (daughter) is POA, please call with any updates at [phone].      Afia Bonner RN  02/09/22 [id number]

## 2022-02-10 NOTE — PROGRESS NOTES
Facility/Department: [address]  Initial Assessment  DYSPHAGIA BEDSIDE SWALLOW EVALUATION     Patient: Anahi Aden   : 1940   MRN: [id number]      Evaluation Date: 2/10/2022   Admitting Diagnosis: Hypoxia [R09.02]  Troponin level elevated [R77.8]  Elevated brain natriuretic peptide (BNP) level [R79.89]  Yeast UTI [B37.49]  Supratherapeutic INR [R79.1]  Altered mental status, unspecified altered mental status type [R41.82]  Acute hypoxemic respiratory failure ([facility] [address] [J96.01]  Person under investigation for COVID-19 [Z20.822]  Pain: denies                        H&P:  \"Briefly, Anahi Aden is a 79 y.o. female  who presents to the ED complaining of hypoxia and SOB, with confusion/AMS recently as well. Lives at [facility]. No headaches, no chest pains, no abdominal pains. On my evaluation she is a bit disoriented/confused but answers questions about her symptoms consistently. No reports of fevers or nausea/vomiting. \"    Chest CT:   Impression   Persistent, but improved multifocal consolidation compared to CT exam   2022.  Masslike opacities remain bilaterally, for which continued   noncontrast CT follow-up in 3 months is recommended to ensure complete   resolution. Head CT:   Impression   No acute intracranial abnormality.       Stable pattern of atrophy and microvascular ischemic disease in the cerebral   white matter. History/Prior Level of Function:   Living Status: ECF   Prior Dysphagia History: No prior SLP noted in chart review, pt currently NPO pending SLP assessment. Per hard chart, pt is on a Regular texture diet with thin liquids at baseline     Dysphagia Impressions/Diagnosis: Oropharyngeal Dysphagia   Pt was positioned upright in bed on O2 via nasal cannula. She was sleeping upon SLP entrance into room and required ongoing stimulation to maintain alertness. She was oriented to person only. Oral mechanism exam; decreased ROM/Coordination.  Trials of ice chips, thin liquids (via straw) and puree were provided to assess swallow function. Pt was unable to self feed. Dysphagia appeared to be characterized by decreased oral cavity closure/reduced labial seal, decreased bolus manipulation, oral holding, suspected delayed swallow initiation and s/s of delayed pharyngeal clearing with multiple swallows with all textures. One instance of delayed coughing was noted following thin liquid trials. Respiratory rate was elevated throughout ranging from 25-40. As session progressed pt demonstrated increased difficulty maintaining adequate alertness for PO. At this time pt does not demonstrate safe ability to accept PO due to increased respiratory rate and poor level of alertness. Recommend NPO pending ongoing assessment of swallow function with allowance of necessary meds with puree and small sips of thin water for pleasure. Recommended Diet and Intervention 2/10/2022:  NPO with ongoing assessment of swallow function, allow small sips of thin water for pleasure and necessary PO meds with puree     Compensatory Swallowing Strategies:  TBD     SHORT TERM DYSPHAGIA GOALS/PLAN OF CARE: Speech therapy for dysphagia tx 3-5 times per week during acute care stay.     Pt will functionally tolerate ongoing assessment of swallow function with diet to be determined as indicated   Pt will advance to least restrictive diet as indicated     Dysphagia Therapeutic Intervention:  Oral Care, Diet Tolerance Monitoring , Patient/Family Education , Therapeutic Trials with SLP     Discharge Recommendations: Recommend ongoing SLP for dysphagia therapy upon discharge from hospital     Patient Positioning: Upright in bed    Current Diet Level (prior to evaluation): NPO     Respiratory Status:   []Room Air   [x]O2 via nasal cannula   []Other:    Dentition:  [x]Adequate  []Dentures   []Missing Many Teeth  []Edentulous  []Other:    Baseline Vocal Quality:  []Normal  []Dysphonic   []Aphonic []Hoarse  []Wet  [x]Weak  []Other:    Volitional Cough:  []Strong  [x]Weak  []Wet  []Absent  []Congested  []Other:    Volitional Swallow:   []Absent   []Delayed     []Adequate     [x]Required use of drink     Oral Mechanism Exam:  []WFL [x]Mild   [] Moderate  []Severe  []To be assessed  Impaired:   []Left side      []Right side    [x]Labial ROM/Coordination    []Labial Symmetry   [x]Lingual ROM/Coordination   []Lingual Symmetry  []Gag  []Other:     Oral Phase: []WFL [x]Mild   [x] Moderate  []Severe  []To be assessed   [x]Impaired/Prolonged Mastication:   []Spillage Left:   []Spillage Right:  []Pocketing Left:   []Pocketing Right:   [x]Decreased Anterior to Posterior Transit:   [x]Suspected Premature Bolus Loss:   []Lingual/Palatal Residue:   [x]Other: oral holding     Pharyngeal Phase: []WFL []Mild   [x] Moderate  []Severe  []To be assessed   [x]Delayed Swallow:   []Suspected Pharyngeal Pooling:   []Decreased Laryngeal Elevation:   []Absent Swallow:  []Wet Vocal Quality:   []Throat Clearing-Immediate:   []Throat Clearing-Delayed:   []Cough-Immediate:   [x]Cough-Delayed: with thins x1   []Change in Vital Signs:  [x]Suspected Delayed Pharyngeal Clearing:  []Other:       Eating Assistance:  []Independent  []Setup or clean-up assistance   [] Supervision or touching assistance   [] Partial or moderate assistance   [] Substantial or maximal assistance  [x] Dependent       EDUCATION:   Provided education regarding role of SLP, results of assessment, recommendations and general speech pathology plan of care. [] Pt verbalized understanding and agreement   [] Pt requires ongoing learning   [x] No evidence of comprehension     If patient discharges prior to next visit, this note will serve as discharge.      Timed Code Minutes: 0 minutes   Total Treatment Minutes:  30 minutes     Electronically signed by:    Shae Kelley MA 1 \[facility]\""  Speech Language Pathologist

## 2022-02-10 NOTE — ED NOTES
This RN attempted peripheral IV placement at this time unsuccessfully.      Cristofer Green RN  02/09/22 [id number]

## 2022-02-10 NOTE — ED PROVIDER NOTES
I independently saw performed a substantive portion of the visit (history, physical, and MDM) on Azeb Peraza. All diagnostic, treatment, and disposition decisions were made by myself in conjunction with the advanced practice provider. I have participated in the medical decision making and directed the treatment plan and disposition of the patient. For further details of [facility] emergency department encounter, please see the advanced practice provider's documentation. CHIEF COMPLAINT  Chief Complaint   Patient presents with    Altered Mental Status     Pt in via [facility] from [facility]. EMS states that the pt is altered with a last known well of sometime today (02/09/22). 86% on RA to 97% Nasal Cannula. Pt repeating the phrase \"momma please take me with you\" or \"momma please\" and asking to die at triage. NH states that the pt is normally awake and alert.  Other     NH also states that they suspect bleeding based on abnormal labs and want her checked out. Briefly, Azeb Peraza is a 79 y.o. female  who presents to the ED complaining of hypoxia and SOB, with confusion/AMS recently as well. Lives at [facility]. No headaches, no chest pains, no abdominal pains. On my evaluation she is a bit disoriented/confused but answers questions about her symptoms consistently. No reports of fevers or nausea/vomiting. FOCUSED PHYSICAL EXAMINATION  /64   Pulse 106   Temp 97 °F (36.1 °C) (Oral)   Resp 16   Ht 5' 7\" (1.702 m)   Wt 250 lb (113.4 kg)   SpO2 99%   BMI 39.16 kg/m²    Focused physical examination notable for mild acute distress, well-appearing, well-nourished, normal speech and confused/disoriented but without obvious facial droop, no obvious rash. No obvious cranial nerve deficits on my initial exam.  Regular rhythm, regular rate/borderline tachycardia. Bibasilar rales noted. Mild peripheral edema noted as well.   Abdomen is soft nontender nondistended. The 12 lead EKG was interpreted by me as follows:  Rate: tachycardia with a rate of 101  Rhythm: atrial fibrillation  Axis: normal  Intervals: narrow QRS  ST segments: no ST elevations or depressions  T waves: no abnormal inversions  Non-specific T wave changes: present  Prior EKG comparison: EKG dated 1/3/22 is not significantly different    MDM:  Diagnostic considerations included stroke, TIA, intracranial bleed, trauma, infection/sepsis, medication side effect, intoxication/withdrawal, metabolic/electrolyte abnormalities    ED course was notable for hypoxia stable on nasal cannula oxygen with encephalopathy likely polyfactorial.  Do not suspect appear septic picture. Patient has a yeast UTI and so was given Diflucan but chest x-ray did not show confluent infiltrate at this time. Patient's INR is supratherapeutic lowering concern for PE. Suspect with troponin and BNP elevation and mild creatinine elevation at the patient is exhibiting some signs of pulmonary edema so was given Lasix and nitroglycerin paste for now in addition to aspirin. Covid PCR pending, was given Decadron in case this ends up positive for now. Lactate is normal.    During the patient's ED course, the patient was given:  Medications   fluconazole (DIFLUCAN) 200 mg IVPB (has no administration in time range)   furosemide (LASIX) injection 40 mg (has no administration in time range)   aspirin chewable tablet 324 mg (has no administration in time range)   dexamethasone (PF) (DECADRON) injection 6 mg (has no administration in time range)        CLINICAL IMPRESSION  1. Altered mental status, unspecified altered mental status type    2. Yeast UTI    3. Person under investigation for COVID-19    4. Hypoxia    5. Troponin level elevated    6. Elevated brain natriuretic peptide (BNP) level    7. Supratherapeutic INR        DISPOSITION  Emeli Mckee was admitted in fair condition.     The plan is to admit to the hospital at this time under the hospitalist service. Hospitalist accepted the patient and will take over the patient's care. The total critical care time I independently spent while evaluating and treating this patient was 35 minutes. This excludes time spent doing separately billable procedures. This includes time at the bedside, data interpretation, medication management, obtaining critical history from collateral sources if the patient is unable to provide it directly, and physician consultation. Specifics of interventions taken and potentially life-threatening diagnostic considerations are listed above in the medical decision making. If this was a shared visit with an SUNG, the time in this attestation is non-concurrent critical care time out of the total shared critical care time provided by the SUNG and myself. This chart was created using Dragon dictation software. Efforts were made by me to ensure accuracy, however some errors may be present due to limitations of this technology.             Armando Deleon MD  02/09/22 7911

## 2022-02-10 NOTE — PROGRESS NOTES
Physical Therapy    Facility/Department: [facility]  Initial Assessment    NAME: Jonathan Pozo  : 1940  MRN: [id number]    Date of Service: 2/10/2022    Discharge Recommendations:Maria Isabel Mccullough scored a 6/24 on the AM-PAC short mobility form. Current research shows that an AM-PAC score of 17 or less is typically not associated with a discharge to the patient's home setting. Based on the patient's AM-PAC score and their current functional mobility deficits, it is recommended that the patient have 3-5 sessions per week of Physical Therapy at d/c to increase the patient's independence. Please see assessment section for further patient specific details. If patient discharges prior to next session this note will serve as a discharge summary. Please see below for the latest assessment towards goals. PT Equipment Recommendations  Equipment Needed: Yes  Mobility Devices: Hospital Bed;Transport Devices  Transport Devices: Patient [address] Bed : Electric - Full (Head of Bed); Electric - Full  (Height of Bed); Bed Rails - Quadrant  Other: Should pt not return to facility and d/c home, the patient and caretakers would benefit from the use of a hospital bed and mechanical lift. Pt currenlty requires 2 person assist for rolling and unsafe for attempted sitting EOB/transfers. Pt /caregiver would benefit from DME to facilitate improve pressure relief, repositioning, and decrease caregiver burden with ADLs. Assessment   Body structures, Functions, Activity limitations: Decreased functional mobility ; Decreased ROM; Decreased safe awareness;Decreased strength;Decreased endurance;Decreased balance;Decreased posture;Decreased coordination  Assessment: Patient previously living at home and ambulating independently with assistive device prior to hospitalization. Currently, patient is unsafe to ambulate or transfer due to unstable vitals and deficits in balance, strength, and endurance.  Patient would benefit from skilled PT services to address deficits and faciliate return to PLOF. Treatment Diagnosis: Functional mobility deficits  Prognosis: Poor  Decision Making: High Complexity  Clinical Presentation: unstable  PT Education: Orientation;PT Role  Patient Education: Patient unable to verbalize understanding. Barriers to Learning: Cognitive  REQUIRES PT FOLLOW UP: Yes  Activity Tolerance  Activity Tolerance: Patient limited by cognitive status;Treatment limited secondary to medical complications (free text)  Activity Tolerance: Pt's RR ranging from 22-40 throughout session, BP elevated, HR ranging from 120-140 at rest and during mobility, and SpO2 ranging from % during session on 4L O2. Pt with significant SOB/WOB at rest and with mobility. Patient Diagnosis(es): The primary encounter diagnosis was Altered mental status, unspecified altered mental status type. Diagnoses of Yeast UTI, Person under investigation for COVID-19, Hypoxia, Troponin level elevated, Elevated brain natriuretic peptide (BNP) level, and Supratherapeutic INR were also pertinent to this visit. has a past medical history of Anemia, Arthritis, Atrial fibrillation ([address] Chronic kidney disease (CKD), stage II (mild), Community acquired pneumonia of left lower lobe of lung, Compression fracture of T3 vertebra ([address] Depression, Diastolic CHF ([address] Diverticulosis, GI bleed, Gout, Hemorrhoids, Hyperlipidemia, Hypertension, Hyperuricemia, Iron deficiency anemia, PONV (postoperative nausea and vomiting), Unspecified sleep apnea, and Vitamin D deficiency. has a past surgical history that includes Cholecystectomy (1974); Hysterectomy (1977); Knee arthroscopy (2005); bladder suspension (1997); Uvulopalatopharygoplasty (3/2002); eye surgery; Colonoscopy; Colonoscopy (11/2014); joint replacement (Right, 2017);  Pain management procedure (Left, 8/11/2020); and Pain management procedure (N/A, 8/25/2020). Restrictions  Restrictions/Precautions  Restrictions/Precautions: Isolation,Fall Risk (high fall risk, COVID-19)  Required Braces or Orthoses?: No  Position Activity Restriction  Other position/activity restrictions: 78 y/o female with recent COVID and chronic afib who was admitted with hypoxia and mental status changes. Vision/Hearing  Vision: Impaired  Vision Exceptions: Wears glasses at all times  Hearing: Within functional limits       Subjective  General  Chart Reviewed: Yes  Patient assessed for rehabilitation services?: Yes  Response To Previous Treatment: Not applicable  Family / Caregiver Present: No  Diagnosis: Hypoxia  Follows Commands: Impaired  Other (Comment): unable to follow any commands this date  Subjective  Subjective: Patient semi-fowlers in bed upon PT/OT arrival. Unable to verbalize agreement to session or pain due to altered mental status.   Pain Screening  Patient Currently in Pain: Other (comment) (Unable to verbalize.)  Vital Signs  Patient Currently in Pain: Other (comment) (Unable to verbalize.)  Oxygen Therapy  SpO2: 98 %  Pulse Oximeter Device Mode: Continuous  Pulse Oximeter Device Location: Finger;Right  O2 Device: Nasal cannula  O2 Flow Rate (L/min): 4 L/min     Orientation  Orientation  Overall Orientation Status: Impaired  Orientation Level: Oriented to person;Disoriented to situation;Disoriented to time;Disoriented to place (oriented to name only)     Social/Functional History  Social/Functional History  Lives With: Friend(s)  Type of Home: House  Home Layout: Two level,Able to Live on Main level with bedroom/bathroom,1/2 bath on main level,Bed/Bath upstairs  Home Access: Stairs to enter with rails  Entrance Stairs - Number of Steps: 2 MADHU -- 14 steps to upstairs bed/bath  Bathroom Shower/Tub: Tub/Shower unit  [employer]: Handicap height  Bathroom Equipment: Shower chair,Grab bars in shower  Home Equipment: 4 wheeled walker,Cane,Lift chair  ADL Assistance: Independent  Homemaking Assistance: Needs assistance  Ambulation Assistance: Independent (cane or motorized scooter for grocery shopping.)  Transfer Assistance: Independent  Active : Yes  Occupation: Retired  Additional Comments: Pt very questionable historian. Information pulled over from previous evaluation. Pt in from SNF this admission.      Cognition   Cognition  Overall Cognitive Status: Exceptions  Arousal/Alertness: Inconsistent responses to stimuli  Following Commands: Does not follow commands  Attention Span: Unable to maintain attention  Memory: Decreased recall of recent events;Decreased short term memory;Decreased long term memory;Decreased recall of precautions;Decreased recall of biographical Information  Safety Judgement: Decreased awareness of need for safety;Decreased awareness of need for assistance  Problem Solving: Decreased awareness of errors  Insights: Not aware of deficits  Initiation: Requires cues for all  Sequencing: Requires cues for all    Objective  Observation/Palpation  Posture: Poor  PROM RLE (degrees)  RLE General PROM: able to observe ~40% PROM during rolling tasks in bed  AROM RLE (degrees)  RLE General AROM: Unable to assess, pt unable to follow commands for assessment  PROM LLE (degrees)  LLE General PROM: able to observe ~40% PROM during rolling tasks in bed  AROM LLE (degrees)  LLE General AROM: Unable to assess, , pt unable to follow commands for assessment  Strength RLE  Strength RLE: Exception  Comment: pt unable to follow commands for formal assessment, grossly 0/5 based off observation of functional mobility  Strength LLE  Strength LLE: Exception  Comment: pt unable to follow commands for formal assessment, grossly 0/5 based off observation of functional mobility  Bed mobility  Rolling to Left: Dependent/Total (Dx2)  Rolling to Right: Dependent/Total (Dx2)  Comment: x1 each side  Ambulation  Ambulation?: No  Balance  Comments: Patient remained supine in bed throughout session. Plan   Plan  Times per week: 1-2x  Times per day: Daily  Current Treatment Recommendations: Strengthening,Neuromuscular Re-education,Home Exercise Program,Equipment Evaluation, Education, & procurement,Safety Education & Training,Manual Therapy - Soft Tissue Mobilization,Gait Training,Transfer Training,ROM,Balance Training,Endurance Training,Patient/Caregiver Education & Training,Positioning  Safety Devices  Type of devices: All fall risk precautions in place,Bed alarm in place,Call light within reach,Left in bed,Patient at risk for falls,Nurse notified  Restraints  Initially in place: No    AM-PAC Score  AM-PAC Inpatient Mobility Raw Score : 6 (02/10/22 1627)  AM-PAC Inpatient T-Scale Score : 23.55 (02/10/22 1627)  Mobility Inpatient CMS 0-100% Score: 100 (02/10/22 1627)  Mobility Inpatient CMS G-Code Modifier : CN (02/10/22 1627)     Goals  Short term goals  Time Frame for Short term goals: At time of d/c. Short term goal 1: Patient will perform rolling requiring MaxAx1. Short term goal 2: Patient will sit at EOB for 1 minute requiring MaxAx1 for trunk control. Short term goal 3: Patient will perform supine<>sit transfer requiring MaxAx1. Patient Goals   Patient goals : Did not state. Therapy Time   Individual Concurrent Group Co-treatment   Time In 3180         Time Out 1536         Minutes 38         Timed Code Treatment Minutes: 23 Minutes    Timed Code Treatment Minutes:  23 Minutes    Total Treatment Minutes:  Pr-155 Raquel Garrido, SPT    PT providing direct supervision during session and assisting in making skilled judgements throughout session.   [id number] [facility] PT, DPT [id number]    [address] [place] [address], PT

## 2022-02-11 VITALS
HEIGHT: 67 IN | DIASTOLIC BLOOD PRESSURE: 43 MMHG | BODY MASS INDEX: 36.88 KG/M2 | TEMPERATURE: 96.6 F | WEIGHT: 235 LBS | OXYGEN SATURATION: 99 % | SYSTOLIC BLOOD PRESSURE: 54 MMHG

## 2022-02-11 LAB
ESTIMATED AVERAGE GLUCOSE: 145.6 MG/DL
HBA1C MFR BLD: 6.7 %

## 2022-02-11 NOTE — PROGRESS NOTES
glucagon (rDNA), dextrose      Intake/Output Summary (Last 24 hours) at 2/10/2022 1915  Last data filed at 2/10/2022 1620  Gross per 24 hour   Intake 266.18 ml   Output 750 ml   Net -483.82 ml       Physical Exam Performed:    BP (!) 156/97   Pulse 125   Temp 96.3 °F (35.7 °C) (Temporal)   Resp 22   Ht 5' 7\" (1.702 m)   Wt 235 lb (106.6 kg)   SpO2 98%   BMI 36.81 kg/m²     General appearance: Obese, confused, not following commands  HEENT: Pupils equal, round, and reactive to light. Conjunctivae/corneas clear. Neck: Supple, with full range of motion. No jugular venous distention. Trachea midline. Respiratory:  BL rhonchi. No wheezing or rales  Cardiovascular: Tachycardia, irregularly irregular without murmurs, rubs or gallops. Abdomen: Soft, non-tender, obese non-distended with normal bowel sounds. Musculoskeletal: No clubbing, cyanosis or edema bilaterally. Full range of motion without deformity. Skin: Skin color, texture, turgor normal.  No rashes or lesions.   Neurologic:  Moving all extremities   Psychiatric: Easily agitated   Capillary Refill: Brisk,3 seconds, normal   Peripheral Pulses: +2 palpable, equal bilaterally       Labs:   Recent Labs     02/09/22  1923 02/10/22  0237   WBC 11.5* 10.6   HGB 10.5* 11.3*   HCT 32.2* 33.3*    146     Recent Labs     02/09/22  1923 02/10/22  0237    137   K 4.1 3.7    102   CO2 21 19*   BUN 61* 61*   CREATININE 1.7* 1.5*   CALCIUM 8.7 8.8     Recent Labs     02/09/22  1923 02/10/22  0237   AST 28 22   ALT 54* 58*   BILITOT 1.2* 1.1*   ALKPHOS 103 104     Recent Labs     02/09/22  2029 02/10/22  0237   INR 6.58* 6.65*     Recent Labs     02/09/22  1923 02/10/22  0237 02/10/22  0727   TROPONINI 0.07* 0.07* 0.07*       Urinalysis:      Lab Results   Component Value Date    NITRU Negative 02/09/2022    WBCUA 4 02/09/2022    BACTERIA 3+ 12/15/2021    RBCUA None seen 02/09/2022    BLOODU Negative 02/09/2022    SPECGRAV 1.015 02/09/2022    GLUCOSEU Negative 02/09/2022       Radiology:  CT CHEST WO CONTRAST   Final Result   Persistent, but improved multifocal consolidation compared to CT exam   01/03/2022. Masslike opacities remain bilaterally, for which continued   noncontrast CT follow-up in 3 months is recommended to ensure complete   resolution. CT HEAD WO CONTRAST   Final Result   No acute intracranial abnormality. Stable pattern of atrophy and microvascular ischemic disease in the cerebral   white matter. XR CHEST PORTABLE   Final Result   Hypoinflated, grossly clear lungs. Cardiomegaly. No acute cardiopulmonary   abnormality. MRI BRAIN WO CONTRAST    (Results Pending)           Assessment/Plan:    Active Hospital Problems    Diagnosis     Pure hypercholesterolemia [E78.00]      Priority: High    Aspiration pneumonia of both lower lobes ([address] [J69.0]      Priority: High    Acute metabolic encephalopathy [D33.11]     Acute hypoxemic respiratory failure (HCC) [J96.01]     Supratherapeutic INR [R79.1]     Frequent falls [R29.6]     Gastroesophageal reflux disease without esophagitis [K21.9]     Chronic diastolic heart failure ([facility]) [I50.32]     Stage 3 chronic kidney disease ([address] [N18.30]     Atrial fibrillation with RVR ([facility]) [I48.91]     Morbid obesity with BMI of 40.0-44.9, adult ([facility]) [E66.01, Z68.41]     Essential hypertension [I10]     Sleep apnea [G47.30]      1. Check MRI Brain to r/o stroke  2. Change to Zosyn IV for aspiration PNA  3. IVF bolus now  4. Continue IVF  5. Cardizem gtt for Afib with RVR  6. NPO, she failed swallow eval  7. PT/OT scores are much worse than at [facility]  8. Geodon IM PRN agitation  9. COVID positive again; unclear if this is culprit of entire condition  10.  DNR-CCA/DNI  11. Daily INR    DVT Prophylaxis: Supra therapeutic INR from Coumadin  Diet: Diet NPO Exceptions are: Sips of Water with Meds, Ice Chips, Sips of Clear Liquids  Code Status: DNR-CCA    PT/OT Eval Status: Following    Dispo - Unclear    Discussed with ICU nursing, CM, PT and OT. Discussed extensively with daughter Mary Pelletier. There are multiple conditions here that present her condition as very guarded. I am concerned she will not be able to meaningfully participate in improvement. If her MRI Brain is negative for stroke, I think comfort care is best.    Critical care time with patient was 35 minutes excluding separately billable procedures.     Asif Cates MD

## 2022-02-11 NOTE — PROGRESS NOTES
[facility] at [phone]: Patient has had no IV access for a day now. I was told that she needed a PICC placed and that the team was notified, but there is no order for a PICC. We tried to get an IV in her again tonight and were not successful. Can I get an order please? PT is NPO at this time. Orders placed for a consult to Nephrology and orders placed for PICC placement. Perfect Serve sent to [facility] at [phone]: Nephrology approved. Patients INR is 6.65. PICC team will most likely not place one on her. Waiting to hear back from them now. I will be asking for them to at least place a peripheral or two for tonight. [phone]: Wanting to get an ABG. Looks like come color change and mental status change. Orders placed for ABG. Staff unable to obtain at this time    Perfect Serve sent to [facility] at [phone]: FYI patient has passed. Time of death was 2326. 2 nurses confirmed and family notified by charge nurse. Response at 2330: Maribel Espinal.  Thank you

## 2022-02-11 NOTE — SIGNIFICANT EVENT
\" Death Pronunciation\" Note     Patient Active Problem List   Diagnosis    Sleep apnea    Diverticulosis    Hemorrhoids    Pelvic relaxation    Hyperuricemia    Menopausal syndrome    Gout    Bilateral knee pain    Morbid obesity with BMI of 40.0-44.9, adult (HCC)    Essential hypertension    Atrial fibrillation with RVR (HCC)    Primary localized osteoarthrosis, hand    Primary osteoarthritis of right knee    Chronic diastolic heart failure (HCC)    Stage 3 chronic kidney disease (HCC)    Aspiration pneumonia of both lower lobes (HCC)    Pure hypercholesterolemia    Gastroesophageal reflux disease without esophagitis    Hyperglycemia    Gait disturbance    Acute diastolic congestive heart failure (HCC)    New onset of congestive heart failure (HCC)    Acute renal failure superimposed on stage 3 chronic kidney disease (HCC)    Lumbar disc disease    Frequent falls    COVID-19 virus infection    Compression fracture of T3 vertebra (HCC)    Compression fracture of T4 vertebra (HCC)    Fracture of manubrium, initial encounter for closed fracture    Physical deconditioning    Slow transit constipation    Supratherapeutic INR    Delirium    Acute hypoxemic respiratory failure (HCC)    Acute metabolic encephalopathy       Date of death : 2/10/22  Time of death :     Patient seen and examined . Noted no spontaneous breathing efforts   Pupils fixed and dilated   Pulseless   No recordable BP    EKG Flat line     Declared  @ 2/10/22    Family and Primary attending to be notified by Staff     Discharge Summary will be completed by \" Dr Nany Contreras  \"  who was the Primary MD/Attending for the patient while being Rxed @ [facility], MD    2022 2:19 AM    [facility].

## 2022-02-11 NOTE — CONSULTS
MD Tequila Mcneil MD Binnie Self, MD                  Office: [phone]                 Fax: [phone]         [address] [place] [address]                    PATIENT NAME: Audie Galeazzi  : 1940  MRN: [id number]    Consulted as w/ elevated Cr and need for PICC line. Risk < benefits of PICC line. Prefer dominant arm. Discussed with patient's RN. Thank you for allowing me to participate in this patient's care. Please do not hesitate to contact me for any questions/concerns. We will follow along with you.      Adamaris Coleman MD  [facility] [address] [place] [address] Road   Phone: [phone] or [address] [facility]  Fax: [phone]

## 2022-02-11 NOTE — PROGRESS NOTES
Pt rapidly declining. Change in mental status and sudden agonal breathing. Called and spoke to daughter, Marilu Oneill. Discussion about code status and how aggressive she wanted to be, as pt needs intubated and a central line placed for vasopressors emergently- daughter does not want intubation or a central line.  Will change code status to [facility]- other daughter, Chris Patel (who lives locally) on her way to hospital.

## 2022-02-11 NOTE — DISCHARGE SUMMARY
Hospital Medicine Discharge Summary    Patient: Raymond Sterling     Gender: female  : 1940   Age: 79 y.o. MRN: [id number]    Admitting Physician: Libertad Moralez MD  Discharge Physician: Katt Hidalgo MD     Code Status: DNR-CCA    Admit Date: 2022   Expiration Date: 2/10/22 at 2326 from [employer] 19 PNA    Disposition:      Discharge Diagnoses: Active Hospital Problems    Diagnosis Date Noted    Pure hypercholesterolemia [E78.00]      Priority: High    Aspiration pneumonia of both lower lobes ([address] [J69.0]      Priority: High    Acute metabolic encephalopathy [L35.23] 02/10/2022    Acute hypoxemic respiratory failure ([address] [J96.01] 2022    Supratherapeutic INR [R79.1]     Frequent falls [R29.6] 2021    Gastroesophageal reflux disease without esophagitis [K21.9]     Chronic diastolic heart failure (HCC) [I50.32] 2017    Stage 3 chronic kidney disease ([facility] [N18.30] 2017    Atrial fibrillation with RVR ([address] [I48.91]     Morbid obesity with BMI of 40.0-44.9, adult ([address] [E66.01, Z68.41] 09/10/2007    Essential hypertension [I10] 09/10/2007    Sleep apnea [G47.30] 2002           Condition at Discharge:  [address] Course:   80 yo F with history of fall on 2021, YUMIKO, obesity, HTN, Afib who was admitted at [facility] b/w 1/3/22 to 1/10/22 for COVID 19 PNA and discharged to [facility]. Patient sent to ER for AMS. Admitted as inpatient for acute metabolic encephalopathy, BL aspiration PNA, SAMANTA and afib with RVR. Started on IVF and IV Abx. Seen by PT/OT. Has profound change in functional capacity since last PT/OT eval at [facility]. Ordered for MRI Brain to r/o stroke. Changed to DNR-CCA/DNI after long discussion with daughter Faith Dillard on phone. She progressively worsened on 2/10/22.   She  comfortably on 2/10/22 at 2326 from COVID 19 PNA.       Discharge Medications:   Discharge Medication List as of 2022  2:45 AM Discharge Medication List as of 2/11/2022  2:45 AM        Discharge Medication List as of 2/11/2022  2:45 AM      CONTINUE these medications which have NOT CHANGED    Details   sertraline (ZOLOFT) 50 MG tablet Take 50 mg by mouth dailyHistorical Med      LORazepam (ATIVAN) 0.5 MG tablet Take 0.5 mg by mouth daily. Historical Med      dexamethasone (DECADRON) 6 MG tablet Take 6 mg by mouth daily (with breakfast)Historical Med      mirtazapine (REMERON) 7.5 MG tablet Take 7.5 mg by mouth nightlyHistorical Med      docusate sodium (COLACE) 100 MG capsule Take 1 capsule by mouth 2 times daily, Disp-60 capsule, R-2DC to SNF      warfarin (COUMADIN) 1 MG tablet Take 0.5 tablets by mouth daily Please continue to check PT/INR if your INR level is too low you may need to resume your original 1mg dosage as previously prescribed, Disp-15 tablet, R-0Print      DULoxetine HCl 40 MG CPEP Take 36 m by mouth dailyHistorical Med      acetaminophen (TYLENOL) 500 MG tablet Take 1 tablet by mouth 4 times daily as needed for Pain, Disp-120 tablet, R-0Normal      allopurinol (ZYLOPRIM) 300 MG tablet TAKE ONE TABLET BY MOUTH DAILY, Disp-90 tablet, R-3Normal      atorvastatin (LIPITOR) 20 MG tablet TAKE ONE TABLET BY MOUTH ONCE NIGHTLY, Disp-90 tablet, R-3Normal      omeprazole (PRILOSEC) 20 MG delayed release capsule TAKE ONE CAPSULE BY MOUTH DAILY, Disp-90 capsule, R-2Normal      metoprolol tartrate (LOPRESSOR) 25 MG tablet TAKE ONE TABLET BY MOUTH TWICE A DAY, Disp-180 tablet, R-1Normal      lidocaine (LIDODERM) 5 % Place 1 patch onto the skin daily 12 hours on, 12 hours off., Disp-30 patch, R-0Normal      CARTIA  MG extended release capsule TAKE ONE CAPSULE BY MOUTH DAILY, Disp-90 capsule, R-2Normal      furosemide (LASIX) 20 MG tablet Take 1 tablet by mouth daily as needed (SOB, edema, weight gain), Disp-30 tablet, R-5Adjust Sig      Cholecalciferol (VITAMIN D) 2000 UNITS CAPS capsule Take 2,000 Units by mouth daily Discharge Medication List as of 2/11/2022  2:45 AM      STOP taking these medications       pantoprazole (PROTONIX) 40 MG tablet Comments:   Reason for Stopping:                 Discharge Exam:    BP (!) 54/43   Pulse (!) 0   Temp 96.6 °F (35.9 °C) (Axillary)   Resp (!) 0   Ht 5' 7\" (1.702 m)   Wt 235 lb (106.6 kg)   SpO2 99%   BMI 36.81 kg/m²   See Dr Candy Delgado note    Karen Hazard: For convenience and continuity at follow-up the following most recent labs are provided:    Lab Results   Component Value Date    WBC 10.6 02/10/2022    HGB 11.3 02/10/2022    HCT 33.3 02/10/2022    .9 02/10/2022     02/10/2022     02/10/2022    K 3.7 02/10/2022     02/10/2022    CO2 19 02/10/2022    BUN 61 02/10/2022    CREATININE 1.5 02/10/2022    CALCIUM 8.8 02/10/2022    PHOS 3.9 10/15/2020    .0 02/06/2018    ALKPHOS 104 02/10/2022    ALT 58 02/10/2022    AST 22 02/10/2022    BILITOT 1.1 02/10/2022    BILIDIR <0.2 01/13/2016    LABALBU 3.2 02/10/2022    LDLCALC 56 10/11/2020    TRIG 97 10/11/2020     Lab Results   Component Value Date    INR 6.65 (HH) 02/10/2022    INR 6.58 (HH) 02/09/2022    INR 1.71 (H) 01/10/2022       Radiology:  CT HEAD WO CONTRAST    Result Date: 2/9/2022  EXAMINATION: CT OF THE HEAD WITHOUT CONTRAST  2/9/2022 7:54 pm TECHNIQUE: CT of the head was performed without the administration of intravenous contrast. Dose modulation, iterative reconstruction, and/or weight based adjustment of the mA/kV was utilized to reduce the radiation dose to as low as reasonably achievable. COMPARISON: 01/03/2022 HISTORY: ORDERING SYSTEM PROVIDED HISTORY: mental status change TECHNOLOGIST PROVIDED HISTORY: If patient is on cardiac monitor and/or pulse ox, they may be taken off cardiac monitor and pulse ox, left on O2 if currently on. All monitors reattached when patient returns to room. Has a \"code stroke\" or \"stroke alert\" been called? ->No Reason for exam:->mental status change Decision Support Exception - unselect if not a suspected or confirmed emergency medical condition->Emergency Medical Condition (MA) Reason for Exam: mental status change FINDINGS: BRAIN/VENTRICLES: There is no acute intracranial hemorrhage, mass effect or midline shift. No abnormal extra-axial fluid collection. The gray-white differentiation is maintained without evidence of an acute infarct. There is no evidence of hydrocephalus. There is a mild-to-moderate degree of generalized volume loss. There is scattered low-attenuation in the periventricular and centrum semiovale white matter, consistent with small vessel disease. ORBITS: The visualized portion of the orbits demonstrate no acute abnormality. SINUSES: The visualized paranasal sinuses and mastoid air cells demonstrate no acute abnormality. SOFT TISSUES/SKULL:  No acute abnormality of the visualized skull or soft tissues. No acute intracranial abnormality. Stable pattern of atrophy and microvascular ischemic disease in the cerebral white matter. CT CHEST WO CONTRAST    Result Date: 2/9/2022  EXAMINATION: CT OF THE CHEST WITHOUT CONTRAST 2/9/2022 10:43 pm TECHNIQUE: CT of the chest was performed without the administration of intravenous contrast. Multiplanar reformatted images are provided for review. Dose modulation, iterative reconstruction, and/or weight based adjustment of the mA/kV was utilized to reduce the radiation dose to as low as reasonably achievable. COMPARISON: Chest radiograph today. Chest CT 01/03/2022. HISTORY: ORDERING SYSTEM PROVIDED HISTORY: Hypoxemia ? PNA TECHNOLOGIST PROVIDED HISTORY: Reason for exam:->Hypoxemia ? PNA Decision Support Exception - unselect if not a suspected or confirmed emergency medical condition->Emergency Medical Condition (MA) Reason for Exam: Hypoxemia ? PNA. Relevant Medical/Surgical History: Altered Mental Status (Pt in via [facility] from [facility].  EMS states that the pt is altered with a last known well of sometime today (02/09/22). 86% on RA to 97% Nasal Cannula. Pt repeating the phrase \"momma please take me with you\" or \"momma please\" and asking to die at triage. NH states that the pt is normally awake and alert. ) FINDINGS: Mediastinum: Mildly enlarged main pulmonary artery size measuring 30 mm. Calcified atheromatous plaque and coronary calcifications are noted. No pericardial effusion. No enlarged or suspicious-appearing lymph nodes are identified. Moderate size hiatal hernia. Lungs/pleura: Multifocal consolidative opacities are again demonstrated but less extensive since the prior chest CT. Notable focal masslike consolidative opacities remain in the right lung apex, superior segment of the left lower lobe and posterior left lower lobe. No effusion. The central airway is patent. Upper Abdomen: No acute findings. Cholecystectomy. Soft Tissues/Bones: Compression fractures of T3 and T4 again demonstrated. Subacute fracture of the manubrium near the sternomanubrial junction again demonstrated. No new osseous abnormality identified. Persistent, but improved multifocal consolidation compared to CT exam 01/03/2022. Masslike opacities remain bilaterally, for which continued noncontrast CT follow-up in 3 months is recommended to ensure complete resolution. XR CHEST PORTABLE    Result Date: 2/9/2022  EXAMINATION: ONE XRAY VIEW OF THE CHEST 2/9/2022 5:26 pm COMPARISON: January 3, 2022. HISTORY: ORDERING SYSTEM PROVIDED HISTORY: SOB TECHNOLOGIST PROVIDED HISTORY: Reason for exam:->SOB Reason for Exam: SOB FINDINGS: A portable upright frontal view chest radiograph was obtained. Lung volumes are low. The heart is enlarged. The mediastinal contour and pleural spaces are otherwise within normal limits. The lungs are grossly clear. There is no focal consolidation or pneumothorax. The pulmonary vascular pattern is within normal limits. No acute thoracic osseous abnormality.      Hypoinflated, grossly clear lungs. Cardiomegaly. No acute cardiopulmonary abnormality. The patient was seen and examined on day of discharge and this discharge summary is in conjunction with any daily progress note from day of discharge. Time Spent on discharge is 45 minutes  in the examination, evaluation, counseling and review of medications and discharge plan. Note that more than 30 minutes was spent in preparing discharge papers, discussing discharge with patient, medication review, etc.       Signed:    Tomas Sanchez MD   2/11/2022      Thank you Christofer Castellanos MD for the opportunity to be involved in this patient's care.  If you have any questions or concerns please feel free to contact me at [facility]

## 2022-02-11 NOTE — PROGRESS NOTES
DIT VASCULAR ACCESS SERVICES    USG PIV PLACEMENT:  Initial call for PICC placement; however, pt is not a PICC candidate at this time d/t elevated INR 6.65 and need for Nephrology clearance for the PICC placement based on her hx CKD and slightly elevated creat; I offered PIV and RN requests that 2 PIVs be placed as pt is not doing well at this time; pt has no IV access; pt has no known arm restrictions; #20gu 1.75in sterile PIV placed successfully to the L upper arm, guided by US imagery; PIV is patent w brisk blood return and flushes easily without resistance; pt is actively dying and  before I was able to attempt 2nd PIV; bed is locked in lowest position upon my departure and RNs remain in pts room.     NICHOLAS Graves RN, BSN, Praveen Sharma.

## 2022-02-11 NOTE — PROGRESS NOTES
Assessment completed. VSS. Patient awake, alert, and in bed. Medications given per MAR. . Coverage provided. Grier patency maintained. Patient still needing IV access. Many failed attempts during day shift. Will have charge nurse try. Safety and isolation precautions maintained. Call light within reach. Will continue to monitor.

## 2022-02-11 NOTE — PROGRESS NOTES
Patient passed away at 2326. Time of death confirmed by two RN's. Family notified. [facility] notified within 1 hour of passing. Post Mortem care proved. Attempted to call [facility] to notify them of patients passing. Will try again in the morning.  home selected and notified.  home will  patient in the morning. Family came to visit patient. All questions answered at this time. Family left with patients clothes and glasses. Dentures left with patient for  home to have.

## 2022-02-13 LAB
BLOOD CULTURE, ROUTINE: NORMAL
CULTURE, BLOOD 2: NORMAL

## 2022-02-14 ENCOUNTER — CARE COORDINATION (OUTPATIENT)
Dept: CARE COORDINATION | Age: 82
End: 2022-02-14

## 2024-01-01 NOTE — CARE COORDINATION
[facility] Transitions Initial Follow Up Call    Call within 2 business days of discharge: Yes    Patient: Marcela Lawrence Patient : 1940   MRN: [id number]  Reason for Admission: At [facility] with RVR  Discharge Date: 18 RARS: [facility] Risk Score: 20.75     Spoke with: [address]: Hinduism    Non-face-to-face services provided:  Scheduled appointment with PCP-Dr Cruz Marrow 18  Scheduled appointment with Specialist-Dr Kumar Postal 18  Obtained and reviewed discharge summary and/or continuity of care documents  Education of patient/family/caregiver/guardian to support self-management-Review care  Assessment and support for treatment adherence and medication management-Med rec    Care Transitions 24 Hour Call    Do you have any ongoing symptoms?:  No  Do you have a copy of your discharge instructions?:  Yes  Do you have all of your prescriptions and are they filled?:  Yes  Have you been contacted by a [id number] [employer] Pharmacist?:  No  Have you scheduled your follow up appointment?:  Yes  How are you going to get to your appointment?:  Car - drive self  Were you discharged with any Home Care or Post Acute Services:  No  Do you have support at home?:  Roommate/Housemate  Do you feel like you have everything you need to keep you well at home?:  Yes  Are you an active caregiver in your home?:  No  Care Transitions Interventions     Care Transition post hospital f/u call. Pt states she is doing \"well\". Denies sob, papitations or weakness. She feels she is back to normal. She has her new Rx for Cardizem and has stopped the Metoprolol as ordered. Med rec completed without discrepancy. Noted pt had appt scheduled at anticoagulation clinic for today but pt said her INR was checked daily for 3 days with result 2.5 and she spoke with [address] [place] [address] and they advised she wait 1month and continue her same dose of coumadin which pt correctly verbalized. Pt scheduled her f/u appts as noted.   Agreeable to care transition calls. Follow Up  Future Appointments  Date Time Provider Juan Jose Ashley   2/9/2018 3:45 PM Genesis Dill MD [facility] [address] Card [facility]   2/14/2018 3:30 PM MD [facility] IM [facility]   3/9/2018 10:45 AM A ANTICOAGULATION CLINIC A ANTICOAG None   3/14/2018 11:30 AM Denver Severiano Norse, MD [facility] AND [facility]   4/11/2018 1:00 PM MD GARY [facility]   5/25/2018 1:00 PM Genesis Dill MD [address] Transition Coordinator  [phone]  [email or web address] .

## (undated) DEVICE — TOWEL,OR,DSP,ST,BLUE,STD,4/PK,20PK/CS: Brand: MEDLINE

## (undated) DEVICE — ALCOHOL RUBBING 16OZ 70% ISO

## (undated) DEVICE — GAUZE,SPONGE,4"X4",16PLY,STRL,LF,10/TRAY: Brand: MEDLINE

## (undated) DEVICE — STERILE POLYISOPRENE POWDER-FREE SURGICAL GLOVES: Brand: PROTEXIS

## (undated) DEVICE — CHLORAPREP 26ML ORANGE

## (undated) DEVICE — UNIVERSAL BLOCK TRAY: Brand: MEDLINE INDUSTRIES, INC.